# Patient Record
Sex: FEMALE | Race: WHITE | NOT HISPANIC OR LATINO | Employment: OTHER | URBAN - METROPOLITAN AREA
[De-identification: names, ages, dates, MRNs, and addresses within clinical notes are randomized per-mention and may not be internally consistent; named-entity substitution may affect disease eponyms.]

---

## 2017-11-09 ENCOUNTER — LAB REQUISITION (OUTPATIENT)
Dept: LAB | Facility: HOSPITAL | Age: 77
End: 2017-11-09
Payer: COMMERCIAL

## 2017-11-09 DIAGNOSIS — D12.5 BENIGN NEOPLASM OF SIGMOID COLON: ICD-10-CM

## 2017-11-09 DIAGNOSIS — K57.30 DIVERTICULOSIS OF LARGE INTESTINE WITHOUT PERFORATION OR ABSCESS WITHOUT BLEEDING: ICD-10-CM

## 2017-11-09 DIAGNOSIS — Z86.010 HISTORY OF COLONIC POLYPS: ICD-10-CM

## 2017-11-09 PROCEDURE — 88305 TISSUE EXAM BY PATHOLOGIST: CPT | Performed by: INTERNAL MEDICINE

## 2018-02-26 ENCOUNTER — TRANSCRIBE ORDERS (OUTPATIENT)
Dept: ADMINISTRATIVE | Facility: HOSPITAL | Age: 78
End: 2018-02-26

## 2018-02-26 DIAGNOSIS — R06.02 SHORTNESS OF BREATH: Primary | ICD-10-CM

## 2018-02-27 ENCOUNTER — TRANSCRIBE ORDERS (OUTPATIENT)
Dept: RADIOLOGY | Facility: HOSPITAL | Age: 78
End: 2018-02-27

## 2018-02-27 ENCOUNTER — HOSPITAL ENCOUNTER (OUTPATIENT)
Dept: RADIOLOGY | Facility: HOSPITAL | Age: 78
Discharge: HOME | End: 2018-02-27
Attending: INTERNAL MEDICINE
Payer: COMMERCIAL

## 2018-02-27 DIAGNOSIS — R06.02 SHORTNESS OF BREATH: ICD-10-CM

## 2018-02-27 PROCEDURE — 71275 CT ANGIOGRAPHY CHEST: CPT

## 2018-02-27 RX ADMIN — IOHEXOL 67 ML: 350 INJECTION, SOLUTION INTRAVENOUS at 11:23

## 2018-04-19 ENCOUNTER — TRANSCRIBE ORDERS (OUTPATIENT)
Dept: ADMINISTRATIVE | Facility: HOSPITAL | Age: 78
End: 2018-04-19

## 2018-04-20 ENCOUNTER — TRANSCRIBE ORDERS (OUTPATIENT)
Dept: ADMINISTRATIVE | Facility: HOSPITAL | Age: 78
End: 2018-04-20

## 2018-04-20 DIAGNOSIS — I15.2 ADRENAL HYPERTENSION (HCC): Primary | ICD-10-CM

## 2018-04-20 DIAGNOSIS — E27.9 ADRENAL HYPERTENSION (HCC): Primary | ICD-10-CM

## 2018-04-25 ENCOUNTER — HOSPITAL ENCOUNTER (OUTPATIENT)
Dept: RADIOLOGY | Age: 78
Discharge: HOME | End: 2018-04-25
Payer: COMMERCIAL

## 2018-04-25 DIAGNOSIS — E27.9 ADRENAL HYPERTENSION (HCC): ICD-10-CM

## 2018-04-25 DIAGNOSIS — I15.2 ADRENAL HYPERTENSION (HCC): ICD-10-CM

## 2018-04-25 PROCEDURE — 74150 CT ABDOMEN W/O CONTRAST: CPT

## 2019-02-13 ENCOUNTER — HOSPITAL ENCOUNTER (OUTPATIENT)
Dept: RADIOLOGY | Facility: HOSPITAL | Age: 79
Discharge: HOME | End: 2019-02-13
Attending: INTERNAL MEDICINE
Payer: COMMERCIAL

## 2019-02-13 ENCOUNTER — TRANSCRIBE ORDERS (OUTPATIENT)
Dept: ADMINISTRATIVE | Facility: HOSPITAL | Age: 79
End: 2019-02-13

## 2019-02-13 ENCOUNTER — APPOINTMENT (OUTPATIENT)
Dept: LAB | Facility: HOSPITAL | Age: 79
End: 2019-02-13
Attending: INTERNAL MEDICINE
Payer: COMMERCIAL

## 2019-02-13 ENCOUNTER — TRANSCRIBE ORDERS (OUTPATIENT)
Dept: LAB | Facility: HOSPITAL | Age: 79
End: 2019-02-13

## 2019-02-13 DIAGNOSIS — R06.02 SHORTNESS OF BREATH: Primary | ICD-10-CM

## 2019-02-13 DIAGNOSIS — R06.00 DYSPNEA, UNSPECIFIED TYPE: ICD-10-CM

## 2019-02-13 DIAGNOSIS — R06.00 DYSPNEA, UNSPECIFIED TYPE: Primary | ICD-10-CM

## 2019-02-13 DIAGNOSIS — R06.02 SHORTNESS OF BREATH: ICD-10-CM

## 2019-02-13 LAB
ALBUMIN SERPL BCP-MCNC: 3.8 G/DL (ref 3.5–5)
ALP SERPL-CCNC: 79 U/L (ref 46–116)
ALT SERPL W P-5'-P-CCNC: 56 U/L (ref 12–78)
ANION GAP SERPL CALCULATED.3IONS-SCNC: 7 MMOL/L (ref 4–13)
AST SERPL W P-5'-P-CCNC: 34 U/L (ref 5–45)
BASOPHILS # BLD AUTO: 0.02 THOUSANDS/ΜL (ref 0–0.1)
BASOPHILS NFR BLD AUTO: 0 % (ref 0–1)
BILIRUB SERPL-MCNC: 0.62 MG/DL (ref 0.2–1)
BUN SERPL-MCNC: 17 MG/DL (ref 5–25)
CALCIUM SERPL-MCNC: 9.2 MG/DL (ref 8.3–10.1)
CHLORIDE SERPL-SCNC: 101 MMOL/L (ref 100–108)
CO2 SERPL-SCNC: 29 MMOL/L (ref 21–32)
CREAT SERPL-MCNC: 0.96 MG/DL (ref 0.6–1.3)
EOSINOPHIL # BLD AUTO: 0.03 THOUSAND/ΜL (ref 0–0.61)
EOSINOPHIL NFR BLD AUTO: 0 % (ref 0–6)
ERYTHROCYTE [DISTWIDTH] IN BLOOD BY AUTOMATED COUNT: 12.1 % (ref 11.6–15.1)
GFR SERPL CREATININE-BSD FRML MDRD: 58 ML/MIN/1.73SQ M
GLUCOSE SERPL-MCNC: 223 MG/DL (ref 65–140)
HCT VFR BLD AUTO: 42.5 % (ref 34.8–46.1)
HGB BLD-MCNC: 13.9 G/DL (ref 11.5–15.4)
IMM GRANULOCYTES # BLD AUTO: 0.02 THOUSAND/UL (ref 0–0.2)
IMM GRANULOCYTES NFR BLD AUTO: 0 % (ref 0–2)
LYMPHOCYTES # BLD AUTO: 2.12 THOUSANDS/ΜL (ref 0.6–4.47)
LYMPHOCYTES NFR BLD AUTO: 28 % (ref 14–44)
MCH RBC QN AUTO: 31.4 PG (ref 26.8–34.3)
MCHC RBC AUTO-ENTMCNC: 32.7 G/DL (ref 31.4–37.4)
MCV RBC AUTO: 96 FL (ref 82–98)
MONOCYTES # BLD AUTO: 0.52 THOUSAND/ΜL (ref 0.17–1.22)
MONOCYTES NFR BLD AUTO: 7 % (ref 4–12)
NEUTROPHILS # BLD AUTO: 4.85 THOUSANDS/ΜL (ref 1.85–7.62)
NEUTS SEG NFR BLD AUTO: 65 % (ref 43–75)
NRBC BLD AUTO-RTO: 0 /100 WBCS
PLATELET # BLD AUTO: 256 THOUSANDS/UL (ref 149–390)
PMV BLD AUTO: 9.4 FL (ref 8.9–12.7)
POTASSIUM SERPL-SCNC: 3.6 MMOL/L (ref 3.5–5.3)
PROT SERPL-MCNC: 7.5 G/DL (ref 6.4–8.2)
RBC # BLD AUTO: 4.43 MILLION/UL (ref 3.81–5.12)
SODIUM SERPL-SCNC: 137 MMOL/L (ref 136–145)
WBC # BLD AUTO: 7.56 THOUSAND/UL (ref 4.31–10.16)

## 2019-02-13 PROCEDURE — 85025 COMPLETE CBC W/AUTO DIFF WBC: CPT

## 2019-02-13 PROCEDURE — 71275 CT ANGIOGRAPHY CHEST: CPT

## 2019-02-13 PROCEDURE — 36415 COLL VENOUS BLD VENIPUNCTURE: CPT

## 2019-02-13 PROCEDURE — 80053 COMPREHEN METABOLIC PANEL: CPT

## 2019-02-13 RX ADMIN — IOHEXOL 100 ML: 350 INJECTION, SOLUTION INTRAVENOUS at 16:02

## 2019-06-03 ENCOUNTER — TRANSCRIBE ORDERS (OUTPATIENT)
Dept: ADMINISTRATIVE | Age: 79
End: 2019-06-03

## 2019-06-03 ENCOUNTER — APPOINTMENT (OUTPATIENT)
Dept: RADIOLOGY | Age: 79
End: 2019-06-03
Payer: COMMERCIAL

## 2019-06-03 DIAGNOSIS — M25.551 RIGHT HIP PAIN: ICD-10-CM

## 2019-06-03 DIAGNOSIS — M25.551 RIGHT HIP PAIN: Primary | ICD-10-CM

## 2019-06-03 PROCEDURE — 73502 X-RAY EXAM HIP UNI 2-3 VIEWS: CPT

## 2019-06-28 ENCOUNTER — OFFICE VISIT (OUTPATIENT)
Dept: OBGYN CLINIC | Facility: MEDICAL CENTER | Age: 79
End: 2019-06-28
Payer: COMMERCIAL

## 2019-06-28 ENCOUNTER — APPOINTMENT (OUTPATIENT)
Dept: RADIOLOGY | Facility: CLINIC | Age: 79
End: 2019-06-28
Payer: COMMERCIAL

## 2019-06-28 VITALS — SYSTOLIC BLOOD PRESSURE: 124 MMHG | DIASTOLIC BLOOD PRESSURE: 84 MMHG | HEART RATE: 80 BPM | HEIGHT: 65 IN

## 2019-06-28 DIAGNOSIS — M25.551 PAIN IN RIGHT HIP: ICD-10-CM

## 2019-06-28 DIAGNOSIS — M16.11 PRIMARY OSTEOARTHRITIS OF ONE HIP, RIGHT: Primary | ICD-10-CM

## 2019-06-28 PROCEDURE — 73502 X-RAY EXAM HIP UNI 2-3 VIEWS: CPT

## 2019-06-28 PROCEDURE — 99203 OFFICE O/P NEW LOW 30 MIN: CPT | Performed by: ORTHOPAEDIC SURGERY

## 2019-06-28 RX ORDER — LEVOTHYROXINE SODIUM 25 MCG
25 TABLET ORAL DAILY
COMMUNITY
Start: 2019-04-12 | End: 2020-09-08

## 2019-06-28 RX ORDER — APIXABAN 5 MG/1
5 TABLET, FILM COATED ORAL 2 TIMES DAILY
COMMUNITY
Start: 2019-05-03 | End: 2020-12-14 | Stop reason: SDUPTHER

## 2019-06-28 RX ORDER — ATORVASTATIN CALCIUM 10 MG/1
10 TABLET, FILM COATED ORAL DAILY
COMMUNITY
Start: 2019-06-22 | End: 2021-06-05

## 2019-06-28 RX ORDER — AMLODIPINE BESYLATE 5 MG/1
5 TABLET ORAL DAILY
COMMUNITY
Start: 2019-04-01 | End: 2020-10-01

## 2019-06-28 RX ORDER — VALSARTAN AND HYDROCHLOROTHIAZIDE 160; 12.5 MG/1; MG/1
1 TABLET, FILM COATED ORAL DAILY
COMMUNITY
Start: 2019-04-12 | End: 2020-09-08

## 2019-07-08 ENCOUNTER — HOSPITAL ENCOUNTER (OUTPATIENT)
Dept: RADIOLOGY | Facility: MEDICAL CENTER | Age: 79
Discharge: HOME | End: 2019-07-08
Attending: PHYSICAL MEDICINE & REHABILITATION | Admitting: PHYSICAL MEDICINE & REHABILITATION
Payer: COMMERCIAL

## 2019-07-08 VITALS
RESPIRATION RATE: 18 BRPM | HEART RATE: 84 BPM | SYSTOLIC BLOOD PRESSURE: 151 MMHG | TEMPERATURE: 98.3 F | OXYGEN SATURATION: 93 % | DIASTOLIC BLOOD PRESSURE: 85 MMHG

## 2019-07-08 DIAGNOSIS — M16.11 PRIMARY OSTEOARTHRITIS OF RIGHT HIP: ICD-10-CM

## 2019-07-08 PROCEDURE — 20610 DRAIN/INJ JOINT/BURSA W/O US: CPT | Performed by: PHYSICAL MEDICINE & REHABILITATION

## 2019-07-08 PROCEDURE — 77002 NEEDLE LOCALIZATION BY XRAY: CPT

## 2019-07-08 PROCEDURE — 77002 NEEDLE LOCALIZATION BY XRAY: CPT | Performed by: PHYSICAL MEDICINE & REHABILITATION

## 2019-07-08 RX ORDER — LIDOCAINE HYDROCHLORIDE 10 MG/ML
5 INJECTION, SOLUTION EPIDURAL; INFILTRATION; INTRACAUDAL; PERINEURAL ONCE
Status: COMPLETED | OUTPATIENT
Start: 2019-07-08 | End: 2019-07-08

## 2019-07-08 RX ORDER — METHYLPREDNISOLONE ACETATE 40 MG/ML
40 INJECTION, SUSPENSION INTRA-ARTICULAR; INTRALESIONAL; INTRAMUSCULAR; PARENTERAL; SOFT TISSUE ONCE
Status: COMPLETED | OUTPATIENT
Start: 2019-07-08 | End: 2019-07-08

## 2019-07-08 RX ORDER — BUPIVACAINE HCL/PF 2.5 MG/ML
10 VIAL (ML) INJECTION ONCE
Status: COMPLETED | OUTPATIENT
Start: 2019-07-08 | End: 2019-07-08

## 2019-07-08 RX ADMIN — METHYLPREDNISOLONE ACETATE 40 MG: 40 INJECTION, SUSPENSION INTRA-ARTICULAR; INTRALESIONAL; INTRAMUSCULAR; PARENTERAL; SOFT TISSUE at 15:45

## 2019-07-08 RX ADMIN — IOHEXOL 2 ML: 300 INJECTION, SOLUTION INTRAVENOUS at 15:45

## 2019-07-08 RX ADMIN — Medication 3 ML: at 15:45

## 2019-07-08 RX ADMIN — LIDOCAINE HYDROCHLORIDE 1.5 ML: 10 INJECTION, SOLUTION EPIDURAL; INFILTRATION; INTRACAUDAL; PERINEURAL at 15:43

## 2019-07-08 NOTE — DISCHARGE INSTRUCTIONS
Steroid Joint Injection   WHAT YOU NEED TO KNOW:   A steroid joint injection is a procedure to inject steroid medicine into a joint  Steroid medicine decreases pain and inflammation  The injection may also contain an anesthetic (numbing medicine) to decrease pain  It may be done to treat conditions such as arthritis, gout, or carpal tunnel syndrome  The injections may be given in your knee, ankle, shoulder, elbow, wrist, ankle or sacroiliac joint  1  Do not apply heat to any area that is numb  If you have discomfort or soreness at the injection site, you may apply ice today, 20 minutes on and 20 minutes off  Tomorrow you may use ice or warm, moist heat  Do not apply ice or heat directly to the skin  2  You may have an increase or change in the discomfort for 36-48 hours after your treatment  Apply ice and continue with any pain medicine you have been prescribed  3  Do not do anything strenuous today  You may shower, but no tub baths or hot tubs today  You may resume your normal activities tomorrow, but do not overdo it  Resume normal activities slowly when you are feeling better  4  If you experience redness, drainage or swelling at the injection site, or if you develop a fever above 100 degrees, please call [facility] at [phone] or go to the Emergency Room  5  Continue to take all routine medicines prescribed by your primary care physician unless otherwise instructed by our staff  Most blood thinners should be started again according to your regularly scheduled dosing  If you have any questions, please give our office a call  If you have a problem specifically related to your procedure, please call our office at [phone]  Problems not related to your procedure should be directed to your primary care physician

## 2019-07-08 NOTE — H&P
History of Present Illness: The patient is a 70 y o  female who presents with complaints of right hip pain    There is no problem list on file for this patient  Past Medical History:   Diagnosis Date    Arthritis     Diabetes mellitus ([facility] [address] )     Hypertension        Past Surgical History:   Procedure Laterality Date    CHOLECYSTECTOMY      HYSTERECTOMY           Current Outpatient Medications:     amLODIPine (NORVASC) 5 mg tablet, Take 5 mg by mouth daily , Disp: , Rfl:     atorvastatin (LIPITOR) 10 mg tablet, Take 10 mg by mouth daily , Disp: , Rfl:     ELIQUIS 5 MG, Take 5 mg by mouth 2 (two) times a day , Disp: , Rfl:     SYNTHROID 25 MCG tablet, Take 25 mcg by mouth daily , Disp: , Rfl:     valsartan-hydrochlorothiazide (DIOVAN-HCT) 160-12 5 MG per tablet, Take 1 tablet by mouth daily , Disp: , Rfl:     Current Facility-Administered Medications:     bupivacaine (PF) (MARCAINE) 0 25 % injection 10 mL, 10 mL, Intra-articular, Once, Rikki Robison DO    iohexol (OMNIPAQUE) 300 mg/mL injection 50 mL, 50 mL, Injection, Once, Rikki Robison DO    lidocaine (PF) (XYLOCAINE-MPF) 1 % injection 5 mL, 5 mL, Infiltration, Once, Rikki Robison DO    methylPREDNISolone acetate (DEPO-MEDROL) injection 40 mg, 40 mg, Intra-articular, Once, Rikki Robison DO    No Known Allergies    Physical Exam:   Vitals:    07/08/19 1529   BP: 158/91   Pulse: 83   Resp: 18   Temp: 98 3 °F (36 8 °C)   SpO2: 95%     General: Awake, Alert, Oriented x 3, Mood and affect appropriate  Respiratory: Respirations even and unlabored  Cardiovascular: Peripheral pulses intact; no edema  Musculoskeletal Exam:  Right hip pain    ASA Score:  3  Patient/Chart Verification  Patient ID Verified: Verbal  Consents Confirmed: Procedural, To be obtained in the Pre-Procedure area  H&P( within 30 days) Verified: To be obtained in the Pre-Procedure area  Allergies Reviewed:  Yes  Anticoag/NSAID held?: NA  Currently on antibiotics?: No  Pregnancy denied?: NA    Assessment:   1   Primary osteoarthritis of right hip        Plan: RT HIP INJ - [place]

## 2019-07-11 ENCOUNTER — EVALUATION (OUTPATIENT)
Dept: PHYSICAL THERAPY | Age: 79
End: 2019-07-11
Payer: COMMERCIAL

## 2019-07-11 VITALS — DIASTOLIC BLOOD PRESSURE: 91 MMHG | SYSTOLIC BLOOD PRESSURE: 154 MMHG | HEART RATE: 86 BPM

## 2019-07-11 DIAGNOSIS — M25.551 PAIN IN RIGHT HIP: ICD-10-CM

## 2019-07-11 DIAGNOSIS — M16.11 PRIMARY OSTEOARTHRITIS OF ONE HIP, RIGHT: ICD-10-CM

## 2019-07-11 PROCEDURE — 97162 PT EVAL MOD COMPLEX 30 MIN: CPT

## 2019-07-11 NOTE — PROGRESS NOTES
PT Evaluation     Today's date: 2019  Patient name: Shashank Mensah  : 1940  MRN: [id number]  Referring provider: Rajni Castano DO  Dx:   Encounter Diagnosis     ICD-10-CM    1  Pain in right hip M25 551 Ambulatory referral to Physical Therapy   2  Primary osteoarthritis of one hip, right M16 11 Ambulatory referral to Physical Therapy                  Assessment  Assessment details: Patient is a 70 y o  Female reporting to PT with complaints of R hip pain  She presents with decreased R hip AROM in all planes, limited LE strength, and impaired ability to perform ADL's  Provocative hip testing was positive on the R side  No red flags or abnormal neurological findings observed  Overall, patient's presentation is consistent with R hip OA  At this time, patient would benefit from skilled PT intervention to address the impairments listed  Impairments: abnormal gait, abnormal or restricted ROM, activity intolerance, impaired physical strength, lacks appropriate home exercise program, pain with function, weight-bearing intolerance and poor body mechanics    Symptom irritability: moderateBarriers to therapy: None  Understanding of Dx/Px/POC: good   Prognosis: good    Goals  STG's: 4 Weeks  1 ) Patient will be independent with HEP   2 ) Patient will be able to walk 2-3 city blocks with <3/10 pain  3 ) Patient will exhibit 25% improvement in R hip AROM in all planes  LTG's: 8 Weeks  1 ) Patient will exhibit 50% improvement in R hip AROM in all planes  2 ) Patient will tolerate standing while doing dishes without discomfort  3 ) Patient will exhibit 4+/5 RLE strength in all planes  4 ) Patient will be able to ascend/descend stairs using reciprocal pattern with <3/10 pain  Plan  Plan details: Patient was provided with an HEP  They were educated regarding repetitions, resistance, and proper technique  Patient demonstrated understanding verbally       Patient would benefit from: skilled physical therapy  Planned therapy interventions: manual therapy, joint mobilization, balance, functional ROM exercises, flexibility, patient education, strengthening, stretching, therapeutic activities, therapeutic exercise and home exercise program  Frequency: 1x week  Duration in weeks: 8  Treatment plan discussed with: patient        Subjective Evaluation    History of Present Illness  Mechanism of injury: Patient is a 70 y o  Female reporting to PT with complaints of R hip pain  She has had a long history of hip pain  Sx's have worsened significantly over the past 3 months  She was seen by ortho who recommended either a LANCE or a CSI  Patient elected to have a CSI on   She has experienced significant relief ever since  Patient was then referred for PT  X-ray performed on  shows Severe degenerative osteoarthritis of the hip with joint space narrowing and osteophyte formation of the acetabulum and femoral head  No significant change  Patient's sx's include dull pain around the groin region, which extends into the thigh  Occasional clicking and catching with pivoting on the R side  Patient denies N/T  Functional Limitations  Walking: able to walk ~75% of the way across [facility] before significant hip pain  Standing: discomfort with prolonged standing >1 hour          Sleeping: significant difficulty sleeping on R side; improved following CSI  Stairs: able to perform since CSI, but one step at a time    Patient consented to manual therapy and physical examination  Demonstrated understanding verbally                    Recurrent probem    Quality of life: good    Pain  Current pain ratin  At best pain ratin  At worst pain ratin  Location: R Hip, Groin  Quality: cramping, dull ache, throbbing, tight, sharp, pressure and discomfort  Relieving factors: change in position, relaxation, rest, support and medications  Aggravating factors: standing, walking and stair climbing    Social Support  Stairs in house: no   Lives in: [place]  Lives with: spouse      Diagnostic Tests  X-ray: abnormal  Treatments  Previous treatment: injection treatment  Patient Goals  Patient goals for therapy: decreased pain, increased strength, increased motion, improved balance and decreased edema          Objective     Concurrent Complaints  Negative for night pain, disturbed sleep, bladder dysfunction, bowel dysfunction, saddle (S4) numbness, cardiac problem, kidney problem, gallbladder problem, stomach problem, ulcer, appendix problem, spleen problem, pancreas problem, history of cancer, history of trauma and infection    Neurological Testing     Sensation     Lumbar   Left   Intact: light touch    Right   Intact: light touch    Reflexes   Left   Patellar (L4): normal (2+)  Achilles (S1): normal (2+)    Right   Patellar (L4): normal (2+)  Achilles (S1): normal (2+)    Active Range of Motion   Left Hip   Flexion: 95 degrees   Abduction: 25 degrees   External rotation (90/90): 38 degrees   Internal rotation (90/90): 30 degrees     Right Hip   Flexion: 75 degrees with pain  Abduction: 20 degrees with pain  External rotation (90/90): 20 degrees with pain  Internal rotation (90/90): 30 degrees with pain    Additional Active Range of Motion Details  SLR: 50 / 55 degrees     Strength/Myotome Testing     Left Hip   Planes of Motion   Flexion: 4+  Extension: 4-  Abduction: 4    Right Hip   Planes of Motion   Flexion: 4+  Extension: 4-  Abduction: 4    Left Knee   Flexion: 5  Extension: 5    Right Knee   Flexion: 5  Extension: 5    Left Ankle/Foot   Dorsiflexion: 5    Right Ankle/Foot   Dorsiflexion: 5    Tests     Right Hip   Positive BINCATE and saira       General Comments:    Lower quarter screen   Hips: unremarkable  Knees: unremarkable  Foot/ankle: unremarkable             Precautions: HT      Manual  7/11  IE            R Hip AROM                                                                     Exercise Diary  7/11  IE Nustep             H/S Stretch             Hip Flexor Stretch              Heel Slides (Flex/Abd)             Bridges             SLR x 3             TB Clamshells             Standing H' Abd/Ext             [facility]                                                                                                                                      Modalities

## 2019-07-15 ENCOUNTER — TELEPHONE (OUTPATIENT)
Dept: PAIN MEDICINE | Facility: CLINIC | Age: 79
End: 2019-07-15

## 2019-07-22 NOTE — TELEPHONE ENCOUNTER
Patient is calling in with an update  Patient said that she is doing good  Her pain level is a 1-2/10, the injection has helped 80-90%

## 2019-07-23 ENCOUNTER — OFFICE VISIT (OUTPATIENT)
Dept: PHYSICAL THERAPY | Age: 79
End: 2019-07-23
Payer: COMMERCIAL

## 2019-07-23 DIAGNOSIS — M25.551 PAIN IN RIGHT HIP: Primary | ICD-10-CM

## 2019-07-23 DIAGNOSIS — M16.11 PRIMARY OSTEOARTHRITIS OF ONE HIP, RIGHT: ICD-10-CM

## 2019-07-23 PROCEDURE — 97140 MANUAL THERAPY 1/> REGIONS: CPT

## 2019-07-23 PROCEDURE — 97110 THERAPEUTIC EXERCISES: CPT

## 2019-07-23 NOTE — PROGRESS NOTES
Daily Note     Today's date: 2019  Patient name: Mei Venegas  : 1940  MRN: [id number]  Referring provider: Teena Wilhelm DO  Dx:   Encounter Diagnosis     ICD-10-CM    1  Pain in right hip M25 551    2  Primary osteoarthritis of one hip, right M16 11                   Subjective: Patient presents feeling well overall  0/10 pain noted  She was fairly adherent with HEP while on vacation  Offers no new complaints  Patient has elected to be done with PT and to continue with exercises at home due to high co-pay  She demonstrates understanding of how to perform the exercises verbally  Objective: See treatment diary below      Assessment: Patient tolerated tx session fairly well  She was unable to perform exercises with resisted hip abduction against gravity due to pain around the greater trochanter  Otherwise able to perform all other PRE's without difficulty  Patient demonstrated fatigue post treatment, exhibited good technique with therapeutic exercises and would benefit from continued PT      Plan: Patient has discharged herself from PT  Precautions: HT      Manual    IE            R Hip AROM  10'                                                                   Exercise Diary    IE            Nustep  10'           H/S Stretch  5x30"           Hip Flexor Stretch   5x30"           Bridges  20x           SLR x 3  SLR/PLR  2#  20x           Supine  TB Clamshells  Green  20x           Standing H' Abd/Ext  20x EA             Marches  30x           HR  30x           Monster Walks             Leg Press  55#  30x                                                                                                                       Modalities

## 2019-08-09 ENCOUNTER — APPOINTMENT (OUTPATIENT)
Dept: RADIOLOGY | Facility: MEDICAL CENTER | Age: 79
End: 2019-08-09
Payer: COMMERCIAL

## 2019-08-09 ENCOUNTER — OFFICE VISIT (OUTPATIENT)
Dept: OBGYN CLINIC | Facility: MEDICAL CENTER | Age: 79
End: 2019-08-09
Payer: COMMERCIAL

## 2019-08-09 ENCOUNTER — TELEPHONE (OUTPATIENT)
Dept: SURGICAL ONCOLOGY | Facility: CLINIC | Age: 79
End: 2019-08-09

## 2019-08-09 VITALS
DIASTOLIC BLOOD PRESSURE: 78 MMHG | WEIGHT: 206.6 LBS | HEIGHT: 65 IN | SYSTOLIC BLOOD PRESSURE: 142 MMHG | HEART RATE: 84 BPM | BODY MASS INDEX: 34.42 KG/M2

## 2019-08-09 DIAGNOSIS — Z01.818 PREOPERATIVE TESTING: ICD-10-CM

## 2019-08-09 DIAGNOSIS — M16.11 PRIMARY OSTEOARTHRITIS OF ONE HIP, RIGHT: ICD-10-CM

## 2019-08-09 DIAGNOSIS — M16.11 PRIMARY OSTEOARTHRITIS OF ONE HIP, RIGHT: Primary | ICD-10-CM

## 2019-08-09 PROCEDURE — 73502 X-RAY EXAM HIP UNI 2-3 VIEWS: CPT

## 2019-08-09 PROCEDURE — 99213 OFFICE O/P EST LOW 20 MIN: CPT | Performed by: ORTHOPAEDIC SURGERY

## 2019-08-09 RX ORDER — CHLORHEXIDINE GLUCONATE 4 G/100ML
SOLUTION TOPICAL DAILY PRN
Status: CANCELLED | OUTPATIENT
Start: 2019-08-09

## 2019-08-09 RX ORDER — FERROUS SULFATE TAB EC 324 MG (65 MG FE EQUIVALENT) 324 (65 FE) MG
324 TABLET DELAYED RESPONSE ORAL
Qty: 30 TABLET | Refills: 1 | Status: SHIPPED | OUTPATIENT
Start: 2019-08-09 | End: 2020-12-09

## 2019-08-09 RX ORDER — SODIUM CHLORIDE 9 MG/ML
75 INJECTION, SOLUTION INTRAVENOUS CONTINUOUS
Status: CANCELLED | OUTPATIENT
Start: 2019-10-22

## 2019-08-09 RX ORDER — CHLORHEXIDINE GLUCONATE 0.12 MG/ML
15 RINSE ORAL ONCE
Status: CANCELLED | OUTPATIENT
Start: 2019-10-22 | End: 2019-08-09

## 2019-08-09 RX ORDER — CEFAZOLIN SODIUM 2 G/50ML
2000 SOLUTION INTRAVENOUS ONCE
Status: CANCELLED | OUTPATIENT
Start: 2019-10-22 | End: 2019-08-09

## 2019-08-09 RX ORDER — FOLIC ACID 1 MG/1
1 TABLET ORAL DAILY
Qty: 30 TABLET | Refills: 1 | Status: SHIPPED | OUTPATIENT
Start: 2019-08-09 | End: 2020-12-09

## 2019-08-09 RX ORDER — ACETAMINOPHEN 325 MG/1
975 TABLET ORAL ONCE
Status: CANCELLED | OUTPATIENT
Start: 2019-10-22 | End: 2019-08-09

## 2019-08-09 RX ORDER — GABAPENTIN 300 MG/1
300 CAPSULE ORAL ONCE
Status: CANCELLED | OUTPATIENT
Start: 2019-10-22 | End: 2019-08-09

## 2019-08-09 RX ORDER — ASCORBIC ACID 500 MG
500 TABLET ORAL DAILY
Qty: 30 TABLET | Refills: 1 | Status: SHIPPED | OUTPATIENT
Start: 2019-08-09 | End: 2020-12-09

## 2019-08-09 NOTE — PROGRESS NOTES
Assessment/Plan     1  Primary osteoarthritis of one hip, right    2  Preoperative testing      Orders Placed This Encounter   Procedures    Urine culture    Comprehensive metabolic panel    Hemoglobin A1C W/EAG Estimation    CBC and differential    C-reactive protein    Protime-INR    APTT    UA (URINE) with reflex to Microscopic    Ambulatory referral to [address] Ambulatory referral to Physical Therapy    Ambulatory referral to Hematology / Oncology    Ambulatory referral to Dentistry    Type and screen     · Ms Bronson has severe R hip arthritis  She has tried and failed conservative treatment including tylenol, PT, and steroid injection  She is unable to take NSAIDs  We discussed her treatment options as well as risks and benefits of her treatment options  She would like to move ahead with a right total hip arthroplasty  The risks of surgery include, but are not limited to infection, blood clot, wound healing problems, blood loss, damage to blood vessels and nerves, persistent pain and stiffness, dislocation, fracture, leg-length discrepancy, need for additional surgery, need for revision surgery, failure of hardware, heart attack, stroke, death  The patient understood and agreed to by oral and written consent  I answered all questions regarding surgery  · Discussed with patient and her  surgery for Right posterior  total hip arthroplasty  She is aware there will be precautions after surgery  · Discussed with patient pros and cons about posterior vs anterior total hip arthroplasty   · Patient will need clearance from PCP,dental and hematology prior to surgery   · Will prescribed patient Vit C, Folic Acid and Iron to take 30 days before surgery   · She will likely resume Eliquis after surgery unless otherwise suggested by Hematology    Return for 10-14 days after surgery      I answered all of the patient's questions during the visit and provided education of the patient's condition during the visit  The patient verbalized understanding of the information given and agrees with the plan  This note was dictated using [employer] software  It may contain errors including improperly dictated words  Please contact physician directly for any questions  Subjective   Chief Complaint:   Chief Complaint   Patient presents with    Right Hip - Follow-up       Jean Mercado is a 70 y o  female who presents for follow up for right hip OA  Patient had right IA hip steroid injection on 7/8/19  Patient states the first day she had 80% relief , then had 60-70% relief for a week and then started to have increase pain  She notes the right anterior thigh pain has subsided since the injection  She is having constant achy pain right groin radiating to lateral and posterior hip  She notes achy pain when sitting and sharp pain with transitional positions, getting in and out of car and getting up and down steps  She is taking Tylenol prn for pain  She has tried PT, but it made her symptoms worse  She can not take NSAIDS due to taking Eliquis for 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  Patient has no history of DVT or MRSA, she does not see a cardiologist or dentist  She is a Diabetic  Review of Systems  See [facility] for musculoskeletal review     All other systems reviewed are negative     History:  Past Medical History:   Diagnosis Date    Arthritis     Diabetes mellitus ([address] )     Hypertension      Past Surgical History:   Procedure Laterality Date    CHOLECYSTECTOMY      HYSTERECTOMY       Social History   Social History     Substance and Sexual Activity   Alcohol Use Not on file     Social History     Substance and Sexual Activity   Drug Use Not on file     Social History     Tobacco Use   Smoking Status Never Smoker   Smokeless Tobacco Never Used     Family History:   Family History   Problem Relation Age of Onset    Hypertension Family        Current Outpatient Medications on File Prior to Visit   Medication Sig Dispense Refill    amLODIPine (NORVASC) 5 mg tablet Take 5 mg by mouth daily       atorvastatin (LIPITOR) 10 mg tablet Take 10 mg by mouth daily       ELIQUIS 5 MG Take 5 mg by mouth 2 (two) times a day       SYNTHROID 25 MCG tablet Take 25 mcg by mouth daily       valsartan-hydrochlorothiazide (DIOVAN-HCT) 160-12 5 MG per tablet Take 1 tablet by mouth daily        No current facility-administered medications on file prior to visit        No Known Allergies     Objective     /78   Pulse 84   Ht 5' 4 5" (1 638 m)   Wt 93 7 kg (206 lb 9 6 oz)   BMI 34 92 kg/m²      PE:  AAOx 3  WDWN  Hearing intact, no drainage from eyes  no audible wheezing  no abdominal distension  LE compartments soft, skin intact    right hip:   No dislocation/deformity  ROM: FF: 0-95, IR: 0-10, ER: 0-20  +TTP over greater trochanter  Leg lengths appear equal    RLE: AT/GS intact      Scribe Attestation    I,:   Jacqueline Hernandez am acting as a scribe while in the presence of the attending physician :        I,:   Holden Murguia DO personally performed the services described in this documentation    as scribed in my presence :

## 2019-08-09 NOTE — TELEPHONE ENCOUNTER
New Patient Encounter      New Patient Intake Form   Patient Details:  Oksana Matta  1940  [id number]    Background Information:  [address] starts by opening a telephone encounter and gathering the following information   Who is calling to schedule? If not self, relationship to patient? provider   Referring Provider POLLO Driscoll   What is the diagnosis? Surg  clearance  Hx of PE   When was the diagnosis? 8/2019   Is patient aware of diagnosis? Yes   Reason for visit? History Of   Have you had any testing done? If so: when, where? No   Are records in EPIC? Yes   Was the patient told to bring a disk? No   Scheduling Information:   Preferred [place]:  [place]   Requesting Specific Provider? no   Are there any dates/time the patient cannot be seen? no   Counseling Pre-Screen:  If the patient answers YES to any of the below questions, please route to the appropriate location specific counselor    Have you felt anxious or worried about cancer and the treatment you are receiving? No   Has your diagnosis caused physical, emotional, or financial hardship for you? No   Do you anticipate any transportation issues to get to your appointment? No   Miscellaneous: scheduled pt with Dr Terri Garcia   After completing the above information, please route to Financial Counselor and the appropriate Nurse Navigator for review

## 2019-09-02 ENCOUNTER — HOSPITAL ENCOUNTER (EMERGENCY)
Facility: HOSPITAL | Age: 79
Discharge: HOME | End: 2019-09-02
Attending: EMERGENCY MEDICINE
Payer: COMMERCIAL

## 2019-09-02 ENCOUNTER — APPOINTMENT (EMERGENCY)
Dept: RADIOLOGY | Facility: HOSPITAL | Age: 79
End: 2019-09-02
Payer: COMMERCIAL

## 2019-09-02 VITALS
DIASTOLIC BLOOD PRESSURE: 104 MMHG | HEIGHT: 63 IN | SYSTOLIC BLOOD PRESSURE: 201 MMHG | WEIGHT: 202 LBS | BODY MASS INDEX: 35.79 KG/M2 | TEMPERATURE: 98.4 F | RESPIRATION RATE: 22 BRPM | OXYGEN SATURATION: 99 % | HEART RATE: 93 BPM

## 2019-09-02 DIAGNOSIS — M25.551 RIGHT HIP PAIN: ICD-10-CM
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
## 2019-09-02 PROCEDURE — 73564 X-RAY EXAM KNEE 4 OR MORE: CPT

## 2019-09-02 PROCEDURE — 99284 EMERGENCY DEPT VISIT MOD MDM: CPT

## 2019-09-02 PROCEDURE — 99284 EMERGENCY DEPT VISIT MOD MDM: CPT | Performed by: EMERGENCY MEDICINE

## 2019-09-02 PROCEDURE — 73700 CT LOWER EXTREMITY W/O DYE: CPT

## 2019-09-02 RX ORDER — OXYCODONE HYDROCHLORIDE 5 MG/1
5 TABLET ORAL ONCE
Status: COMPLETED | OUTPATIENT
Start: 2019-09-02 | End: 2019-09-02

## 2019-09-02 RX ORDER — LIDOCAINE 50 MG/G
1 PATCH TOPICAL ONCE
Status: DISCONTINUED | OUTPATIENT
Start: 2019-09-02 | End: 2019-09-02 | Stop reason: HOSPADM

## 2019-09-02 RX ADMIN — LIDOCAINE 1 PATCH: 50 PATCH CUTANEOUS at 01:28

## 2019-09-02 RX ADMIN — OXYCODONE HYDROCHLORIDE 5 MG: 5 TABLET ORAL at 01:28

## 2019-09-02 NOTE — ED PROVIDER NOTES
History  Chief Complaint   Patient presents with    Fall     Pt states she was in the bathroom when she had a mechanical fall, landing on the left knee and having her right leg go out to the side  Pt now having right hip pain, denies hitting head  70year old female with history of osteoarthritis presents to ED s/p mechanical fall with worsening right hip pain  Patient says that prior to arrival she was going to the bathroom and missed her footing causing her to fall on her left knee while her right leg went to the side  She is on Eliquis but denies head injury or LOC  She was able to get up on her own and has been ambulatory but with severe right hip pain radiating into her groin  She is supposed to be getting a right hip replacement within the next couple of months  She denies any new knee pain or any other injuries or complaints  Prior to Admission Medications   Prescriptions Last Dose Informant Patient Reported? Taking?    ELIQUIS 5 MG 9/1/2019 at Unknown time Self Yes Yes   Sig: Take 5 mg by mouth 2 (two) times a day    SYNTHROID 25 MCG tablet 9/1/2019 at Unknown time Self Yes Yes   Sig: Take 25 mcg by mouth daily    amLODIPine (NORVASC) 5 mg tablet 9/1/2019 at Unknown time Self Yes Yes   Sig: Take 5 mg by mouth daily    ascorbic acid (VITAMIN C) 500 mg tablet 9/1/2019 at Unknown time  No Yes   Sig: Take 1 tablet (500 mg total) by mouth daily Start to take 30 days before surgery   atorvastatin (LIPITOR) 10 mg tablet 9/1/2019 at Unknown time Self Yes Yes   Sig: Take 10 mg by mouth daily    ferrous sulfate 324 (65 Fe) mg 9/1/2019 at Unknown time  No Yes   Sig: Take 1 tablet (324 mg total) by mouth daily before breakfast Start to take 30 days before surgery   folic acid (FOLVITE) 1 mg tablet 9/1/2019 at Unknown time  No Yes   Sig: Take 1 tablet (1 mg total) by mouth daily Start to take 30 days before surgery   valsartan-hydrochlorothiazide (DIOVAN-HCT) 160-12 5 MG per tablet 9/1/2019 at Unknown time Self Yes Yes   Sig: Take 1 tablet by mouth daily       Facility-Administered Medications: None       Past Medical History:   Diagnosis Date    Arthritis     Diabetes mellitus ([facility] [address] )     Hypertension        Past Surgical History:   Procedure Laterality Date    CHOLECYSTECTOMY      HYSTERECTOMY         Family History   Problem Relation Age of Onset    Hypertension Family      I have reviewed and agree with the history as documented  Social History     Tobacco Use    Smoking status: Never Smoker    Smokeless tobacco: Never Used   Substance Use Topics    Alcohol use: Not Currently    Drug use: Not Currently        Review of Systems   Constitutional: Negative  Negative for chills and fever  HENT: Negative  Negative for rhinorrhea  Eyes: Negative  Respiratory: Negative  Negative for cough and shortness of breath  Cardiovascular: Negative  Negative for chest pain and leg swelling  Gastrointestinal: Negative  Negative for abdominal pain, diarrhea, nausea and vomiting  Genitourinary: Negative  Negative for dysuria, flank pain and frequency  Musculoskeletal: Negative for back pain and neck pain  Right hip pain   Skin: Negative  Negative for rash  Neurological: Negative  Negative for light-headedness and headaches  All other systems reviewed and are negative  Physical Exam  ED Triage Vitals [09/02/19 0113]   Temperature Pulse Respirations Blood Pressure SpO2   98 4 °F (36 9 °C) 93 22 (!) 201/104 99 %      Temp Source Heart Rate Source Patient Position - Orthostatic VS BP Location FiO2 (%)   Oral Monitor Lying Left arm --      Pain Score       Worst Possible Pain             Orthostatic Vital Signs  Vitals:    09/02/19 0113   BP: (!) 201/104   Pulse: 93   Patient Position - Orthostatic VS: Lying       Physical Exam   Constitutional: She is oriented to person, place, and time  She appears well-developed and well-nourished  No distress     HENT:   Head: Normocephalic and atraumatic  Mouth/Throat: Oropharynx is clear and moist    Eyes: EOM are normal    Neck: Normal range of motion  Neck supple  Cardiovascular: Normal rate, regular rhythm, normal heart sounds and intact distal pulses  Exam reveals no gallop and no friction rub  No murmur heard  Pulmonary/Chest: Effort normal and breath sounds normal  No respiratory distress  She has no wheezes  She has no rales  Abdominal: Soft  There is no tenderness  There is no rebound and no guarding  Musculoskeletal: She exhibits tenderness (tender over right hip without any overlying ecchymosis or effusion)  She exhibits no edema  Neurological: She is alert and oriented to person, place, and time  No cranial nerve deficit  Clear fluent speech   Skin: Skin is warm and dry  Capillary refill takes less than 2 seconds  Psychiatric: She has a normal mood and affect  Nursing note and vitals reviewed  ED Medications  Medications   lidocaine (LIDODERM) 5 % patch 1 patch (1 patch Topical Medication Applied 9/2/19 0128)   oxyCODONE (ROXICODONE) IR tablet 5 mg (5 mg Oral Given 9/2/19 0128)       Diagnostic Studies  Results Reviewed     None                 CT hip right without contrast   Final Result by Jaylene Lindsey MD (09/02 0253)      No acute fracture or dislocation  Workstation performed: [id number]         XR knee 4+ vw left injury    (Results Pending)         Procedures  Procedures        ED Course           Identification of Seniors at Risk      Most Recent Value   (ISAR) Identification of Seniors at Risk   Before the illness or injury that brought you to the Emergency, did you need someone to help you on a regular basis? 0 Filed at: 09/02/2019 0115   In the last 24 hours, have you needed more help than usual?  0 Filed at: 09/02/2019 5959   Have you been hospitalized for one or more nights during the past 6 months?   0 Filed at: 09/02/2019 0115   In general, do you see well?  0 Filed at: 09/02/2019 0115   In general, do you have serious problems with your memory? 0 Filed at: 09/02/2019 0115   Do you take more than three different medications every day? 1 Filed at: 09/02/2019 0115   ISAR Score  1 Filed at: 09/02/2019 0115                          [facility]  Number of Diagnoses or Management Options  Fall, initial encounter:   Right hip pain:   Diagnosis management comments: 70year old female with history of osteoarthritis presents to ED s/p mechanical fall with worsening right hip pain  Xray of left knee and CT of right hip are negative for acute injury  Advised tylenol and lidoderm patches and follow up with her orthopedic surgeon this week  She has a walker at home she can use  Patient agrees with plan and remains good for discharge  Disposition  Final diagnoses:   Fall, initial encounter   Right hip pain     Time reflects when diagnosis was documented in both MDM as applicable and the Disposition within this note     Time User Action Codes Description Comment    9/2/2019  3:14 AM Heidi Reyes Add [W19  Darene Doyne Fall, initial encounter     9/2/2019  3:14 AM Heidi Reyes Add [M25 551] Right hip pain       ED Disposition     ED Disposition Condition Date/Time Comment    Discharge Good Mon Sep 2, 2019  3:14 AM Batool Bronson discharge to home/self care              Follow-up Information     Follow up With Specialties Details Why Contact Info Additional Information    Tk Seals MD Internal Medicine In 1 day To make an appointment [address]       [address] Emergency Department Emergency Medicine Go to  If symptoms worsen [address]  [phone]  ED, [address] [place] [address], [phone]          Discharge Medication List as of 9/2/2019  3:14 AM      CONTINUE these medications which have NOT CHANGED    Details   amLODIPine (NORVASC) 5 mg tablet Take 5 mg by mouth daily , Starting Mon 4/1/2019, Historical Med ascorbic acid (VITAMIN C) 500 mg tablet Take 1 tablet (500 mg total) by mouth daily Start to take 30 days before surgery, Starting Fri 8/9/2019, Normal      atorvastatin (LIPITOR) 10 mg tablet Take 10 mg by mouth daily , Starting Sat 6/22/2019, Historical Med      ELIQUIS 5 MG Take 5 mg by mouth 2 (two) times a day , Starting Fri 5/3/2019, Historical Med      ferrous sulfate 324 (65 Fe) mg Take 1 tablet (324 mg total) by mouth daily before breakfast Start to take 30 days before surgery, Starting Fri 2/8/8753, Normal      folic acid (FOLVITE) 1 mg tablet Take 1 tablet (1 mg total) by mouth daily Start to take 30 days before surgery, Starting Fri 8/9/2019, Normal      SYNTHROID 25 MCG tablet Take 25 mcg by mouth daily , Starting Fri 4/12/2019, Historical Med      valsartan-hydrochlorothiazide (DIOVAN-HCT) 160-12 5 MG per tablet Take 1 tablet by mouth daily , Starting Fri 4/12/2019, Historical Med           No discharge procedures on file  ED Provider  Attending physically available and evaluated Yazan Holt I managed the patient along with the ED Attending      Electronically Signed by         Angel Cano MD  09/02/19 5318

## 2019-09-02 NOTE — ED ATTENDING ATTESTATION
BRIJESH, [address] [place] [address], DO, saw and evaluated the patient  I have discussed the patient with the resident/non-physician practitioner and agree with the resident's/non-physician practitioner's findings, Plan of Care, and MDM as documented in the resident's/non-physician practitioner's note, except where noted  All available labs and Radiology studies were reviewed  I was present for key portions of any procedure(s) performed by the resident/non-physician practitioner and I was immediately available to provide assistance  At this point I agree with the current assessment done in the Emergency Department  I have conducted an independent evaluation of this patient a history and physical is as follows:    51-year-old female presents status post mechanical fall  Patient states she was standing in the bathroom, spread something and then slipped landing on her left knee and her right leg went out  Patient states she is having right hip pain  Is scheduled to have surgery on her right hip in November  Denies hitting her head, no loss of consciousness  Does take Xarelto for PEs  Denies other complaints  On exam-no acute distress, heart regular, no respiratory distress, minimal to no tenderness to the left knee without obvious ecchymosis or swelling, tender right hip, pain with external rotation and flexion    Plan-image right hip and left knee    Critical Care Time  Procedures

## 2019-09-03 ENCOUNTER — TELEPHONE (OUTPATIENT)
Dept: OBGYN CLINIC | Facility: MEDICAL CENTER | Age: 79
End: 2019-09-03

## 2019-09-03 NOTE — TELEPHONE ENCOUNTER
Patient called on Monday 9/2 and left vmail regarding a fall she had on Sunday 9/1  She stated she spent time in the ED yesterday  She has an upcoming surgery on her hip and she is in extreme pain  Wants a call back       number: [phone]

## 2019-09-03 NOTE — TELEPHONE ENCOUNTER
Patient is scheduled at this time for November to have her hip replaced  She was interested in having it sooner due to her increased pain  Upon return from vacation, Dr Zaira Velasquez can discuss this with scheduling  Patient is aware that she is out at this time

## 2019-09-09 NOTE — TELEPHONE ENCOUNTER
Esequiel Villanueva,     This patient can have her surgery any time after 10/8/19 from what I can see  Please call her to reschedule  Thank you!

## 2019-09-29 ENCOUNTER — ANESTHESIA EVENT (OUTPATIENT)
Dept: PERIOP | Facility: HOSPITAL | Age: 79
DRG: 470 | End: 2019-09-29
Payer: COMMERCIAL

## 2019-09-29 RX ORDER — SODIUM CHLORIDE 9 MG/ML
125 INJECTION, SOLUTION INTRAVENOUS CONTINUOUS
Status: CANCELLED | OUTPATIENT
Start: 2019-10-22

## 2019-09-30 ENCOUNTER — APPOINTMENT (OUTPATIENT)
Dept: PREADMISSION TESTING | Facility: HOSPITAL | Age: 79
End: 2019-09-30
Payer: COMMERCIAL

## 2019-09-30 ENCOUNTER — APPOINTMENT (OUTPATIENT)
Dept: LAB | Facility: HOSPITAL | Age: 79
End: 2019-09-30
Attending: ORTHOPAEDIC SURGERY
Payer: COMMERCIAL

## 2019-09-30 ENCOUNTER — HOSPITAL ENCOUNTER (OUTPATIENT)
Dept: NON INVASIVE DIAGNOSTICS | Facility: HOSPITAL | Age: 79
Discharge: HOME | End: 2019-09-30
Attending: ORTHOPAEDIC SURGERY
Payer: COMMERCIAL

## 2019-09-30 DIAGNOSIS — Z01.818 PREOPERATIVE TESTING: ICD-10-CM

## 2019-09-30 DIAGNOSIS — Z01.818 PREOP EXAMINATION: ICD-10-CM

## 2019-09-30 DIAGNOSIS — M16.11 PRIMARY OSTEOARTHRITIS OF ONE HIP, RIGHT: ICD-10-CM

## 2019-09-30 LAB
ABO GROUP BLD: NORMAL
ALBUMIN SERPL BCP-MCNC: 3.9 G/DL (ref 3.5–5)
ALP SERPL-CCNC: 70 U/L (ref 46–116)
ALT SERPL W P-5'-P-CCNC: 26 U/L (ref 12–78)
ANION GAP SERPL CALCULATED.3IONS-SCNC: 10 MMOL/L (ref 4–13)
APTT PPP: 32 SECONDS (ref 23–37)
AST SERPL W P-5'-P-CCNC: 19 U/L (ref 5–45)
ATRIAL RATE: 75 BPM
BACTERIA UR QL AUTO: ABNORMAL /HPF
BASOPHILS # BLD AUTO: 0.02 THOUSANDS/ΜL (ref 0–0.1)
BASOPHILS NFR BLD AUTO: 0 % (ref 0–1)
BILIRUB SERPL-MCNC: 0.49 MG/DL (ref 0.2–1)
BILIRUB UR QL STRIP: NEGATIVE
BLD GP AB SCN SERPL QL: NEGATIVE
BUN SERPL-MCNC: 15 MG/DL (ref 5–25)
CALCIUM SERPL-MCNC: 9.3 MG/DL (ref 8.3–10.1)
CHLORIDE SERPL-SCNC: 100 MMOL/L (ref 100–108)
CLARITY UR: CLEAR
CO2 SERPL-SCNC: 29 MMOL/L (ref 21–32)
COLOR UR: YELLOW
CREAT SERPL-MCNC: 0.84 MG/DL (ref 0.6–1.3)
CRP SERPL QL: <3 MG/L
EOSINOPHIL # BLD AUTO: 0.02 THOUSAND/ΜL (ref 0–0.61)
EOSINOPHIL NFR BLD AUTO: 0 % (ref 0–6)
ERYTHROCYTE [DISTWIDTH] IN BLOOD BY AUTOMATED COUNT: 12.1 % (ref 11.6–15.1)
EST. AVERAGE GLUCOSE BLD GHB EST-MCNC: 146 MG/DL
FERRITIN SERPL-MCNC: 66 NG/ML (ref 8–388)
GFR SERPL CREATININE-BSD FRML MDRD: 68 ML/MIN/1.73SQ M
GLUCOSE SERPL-MCNC: 122 MG/DL (ref 65–140)
GLUCOSE UR STRIP-MCNC: NEGATIVE MG/DL
HBA1C MFR BLD: 6.7 % (ref 4.2–6.3)
HCT VFR BLD AUTO: 43.1 % (ref 34.8–46.1)
HGB BLD-MCNC: 14.1 G/DL (ref 11.5–15.4)
HGB UR QL STRIP.AUTO: ABNORMAL
IMM GRANULOCYTES # BLD AUTO: 0.03 THOUSAND/UL (ref 0–0.2)
IMM GRANULOCYTES NFR BLD AUTO: 1 % (ref 0–2)
INR PPP: 0.99 (ref 0.84–1.19)
IRON SATN MFR SERPL: 24 %
IRON SERPL-MCNC: 89 UG/DL (ref 50–170)
KETONES UR STRIP-MCNC: NEGATIVE MG/DL
LEUKOCYTE ESTERASE UR QL STRIP: ABNORMAL
LYMPHOCYTES # BLD AUTO: 1.51 THOUSANDS/ΜL (ref 0.6–4.47)
LYMPHOCYTES NFR BLD AUTO: 28 % (ref 14–44)
MCH RBC QN AUTO: 31.7 PG (ref 26.8–34.3)
MCHC RBC AUTO-ENTMCNC: 32.7 G/DL (ref 31.4–37.4)
MCV RBC AUTO: 97 FL (ref 82–98)
MONOCYTES # BLD AUTO: 0.49 THOUSAND/ΜL (ref 0.17–1.22)
MONOCYTES NFR BLD AUTO: 9 % (ref 4–12)
NEUTROPHILS # BLD AUTO: 3.35 THOUSANDS/ΜL (ref 1.85–7.62)
NEUTS SEG NFR BLD AUTO: 62 % (ref 43–75)
NITRITE UR QL STRIP: NEGATIVE
NON-SQ EPI CELLS URNS QL MICRO: ABNORMAL /HPF
NRBC BLD AUTO-RTO: 0 /100 WBCS
P AXIS: 46 DEGREES
PH UR STRIP.AUTO: 7 [PH]
PLATELET # BLD AUTO: 297 THOUSANDS/UL (ref 149–390)
PMV BLD AUTO: 9.2 FL (ref 8.9–12.7)
POTASSIUM SERPL-SCNC: 3.8 MMOL/L (ref 3.5–5.3)
PR INTERVAL: 166 MS
PROT SERPL-MCNC: 7.4 G/DL (ref 6.4–8.2)
PROT UR STRIP-MCNC: NEGATIVE MG/DL
PROTHROMBIN TIME: 13.2 SECONDS (ref 11.6–14.5)
QRS AXIS: 24 DEGREES
QRSD INTERVAL: 74 MS
QT INTERVAL: 384 MS
QTC INTERVAL: 428 MS
RBC # BLD AUTO: 4.45 MILLION/UL (ref 3.81–5.12)
RBC #/AREA URNS AUTO: ABNORMAL /HPF
RH BLD: POSITIVE
SODIUM SERPL-SCNC: 139 MMOL/L (ref 136–145)
SP GR UR STRIP.AUTO: 1.01 (ref 1–1.03)
SPECIMEN EXPIRATION DATE: NORMAL
T WAVE AXIS: 30 DEGREES
TIBC SERPL-MCNC: 369 UG/DL (ref 250–450)
UROBILINOGEN UR QL STRIP.AUTO: 0.2 E.U./DL
VENTRICULAR RATE: 75 BPM
WBC # BLD AUTO: 5.42 THOUSAND/UL (ref 4.31–10.16)
WBC #/AREA URNS AUTO: ABNORMAL /HPF

## 2019-09-30 PROCEDURE — 83550 IRON BINDING TEST: CPT

## 2019-09-30 PROCEDURE — 86140 C-REACTIVE PROTEIN: CPT

## 2019-09-30 PROCEDURE — 86900 BLOOD TYPING SEROLOGIC ABO: CPT

## 2019-09-30 PROCEDURE — 93005 ELECTROCARDIOGRAM TRACING: CPT

## 2019-09-30 PROCEDURE — 87086 URINE CULTURE/COLONY COUNT: CPT

## 2019-09-30 PROCEDURE — 81001 URINALYSIS AUTO W/SCOPE: CPT | Performed by: ORTHOPAEDIC SURGERY

## 2019-09-30 PROCEDURE — 80053 COMPREHEN METABOLIC PANEL: CPT

## 2019-09-30 PROCEDURE — 85025 COMPLETE CBC W/AUTO DIFF WBC: CPT

## 2019-09-30 PROCEDURE — 85610 PROTHROMBIN TIME: CPT

## 2019-09-30 PROCEDURE — 83540 ASSAY OF IRON: CPT

## 2019-09-30 PROCEDURE — 85730 THROMBOPLASTIN TIME PARTIAL: CPT

## 2019-09-30 PROCEDURE — 36415 COLL VENOUS BLD VENIPUNCTURE: CPT

## 2019-09-30 PROCEDURE — 93010 ELECTROCARDIOGRAM REPORT: CPT | Performed by: INTERNAL MEDICINE

## 2019-09-30 PROCEDURE — 86850 RBC ANTIBODY SCREEN: CPT

## 2019-09-30 PROCEDURE — 82728 ASSAY OF FERRITIN: CPT

## 2019-09-30 PROCEDURE — 83036 HEMOGLOBIN GLYCOSYLATED A1C: CPT

## 2019-09-30 PROCEDURE — 86901 BLOOD TYPING SEROLOGIC RH(D): CPT

## 2019-09-30 RX ORDER — MULTIVITAMIN
1 TABLET ORAL DAILY
COMMUNITY

## 2019-09-30 RX ORDER — ACETAMINOPHEN 325 MG/1
650 TABLET ORAL EVERY 6 HOURS PRN
COMMUNITY

## 2019-09-30 NOTE — PRE-PROCEDURE INSTRUCTIONS
Pre-Surgery Instructions:   Medication Instructions    acetaminophen (TYLENOL) 325 mg tablet Patient was instructed by Physician and understands   amLODIPine (NORVASC) 5 mg tablet Patient was instructed by Physician and understands   ascorbic acid (VITAMIN C) 500 mg tablet Patient was instructed by Physician and understands   atorvastatin (LIPITOR) 10 mg tablet Patient was instructed by Physician and understands   ELIQUIS 5 MG Patient was instructed to contact Physician for medication instruction   ferrous sulfate 324 (65 Fe) mg Patient was instructed by Physician and understands   folic acid (FOLVITE) 1 mg tablet Patient was instructed by Physician and understands   Multiple Vitamin (MULTIVITAMIN) tablet Patient was instructed by Physician and understands   Omega-3 Fatty Acids (FISH OIL PO) Patient was instructed by Physician and understands   SYNTHROID 25 MCG tablet Patient was instructed by Physician and understands   valsartan-hydrochlorothiazide (DIOVAN-HCT) 160-12 5 MG per tablet Patient was instructed by Physician and understands  Pt instructed to take synthroid the morning of surgery with a small sip of water  [facility] preop instructions given to pt and reviewed  Pt given Chlorhexidine  Chlorhexidine wipes reviewed with pt and given  Incentive spirometer given to pt and reviewed  Joint information reviewed with pt

## 2019-09-30 NOTE — ANESTHESIA PREPROCEDURE EVALUATION
Review of Systems/Medical History  Patient summary reviewed  Chart reviewed  No history of anesthetic complications     Cardiovascular  Hyperlipidemia, Hypertension on > 1 medication, DVT  PE,  Pulmonary    Comment: Hx PE     GI/Hepatic  Negative GI/hepatic ROS          Negative  ROS        Endo/Other  History of thyroid disease , hypothyroidism,   Obesity    GYN  Negative gynecology ROS          Hematology  Negative hematology ROS      Musculoskeletal    Arthritis     Neurology  Negative neurology ROS      Psychology   Negative psychology ROS              Physical Exam    Airway    Mallampati score: II  TM Distance: >3 FB  Neck ROM: full     Dental   No notable dental hx     Cardiovascular  Rhythm: regular, Rate: normal, Cardiovascular exam normal    Pulmonary  Pulmonary exam normal Breath sounds clear to auscultation,     Other Findings        Anesthesia Plan  ASA Score- 3     Anesthesia Type- spinal with ASA Monitors  Additional Monitors:   Airway Plan:         Plan Factors-Patient not instructed to abstain from smoking on day of procedure  Patient did not smoke on day of surgery  Induction-     Postoperative Plan-     Informed Consent- Anesthetic plan and risks discussed with patient and spouse  I personally reviewed this patient with the CRNA  Discussed and agreed on the Anesthesia Plan with the CRNA  Al Rome

## 2019-10-02 LAB — BACTERIA UR CULT: NORMAL

## 2019-10-04 ENCOUNTER — TELEPHONE (OUTPATIENT)
Dept: OBGYN CLINIC | Facility: HOSPITAL | Age: 79
End: 2019-10-04

## 2019-10-04 NOTE — TELEPHONE ENCOUNTER
Pt contacted today to complete a preoperative elective admission assessment  VM left for patient to return my call at earliest convenience

## 2019-10-08 ENCOUNTER — TELEPHONE (OUTPATIENT)
Dept: OBGYN CLINIC | Facility: HOSPITAL | Age: 79
End: 2019-10-08

## 2019-10-08 ENCOUNTER — EVALUATION (OUTPATIENT)
Dept: PHYSICAL THERAPY | Age: 79
End: 2019-10-08
Payer: COMMERCIAL

## 2019-10-08 VITALS — SYSTOLIC BLOOD PRESSURE: 148 MMHG | DIASTOLIC BLOOD PRESSURE: 91 MMHG

## 2019-10-08 DIAGNOSIS — M16.11 PRIMARY OSTEOARTHRITIS OF ONE HIP, RIGHT: Primary | ICD-10-CM

## 2019-10-08 DIAGNOSIS — Z01.818 PREOPERATIVE TESTING: ICD-10-CM

## 2019-10-08 PROCEDURE — 97162 PT EVAL MOD COMPLEX 30 MIN: CPT

## 2019-10-08 NOTE — PROGRESS NOTES
PT Evaluation     Today's date: 10/8/2019  Patient name: Jillian Rincon  : 1940  MRN: [id number]  Referring provider: Heydi Lazo DO  Dx:   Encounter Diagnosis     ICD-10-CM    1  Primary osteoarthritis of one hip, right M16 11 Ambulatory referral to Physical Therapy   2  Preoperative testing Z01 [id number] Ambulatory referral to Physical Therapy                  Assessment  Assessment details: Patient is a 70 y o  Female reporting to PT with complaints of R hip pain  She presents with decreased R hip AROM in all planes, LE weakness, pain with performance of weight-bearing ADL's, and abnormal gait mechanics  No red flags observed  At this time, patient will benefit from skilled PT to address hip AROM deficits prior to surgery  Impairments: abnormal gait, abnormal or restricted ROM, abnormal movement, activity intolerance, impaired balance, impaired physical strength, pain with function and poor body mechanics    Symptom irritability: highBarriers to therapy: None  Understanding of Dx/Px/POC: excellent  Goals  STG's: 4 Weeks  1 ) Patient will be independent with HEP   2 ) Patient will demonstrate proper body mechanics with squatting and lifting heavy objects  3 ) Patient will be able to roll in bed without discomfort  4 ) Patient will be able to stand for >30 mins with <3/10 pain  LTG's: 8 Weeks  1 ) Patient will be able to stand for >1 hour with <3/10 pain  2 ) Patient will demonstrate 5/5 LE strength in all planes, which will allow for normal performance of ADL's without difficulty  3 ) Patient will demonstrate full R hip AROM in all planes when compared B/L    4 ) Patient will exhibit normal gait mechanics without deviation  5 ) Patient will achieve greater than or equal to predicted FOTO score  Plan  Plan details: Patient was educated regarding the etiology and pathomechanics of their condition  They demonstrated understanding verbally  Patient was provided with an HEP   They were educated regarding repetitions, resistance, and proper technique  Patient demonstrated understanding verbally  Patient would benefit from: skilled physical therapy  Planned therapy interventions: manual therapy, neuromuscular re-education, patient education, strengthening, stretching, therapeutic activities, therapeutic exercise, home exercise program, functional ROM exercises, flexibility, body mechanics training and gait training  Frequency: 2x week  Duration in weeks: 8  Treatment plan discussed with: patient        Subjective Evaluation    History of Present Illness  Mechanism of injury: Patient is a 70 y o  Female reporting to PT for R hip pain  She is currently scheduled for a Posterior R LANCE on 10/22 and will be utilizing PT for prehabilitation prior to surgery  Sx's have significantly worsened since initial PT tx over the summer  Patient fell on  without significant injury, but further exacerbated hip pain  Sx's have improved slightly, but overall, patient has great difficulty with walking, ascending/descending stairs, and transferring from sit-to-stand due to discomfort  Patient has been adherent with previously prescribed HEP, but she is unable to perform several exercises due to significant discomfort  Patient consented to manual therapy and physical examination  Demonstrated understanding verbally                Recurrent probem    Quality of life: good    Pain  Current pain ratin  At best pain ratin  At worst pain ratin  Location: R Hip  Quality: tight, throbbing, pressure, dull ache, cramping, discomfort and sharp  Aggravating factors: stair climbing, sitting, standing, walking and lifting    Treatments  Previous treatment: physical therapy and injection treatment  Patient Goals  Patient goals for therapy: decreased edema, decreased pain, increased motion, improved balance, increased strength, independence with ADLs/IADLs and return to sport/leisure activities          Objective     Neurological Testing     Sensation     Hip   Left Hip   Intact: light touch    Right Hip   Intact: light touch    Active Range of Motion   Left Hip   Flexion: 95 degrees   Abduction: 32 degrees   External rotation (90/90): 52 degrees   Internal rotation (90/90): 22 degrees     Right Hip   Flexion: 65 degrees with pain  Abduction: 20 degrees with pain  External rotation (90/90): 28 degrees with pain  Internal rotation (90/90): 18 degrees with pain    Strength/Myotome Testing     Left Hip   Planes of Motion   Flexion: 5  Extension: 4-  Abduction: 4+    Right Hip   Planes of Motion   Flexion: 4  Extension: 4-  Abduction: 4-    Ambulation     Comments   Observational Gait  -Decreased gait speed  -Decreased stance time on RLE with ambulation  -Decreased hip flexion during swing phase of RLE             Precautions: HTN, Elevated PE Risk      Manual  10/8  IE            R Hip PROM                                                                     Exercise Diary  10/8  IE            Bike             H/S Stretch             Heel Slides (Flex/Ext)             Bridges             SLR x 3             Supine TB Clamshells             Standing H' Abd/Ext             HR             Mini Squats                                                                                                                                                                Modalities

## 2019-10-09 ENCOUNTER — CONSULT (OUTPATIENT)
Dept: HEMATOLOGY ONCOLOGY | Facility: CLINIC | Age: 79
End: 2019-10-09
Payer: COMMERCIAL

## 2019-10-09 VITALS
RESPIRATION RATE: 16 BRPM | OXYGEN SATURATION: 94 % | BODY MASS INDEX: 35.03 KG/M2 | WEIGHT: 205.2 LBS | DIASTOLIC BLOOD PRESSURE: 90 MMHG | HEIGHT: 64 IN | HEART RATE: 104 BPM | TEMPERATURE: 97.3 F | SYSTOLIC BLOOD PRESSURE: 160 MMHG

## 2019-10-09 DIAGNOSIS — M16.11 PRIMARY OSTEOARTHRITIS OF ONE HIP, RIGHT: ICD-10-CM

## 2019-10-09 DIAGNOSIS — Z01.818 PREOPERATIVE TESTING: ICD-10-CM

## 2019-10-09 DIAGNOSIS — Z86.2 HISTORY OF HYPERCOAGULABLE STATE: Primary | ICD-10-CM

## 2019-10-09 PROCEDURE — 99204 OFFICE O/P NEW MOD 45 MIN: CPT | Performed by: INTERNAL MEDICINE

## 2019-10-09 NOTE — PROGRESS NOTES
Hematology/Oncology Outpatient Consult  Nieves Curling 70 y o  female 1940 [id number]    Date:  10/9/2019    Assessment and Plan:  1  Primary osteoarthritis of one hip, right  The patient was told that the Eliquis can safely be put on hold about 24-48 hours before her planned elective right hip surgery  She does not need any bridging since Eliquis has the same half-life like low-molecular weight heparin  The patient should then be restarted on full-dose Eliquis as soon as she completes the surgery and deemed safe to be back on anticoagulation by the surgical team   - Ambulatory referral to Hematology / Oncology    2  Preoperative testing  The patient was told that we will pursue some limited hypercoagulable workup before her surgery which is not going to change our recommendation unless she gets the diagnosis of antiphospholipid antibody syndrome, in which case anticoagulation with Coumadin postoperatively along with the low-molecular weight heparin would be the best choice for her  - Ambulatory referral to Hematology / Oncology    3  History of hypercoagulable state  As stated above  We will order the workup today and meet with the patient after her hip surgery to discuss the results and finalize our recommendation  If her workup indicates antiphospholipid antibody syndrome, the patient will be contacted over the phone regarding the next step  - CBC and differential; Future  - Comprehensive metabolic panel; Future  - Beta-2 glycoprotein antibodies; Future  - Cardiolipin antibody; Future  - Prothrombin gene mutation; Future  - Factor 5 leiden; Future      HPI:  This is a 46-year-old female with history of arthritis, hypertension, her tubal bowel syndrome, hyperlipidemia, etc   The patient stated that she was initially diagnosed with her 1st pulmonary embolism when she was in her 2nd trimester of her 5th pregnancy in 0  At that time she was treated with heparin    During the postpartum face she was not anticoagulated and rate had another clot a month after delivery and was on Coumadin for about 2 years  The patient then had no other clotting event until February of 2018 when she was again diagnosed with pulmonary embolism after a [place]  car trip  She at that time had significant respiratory symptoms and was found to have bilateral pulmonary emboli  She was then started on Eliquis for 8 months  She again had another episode of respiratory symptoms, however, this time was unprovoked around February 2019 while she was off of any anticoagulation  Another CT scan of the chest on the 13th February 2019 showed multiple acute pulmonary emboli  The patient was again restarted on Eliquis for anticoagulation 5 mg twice a day which she is tolerating very well  The patient is scheduled to get right hip surgery done in about 2 weeks from now and came in today for a pre surgical hematological evaluation  Interval history:  The patient denies bleeding from any sites  ROS: Review of Systems   Constitutional: Positive for fatigue  Negative for activity change, appetite change, chills, diaphoresis, fever and unexpected weight change  HENT: Negative for congestion, dental problem, ear discharge, ear pain, facial swelling, hearing loss, mouth sores, nosebleeds, postnasal drip, sore throat, tinnitus and trouble swallowing  Eyes: Negative for discharge, redness, itching and visual disturbance  Respiratory: Negative for cough, chest tightness, shortness of breath and wheezing  Cardiovascular: Negative for chest pain, palpitations and leg swelling  Gastrointestinal: Negative for abdominal distention, abdominal pain, anal bleeding, blood in stool, constipation, diarrhea, nausea and vomiting  Genitourinary: Negative for difficulty urinating, dysuria, flank pain, frequency, hematuria and urgency  Musculoskeletal: Positive for arthralgias (Right hip)   Negative for back pain, gait problem, joint swelling, myalgias and neck pain  Skin: Negative for color change, pallor, rash and wound  Neurological: Negative for dizziness, syncope, speech difficulty, weakness, light-headedness, numbness and headaches  Hematological: Negative for adenopathy  Does not bruise/bleed easily  Psychiatric/Behavioral: Positive for sleep disturbance  Negative for agitation, behavioral problems and confusion         Past Medical History:   Diagnosis Date    Arthritis     At risk for falls     Disease of thyroid gland     hypo    Edema of both legs     History of pulmonary embolus (PE)     Hyperlipidemia     Hypertension     Irritable bowel syndrome     Prediabetes     Uses roller walker     Wears glasses     Wears partial dentures     upper partial       Past Surgical History:   Procedure Laterality Date    CHOLECYSTECTOMY      COLONOSCOPY      HYSTERECTOMY         Social History     Socioeconomic History    Marital status: Unknown     Spouse name: None    Number of children: None    Years of education: None    Highest education level: None   Occupational History    None   Social Needs    Financial resource strain: None    Food insecurity:     Worry: None     Inability: None    Transportation needs:     Medical: None     Non-medical: None   Tobacco Use    Smoking status: Never Smoker    Smokeless tobacco: Never Used   Substance and Sexual Activity    Alcohol use: Yes     Frequency: Monthly or less    Drug use: Not Currently    Sexual activity: None   Lifestyle    Physical activity:     Days per week: None     Minutes per session: None    Stress: None   Relationships    Social connections:     Talks on phone: None     Gets together: None     Attends [employer] service: None     Active member of club or organization: None     Attends meetings of clubs or organizations: None     Relationship status: None    Intimate partner violence:     Fear of current or ex partner: None     Emotionally abused: None Physically abused: None     Forced sexual activity: None   Other Topics Concern    None   Social History Narrative    None       Family History   Problem Relation Age of Onset    Hypertension Family        No Known Allergies      Current Outpatient Medications:     acetaminophen (TYLENOL) 325 mg tablet, Take 650 mg by mouth every 6 (six) hours as needed for mild pain, Disp: , Rfl:     amLODIPine (NORVASC) 5 mg tablet, Take 5 mg by mouth daily , Disp: , Rfl:     ascorbic acid (VITAMIN C) 500 mg tablet, Take 1 tablet (500 mg total) by mouth daily Start to take 30 days before surgery, Disp: 30 tablet, Rfl: 1    atorvastatin (LIPITOR) 10 mg tablet, Take 10 mg by mouth daily , Disp: , Rfl:     ELIQUIS 5 MG, Take 5 mg by mouth 2 (two) times a day , Disp: , Rfl:     ferrous sulfate 324 (65 Fe) mg, Take 1 tablet (324 mg total) by mouth daily before breakfast Start to take 30 days before surgery, Disp: 30 tablet, Rfl: 1    folic acid (FOLVITE) 1 mg tablet, Take 1 tablet (1 mg total) by mouth daily Start to take 30 days before surgery, Disp: 30 tablet, Rfl: 1    Multiple Vitamin (MULTIVITAMIN) tablet, Take 1 tablet by mouth daily, Disp: , Rfl:     Omega-3 Fatty Acids (FISH OIL PO), Take 1 capsule by mouth daily, Disp: , Rfl:     SYNTHROID 25 MCG tablet, Take 25 mcg by mouth daily , Disp: , Rfl:     valsartan-hydrochlorothiazide (DIOVAN-HCT) 160-12 5 MG per tablet, Take 1 tablet by mouth daily , Disp: , Rfl:       Physical Exam:  /90 (BP Location: Left arm, Cuff Size: Adult)   Pulse 104   Temp (!) 97 3 °F (36 3 °C) (Tympanic)   Resp 16   Ht 5' 3 5" (1 613 m)   Wt 93 1 kg (205 lb 3 2 oz)   SpO2 94%   BMI 35 78 kg/m²     Physical Exam   Constitutional: She is oriented to person, place, and time  She appears well-developed and well-nourished  No distress  HENT:   Head: Normocephalic and atraumatic     Nose: Nose normal    Mouth/Throat: Oropharynx is clear and moist    Eyes: Pupils are equal, round, and reactive to light  Conjunctivae and EOM are normal  Right eye exhibits no discharge  Left eye exhibits no discharge  No scleral icterus  Neck: Normal range of motion  Neck supple  No JVD present  No tracheal deviation present  No thyromegaly present  Cardiovascular: Normal rate, regular rhythm and normal heart sounds  Exam reveals no friction rub  No murmur heard  Pulmonary/Chest: Effort normal and breath sounds normal  No stridor  No respiratory distress  She has no wheezes  She has no rales  She exhibits no tenderness  Abdominal: Soft  Bowel sounds are normal  She exhibits no distension and no mass  There is no hepatosplenomegaly, splenomegaly or hepatomegaly  There is no tenderness  There is no rebound and no guarding  Musculoskeletal: Normal range of motion  She exhibits no edema, tenderness or deformity  Lymphadenopathy:     She has no cervical adenopathy  Neurological: She is alert and oriented to person, place, and time  She has normal reflexes  No cranial nerve deficit  Coordination normal    Skin: Skin is warm and dry  No rash noted  She is not diaphoretic  No erythema  No pallor  Psychiatric: She has a normal mood and affect  Her behavior is normal  Judgment and thought content normal          Labs:  Lab Results   Component Value Date    WBC 5 42 09/30/2019    HGB 14 1 09/30/2019    HCT 43 1 09/30/2019    MCV 97 09/30/2019     09/30/2019     Lab Results   Component Value Date    K 3 8 09/30/2019     09/30/2019    CO2 29 09/30/2019    BUN 15 09/30/2019    CREATININE 0 84 09/30/2019    CALCIUM 9 3 09/30/2019    AST 19 09/30/2019    ALT 26 09/30/2019    ALKPHOS 70 09/30/2019    EGFR 68 09/30/2019       Patient voiced understanding and agreement in the above discussion  Aware to contact our office with questions/symptoms in the interim

## 2019-10-11 NOTE — TELEPHONE ENCOUNTER
Preoperative Elective Admission Assessment       Living Situation: Pt reports living at home with her Nallely  Home Layout: Ranch style home with step in bath tub with grab bars                      Steps: 1 to enter the home  First Floor Setup: Yes    Post-op Caregiver: , Juanpablo    Post-op Transport:  Juanpablo    Outpatient Physical Therapy Site: [facility]    DME: Pt reports having a cane, RW (rollator) and denies BSC     Patient's Current Level of Function: Pt reports cane for ambulation outside the home, independent with ADLS     Medication Management: Pt reports self managing medications removing them daily from the weekly pillbox                    Preferred Pharmacy: [facility]                     Blood Management Vitamins: Pt reports taking folic acid, vitamin C and the Iron daily                  Post-op anticoagulant: Eliquis patient  DC Plan: Home with outpatient PT                      Barriers to DC identified preoperatively:     BMI: 34 92    Cares[employer]: Enrolled on 10/8                    RAPT: 9                    ACE/ARB Form: GFR>60                    HOOS/KOOS: 46 [phone]     Patient Education:   Pt educated on post op pain, early mobilization (POD0), indication/use of incentive spirometer (10x/hr while awake), and indication for/use of foot/leg pumps  pt encouraged to call me with questions, concerns or issues

## 2019-10-15 ENCOUNTER — OFFICE VISIT (OUTPATIENT)
Dept: PHYSICAL THERAPY | Age: 79
End: 2019-10-15
Payer: COMMERCIAL

## 2019-10-15 DIAGNOSIS — M16.11 PRIMARY OSTEOARTHRITIS OF ONE HIP, RIGHT: Primary | ICD-10-CM

## 2019-10-15 PROCEDURE — 97140 MANUAL THERAPY 1/> REGIONS: CPT | Performed by: SPECIALIST/TECHNOLOGIST

## 2019-10-15 PROCEDURE — 97110 THERAPEUTIC EXERCISES: CPT | Performed by: SPECIALIST/TECHNOLOGIST

## 2019-10-15 NOTE — PROGRESS NOTES
Daily Note     Today's date: 10/15/2019  Patient name: Jolene Duffy  : 1940  MRN: [id number]  Referring provider: Gustavo Lindsey DO  Dx:   Encounter Diagnosis     ICD-10-CM    1  Primary osteoarthritis of one hip, right M16 11                   Subjective: Pt reports she had difficulty sleeping last night due to R hip pain  Objective: See treatment diary below    Assessment: PROM is stiff in all planes  Pt demonstrates good muscle activation with therex when cued  Tolerated treatment well  Patient would benefit from continued PT to improve pre-surgical ROM, strength and HEP application in preparation for LANCE next week  Plan: Continue per plan of care        Precautions: HTN, Elevated PE Risk      Manual  10/8  IE 10/15           R Hip PROM  10'                                                                   Exercise Diary  10/8  IE 10/15           Bike 10' L1 10' L1           H/S Stretch 4x30" 4x30"           Heel Slides (Flex/Ext) 15x10" 15x10"           Bridges  20x           SLR x 3  20x           Supine TB Clamshells  Red 20x           Standing H' Abd/Ext  20x           HR  20x           Mini Squats  20x                                                                                                                                                              Modalities

## 2019-10-17 ENCOUNTER — OFFICE VISIT (OUTPATIENT)
Dept: PHYSICAL THERAPY | Age: 79
End: 2019-10-17
Payer: COMMERCIAL

## 2019-10-17 DIAGNOSIS — Z01.818 PREOPERATIVE TESTING: ICD-10-CM

## 2019-10-17 DIAGNOSIS — M16.11 PRIMARY OSTEOARTHRITIS OF ONE HIP, RIGHT: Primary | ICD-10-CM

## 2019-10-17 PROCEDURE — 97140 MANUAL THERAPY 1/> REGIONS: CPT | Performed by: PHYSICAL THERAPIST

## 2019-10-17 PROCEDURE — 97110 THERAPEUTIC EXERCISES: CPT | Performed by: PHYSICAL THERAPIST

## 2019-10-22 ENCOUNTER — APPOINTMENT (INPATIENT)
Dept: RADIOLOGY | Facility: HOSPITAL | Age: 79
DRG: 470 | End: 2019-10-22
Payer: COMMERCIAL

## 2019-10-22 ENCOUNTER — HOSPITAL ENCOUNTER (INPATIENT)
Facility: HOSPITAL | Age: 79
LOS: 4 days | Discharge: HOME HEALTH | DRG: 470 | End: 2019-10-26
Attending: ORTHOPAEDIC SURGERY | Admitting: ORTHOPAEDIC SURGERY
Payer: COMMERCIAL

## 2019-10-22 ENCOUNTER — ANESTHESIA (OUTPATIENT)
Dept: PERIOP | Facility: HOSPITAL | Age: 79
DRG: 470 | End: 2019-10-22
Payer: COMMERCIAL

## 2019-10-22 DIAGNOSIS — M16.11 PRIMARY OSTEOARTHRITIS OF ONE HIP, RIGHT: Primary | ICD-10-CM

## 2019-10-22 DIAGNOSIS — Z86.2 HISTORY OF HYPERCOAGULABLE STATE: ICD-10-CM

## 2019-10-22 PROCEDURE — 0SR904Z REPLACEMENT OF RIGHT HIP JOINT WITH CERAMIC ON POLYETHYLENE SYNTHETIC SUBSTITUTE, OPEN APPROACH: ICD-10-PCS | Performed by: ORTHOPAEDIC SURGERY

## 2019-10-22 PROCEDURE — C1713 ANCHOR/SCREW BN/BN,TIS/BN: HCPCS | Performed by: ORTHOPAEDIC SURGERY

## 2019-10-22 PROCEDURE — 86920 COMPATIBILITY TEST SPIN: CPT

## 2019-10-22 PROCEDURE — 27130 TOTAL HIP ARTHROPLASTY: CPT | Performed by: PHYSICIAN ASSISTANT

## 2019-10-22 PROCEDURE — C1776 JOINT DEVICE (IMPLANTABLE): HCPCS | Performed by: ORTHOPAEDIC SURGERY

## 2019-10-22 PROCEDURE — 73501 X-RAY EXAM HIP UNI 1 VIEW: CPT

## 2019-10-22 PROCEDURE — 27130 TOTAL HIP ARTHROPLASTY: CPT | Performed by: ORTHOPAEDIC SURGERY

## 2019-10-22 DEVICE — TRI-LOCK BPS FEMORAL STEM 12/14 TAPER TRI-LOCK BPS W/GRIPTION SIZE 2 HI 99MM
Type: IMPLANTABLE DEVICE | Site: HIP | Status: FUNCTIONAL
Brand: TRI-LOCK GRIPTION

## 2019-10-22 DEVICE — PINNACLE POROCOAT ACETABULAR SHELL SECTOR II 48MM OD
Type: IMPLANTABLE DEVICE | Site: HIP | Status: FUNCTIONAL
Brand: PINNACLE POROCOAT

## 2019-10-22 DEVICE — BIOLOX DELTA CERAMIC FEMORAL HEAD 32MM DIA +1 12/14 TAPER
Type: IMPLANTABLE DEVICE | Site: HIP | Status: FUNCTIONAL
Brand: BIOLOX DELTA

## 2019-10-22 DEVICE — PINNACLE CANCELLOUS BONE SCREW 6.5MM X 15MM
Type: IMPLANTABLE DEVICE | Site: HIP | Status: FUNCTIONAL
Brand: PINNACLE

## 2019-10-22 DEVICE — PINNACLE HIP SOLUTIONS ALTRX POLYETHYLENE ACETABULAR LINER NEUTRAL 32MM ID 48MM OD
Type: IMPLANTABLE DEVICE | Site: HIP | Status: FUNCTIONAL
Brand: PINNACLE ALTRX

## 2019-10-22 RX ORDER — CEFAZOLIN SODIUM 2 G/50ML
2000 SOLUTION INTRAVENOUS ONCE
Status: COMPLETED | OUTPATIENT
Start: 2019-10-22 | End: 2019-10-22

## 2019-10-22 RX ORDER — FENTANYL CITRATE/PF 50 MCG/ML
25 SYRINGE (ML) INJECTION
Status: DISCONTINUED | OUTPATIENT
Start: 2019-10-22 | End: 2019-10-22 | Stop reason: HOSPADM

## 2019-10-22 RX ORDER — TRANEXAMIC ACID 100 MG/ML
INJECTION, SOLUTION INTRAVENOUS AS NEEDED
Status: DISCONTINUED | OUTPATIENT
Start: 2019-10-22 | End: 2019-10-22 | Stop reason: SURG

## 2019-10-22 RX ORDER — ONDANSETRON 2 MG/ML
4 INJECTION INTRAMUSCULAR; INTRAVENOUS EVERY 6 HOURS PRN
Status: DISCONTINUED | OUTPATIENT
Start: 2019-10-22 | End: 2019-10-24

## 2019-10-22 RX ORDER — OXYCODONE HYDROCHLORIDE 10 MG/1
10 TABLET ORAL EVERY 4 HOURS PRN
Status: DISCONTINUED | OUTPATIENT
Start: 2019-10-22 | End: 2019-10-24 | Stop reason: ALTCHOICE

## 2019-10-22 RX ORDER — ACETAMINOPHEN 325 MG/1
975 TABLET ORAL ONCE
Status: COMPLETED | OUTPATIENT
Start: 2019-10-22 | End: 2019-10-22

## 2019-10-22 RX ORDER — HYDROCHLOROTHIAZIDE 12.5 MG/1
12.5 TABLET ORAL DAILY
Status: DISCONTINUED | OUTPATIENT
Start: 2019-10-22 | End: 2019-10-26 | Stop reason: HOSPADM

## 2019-10-22 RX ORDER — CALCIUM CARBONATE 200(500)MG
1000 TABLET,CHEWABLE ORAL DAILY PRN
Status: DISCONTINUED | OUTPATIENT
Start: 2019-10-22 | End: 2019-10-26 | Stop reason: HOSPADM

## 2019-10-22 RX ORDER — PROPOFOL 10 MG/ML
INJECTION, EMULSION INTRAVENOUS CONTINUOUS PRN
Status: DISCONTINUED | OUTPATIENT
Start: 2019-10-22 | End: 2019-10-22 | Stop reason: SURG

## 2019-10-22 RX ORDER — LOSARTAN POTASSIUM 50 MG/1
100 TABLET ORAL DAILY
Status: DISCONTINUED | OUTPATIENT
Start: 2019-10-22 | End: 2019-10-26 | Stop reason: HOSPADM

## 2019-10-22 RX ORDER — ALBUMIN, HUMAN INJ 5% 5 %
12.5 SOLUTION INTRAVENOUS ONCE
Status: COMPLETED | OUTPATIENT
Start: 2019-10-22 | End: 2019-10-22

## 2019-10-22 RX ORDER — GABAPENTIN 300 MG/1
300 CAPSULE ORAL ONCE
Status: COMPLETED | OUTPATIENT
Start: 2019-10-22 | End: 2019-10-22

## 2019-10-22 RX ORDER — CHLORHEXIDINE GLUCONATE 0.12 MG/ML
15 RINSE ORAL ONCE
Status: COMPLETED | OUTPATIENT
Start: 2019-10-22 | End: 2019-10-22

## 2019-10-22 RX ORDER — PROMETHAZINE HYDROCHLORIDE 6.25 MG/5ML
12.5 SYRUP ORAL EVERY 6 HOURS PRN
Status: DISCONTINUED | OUTPATIENT
Start: 2019-10-22 | End: 2019-10-22

## 2019-10-22 RX ORDER — DEXAMETHASONE SODIUM PHOSPHATE 4 MG/ML
INJECTION, SOLUTION INTRA-ARTICULAR; INTRALESIONAL; INTRAMUSCULAR; INTRAVENOUS; SOFT TISSUE AS NEEDED
Status: DISCONTINUED | OUTPATIENT
Start: 2019-10-22 | End: 2019-10-22 | Stop reason: SURG

## 2019-10-22 RX ORDER — FENTANYL CITRATE 50 UG/ML
INJECTION, SOLUTION INTRAMUSCULAR; INTRAVENOUS AS NEEDED
Status: DISCONTINUED | OUTPATIENT
Start: 2019-10-22 | End: 2019-10-22 | Stop reason: SURG

## 2019-10-22 RX ORDER — MIDAZOLAM HYDROCHLORIDE 1 MG/ML
INJECTION INTRAMUSCULAR; INTRAVENOUS AS NEEDED
Status: DISCONTINUED | OUTPATIENT
Start: 2019-10-22 | End: 2019-10-22 | Stop reason: SURG

## 2019-10-22 RX ORDER — OXYCODONE HYDROCHLORIDE 5 MG/1
TABLET ORAL
Qty: 30 TABLET | Refills: 0 | Status: SHIPPED | OUTPATIENT
Start: 2019-10-22 | End: 2019-10-26 | Stop reason: HOSPADM

## 2019-10-22 RX ORDER — ATORVASTATIN CALCIUM 10 MG/1
10 TABLET, FILM COATED ORAL
Status: DISCONTINUED | OUTPATIENT
Start: 2019-10-22 | End: 2019-10-26 | Stop reason: HOSPADM

## 2019-10-22 RX ORDER — CEFAZOLIN SODIUM 1 G/50ML
1000 SOLUTION INTRAVENOUS EVERY 8 HOURS
Status: COMPLETED | OUTPATIENT
Start: 2019-10-22 | End: 2019-10-22

## 2019-10-22 RX ORDER — TETRACAINE HCL 10 MG/ML
INJECTION SUBARACHNOID AS NEEDED
Status: DISCONTINUED | OUTPATIENT
Start: 2019-10-22 | End: 2019-10-22 | Stop reason: SURG

## 2019-10-22 RX ORDER — ONDANSETRON 2 MG/ML
4 INJECTION INTRAMUSCULAR; INTRAVENOUS ONCE AS NEEDED
Status: DISCONTINUED | OUTPATIENT
Start: 2019-10-22 | End: 2019-10-22 | Stop reason: HOSPADM

## 2019-10-22 RX ORDER — OXYCODONE HYDROCHLORIDE 5 MG/1
5 TABLET ORAL EVERY 4 HOURS PRN
Status: DISCONTINUED | OUTPATIENT
Start: 2019-10-22 | End: 2019-10-24 | Stop reason: ALTCHOICE

## 2019-10-22 RX ORDER — ACETAMINOPHEN 325 MG/1
650 TABLET ORAL EVERY 6 HOURS PRN
Status: DISCONTINUED | OUTPATIENT
Start: 2019-10-22 | End: 2019-10-26 | Stop reason: HOSPADM

## 2019-10-22 RX ORDER — SENNOSIDES 8.6 MG
1 TABLET ORAL DAILY
Status: DISCONTINUED | OUTPATIENT
Start: 2019-10-22 | End: 2019-10-26 | Stop reason: HOSPADM

## 2019-10-22 RX ORDER — SODIUM CHLORIDE, SODIUM LACTATE, POTASSIUM CHLORIDE, CALCIUM CHLORIDE 600; 310; 30; 20 MG/100ML; MG/100ML; MG/100ML; MG/100ML
100 INJECTION, SOLUTION INTRAVENOUS CONTINUOUS
Status: DISCONTINUED | OUTPATIENT
Start: 2019-10-22 | End: 2019-10-25

## 2019-10-22 RX ORDER — DOCUSATE SODIUM 100 MG/1
100 CAPSULE, LIQUID FILLED ORAL 2 TIMES DAILY
Qty: 20 CAPSULE | Refills: 0 | Status: SHIPPED | OUTPATIENT
Start: 2019-10-22 | End: 2020-12-09

## 2019-10-22 RX ORDER — MORPHINE SULFATE 4 MG/ML
3 INJECTION, SOLUTION INTRAMUSCULAR; INTRAVENOUS EVERY 2 HOUR PRN
Status: ACTIVE | OUTPATIENT
Start: 2019-10-22 | End: 2019-10-24

## 2019-10-22 RX ORDER — LEVOTHYROXINE SODIUM 0.03 MG/1
25 TABLET ORAL
Status: DISCONTINUED | OUTPATIENT
Start: 2019-10-23 | End: 2019-10-26 | Stop reason: HOSPADM

## 2019-10-22 RX ORDER — ONDANSETRON 2 MG/ML
INJECTION INTRAMUSCULAR; INTRAVENOUS AS NEEDED
Status: DISCONTINUED | OUTPATIENT
Start: 2019-10-22 | End: 2019-10-22 | Stop reason: SURG

## 2019-10-22 RX ORDER — FERROUS SULFATE 325(65) MG
325 TABLET ORAL 2 TIMES DAILY WITH MEALS
Status: DISCONTINUED | OUTPATIENT
Start: 2019-10-22 | End: 2019-10-26 | Stop reason: HOSPADM

## 2019-10-22 RX ORDER — AMLODIPINE BESYLATE 5 MG/1
5 TABLET ORAL DAILY
Status: DISCONTINUED | OUTPATIENT
Start: 2019-10-22 | End: 2019-10-24

## 2019-10-22 RX ORDER — FOLIC ACID 1 MG/1
1 TABLET ORAL DAILY
Status: DISCONTINUED | OUTPATIENT
Start: 2019-10-22 | End: 2019-10-26 | Stop reason: HOSPADM

## 2019-10-22 RX ORDER — SODIUM CHLORIDE 9 MG/ML
75 INJECTION, SOLUTION INTRAVENOUS CONTINUOUS
Status: DISCONTINUED | OUTPATIENT
Start: 2019-10-22 | End: 2019-10-22

## 2019-10-22 RX ORDER — MAGNESIUM HYDROXIDE 1200 MG/15ML
LIQUID ORAL AS NEEDED
Status: DISCONTINUED | OUTPATIENT
Start: 2019-10-22 | End: 2019-10-22 | Stop reason: HOSPADM

## 2019-10-22 RX ORDER — CHLORHEXIDINE GLUCONATE 4 G/100ML
SOLUTION TOPICAL DAILY PRN
Status: DISCONTINUED | OUTPATIENT
Start: 2019-10-22 | End: 2019-10-22 | Stop reason: HOSPADM

## 2019-10-22 RX ORDER — SODIUM CHLORIDE 9 MG/ML
125 INJECTION, SOLUTION INTRAVENOUS CONTINUOUS
Status: DISCONTINUED | OUTPATIENT
Start: 2019-10-22 | End: 2019-10-22

## 2019-10-22 RX ORDER — DOCUSATE SODIUM 100 MG/1
100 CAPSULE, LIQUID FILLED ORAL 2 TIMES DAILY
Status: DISCONTINUED | OUTPATIENT
Start: 2019-10-22 | End: 2019-10-26 | Stop reason: HOSPADM

## 2019-10-22 RX ORDER — ASCORBIC ACID 500 MG
500 TABLET ORAL 2 TIMES DAILY
Status: DISCONTINUED | OUTPATIENT
Start: 2019-10-22 | End: 2019-10-26 | Stop reason: HOSPADM

## 2019-10-22 RX ORDER — PROMETHAZINE HYDROCHLORIDE 25 MG/ML
12.5 INJECTION, SOLUTION INTRAMUSCULAR; INTRAVENOUS EVERY 6 HOURS PRN
Status: DISCONTINUED | OUTPATIENT
Start: 2019-10-22 | End: 2019-10-26 | Stop reason: HOSPADM

## 2019-10-22 RX ORDER — PANTOPRAZOLE SODIUM 40 MG/1
40 TABLET, DELAYED RELEASE ORAL
Status: DISCONTINUED | OUTPATIENT
Start: 2019-10-23 | End: 2019-10-26 | Stop reason: HOSPADM

## 2019-10-22 RX ADMIN — FENTANYL CITRATE 50 MCG: 50 INJECTION, SOLUTION INTRAMUSCULAR; INTRAVENOUS at 07:50

## 2019-10-22 RX ADMIN — ENOXAPARIN SODIUM 40 MG: 40 INJECTION SUBCUTANEOUS at 23:29

## 2019-10-22 RX ADMIN — FOLIC ACID 1 MG: 1 TABLET ORAL at 15:45

## 2019-10-22 RX ADMIN — CEFAZOLIN SODIUM 1000 MG: 1 SOLUTION INTRAVENOUS at 14:49

## 2019-10-22 RX ADMIN — PHENYLEPHRINE HYDROCHLORIDE 100 MCG: 10 INJECTION INTRAVENOUS at 09:36

## 2019-10-22 RX ADMIN — ATORVASTATIN CALCIUM 10 MG: 10 TABLET, FILM COATED ORAL at 16:23

## 2019-10-22 RX ADMIN — CEFAZOLIN SODIUM 2000 MG: 2 SOLUTION INTRAVENOUS at 07:35

## 2019-10-22 RX ADMIN — CHLORHEXIDINE GLUCONATE 0.12% ORAL RINSE 15 ML: 1.2 LIQUID ORAL at 06:38

## 2019-10-22 RX ADMIN — Medication 1 TABLET: at 15:44

## 2019-10-22 RX ADMIN — PROPOFOL 50 MCG/KG/MIN: 10 INJECTION, EMULSION INTRAVENOUS at 07:56

## 2019-10-22 RX ADMIN — ONDANSETRON 4 MG: 2 INJECTION INTRAMUSCULAR; INTRAVENOUS at 07:45

## 2019-10-22 RX ADMIN — SODIUM CHLORIDE 125 ML/HR: 0.9 INJECTION, SOLUTION INTRAVENOUS at 06:27

## 2019-10-22 RX ADMIN — DOCUSATE SODIUM 100 MG: 100 CAPSULE, LIQUID FILLED ORAL at 18:31

## 2019-10-22 RX ADMIN — DEXAMETHASONE SODIUM PHOSPHATE 4 MG: 4 INJECTION, SOLUTION INTRAMUSCULAR; INTRAVENOUS at 07:45

## 2019-10-22 RX ADMIN — CEFAZOLIN SODIUM 1000 MG: 1 SOLUTION INTRAVENOUS at 23:29

## 2019-10-22 RX ADMIN — ACETAMINOPHEN 975 MG: 325 TABLET ORAL at 06:37

## 2019-10-22 RX ADMIN — OXYCODONE HYDROCHLORIDE AND ACETAMINOPHEN 500 MG: 500 TABLET ORAL at 18:31

## 2019-10-22 RX ADMIN — SODIUM CHLORIDE, SODIUM LACTATE, POTASSIUM CHLORIDE, AND CALCIUM CHLORIDE 100 ML/HR: .6; .31; .03; .02 INJECTION, SOLUTION INTRAVENOUS at 20:38

## 2019-10-22 RX ADMIN — SODIUM CHLORIDE: 0.9 INJECTION, SOLUTION INTRAVENOUS at 09:19

## 2019-10-22 RX ADMIN — ALBUMIN (HUMAN) 12.5 G: 12.5 SOLUTION INTRAVENOUS at 11:15

## 2019-10-22 RX ADMIN — IRON SUCROSE 300 MG: 20 INJECTION, SOLUTION INTRAVENOUS at 16:23

## 2019-10-22 RX ADMIN — FERROUS SULFATE TAB 325 MG (65 MG ELEMENTAL FE) 325 MG: 325 (65 FE) TAB at 16:23

## 2019-10-22 RX ADMIN — SODIUM CHLORIDE, SODIUM LACTATE, POTASSIUM CHLORIDE, AND CALCIUM CHLORIDE 1000 ML: .6; .31; .03; .02 INJECTION, SOLUTION INTRAVENOUS at 10:56

## 2019-10-22 RX ADMIN — PHENYLEPHRINE HYDROCHLORIDE 100 MCG: 10 INJECTION INTRAVENOUS at 08:16

## 2019-10-22 RX ADMIN — PHENYLEPHRINE HYDROCHLORIDE 100 MCG: 10 INJECTION INTRAVENOUS at 09:19

## 2019-10-22 RX ADMIN — ONDANSETRON 4 MG: 2 INJECTION INTRAMUSCULAR; INTRAVENOUS at 14:46

## 2019-10-22 RX ADMIN — GABAPENTIN 300 MG: 300 CAPSULE ORAL at 06:37

## 2019-10-22 RX ADMIN — OXYCODONE HYDROCHLORIDE 5 MG: 5 TABLET ORAL at 20:37

## 2019-10-22 RX ADMIN — PROMETHAZINE HYDROCHLORIDE 12.5 MG: 25 INJECTION INTRAMUSCULAR; INTRAVENOUS at 18:30

## 2019-10-22 RX ADMIN — SENNOSIDES 8.6 MG: 8.6 TABLET, FILM COATED ORAL at 15:44

## 2019-10-22 RX ADMIN — TETRACAINE HCL 2 ML: 10 INJECTION SUBARACHNOID at 07:50

## 2019-10-22 RX ADMIN — TRANEXAMIC ACID 1 G: 1 INJECTION, SOLUTION INTRAVENOUS at 08:00

## 2019-10-22 RX ADMIN — PHENYLEPHRINE HYDROCHLORIDE 100 MCG: 10 INJECTION INTRAVENOUS at 08:32

## 2019-10-22 RX ADMIN — MIDAZOLAM 2 MG: 1 INJECTION INTRAMUSCULAR; INTRAVENOUS at 07:35

## 2019-10-22 RX ADMIN — PHENYLEPHRINE HYDROCHLORIDE 100 MCG: 10 INJECTION INTRAVENOUS at 09:30

## 2019-10-22 RX ADMIN — SODIUM CHLORIDE, SODIUM LACTATE, POTASSIUM CHLORIDE, AND CALCIUM CHLORIDE 100 ML/HR: .6; .31; .03; .02 INJECTION, SOLUTION INTRAVENOUS at 11:10

## 2019-10-22 RX ADMIN — SODIUM CHLORIDE: 0.9 INJECTION, SOLUTION INTRAVENOUS at 08:15

## 2019-10-22 RX ADMIN — PHENYLEPHRINE HYDROCHLORIDE 100 MCG: 10 INJECTION INTRAVENOUS at 08:06

## 2019-10-22 RX ADMIN — PHENYLEPHRINE HYDROCHLORIDE 100 MCG: 10 INJECTION INTRAVENOUS at 09:25

## 2019-10-22 NOTE — INTERVAL H&P NOTE
H&P reviewed  After examining the patient I find no changes in the patients condition since the H&P had been written      Vitals:    10/22/19 0608   BP: 162/82   Pulse: 89   Resp: 18   Temp: 97 5 °F (36 4 °C)   SpO2: 94%

## 2019-10-22 NOTE — PLAN OF CARE
Problem: Potential for Falls  Goal: Patient will remain free of falls  Description  INTERVENTIONS:  - Assess patient frequently for physical needs  -  Identify cognitive and physical deficits and behaviors that affect risk of falls    -  [place] fall precautions as indicated by assessment   - Educate patient/family on patient safety including physical limitations  - Instruct patient to call for assistance with activity based on assessment  - Modify environment to reduce risk of injury  - Consider OT/PT consult to assist with strengthening/mobility  Outcome: Progressing     Problem: Prexisting or High Potential for Compromised Skin Integrity  Goal: Skin integrity is maintained or improved  Description  INTERVENTIONS:  - Identify patients at risk for skin breakdown  - Assess and monitor skin integrity  - Assess and monitor nutrition and hydration status  - Monitor labs   - Assess for incontinence   - Turn and reposition patient  - Assist with mobility/ambulation  - Relieve pressure over bony prominences  - Avoid friction and shearing  - Provide appropriate hygiene as needed including keeping skin clean and dry  - Evaluate need for skin moisturizer/barrier cream  - Collaborate with interdisciplinary team   - Patient/family teaching  - Consider wound care consult   Outcome: Progressing     Problem: PAIN - ADULT  Goal: Verbalizes/displays adequate comfort level or baseline comfort level  Description  Interventions:  - Encourage patient to monitor pain and request assistance  - Assess pain using appropriate pain scale  - Administer analgesics based on type and severity of pain and evaluate response  - Implement non-pharmacological measures as appropriate and evaluate response  - Consider cultural and social influences on pain and pain management  - Notify physician/advanced practitioner if interventions unsuccessful or patient reports new pain  Outcome: Progressing     Problem: SAFETY ADULT  Goal: Maintain or return to baseline ADL function  Description  INTERVENTIONS:  -  Assess patient's ability to carry out ADLs; assess patient's baseline for ADL function and identify physical deficits which impact ability to perform ADLs (bathing, care of mouth/teeth, toileting, grooming, dressing, etc )  - Assess/evaluate cause of self-care deficits   - Assess range of motion  - Assess patient's mobility; develop plan if impaired  - Assess patient's need for assistive devices and provide as appropriate  - Encourage maximum independence but intervene and supervise when necessary  - Involve family in performance of ADLs  - Assess for home care needs following discharge   - Consider OT consult to assist with ADL evaluation and planning for discharge  - Provide patient education as appropriate  Outcome: Progressing  Goal: Maintain or return mobility status to optimal level  Description  INTERVENTIONS:  - Assess patient's baseline mobility status (ambulation, transfers, stairs, etc )    - Identify cognitive and physical deficits and behaviors that affect mobility  - Identify mobility aids required to assist with transfers and/or ambulation (gait belt, sit-to-stand, lift, walker, cane, etc )  - [place] fall precautions as indicated by assessment  - Record patient progress and toleration of activity level on Mobility SBAR; progress patient to next Phase/Stage  - Instruct patient to call for assistance with activity based on assessment  - Consider rehabilitation consult to assist with strengthening/weightbearing, etc   Outcome: Progressing     Problem: DISCHARGE PLANNING  Goal: Discharge to home or other facility with appropriate resources  Description  INTERVENTIONS:  - Identify barriers to discharge w/patient and caregiver  - Arrange for needed discharge resources and transportation as appropriate  - Identify discharge learning needs (meds, wound care, etc )  - Arrange for interpretive services to assist at discharge as needed  - Refer to Case Management Department for coordinating discharge planning if the patient needs post-hospital services based on physician/advanced practitioner order or complex needs related to functional status, cognitive ability, or social support system  Outcome: Progressing     Problem: COPING  Goal: Pt/Family able to verbalize concerns and demonstrate effective coping strategies  Description  INTERVENTIONS:  - Assist patient/family to identify coping skills, available support systems and cultural and spiritual values  - Provide emotional support, including active listening and acknowledgement of concerns of patient and caregivers  - Reduce environmental stimuli, as able  - Provide patient education  - Assess for spiritual pain/suffering and initiate spiritual care, including notification of Pastoral Care or jey based community as needed  - Assess effectiveness of coping strategies  Outcome: Progressing  Goal: Will report anxiety at manageable levels  Description  INTERVENTIONS:  - Administer medication as ordered  - Teach and encourage coping skills  - Provide emotional support  - Assess patient/family for anxiety and ability to cope  Outcome: Progressing

## 2019-10-22 NOTE — DISCHARGE INSTRUCTIONS
TOTAL HIP REPLACEMENT DISCHARGE INSTRUCTIONS    Surgical Dressing: You may remove your dressing 7 days from the date of your surgery then change your dressing daily until drainage stops  Maintain steri strips  Let them fall off on their own  Do not put any lotions or creams on your incision  If there are any signs of infection such as drainage persisting beyond a few days, unusual looking drainage (yellow, green), increased redness around the incision, or fever/chills let your doctor know  Do not get your incision wet until approved by your physician  Medications:  Upon discharge you will be given a prescription for an anticoagulant (i e  Ecotrin (Aspirin), Coumadin (Warfarin), Lovenox (Enoxaparin)) and a narcotic pain reliever  Do not take any over the counter NSAIDs (i e  Ibuprofen, Motrin, Advil, Naprosyn, Aleve) while on your anticoagulation medication  Narcotic pain relievers cannot be refilled over the phone  Please be mindful of the number of pills you have left so you can  a prescription for a refill if needed during office hours  If you are on Coumadin (Warfarin) you will need your blood drawn every Monday and Thursday once you are home  Initially your visiting nurse will draw your blood  Later you will go to an outpatient lab  You will receive a phone call the next day and your Coumadin (warfarin) will be dosed based on these results  Take this dosage daily until you hear from us next  Walking:  Use two crutches or a walker for EVERY step  Gradually increase your walking daily  You can progress to a cane as tolerated once advised by your physical therapist       Sleeping Positions: You may sleep on your back, stomach, or either side with a pillow between your knees  Also use the pillow between your legs as you turn to your side or stomach  Bathing:  No tub baths  Do not submerge your incision    You may shower, but your incision must stay clean and dry while showering  It may be helpful to use gauze and a Tegaderm dressing to seal off your incision  You may sit on a shower chair or stand and shower briefly  Use your crutches/walker to get in and out of the shower  Once your incision is healed, and it is approved by your physician, you may get your incision wet  Sexual Relations:  Resume according to your comfort  Swimming:  No swimming or hot tubs until approved by your physician  Swimming will be allowed once your incision is well healed  Driving: You may ride as a passenger now  No driving until your follow-up appointment  To get into the car use the front passenger seat with the seat pushed back as far as possible  Scoot yourself back in the seat  Use your hands to assist your legs into the car  Physical therapy:  When you have finished with home visiting PT, you may begin outpatient PT  Call your surgeons office if you have not already received a prescription  A prescription can be faxed to the outpatient center of your choice  Posterior hip precautions:  Avoid bending greater than 90 degrees at the hips, twisting/pivoting, and crossing the knees (You may cross at the ankles only) for the first 6 weeks after surgery  Your physical therapist will review this with you  Special considerations: To minimize swelling, stiffness, and decrease pain use cold as needed, but not heat  Ice 20 minutes at a time with a cloth between your skin and the ice  Ice after walking, when you have pain, or after you have completed your exercises  Limit your sitting to 60 minutes at one time  Continue to wear your CAMILLE stockings at all times for 2 weeks after surgery, except when washing  You can wear them as needed after that  Follow up:  Call [phone] to make an appointment to see your surgeon within 1 week of your surgery if you havent done so already    If you have any questions during business hours please call the direct office phone number [phone]  Otherwise please call [phone] for any concerns  For the future:  Prior to any dental work, including routine cleanings, call the office for a prescription for antibiotics to be taken prior to your dental appointment  For any invasive procedures (i e  endoscopy, colonoscopy, etc ) inform the doctor performing the procedure that you have a total knee replacement and will antibiotics just prior to the procedure to protect it

## 2019-10-22 NOTE — PHYSICAL THERAPY NOTE
PHYSICAL THERAPY NOTE          Patient Name: Eliana Smith  VCBWI'G Date: 10/22/2019     PT order received  Chart reviewed  Attempted PT eval but pt requested to defer at this time 2* to nausea & BLE numbness  Motor intact but pt still has dec sensation to BLE  RN also reported that pt was on hypotension protocol in PACU  Per RN, BP better but still below normal  Will follow in a m  Nsg staff may attempt to dangle at bedside or OOB in chair at a later time as appropriate  Nsg notified    Santos Bergeron, PT

## 2019-10-22 NOTE — OP NOTE
OPERATIVE REPORT  PATIENT NAME: Yarelis Guevara    :  1940  MRN: [id number]  Pt Location: [facility] OR ROOM 01    SURGERY DATE: 10/22/2019    Surgeon(s) and Role:     * Berny Saenz DO - Primary     * Darvin Dahl PA-C - Assisting    Preop Diagnosis:  Primary osteoarthritis of one hip, right [M16 11]    Post-Op Diagnosis Codes:     * Primary osteoarthritis of one hip, right [M16 11]    Procedure(s) (LRB):  ARTHROPLASTY HIP TOTAL (Right)    Specimen(s):  * No specimens in log *    Estimated Blood Loss:   200 mL    Drains:  Urethral Catheter Double-lumen; Latex 16 Fr  (Active)   Number of days: 0       Anesthesia Type:   spinal    Operative Indications:  Primary osteoarthritis of one hip, right [M16 11]      Operative Findings:  See below    Complications:   None    Procedure and Technique:  Implants: Depuy  Pocahontas acetabular sector II cup size 48mm  Trilock high offset stem size 2  Neutral acetabular liner  Two 6 5 x 15mm screw  36mm  Biolox delta ceramic femoral head    INDICATIONS FOR PROCEDURE:  The patients is a 70years old female who presented to the office with right hip arthritis  Conservative treatments were tried and failed  The patient had debilitating pain and decreased quality of life from their end-stage arthritis  Surgery was then recommended  Extensive counseling in regards to the reasons for surgical intervention as well as the risks and benefits of surgery were reviewed  The risks include, but are not limited to infection, extensive blood clots, blood clots, wound healing problems, damage to blood vessels and nerves, dislocation, leg length discrepancy, fracture, the need for further surgery  The patient understood and agreed to by oral and written consent      OPERATIVE PROCEDURE:  The patient was identified as Trygve  Millets by Her ID bracelet by the surgical staff in the preoperative area at [address]   The patient was wheeled back to the surgical room and placed on the operative table  Preoperative antibiotics were given  Spinal anesthesia was administered and the patient was intubated without problems  The patient was placed in the lateral decubitus  position and all bony prominences were carefully protected  The right leg was then prepped and draped in the usual sterile fashion  A timeout was performed where the patients name and surgical site were once again identified  An incision was made over  the posterolateral aspect of the patients right hip  Dissection was made through the skin and subcutaneous tissue down to the fascia over the gluteus yohan  The fascia was incised and the gluteus yohan muscle fibers were spread  A retractor was carefully placed under the gluteus medius to protect it  The external rotators were identified and released  The piriformis was tagged with an ethibond suture  The gluteus minimus was identified and a retractor was carefully placed under it to protect it  The capsule was incised and tagged with an ethibond suture  At this point all retractors were removed and the suture tagging the piriformis was used to jess the patients leg length  Retractors were once again placed and the hip was dislocated without problems  A trial and the neck length measurement from the template were used to identify the best place to make the neck cut  The cut was made with a saw and the femoral head was removed  Severe arthritic changes were noted on the femoral head and acetabulum  Retractors were placed to provide acetabular exposure  The labrum was removed  Sequential reaming took place and a size 48mm acetabular shell was found to be the best fit  The capsule was injected with 10mL of a combination of toradol, marcaine, and morphine  The shell was impacted into place  Two 6 5 x 15 mm screws were placed through the cup and the final liner was impacted into placed  The liner was checked and found to be secure  Attention was then paid to the femur  Instruments were used to provide for exposure  A box osteotome and canal finder were used to open the femoral canal   Sequential broaching took place and it was found that a size 2  femoral broach to be the best fit  The broach was left in place and a size 32mm +1 5 femoral head with a high offset neck were then trialed  The leg lengths were checked and the leg was taken through a ROM to evaluate for stability  Both the stability and leg lengths were found to be acceptable  Attention returned to the femur and the final femoral stem was impacted into place  Another trial was performed and the stability and accuracy of the leg length were confirmed and found to be acceptable  The final 32mm +1 5 femoral head was impacted securely into place  The acetabulum was irrigated and the hip was carefully reduced  Copious amounts of irrigation was used to irrigate the hip  An irrisept wash followed by more irrigation was performed  The capsule and external rotators were repaired with a #5 Ethibond suture  Irrigation was used throughout the closure  The gluteus yohan fascia was repaired with #1-0 vicryl suture  The subcutaneous fat was closed with a running #1-0 vicryl suture  The skin was closed with #2-0 vicryl and #3-0 monocryl subcutaneously  Steri strips were placed and a sterile dressing were placed over top  The patient was transferred onto a hospital bed and awakened without difficulty  I attest that I was present and performed this procedure  Brooke Barney PA-C  was present for the entire procedure and provided essential assistance with limb position, patient prepping, and retraction    A qualified resident physician was not available    Patient Disposition:  extubated and stable    SIGNATURE: Clifton Barahona DO  DATE: October 22, 2019  TIME: 11:05 AM

## 2019-10-22 NOTE — ANESTHESIA POSTPROCEDURE EVALUATION
Post-Op Assessment Note    CV Status:  Stable    Pain management: adequate     Mental Status:  Alert and awake   Hydration Status:  Euvolemic   PONV Controlled:  Controlled   Airway Patency:  Patent   Post Op Vitals Reviewed: Yes      Staff: Anesthesiologist           BP 96/52 (10/22/19 1140)    Temp     Pulse 80 (10/22/19 1140)   Resp 14 (10/22/19 1140)    SpO2 97 % (10/22/19 1140)

## 2019-10-23 PROBLEM — E03.9 HYPOTHYROID: Status: ACTIVE | Noted: 2019-10-23

## 2019-10-23 PROBLEM — I10 ESSENTIAL HYPERTENSION: Status: ACTIVE | Noted: 2019-10-23

## 2019-10-23 PROBLEM — E78.5 HYPERLIPIDEMIA: Status: ACTIVE | Noted: 2019-10-23

## 2019-10-23 LAB
ANION GAP SERPL CALCULATED.3IONS-SCNC: 9 MMOL/L (ref 4–13)
BUN SERPL-MCNC: 8 MG/DL (ref 5–25)
CALCIUM SERPL-MCNC: 8.6 MG/DL (ref 8.3–10.1)
CHLORIDE SERPL-SCNC: 103 MMOL/L (ref 100–108)
CO2 SERPL-SCNC: 24 MMOL/L (ref 21–32)
CREAT SERPL-MCNC: 0.62 MG/DL (ref 0.6–1.3)
ERYTHROCYTE [DISTWIDTH] IN BLOOD BY AUTOMATED COUNT: 12.6 % (ref 11.6–15.1)
GFR SERPL CREATININE-BSD FRML MDRD: 88 ML/MIN/1.73SQ M
GLUCOSE SERPL-MCNC: 118 MG/DL (ref 65–140)
HCT VFR BLD AUTO: 35.9 % (ref 34.8–46.1)
HGB BLD-MCNC: 11.5 G/DL (ref 11.5–15.4)
MCH RBC QN AUTO: 32 PG (ref 26.8–34.3)
MCHC RBC AUTO-ENTMCNC: 32 G/DL (ref 31.4–37.4)
MCV RBC AUTO: 100 FL (ref 82–98)
PLATELET # BLD AUTO: 223 THOUSANDS/UL (ref 149–390)
PMV BLD AUTO: 9.4 FL (ref 8.9–12.7)
POTASSIUM SERPL-SCNC: 4.4 MMOL/L (ref 3.5–5.3)
RBC # BLD AUTO: 3.59 MILLION/UL (ref 3.81–5.12)
SODIUM SERPL-SCNC: 136 MMOL/L (ref 136–145)
WBC # BLD AUTO: 8.95 THOUSAND/UL (ref 4.31–10.16)

## 2019-10-23 PROCEDURE — 99024 POSTOP FOLLOW-UP VISIT: CPT | Performed by: ORTHOPAEDIC SURGERY

## 2019-10-23 PROCEDURE — G8988 SELF CARE GOAL STATUS: HCPCS

## 2019-10-23 PROCEDURE — G8978 MOBILITY CURRENT STATUS: HCPCS

## 2019-10-23 PROCEDURE — 97110 THERAPEUTIC EXERCISES: CPT

## 2019-10-23 PROCEDURE — G8987 SELF CARE CURRENT STATUS: HCPCS

## 2019-10-23 PROCEDURE — 97163 PT EVAL HIGH COMPLEX 45 MIN: CPT

## 2019-10-23 PROCEDURE — 97116 GAIT TRAINING THERAPY: CPT

## 2019-10-23 PROCEDURE — G8979 MOBILITY GOAL STATUS: HCPCS

## 2019-10-23 PROCEDURE — 85027 COMPLETE CBC AUTOMATED: CPT | Performed by: PHYSICIAN ASSISTANT

## 2019-10-23 PROCEDURE — 80048 BASIC METABOLIC PNL TOTAL CA: CPT | Performed by: PHYSICIAN ASSISTANT

## 2019-10-23 PROCEDURE — 99254 IP/OBS CNSLTJ NEW/EST MOD 60: CPT | Performed by: STUDENT IN AN ORGANIZED HEALTH CARE EDUCATION/TRAINING PROGRAM

## 2019-10-23 PROCEDURE — 97530 THERAPEUTIC ACTIVITIES: CPT

## 2019-10-23 PROCEDURE — 97167 OT EVAL HIGH COMPLEX 60 MIN: CPT

## 2019-10-23 RX ADMIN — OXYCODONE HYDROCHLORIDE AND ACETAMINOPHEN 500 MG: 500 TABLET ORAL at 17:56

## 2019-10-23 RX ADMIN — FOLIC ACID 1 MG: 1 TABLET ORAL at 08:58

## 2019-10-23 RX ADMIN — OXYCODONE HYDROCHLORIDE AND ACETAMINOPHEN 500 MG: 500 TABLET ORAL at 08:57

## 2019-10-23 RX ADMIN — LEVOTHYROXINE SODIUM 25 MCG: 25 TABLET ORAL at 05:22

## 2019-10-23 RX ADMIN — SENNOSIDES 8.6 MG: 8.6 TABLET, FILM COATED ORAL at 08:57

## 2019-10-23 RX ADMIN — FERROUS SULFATE TAB 325 MG (65 MG ELEMENTAL FE) 325 MG: 325 (65 FE) TAB at 08:58

## 2019-10-23 RX ADMIN — LOSARTAN POTASSIUM 100 MG: 50 TABLET, FILM COATED ORAL at 08:57

## 2019-10-23 RX ADMIN — AMLODIPINE BESYLATE 5 MG: 5 TABLET ORAL at 17:56

## 2019-10-23 RX ADMIN — OXYCODONE HYDROCHLORIDE 10 MG: 10 TABLET ORAL at 13:12

## 2019-10-23 RX ADMIN — ENOXAPARIN SODIUM 40 MG: 40 INJECTION SUBCUTANEOUS at 22:34

## 2019-10-23 RX ADMIN — OXYCODONE HYDROCHLORIDE 10 MG: 10 TABLET ORAL at 08:58

## 2019-10-23 RX ADMIN — HYDROCHLOROTHIAZIDE 12.5 MG: 12.5 TABLET ORAL at 08:57

## 2019-10-23 RX ADMIN — DOCUSATE SODIUM 100 MG: 100 CAPSULE, LIQUID FILLED ORAL at 08:57

## 2019-10-23 RX ADMIN — FERROUS SULFATE TAB 325 MG (65 MG ELEMENTAL FE) 325 MG: 325 (65 FE) TAB at 17:56

## 2019-10-23 RX ADMIN — DOCUSATE SODIUM 100 MG: 100 CAPSULE, LIQUID FILLED ORAL at 17:56

## 2019-10-23 RX ADMIN — OXYCODONE HYDROCHLORIDE 5 MG: 5 TABLET ORAL at 04:39

## 2019-10-23 RX ADMIN — PANTOPRAZOLE SODIUM 40 MG: 40 TABLET, DELAYED RELEASE ORAL at 05:22

## 2019-10-23 RX ADMIN — Medication 1 TABLET: at 08:57

## 2019-10-23 RX ADMIN — ATORVASTATIN CALCIUM 10 MG: 10 TABLET, FILM COATED ORAL at 17:56

## 2019-10-23 RX ADMIN — IRON SUCROSE 300 MG: 20 INJECTION, SOLUTION INTRAVENOUS at 08:57

## 2019-10-23 RX ADMIN — OXYCODONE HYDROCHLORIDE 10 MG: 10 TABLET ORAL at 20:36

## 2019-10-23 NOTE — UTILIZATION REVIEW
Initial Clinical Review    Elective IP   surgical procedure    Age/Sex: 70 y o  female     Surgery Date: 10/22/2019    Procedure: ARTHROPLASTY HIP TOTAL (Right)       Anesthesia:    spinal      Admission Orders: Date/Time/Statement: Inpatient Admission Orders (From admission, onward)     Ordered        10/22/19 1048  Inpatient Admission  Once                   Orders Placed This Encounter   Procedures    Inpatient Admission     Standing Status:   Standing     Number of Occurrences:   1     Order Specific Question:   Admitting Physician     Answer:   Alberto Anderson [[id number]]     Order Specific Question:   Level of Care     Answer:   Med Surg [[id number]]     Order Specific Question:   Estimated length of stay     Answer:   More than 2 Midnights     Order Specific Question:   Certification     Answer:   I certify that inpatient services are medically necessary for this patient for a duration of greater than two midnights  See H&P and MD Progress Notes for additional information about the patient's course of treatment       Vital Signs: /74 (BP Location: Right arm)   Pulse 99   Temp 98 8 °F (37 1 °C) (Tympanic)   Resp 18   Ht 5' 3 5" (1 613 m)   Wt 91 kg (200 lb 9 9 oz)   SpO2 94%   BMI 34 98 kg/m²      Diet:  reg    Mobility: PT/OT    DVT Prophylaxis:  SCD's    Medications/Pain Control:     Medications:  amLODIPine 5 mg Oral Daily   ascorbic acid 500 mg Oral BID   atorvastatin 10 mg Oral Daily With Dinner   docusate sodium 100 mg Oral BID   enoxaparin 40 mg Subcutaneous Daily   ferrous sulfate 325 mg Oral BID With Meals   folic acid 1 mg Oral Daily   losartan 100 mg Oral Daily   And      hydrochlorothiazide 12 5 mg Oral Daily   levothyroxine 25 mcg Oral Early Morning   multivitamin-minerals 1 tablet Oral Daily   pantoprazole 40 mg Oral Early Morning   senna 1 tablet Oral Daily       lactated ringers 100 mL/hr Intravenous Continuous       acetaminophen 650 mg Oral Q6H PRN   calcium carbonate 1,000 mg Oral Daily PRN   morphine injection 3 mg Intravenous Q2H PRN   ondansetron 4 mg Intravenous Q6H PRN   X 1   1022   oxyCODONE 10 mg Oral Q4H PRN   oxyCODONE 5 mg Oral Q4H PRN   promethazine 12 5 mg Intravenous Q6H PRN   X 1  10/22   sodium chloride 1,000 mL Intravenous Once PRN   And      sodium chloride 1,000 mL Intravenous Once PRN       Network Utilization Review Department  [email or web address]  ATTENTION: Please call with any questions or concerns to [phone] and carefully listen to the prompts so that you are directed to the right person  All voicemails are confidential   Lizeth Brigida all requests for admission clinical reviews, approved or denied determinations and any other requests to dedicated fax number below belonging to the campus where the patient is receiving treatment    FACILITY NAME UR FAX NUMBER   ADMISSION DENIALS (Administrative/Medical Necessity) [address] [facility] (Maternity/NICU/Pediatrics) [phone]   [facility] [address] [facility] [address] [place] [phone]   [facility] [address] [facility] [phone]   [facility] [address] [phone]

## 2019-10-23 NOTE — PHYSICAL THERAPY NOTE
Physical Therapy Evaluation    Patient Name: Hetal Arriaza    WBRZI'R Date: 10/23/2019     Problem List  Principal Problem:    Primary osteoarthritis of one hip, right       Past Medical History  Past Medical History:   Diagnosis Date    Arthritis     At risk for falls     Disease of thyroid gland     hypo    Edema of both legs     History of pulmonary embolus (PE)     Hyperlipidemia     Hypertension     Irritable bowel syndrome     Prediabetes     Uses roller walker     Wears glasses     Wears partial dentures     upper partial        Past Surgical History  Past Surgical History:   Procedure Laterality Date    CHOLECYSTECTOMY      COLONOSCOPY      HYSTERECTOMY      UT TOTAL HIP ARTHROPLASTY Right 10/22/2019    Procedure: ARTHROPLASTY HIP TOTAL;  Surgeon: Ana Arias DO;  Location: [facility];  Service: Orthopedics           10/23/19 0914   Note Type   Note type Eval only   Pain Assessment   Pain Assessment 0-10   Pain Score 6   Pain Type Acute pain;Surgical pain   Pain Location Hip   Pain Orientation Right   Hospital Pain Intervention(s) Repositioned; Ambulation/increased activity   Response to Interventions tolerated   Home Living   Type of [address] [facility] One level   Bathroom Shower/Tub Walk-in shower   Bathroom Toilet Raised   Home Equipment Walker  (RW)   Prior Function   Level of [place] Independent with ADLs and functional mobility   Lives With Spouse   Receives Help From Family   ADL Assistance Independent   IADLs Independent   Falls in the last 6 months 1 to 4  (1)   Vocational Retired   Restrictions/Precautions   [place] Bearing Precautions Per Order Yes   RLE [place] Bearing Per Order WBAT   Other Precautions WBS;THR;Multiple lines; Fall Risk;Pain   General   Family/Caregiver Present No   Cognition   Overall Cognitive Status WFL   Arousal/Participation Cooperative   Attention Within functional limits   Orientation Level Oriented X4   Following Commands Follows one step commands without difficulty   RUE Assessment   RUE Assessment WFL   LUE Assessment   LUE Assessment WFL   RLE Assessment   RLE Assessment X   Strength RLE   RLE Overall Strength 2/5  (grossly)   LLE Assessment   LLE Assessment WFL   Coordination   Movements are Fluid and Coordinated 0   Coordination and Movement Description slow movements, guarded posture secondary to pain   Bed Mobility   Rolling L 4  Minimal assistance   Additional items Increased time required;Verbal cues;LE management   Supine to Sit 4  Minimal assistance   Additional items Increased time required;Verbal cues;LE management   Transfers   Sit to Stand 4  Minimal assistance   Additional items Assist x 2; Increased time required;Verbal cues   Stand to Sit 4  Minimal assistance   Additional items Assist x 2; Increased time required;Verbal cues   Ambulation/Elevation   Gait pattern Improper Weight shift; Antalgic;Narrow DEBORA; Decreased foot clearance;Decreased R stance; Short stride; Step to;Excessively slow   Gait Assistance 4  Minimal assist   Additional items Verbal cues; Tactile cues   Assistive Device Rolling walker   Distance 7'   Balance   Static Sitting Fair +   Dynamic Sitting Fair   Static Standing Fair  (RW)   Dynamic Standing Poor +  (RW)   Ambulatory Poor +  (RW)   Endurance Deficit   Endurance Deficit Yes   Endurance Deficit Description weakness, fatigue   Activity Tolerance   Activity Tolerance Patient limited by fatigue;Patient limited by pain   Medical Staff Made Aware OT Angel   Nurse Made Aware RN cleared patient for PT evaluation   Assessment   Prognosis Good   Problem List Decreased strength;Decreased range of motion;Decreased endurance; Impaired balance;Decreased mobility; Decreased skin integrity;Orthopedic restrictions;Pain   Assessment Pt is a 70 y o  female seen for PT evaluation s/p admit to [facility] on 10/22/19  Two pt identifiers were used to confirm   Pt presented s/p R LANCE which was performed on 10/22/19  Pt was admitted with a primary dx of: primary osteoarthritis of right hip  PT now consulted for assessment of mobility and d/c needs  Pts current co morbidities effecting treatment include: history of hypercoaguable state, DM, HTN and personal factors including 2 JOHANNA home environment  Pt currently lives in a 1 story home with her   Pts current clinical presentation is Unstable/ Unpredictable (high complexity) due to Ongoing medical management for primary dx, Increased reliance on more restrictive AD compared to baseline, decreased activity tolerance compared to baseline, fall risk, increased assistance needed from caregiver at current time, multiple lines, decline in overall functional mobility status  Prior to admission, pt was mod I with ambulation with the use of a RW since her fall a month ago as per pt  Upon evaluation, pt currently is requiring min A for bed mobility; min A x 2 for transfers and min A for ambulation w/ RW  Pt displays decreased step and stride length, decreased gait speed, improper weight shift, step to gait pattern  Pt presents at PT eval functioning below baseline and currently w/ overall mobility deficits secondary to: decreased strength, decreased endurance, impaired balance, impaired coordination, pain  Pt currently at a fall risk secondary to impairments listed above  Based on PT evaluation, pt will continue to benefit from skilled acute PT interventions to address stated impairments; to maximize functional mobility; for ongoing pt/ family training; and DME needs  At conclusion of PT session pt was left seated in bedside chair, all needs within reach  Provided pt education regarding PT plan to improve functional mobility status  PT is currently recommending home with family support and outpatient PT  Pt agreeable to plan and goals as stated on evaluation  PT will continue to follow during hospital stay     Goals   Patient Goals to go on a cruise next year   STG Expiration Date 11/06/19   Short Term Goal #1 1  Pt will increased strength by 1 grade in order to increase functional independence  2  Pt will increase balance grade by 1 in order to improve safety and increase functional independence  3  Pt will improve transfer ability to mod I to increase functional independence  4  Pt will be able to amb 150 ft with RW and mod I to increase functional independence  5  Pt will be able to ascend and descend 2 stairs mod I to increase functional independence  6  Pt will be able to verbalize and adhere to hip precautions 3/3  PT Treatment Day 0   Plan   Treatment/Interventions Functional transfer training;LE strengthening/ROM; Elevations; Therapeutic exercise; Endurance training;Patient/family training;Equipment eval/education; Bed mobility;Gait training; Compensatory technique education;Spoke to nursing;OT   PT Frequency 7x/wk; Twice a day   Recommendation   Recommendation Home with family support; Outpatient PT   Equipment Recommended Walker  (RW)   PT - OK to Discharge No   Additional Comments increase ambulation distance and clear stairs   Modified [place] Scale   Modified [place] Scale 4   Barthel Index   Feeding 10   Bathing 0   Grooming Score 5   Dressing Score 5   Bladder Score 10   Bowels Score 10   Toilet Use Score 5   Transfers (Bed/Chair) Score 10   Mobility (Level Surface) Score 0   Stairs Score 0   Barthel Index Score 55       Nessa Menendez PT, DPT

## 2019-10-23 NOTE — PLAN OF CARE
Problem: OCCUPATIONAL THERAPY ADULT  Goal: Performs self-care activities at highest level of function for planned discharge setting  See evaluation for individualized goals  Description  Treatment Interventions: ADL retraining, UE strengthening/ROM, Functional transfer training, Patient/family training, Equipment evaluation/education, Compensatory technique education  Equipment Recommended: Bedside commode(RW)       See flowsheet documentation for full assessment, interventions and recommendations  Note:   Limitation: Decreased ADL status, Decreased UE strength, Decreased Safe judgement during ADL, Decreased endurance, Decreased high-level ADLs  Prognosis: Good  Assessment: Pt is a 77y/o female admitted to the hospital for an elected R THR(10/22) 2* hx OA  PTA pt states independence with ADLs, IADLs, transfers and ambulation; with RW; +1 fall; +drive; -home alone   During initial eval, pt demonstrated deficits with her functional balance, functional mobility, ADL status, activity tolerance(currently fair=15-20mins), transfer safety, and b/l UE strength  Pt would benefit from 1-5 OT tx sessions for the above deficits  OT Discharge Recommendation: (Continued PT)  OT - OK to Discharge:  Yes

## 2019-10-23 NOTE — PLAN OF CARE
Problem: PHYSICAL THERAPY ADULT  Goal: Performs mobility at highest level of function for planned discharge setting  See evaluation for individualized goals  Description  Treatment/Interventions: Functional transfer training, LE strengthening/ROM, Elevations, Therapeutic exercise, Endurance training, Patient/family training, Equipment eval/education, Bed mobility, Gait training, Compensatory technique education, Spoke to nursing, OT  Equipment Recommended: Walker(RW)       See flowsheet documentation for full assessment, interventions and recommendations  Outcome: Progressing  Note:   Prognosis: Good  Problem List: Decreased strength, Decreased range of motion, Decreased endurance, Impaired balance, Decreased mobility, Obesity, Decreased skin integrity, Orthopedic restrictions, Pain  Assessment: Pt  seated at EOB upon my arrival  Pt  reporting increased fatigue/pain, however agreeable to therapeutic intervention  Able to maintain THR posterior precautions throughout treatment session  Progressed with transfers requiring A of therapist with cues provided for proper technique/hand placement  Progressed with a limited amb  trial with use of RW and Tip of therapist with cues provided for LE sequencing  Pt  limited by fatigue requiring a quick return to seated at EOB  Pt  repositioned supine in bed at end of treatment session with ice applied  Pt  will continue progression of PT goals with intent of d/c home with HHPT and family support as needed when medically stable  Barriers to Discharge: Inaccessible home environment  Barriers to Discharge Comments: JOHANNA  Recommendation: Home PT, Home with family support     PT - OK to Discharge: No(Continued progression of PT goals prior to d/c )    See flowsheet documentation for full assessment

## 2019-10-23 NOTE — OCCUPATIONAL THERAPY NOTE
OccupationalTherapy Evaluation (8:50-9:25)     Patient Name: Alie Eastman  VELSG'H Date: 10/23/2019  Problem List  Principal Problem:    Primary osteoarthritis of one hip, right    Past Medical History  Past Medical History:   Diagnosis Date    Arthritis     At risk for falls     Disease of thyroid gland     hypo    Edema of both legs     History of pulmonary embolus (PE)     Hyperlipidemia     Hypertension     Irritable bowel syndrome     Prediabetes     Uses roller walker     Wears glasses     Wears partial dentures     upper partial     Past Surgical History  Past Surgical History:   Procedure Laterality Date    CHOLECYSTECTOMY      COLONOSCOPY      HYSTERECTOMY      ME TOTAL HIP ARTHROPLASTY Right 10/22/2019    Procedure: ARTHROPLASTY HIP TOTAL;  Surgeon: Pretty Nicole DO;  Location: [facility];  Service: Orthopedics        10/23/19 0925   Note Type   Note type Eval only   Restrictions/Precautions   Weight Bearing Precautions Per Order Yes   RLE Weight Bearing Per Order WBAT   Other Precautions Pain; Fall Risk;Multiple lines;WBS;THR  (THP)   Pain Assessment   Pain Assessment 0-10   Pain Score 6   Pain Type Acute pain;Surgical pain   Pain Location Hip   Pain Orientation Right   Home Living   Type of [address] One level   Bathroom Shower/Tub Walk-in shower  (small theshold)   Bathroom Toilet Raised   Home Equipment Walker   Prior Function   Lives With Spouse   ADL Assistance Independent   IADLs Independent   Falls in the last 6 months 1 to 4  (1)   Vocational Retired   Lifestyle   Autonomy PTA pt states independence with ADLs, IADLs, transfers and ambulation; with RW; +1 fall; +drive; -home alone    Reciprocal Relationships 5 children, supportive    Service to Others Retired RN    Intrinsic Gratification traveling, crossword puzzles   Psychosocial   Psychosocial (WDL) WDL   Subjective   Subjective "The hip feels a little stiff"    ADL   Where Assessed Edge of bed Eating Assistance 6  Modified independent   Grooming Assistance 6  Modified Independent   UB Bathing Assistance 5  Supervision/Setup   LB Bathing Assistance 3  Moderate Assistance   UB Dressing Assistance 5  Supervision/Setup   LB Dressing Assistance 3  Moderate Assistance   Bed Mobility   Rolling L 4  Minimal assistance   Additional items Assist x 1; Increased time required; Bedrails;LE management   Supine to Sit 4  Minimal assistance   Additional items Assist x 1; Increased time required;Verbal cues;LE management   Transfers   Sit to Stand 4  Minimal assistance   Additional items Assist x 2   Stand to Sit 4  Minimal assistance   Additional items Assist x 2   Additional Comments BP sitting EOB: 153/90; BP sitting after ambulation 152/82; nsg notified   Functional Mobility   Functional Mobility 4  Minimal assistance   Additional Comments x2   Additional items Rolling walker   Balance   Static Sitting Fair +   Dynamic Sitting Fair   Static Standing Fair   Dynamic Standing Fair -   Activity Tolerance   Activity Tolerance Patient limited by fatigue;Patient limited by pain   Medical Staff Made Aware nsg; PT    Nurse Made Aware Allie FONTANEZ Assessment   RUE Assessment WFL   RUE Strength   RUE Overall Strength Within Functional Limits - able to perform ADL tasks with strength  (4/5 throughout)   LUE Assessment   LUE Assessment WFL   LUE Strength   LUE Overall Strength Within Functional Limits - able to perform ADL tasks with strength  (4/5 throughout)   Hand Function   Gross Motor Coordination Functional   Fine Motor Coordination Functional   Sensation   Light Touch No apparent deficits   Proprioception   Proprioception No apparent deficits   Vision-Basic Assessment   Current Vision No visual deficits   Vision - Complex Assessment   Acuity Able to read clock/calendar on wall without difficulty   Perception   Inattention/Neglect Appears intact   Cognition   Overall Cognitive Status [facility]   Arousal/Participation Alert; Cooperative   Attention Within functional limits   Orientation Level Oriented X4   Memory Decreased recall of precautions   Following Commands Follows all commands and directions without difficulty   Assessment   Limitation Decreased ADL status; Decreased UE strength;Decreased Safe judgement during ADL;Decreased endurance;Decreased high-level ADLs   Prognosis Good   Assessment Pt is a 79y/o female admitted to the hospital for an elected R THR(10/22) 2* hx OA  PTA pt states independence with ADLs, IADLs, transfers and ambulation; with RW; +1 fall; +drive; -home alone   During initial eval, pt demonstrated deficits with her functional balance, functional mobility, ADL status, activity tolerance(currently fair=15-20mins), transfer safety, and b/l UE strength  Pt would benefit from 1-5 OT tx sessions for the above deficits  Goals   Patient Goals "to be able to get back to traveling "   STG Time Frame   (1-7days)   Short Term Goal #1 Pt will demonstrate improved activity tolerance to good(20-30mins) and standing tolerance to 3-5mins to assist with ADLs  Short Term Goal #2 Pt will independently demonstrate knowledge and application of proper THP's 100% of the time  Short Term Goal  Pt will demonstrate mod I with their bed mobility to facilitate EOB ADLs  Long Term Goal #1 Pt will demonstrate mod I with their UE and LE bathing/dresssing  Long Term Goal #2 Pt will demonstrate g/g- balance with all functional activities  Long Term Goal Pt will demonstrate proper walker/transfer safety 100% of the time  Plan   Treatment Interventions ADL retraining;UE strengthening/ROM; Functional transfer training;Patient/family training;Equipment evaluation/education; Compensatory technique education   Goal Expiration Date 10/30/19   OT Treatment Day 0   OT Frequency 3-5x/wk   Recommendation   OT Discharge Recommendation   (Continued PT)   Equipment Recommended Bedside commode  (RW)   OT - OK to Discharge Yes Barthel Index   Feeding 10   Bathing 0   Grooming Score 5   Dressing Score 5   Bladder Score 10   Bowels Score 10   Toilet Use Score 5   Transfers (Bed/Chair) Score 10   Mobility (Level Surface) Score 0   Stairs Score 0   Barthel Index Score 55     Beth Lopez, OT

## 2019-10-23 NOTE — NURSING NOTE
HERIBERTO Webber contacted about patient's IV access being lost  Stated that she was okay with not having IV access

## 2019-10-23 NOTE — PHYSICAL THERAPY NOTE
Physical Therapy Progress Note     10/23/19 1520   Pain Assessment   Pain Assessment 0-10   Pain Score 8   Pain Type Acute pain   Pain Location Hip   Pain Orientation Right   Hospital Pain Intervention(s) Ambulation/increased activity;Repositioned;Cold applied   Response to Interventions Tolerated  Precautions   Total Hip Replacement ADduction; Internal rotation; Flexion   Restrictions/Precautions   Weight Bearing Precautions Per Order Yes   RLE Weight Bearing Per Order WBAT   Other Precautions Fall Risk;Pain;THR   General   Chart Reviewed Yes   Response to Previous Treatment Patient reporting fatigue but able to participate  Family/Caregiver Present Yes  (Pt's spouse present during treatment session )   Subjective   Subjective Willing to participate in therapy this PM    Bed Mobility   Sit to Supine 4  Minimal assistance   Additional items Assist x 1;HOB elevated; Bedrails;Leg ; Increased time required;Verbal cues;LE management   Transfers   Sit to Stand 4  Minimal assistance   Additional items Assist x 1;Bedrails; Increased time required;Verbal cues   Stand to Sit 4  Minimal assistance   Additional items Assist x 1;Bedrails; Increased time required;Verbal cues   Ambulation/Elevation   Gait pattern Decreased foot clearance; Forward Flexion; Improper Weight shift; Antalgic; Short stride; Excessively slow; Step to; Inconsistent cherry;Decreased R stance   Gait Assistance 4  Minimal assist   Additional items Assist x 1;Verbal cues; Tactile cues   Assistive Device Rolling walker   Distance 10'   Balance   Static Sitting Fair +   Dynamic Sitting Fair   Static Standing Fair   Dynamic Standing Fair -   Ambulatory Poor +   Endurance Deficit   Endurance Deficit Yes   Endurance Deficit Description pain/fatigue   Activity Tolerance   Activity Tolerance Patient limited by fatigue;Patient limited by pain   Nurse Made Aware Yes   Exercises   THR Supine;10 reps;AAROM; Bilateral   Assessment   Prognosis Good   Problem List Decreased strength;Decreased range of motion;Decreased endurance; Impaired balance;Decreased mobility;Obesity; Decreased skin integrity;Orthopedic restrictions;Pain   Assessment Pt  seated at EOB upon my arrival  Pt  reporting increased fatigue/pain, however agreeable to therapeutic intervention  Able to maintain THR posterior precautions throughout treatment session  Progressed with transfers requiring A of therapist with cues provided for proper technique/hand placement  Progressed with a limited amb  trial with use of RW and Tip of therapist with cues provided for LE sequencing  Pt  limited by fatigue requiring a quick return to seated at EOB  Pt  repositioned supine in bed at end of treatment session with ice applied  Pt  will continue progression of PT goals with intent of d/c home with HHPT and family support as needed when medically stable  Barriers to Discharge Inaccessible home environment   Barriers to Discharge Comments JOHANNA   Goals   Patient Goals To get better  STG Expiration Date 11/06/19   PT Treatment Day 1   Plan   Treatment/Interventions Functional transfer training;LE strengthening/ROM; Therapeutic exercise; Endurance training;Bed mobility;Gait training;Spoke to case management;Spoke to nursing;Family   Progress Slow progress, decreased activity tolerance   PT Frequency 7x/wk; Twice a day;Weekend   Recommendation   Recommendation Home PT; Home with family support   Equipment Recommended Tyshawn Patinont; Other (Comment)  (RW, BSC)   PT - OK to Discharge No  (Continued progression of PT goals prior to d/c )     Fan Buchanan, PTA

## 2019-10-23 NOTE — SOCIAL WORK
CM met with pt and spouse at bedside to discuss discharge needs  Pt lives in a house with her   ADL's are completed independently  Pt owns a rolator; CM will order a RW and BSC  PCP identified as Dr Crystal Claire  Pharmacy identified as [facility]  Pt does drive and spouse will transport home at D/C  [facility] Pt does have a POA; CM advised them to give ppwrk to PCP  Pt would like to start with Home therapy and transition to OPT  CM will send a referral to [facility]  No other needs expressed or identified  CM to follow as needed

## 2019-10-23 NOTE — CONSULTS
[address] Internal Medicine  Consult- Ruy Bronson 1940, 70 y o  female MRN: [id number]  Unit/Bed#: E2 -01 Encounter: [id number]  Primary Care Provider: Carlos Alberto Hdz MD   Date and time admitted to hospital: 10/22/2019  5:44 AM      Inpatient consult to Internal Medicine  Consult performed by: Jessica Olivares PA-C  Consult ordered by: Teri Steele PA-C        Essential hypertension  Assessment & Plan  · Currently acceptable  · Continue Diovan and amlodipine  · Continue to monitor    Hyperlipidemia  Assessment & Plan  · Continue Lipitor 10mg     Hypothyroid  Assessment & Plan  · Continue synthroid    History of hypercoagulable state  Assessment & Plan  · History of PE   · Now on Eliquis  · Can resume Eliquis postoperatively/upon discharge    * Primary osteoarthritis of one hip, right  Assessment & Plan  · Ortho is primary, appreciate management  · POD 1  · Monitor Hb postoperatively          VTE Prophylaxis: Enoxaparin (Lovenox)      Recommendations for Discharge:  · Continue home medications as prescribed, transition Lovenox to Eliquis, outpatient follow-up with Ortho    Counseling / Coordination of Care Time: 30 minutes  Greater than 50% of total time spent on patient counseling and coordination of care  Collaboration of Care: Were Recommendations Directly Discussed with Primary Treatment Team? - No     History of Present Illness:    Wang Alexander is a 70 y o  female with past medical history of hypothyroidism, hyperlipidemia, hypertension, history of DVTs who is originally admitted to the Orthopedic service due to right total hip arthroplasty  We are consulted for management of chronic medical conditions  Review patient's home medications, postoperative labs and vitals are acceptable  Patient can resume Eliquis when okay with primary team postoperatively  Recommend rehab upon discharge    Patient medically stable for discharge at this time, with assistance upon ambulation    Review of Systems:    Review of Systems   Musculoskeletal: Positive for arthralgias (Right hip pain)  All other systems reviewed and are negative  Past Medical and Surgical History:     Past Medical History:   Diagnosis Date    Arthritis     At risk for falls     Disease of thyroid gland     hypo    Edema of both legs     History of pulmonary embolus (PE)     Hyperlipidemia     Hypertension     Irritable bowel syndrome     Prediabetes     Uses roller walker     Wears glasses     Wears partial dentures     upper partial       Past Surgical History:   Procedure Laterality Date    CHOLECYSTECTOMY      COLONOSCOPY      HYSTERECTOMY      [place] TOTAL HIP ARTHROPLASTY Right 10/22/2019    Procedure: ARTHROPLASTY HIP TOTAL;  Surgeon: Cameron Dailey DO;  Location: [facility];  Service: Orthopedics       Meds/Allergies:    PTA meds:   Prior to Admission Medications   Prescriptions Last Dose Informant Patient Reported? Taking?    ELIQUIS 5 MG 10/20/2019 at 0900 Self Yes Yes   Sig: Take 5 mg by mouth 2 (two) times a day    Multiple Vitamin (MULTIVITAMIN) tablet 10/21/2019 at 0900 Self Yes Yes   Sig: Take 1 tablet by mouth daily   Omega-3 Fatty Acids (FISH OIL PO) 10/21/2019 at 2130 Self Yes Yes   Sig: Take 1 capsule by mouth daily   SYNTHROID 25 MCG tablet 10/22/2019 at 0500 Self Yes Yes   Sig: Take 25 mcg by mouth daily    acetaminophen (TYLENOL) 325 mg tablet Past Month at Unknown time Self Yes Yes   Sig: Take 650 mg by mouth every 6 (six) hours as needed for mild pain   amLODIPine (NORVASC) 5 mg tablet 10/21/2019 at 2130 Self Yes Yes   Sig: Take 5 mg by mouth daily    ascorbic acid (VITAMIN C) 500 mg tablet 10/21/2019 at 0900  No Yes   Sig: Take 1 tablet (500 mg total) by mouth daily Start to take 30 days before surgery   atorvastatin (LIPITOR) 10 mg tablet 10/21/2019 at 2130 Self Yes Yes   Sig: Take 10 mg by mouth daily    ferrous sulfate 324 (65 Fe) mg 10/21/2019 at 0900  No Yes   Sig: Take 1 tablet (324 mg total) by mouth daily before breakfast Start to take 30 days before surgery   folic acid (FOLVITE) 1 mg tablet 10/21/2019 at 0900  No Yes   Sig: Take 1 tablet (1 mg total) by mouth daily Start to take 30 days before surgery   valsartan-hydrochlorothiazide (DIOVAN-HCT) 160-12 5 MG per tablet 10/21/2019 at 0930 Self Yes Yes   Sig: Take 1 tablet by mouth daily       Facility-Administered Medications: None       Allergies: No Known Allergies    Social History:     Marital Status: /Civil Union    Substance Use History:   Social History     Substance and Sexual Activity   Alcohol Use Yes    Frequency: Monthly or less     Social History     Tobacco Use   Smoking Status Never Smoker   Smokeless Tobacco Never Used     Social History     Substance and Sexual Activity   Drug Use Not Currently       Family History:    Family History   Problem Relation Age of Onset    Hypertension Family        Physical Exam:     Vitals:   Blood Pressure: 127/72 (10/23/19 1507)  Pulse: 89 (10/23/19 1507)  Temperature: 98 7 °F (37 1 °C) (10/23/19 1507)  Temp Source: Tympanic (10/23/19 1507)  Respirations: 19 (10/23/19 1507)  Height: 5' 3 5" (161 3 cm) (09/30/19 1059)  Weight - Scale: 91 kg (200 lb 9 9 oz) (10/23/19 0600)  SpO2: 92 % (10/23/19 1507)    Physical Exam   Constitutional: She appears well-developed and well-nourished  No distress  HENT:   Head: Normocephalic and atraumatic  Eyes: Conjunctivae are normal  No scleral icterus  Cardiovascular: Normal rate and regular rhythm  No murmur heard  Pulmonary/Chest: Effort normal and breath sounds normal  No respiratory distress  She has no wheezes  She has no rales  Abdominal: Soft  She exhibits no distension  There is no tenderness  Musculoskeletal: She exhibits no edema  R hip clean, dry, intact   Neurological: She is alert  Skin: Skin is warm and dry  No erythema  Psychiatric: She has a normal mood and affect  Nursing note and vitals reviewed        Additional Data:     Lab Results: I have personally reviewed pertinent reports  Results from last 7 days   Lab Units 10/23/19  1244   WBC Thousand/uL 8 95   HEMOGLOBIN g/dL 11 5   HEMATOCRIT % 35 9   PLATELETS Thousands/uL 223     Results from last 7 days   Lab Units 10/23/19  0447   SODIUM mmol/L 136   POTASSIUM mmol/L 4 4   CHLORIDE mmol/L 103   CO2 mmol/L 24   BUN mg/dL 8   CREATININE mg/dL 0 62   ANION GAP mmol/L 9   CALCIUM mg/dL 8 6   GLUCOSE RANDOM mg/dL 118             Lab Results   Component Value Date/Time    HGBA1C 6 7 (H) 2019 11:55 AM               Imaging: I have personally reviewed pertinent reports  XR hip/pelv 1 vw right if performed   Final Result by [facility],  (10/22 2889)      Unremarkable appearance of right total hip arthroplasty  Workstation performed: [id number]             EKG, Pathology, and Other Studies Reviewed on Admission:   · EK/30:  NSR, 75 bpm    ** Please Note: This note has been constructed using a voice recognition system   **

## 2019-10-23 NOTE — PLAN OF CARE
Problem: PHYSICAL THERAPY ADULT  Goal: Performs mobility at highest level of function for planned discharge setting  See evaluation for individualized goals  Description  Treatment/Interventions: Functional transfer training, LE strengthening/ROM, Elevations, Therapeutic exercise, Endurance training, Patient/family training, Equipment eval/education, Bed mobility, Gait training, Compensatory technique education, Spoke to nursing, OT  Equipment Recommended: Walker(RW)       See flowsheet documentation for full assessment, interventions and recommendations  Note:   Prognosis: Good  Problem List: Decreased strength, Decreased range of motion, Decreased endurance, Impaired balance, Decreased mobility, Decreased skin integrity, Orthopedic restrictions, Pain  Assessment: Pt is a 70 y o  female seen for PT evaluation s/p admit to [facility] on 10/22/19  Two pt identifiers were used to confirm  Pt presented s/p R LANCE which was performed on 10/22/19  Pt was admitted with a primary dx of: primary osteoarthritis of right hip  PT now consulted for assessment of mobility and d/c needs  Pts current co morbidities effecting treatment include: history of hypercoaguable state, DM, HTN and personal factors including 2 JOHANNA home environment  Pt currently lives in a 1 story home with her   Pts current clinical presentation is Unstable/ Unpredictable (high complexity) due to Ongoing medical management for primary dx, Increased reliance on more restrictive AD compared to baseline, decreased activity tolerance compared to baseline, fall risk, increased assistance needed from caregiver at current time, multiple lines, decline in overall functional mobility status  Prior to admission, pt was mod I with ambulation with the use of a RW since her fall a month ago as per pt  Upon evaluation, pt currently is requiring min A for bed mobility; min A x 2 for transfers and min A for ambulation w/ RW   Pt displays decreased step and stride length, decreased gait speed, improper weight shift, step to gait pattern  Pt presents at PT eval functioning below baseline and currently w/ overall mobility deficits secondary to: decreased strength, decreased endurance, impaired balance, impaired coordination, pain  Pt currently at a fall risk secondary to impairments listed above  Based on PT evaluation, pt will continue to benefit from skilled acute PT interventions to address stated impairments; to maximize functional mobility; for ongoing pt/ family training; and DME needs  At conclusion of PT session pt was left seated in bedside chair, all needs within reach  Provided pt education regarding PT plan to improve functional mobility status  PT is currently recommending home with family support and outpatient PT  Pt agreeable to plan and goals as stated on evaluation  PT will continue to follow during hospital stay  Recommendation: Home with family support, Outpatient PT     PT - OK to Discharge: No    See flowsheet documentation for full assessment

## 2019-10-23 NOTE — PLAN OF CARE
Problem: Potential for Falls  Goal: Patient will remain free of falls  Description  INTERVENTIONS:  - Assess patient frequently for physical needs  -  Identify cognitive and physical deficits and behaviors that affect risk of falls    -  [place] fall precautions as indicated by assessment   - Educate patient/family on patient safety including physical limitations  - Instruct patient to call for assistance with activity based on assessment  - Modify environment to reduce risk of injury  - Consider OT/PT consult to assist with strengthening/mobility  Outcome: Progressing     Problem: Prexisting or High Potential for Compromised Skin Integrity  Goal: Skin integrity is maintained or improved  Description  INTERVENTIONS:  - Identify patients at risk for skin breakdown  - Assess and monitor skin integrity  - Assess and monitor nutrition and hydration status  - Monitor labs   - Assess for incontinence   - Turn and reposition patient  - Assist with mobility/ambulation  - Relieve pressure over bony prominences  - Avoid friction and shearing  - Provide appropriate hygiene as needed including keeping skin clean and dry  - Evaluate need for skin moisturizer/barrier cream  - Collaborate with interdisciplinary team   - Patient/family teaching  - Consider wound care consult   Outcome: Progressing     Problem: PAIN - ADULT  Goal: Verbalizes/displays adequate comfort level or baseline comfort level  Description  Interventions:  - Encourage patient to monitor pain and request assistance  - Assess pain using appropriate pain scale  - Administer analgesics based on type and severity of pain and evaluate response  - Implement non-pharmacological measures as appropriate and evaluate response  - Consider cultural and social influences on pain and pain management  - Notify physician/advanced practitioner if interventions unsuccessful or patient reports new pain  Outcome: Progressing     Problem: SAFETY ADULT  Goal: Maintain or return to baseline ADL function  Description  INTERVENTIONS:  -  Assess patient's ability to carry out ADLs; assess patient's baseline for ADL function and identify physical deficits which impact ability to perform ADLs (bathing, care of mouth/teeth, toileting, grooming, dressing, etc )  - Assess/evaluate cause of self-care deficits   - Assess range of motion  - Assess patient's mobility; develop plan if impaired  - Assess patient's need for assistive devices and provide as appropriate  - Encourage maximum independence but intervene and supervise when necessary  - Involve family in performance of ADLs  - Assess for home care needs following discharge   - Consider OT consult to assist with ADL evaluation and planning for discharge  - Provide patient education as appropriate  Outcome: Progressing  Goal: Maintain or return mobility status to optimal level  Description  INTERVENTIONS:  - Assess patient's baseline mobility status (ambulation, transfers, stairs, etc )    - Identify cognitive and physical deficits and behaviors that affect mobility  - Identify mobility aids required to assist with transfers and/or ambulation (gait belt, sit-to-stand, lift, walker, cane, etc )  - [place] fall precautions as indicated by assessment  - Record patient progress and toleration of activity level on Mobility SBAR; progress patient to next Phase/Stage  - Instruct patient to call for assistance with activity based on assessment  - Consider rehabilitation consult to assist with strengthening/weightbearing, etc   Outcome: Progressing     Problem: DISCHARGE PLANNING  Goal: Discharge to home or other facility with appropriate resources  Description  INTERVENTIONS:  - Identify barriers to discharge w/patient and caregiver  - Arrange for needed discharge resources and transportation as appropriate  - Identify discharge learning needs (meds, wound care, etc )  - Arrange for interpretive services to assist at discharge as needed  - Refer to Case Management Department for coordinating discharge planning if the patient needs post-hospital services based on physician/advanced practitioner order or complex needs related to functional status, cognitive ability, or social support system  Outcome: Progressing     Problem: COPING  Goal: Pt/Family able to verbalize concerns and demonstrate effective coping strategies  Description  INTERVENTIONS:  - Assist patient/family to identify coping skills, available support systems and cultural and spiritual values  - Provide emotional support, including active listening and acknowledgement of concerns of patient and caregivers  - Reduce environmental stimuli, as able  - Provide patient education  - Assess for spiritual pain/suffering and initiate spiritual care, including notification of Pastoral Care or jey based community as needed  - Assess effectiveness of coping strategies  Outcome: Progressing  Goal: Will report anxiety at manageable levels  Description  INTERVENTIONS:  - Administer medication as ordered  - Teach and encourage coping skills  - Provide emotional support  - Assess patient/family for anxiety and ability to cope  Outcome: Progressing

## 2019-10-23 NOTE — PROGRESS NOTES
Progress Note - Orthopedics   [facility] Faraz Bronson 70 y o  female MRN: [id number]  Unit/Bed#: E2 -01 Encounter: [id number]    Assessment:  70 y o  female s/p right total hip arthroplasty POD 1     Plan:  Pain control prn  PT/OT-WBAT right LE   Posterior hip precautions  Abduction pillow while in bed  Lovenox/TEDs/SCDs for DVT prophylaxis  Abx x 24h  F/u CBC    Subjective: Pt S&E  Pain better controlled today  Denies nausea/vomiting today  Vitals: Blood pressure 146/74, pulse 99, temperature 98 8 °F (37 1 °C), temperature source Tympanic, resp  rate 18, height 5' 3 5" (1 613 m), weight 91 kg (200 lb 9 9 oz), SpO2 94 %  ,Body mass index is 34 98 kg/m²        Intake/Output Summary (Last 24 hours) at 10/23/2019 1201  Last data filed at 10/23/2019 0857  Gross per 24 hour   Intake 2615 ml   Output 4050 ml   Net -1435 ml       Invasive Devices     Peripheral Intravenous Line            Peripheral IV 10/23/19 Right;Ventral (anterior) Hand less than 1 day                Ortho Exam: rightLE:  Drsg:  C/D/I, sensation grossly intact L4, L5, S1, palpable pedal pulse, EHL/AT/GS intact    Lab, Imaging and other studies:   CBC: No results found for: WBC, HGB, HCT, MCV, PLT, ADJUSTEDWBC, MCH, MCHC, RDW, MPV, NRBC  CMP:   Lab Results   Component Value Date    SODIUM 136 10/23/2019     10/23/2019    CO2 24 10/23/2019    BUN 8 10/23/2019    CREATININE 0 62 10/23/2019    CALCIUM 8 6 10/23/2019    EGFR 88 10/23/2019

## 2019-10-24 LAB
ANION GAP SERPL CALCULATED.3IONS-SCNC: 7 MMOL/L (ref 4–13)
BASOPHILS # BLD AUTO: 0.02 THOUSANDS/ΜL (ref 0–0.1)
BASOPHILS NFR BLD AUTO: 0 % (ref 0–1)
BUN SERPL-MCNC: 9 MG/DL (ref 5–25)
CALCIUM SERPL-MCNC: 8.7 MG/DL (ref 8.3–10.1)
CHLORIDE SERPL-SCNC: 98 MMOL/L (ref 100–108)
CO2 SERPL-SCNC: 27 MMOL/L (ref 21–32)
CREAT SERPL-MCNC: 0.7 MG/DL (ref 0.6–1.3)
EOSINOPHIL # BLD AUTO: 0.01 THOUSAND/ΜL (ref 0–0.61)
EOSINOPHIL NFR BLD AUTO: 0 % (ref 0–6)
ERYTHROCYTE [DISTWIDTH] IN BLOOD BY AUTOMATED COUNT: 12.6 % (ref 11.6–15.1)
GFR SERPL CREATININE-BSD FRML MDRD: 84 ML/MIN/1.73SQ M
GLUCOSE SERPL-MCNC: 142 MG/DL (ref 65–140)
HCT VFR BLD AUTO: 32.1 % (ref 34.8–46.1)
HGB BLD-MCNC: 10 G/DL (ref 11.5–15.4)
IMM GRANULOCYTES # BLD AUTO: 0.05 THOUSAND/UL (ref 0–0.2)
IMM GRANULOCYTES NFR BLD AUTO: 1 % (ref 0–2)
LYMPHOCYTES # BLD AUTO: 1.8 THOUSANDS/ΜL (ref 0.6–4.47)
LYMPHOCYTES NFR BLD AUTO: 20 % (ref 14–44)
MCH RBC QN AUTO: 31.6 PG (ref 26.8–34.3)
MCHC RBC AUTO-ENTMCNC: 31.2 G/DL (ref 31.4–37.4)
MCV RBC AUTO: 102 FL (ref 82–98)
MONOCYTES # BLD AUTO: 0.89 THOUSAND/ΜL (ref 0.17–1.22)
MONOCYTES NFR BLD AUTO: 10 % (ref 4–12)
NEUTROPHILS # BLD AUTO: 6.28 THOUSANDS/ΜL (ref 1.85–7.62)
NEUTS SEG NFR BLD AUTO: 69 % (ref 43–75)
NRBC BLD AUTO-RTO: 0 /100 WBCS
PLATELET # BLD AUTO: 207 THOUSANDS/UL (ref 149–390)
PMV BLD AUTO: 9.3 FL (ref 8.9–12.7)
POTASSIUM SERPL-SCNC: 3.9 MMOL/L (ref 3.5–5.3)
RBC # BLD AUTO: 3.16 MILLION/UL (ref 3.81–5.12)
SODIUM SERPL-SCNC: 132 MMOL/L (ref 136–145)
WBC # BLD AUTO: 9.05 THOUSAND/UL (ref 4.31–10.16)

## 2019-10-24 PROCEDURE — 97530 THERAPEUTIC ACTIVITIES: CPT

## 2019-10-24 PROCEDURE — 97535 SELF CARE MNGMENT TRAINING: CPT

## 2019-10-24 PROCEDURE — 85025 COMPLETE CBC W/AUTO DIFF WBC: CPT | Performed by: PHYSICIAN ASSISTANT

## 2019-10-24 PROCEDURE — 97116 GAIT TRAINING THERAPY: CPT

## 2019-10-24 PROCEDURE — 80048 BASIC METABOLIC PNL TOTAL CA: CPT | Performed by: PHYSICIAN ASSISTANT

## 2019-10-24 PROCEDURE — 97110 THERAPEUTIC EXERCISES: CPT

## 2019-10-24 PROCEDURE — 99232 SBSQ HOSP IP/OBS MODERATE 35: CPT | Performed by: PHYSICIAN ASSISTANT

## 2019-10-24 PROCEDURE — 99024 POSTOP FOLLOW-UP VISIT: CPT | Performed by: PHYSICIAN ASSISTANT

## 2019-10-24 RX ORDER — ONDANSETRON 2 MG/ML
4 INJECTION INTRAMUSCULAR; INTRAVENOUS EVERY 4 HOURS PRN
Status: DISCONTINUED | OUTPATIENT
Start: 2019-10-24 | End: 2019-10-26 | Stop reason: HOSPADM

## 2019-10-24 RX ORDER — ONDANSETRON 4 MG/1
4 TABLET, ORALLY DISINTEGRATING ORAL EVERY 6 HOURS PRN
Status: DISCONTINUED | OUTPATIENT
Start: 2019-10-24 | End: 2019-10-24

## 2019-10-24 RX ORDER — AMLODIPINE BESYLATE 5 MG/1
5 TABLET ORAL DAILY
Status: DISCONTINUED | OUTPATIENT
Start: 2019-10-24 | End: 2019-10-26 | Stop reason: HOSPADM

## 2019-10-24 RX ORDER — HYDROCODONE BITARTRATE AND ACETAMINOPHEN 5; 325 MG/1; MG/1
1 TABLET ORAL EVERY 6 HOURS PRN
Status: DISCONTINUED | OUTPATIENT
Start: 2019-10-24 | End: 2019-10-26 | Stop reason: HOSPADM

## 2019-10-24 RX ORDER — HYDROCODONE BITARTRATE AND ACETAMINOPHEN 5; 325 MG/1; MG/1
2 TABLET ORAL EVERY 8 HOURS PRN
Status: DISCONTINUED | OUTPATIENT
Start: 2019-10-24 | End: 2019-10-26 | Stop reason: HOSPADM

## 2019-10-24 RX ADMIN — PANTOPRAZOLE SODIUM 40 MG: 40 TABLET, DELAYED RELEASE ORAL at 05:30

## 2019-10-24 RX ADMIN — FERROUS SULFATE TAB 325 MG (65 MG ELEMENTAL FE) 325 MG: 325 (65 FE) TAB at 17:41

## 2019-10-24 RX ADMIN — LEVOTHYROXINE SODIUM 25 MCG: 25 TABLET ORAL at 05:30

## 2019-10-24 RX ADMIN — DOCUSATE SODIUM 100 MG: 100 CAPSULE, LIQUID FILLED ORAL at 17:41

## 2019-10-24 RX ADMIN — ONDANSETRON 4 MG: 2 INJECTION INTRAMUSCULAR; INTRAVENOUS at 17:41

## 2019-10-24 RX ADMIN — OXYCODONE HYDROCHLORIDE AND ACETAMINOPHEN 500 MG: 500 TABLET ORAL at 08:41

## 2019-10-24 RX ADMIN — ONDANSETRON 4 MG: 2 INJECTION INTRAMUSCULAR; INTRAVENOUS at 21:42

## 2019-10-24 RX ADMIN — OXYCODONE HYDROCHLORIDE 10 MG: 10 TABLET ORAL at 05:30

## 2019-10-24 RX ADMIN — HYDROCHLOROTHIAZIDE 12.5 MG: 12.5 TABLET ORAL at 08:41

## 2019-10-24 RX ADMIN — ONDANSETRON 4 MG: 2 INJECTION INTRAMUSCULAR; INTRAVENOUS at 06:26

## 2019-10-24 RX ADMIN — Medication 1 TABLET: at 08:41

## 2019-10-24 RX ADMIN — ENOXAPARIN SODIUM 40 MG: 40 INJECTION SUBCUTANEOUS at 08:41

## 2019-10-24 RX ADMIN — FOLIC ACID 1 MG: 1 TABLET ORAL at 08:41

## 2019-10-24 RX ADMIN — LOSARTAN POTASSIUM 100 MG: 50 TABLET, FILM COATED ORAL at 08:41

## 2019-10-24 RX ADMIN — OXYCODONE HYDROCHLORIDE 10 MG: 10 TABLET ORAL at 00:50

## 2019-10-24 RX ADMIN — DOCUSATE SODIUM 100 MG: 100 CAPSULE, LIQUID FILLED ORAL at 08:41

## 2019-10-24 RX ADMIN — ONDANSETRON 4 MG: 2 INJECTION INTRAMUSCULAR; INTRAVENOUS at 12:17

## 2019-10-24 RX ADMIN — FERROUS SULFATE TAB 325 MG (65 MG ELEMENTAL FE) 325 MG: 325 (65 FE) TAB at 08:41

## 2019-10-24 RX ADMIN — SENNOSIDES 8.6 MG: 8.6 TABLET, FILM COATED ORAL at 08:41

## 2019-10-24 RX ADMIN — ATORVASTATIN CALCIUM 10 MG: 10 TABLET, FILM COATED ORAL at 17:41

## 2019-10-24 RX ADMIN — HYDROCODONE BITARTRATE AND ACETAMINOPHEN 1 TABLET: 5; 325 TABLET ORAL at 23:41

## 2019-10-24 RX ADMIN — OXYCODONE HYDROCHLORIDE 10 MG: 10 TABLET ORAL at 12:56

## 2019-10-24 RX ADMIN — OXYCODONE HYDROCHLORIDE AND ACETAMINOPHEN 500 MG: 500 TABLET ORAL at 17:41

## 2019-10-24 NOTE — PROGRESS NOTES
[facility] Internal Medicine  Progress Note - Howard Crystal 1940, 70 y o  female MRN: [id number]  Unit/Bed#: E2 -01 Encounter: [id number]  Primary Care Provider: Adelina Livingston MD   Date and time admitted to hospital: 10/22/2019  5:44 AM    Essential hypertension  Assessment & Plan  · Currently acceptable  · Continue Diovan and amlodipine  · Continue to monitor    Hyperlipidemia  Assessment & Plan  · Continue Lipitor 10mg     Hypothyroid  Assessment & Plan  · Continue synthroid    History of hypercoagulable state  Assessment & Plan  · History of PE   · Now on Eliquis  · Can resume Eliquis postoperatively/upon discharge    * Primary osteoarthritis of one hip, right  Assessment & Plan  · Ortho is primary, appreciate management  · POD 2  · Monitor Hb postoperatively  · Patient has been nauseous with p o  Pain medication, consider switching to IV in the short-term/ensuring that patient does not take pills on an empty stomach  · Patient experienced nausea since last night when she took 10mg oxycodone on an empty stomach and has been nauseous/vomiting since then  Episodes of vomiting or brought on with exacerbation of pain as was experienced during physical therapy today  VTE Pharmacologic Prophylaxis:   Pharmacologic: Enoxaparin (Lovenox)  Mechanical VTE Prophylaxis in Place: No    Patient Centered Rounds: I have performed bedside rounds with nursing staff today  Discussions with Specialists or Other Care Team Provider:  Discussed with nursing staff, nausea is associated with taking pain medication on an empty stomach  Recommend ensuring that patient eats with oxycodone    Education and Discussions with Family / Patient:  Discussed care plan with patient at bedside  Answered all questions to the best of my ability  Time Spent for Care: 20 minutes  More than 50% of total time spent on counseling and coordination of care as described above      Current Length of Stay: 2 day(s)    Current Patient Status: Inpatient   Certification Statement: The patient will continue to require additional inpatient hospital stay due to Postoperative pain control/nausea control    Discharge Plan:  Per primary team, transition patient to p o  Zofran if needed and resume Eliquis upon discharge  Encourage patient to eat when taking oxycodone  Code Status: No Order      Subjective:   Patient reports continued nausea throughout the day, she did vomit while participating with physical therapy  She reports nausea is brought on approximately 15-20 minutes after she takes her oxycodone  It is worse on an empty stomach  Encouraged her to eat while taking this medication  Objective:     Vitals:   Temp (24hrs), Av 3 °F (36 8 °C), Min:97 7 °F (36 5 °C), Max:99 2 °F (37 3 °C)    Temp:  [97 7 °F (36 5 °C)-99 2 °F (37 3 °C)] 98 7 °F (37 1 °C)  HR:  [] 85  Resp:  [18-20] 18  BP: (111-141)/(57-71) 111/57  SpO2:  [94 %-97 %] 95 %  Body mass index is 34 9 kg/m²  Input and Output Summary (last 24 hours): Intake/Output Summary (Last 24 hours) at 10/24/2019 1519  Last data filed at 10/24/2019 1254  Gross per 24 hour   Intake    Output 400 ml   Net -400 ml       Physical Exam:     Physical Exam   Constitutional: She appears well-developed and well-nourished  No distress  HENT:   Head: Normocephalic and atraumatic  Eyes: Conjunctivae are normal  No scleral icterus  Cardiovascular: Normal rate and regular rhythm  No murmur heard  Pulmonary/Chest: Effort normal and breath sounds normal  No respiratory distress  She has no wheezes  She has no rales  Abdominal: Soft  She exhibits distension  There is no tenderness  Musculoskeletal: She exhibits no edema  Neurological: She is alert  Skin: Skin is warm and dry  No erythema  Psychiatric: She has a normal mood and affect  Nursing note and vitals reviewed        Additional Data:     Labs:    Results from last 7 days   Lab Units 10/24/19  0515   WBC Thousand/uL 9 05   HEMOGLOBIN g/dL 10 0*   HEMATOCRIT % 32 1*   PLATELETS Thousands/uL 207   NEUTROS PCT % 69   LYMPHS PCT % 20   MONOS PCT % 10   EOS PCT % 0     Results from last 7 days   Lab Units 10/24/19  0515   SODIUM mmol/L 132*   POTASSIUM mmol/L 3 9   CHLORIDE mmol/L 98*   CO2 mmol/L 27   BUN mg/dL 9   CREATININE mg/dL 0 70   ANION GAP mmol/L 7   CALCIUM mg/dL 8 7   GLUCOSE RANDOM mg/dL 142*                           * I Have Reviewed All Lab Data Listed Above  * Additional Pertinent Lab Tests Reviewed:  All Labs Within Last 24 Hours Reviewed    Imaging:    Imaging Reports Reviewed Today Include: None  Imaging Personally Reviewed by Myself Includes:  None    Recent Cultures (last 7 days):           Last 24 Hours Medication List:     Current Facility-Administered Medications:  acetaminophen 650 mg Oral Q6H PRN Rodolfo Julian PA-C    amLODIPine 5 mg Oral Daily Kelle RALPH PA-C    ascorbic acid 500 mg Oral BID Rodolfo Julian PA-C    atorvastatin 10 mg Oral Daily With [employer]    calcium carbonate 1,000 mg Oral Daily PRN Rodolfo Julian PA-C    docusate sodium 100 mg Oral BID Rodolfo Julian PA-C    enoxaparin 40 mg Subcutaneous Daily Rodolfo Julian PA-C    ferrous sulfate 325 mg Oral BID With Meals Rodolfo Julian PA-C    folic acid 1 mg Oral Daily Rodolfo Julian PA-C    losartan 100 mg Oral Daily Rodolfo Julian PA-C    And        hydrochlorothiazide 12 5 mg Oral Daily Rodolfo Julian PA-C    lactated ringers 100 mL/hr Intravenous Continuous Rodolfo Julian PA-C Last Rate: Stopped (10/23/19 1340)   levothyroxine 25 mcg Oral Early Morning Rodolfo Julian PA-C    multivitamin-minerals 1 tablet Oral Daily Rodolfo Julian PA-C    ondansetron 4 mg Intravenous Q4H PRN Alejo Lasabhinav RALPH PA-C    oxyCODONE 10 mg Oral Q4H PRN Rodolfo Julian PA-C    oxyCODONE 5 mg Oral Q4H PRN Rodolfo Julian PA-C    pantoprazole 40 mg Oral Early Morning Rodolfo Julian PA-C    promethazine 12 5 mg Intravenous Q6H PRN DO kye Gil 1 tablet Oral Daily Nia Cruz PA-C         Today, Patient Was Seen By: Yohana Giron PA-C    ** Please Note: Dictation voice to text software may have been used in the creation of this document   **

## 2019-10-24 NOTE — PLAN OF CARE
Problem: PHYSICAL THERAPY ADULT  Goal: Performs mobility at highest level of function for planned discharge setting  See evaluation for individualized goals  Description  Treatment/Interventions: Functional transfer training, LE strengthening/ROM, Elevations, Therapeutic exercise, Endurance training, Patient/family training, Equipment eval/education, Bed mobility, Gait training, Compensatory technique education, Spoke to nursing, OT  Equipment Recommended: Walker(RW)       See flowsheet documentation for full assessment, interventions and recommendations  10/24/2019 1519 by Marianna Castillo PTA  Outcome: Progressing  Note:   Prognosis: Good  Problem List: Decreased strength, Decreased range of motion, Decreased endurance, Impaired balance, Decreased mobility, Decreased skin integrity, Orthopedic restrictions, Pain, Obesity  Assessment: Pt  supine in bed upon my arrival  Pt  able to adhere to THR posterior precautions throughout treatment session  Performance of HEP supine in bed upon my arrival  Pt  reporting fatigue/pain, however agreeable to therapeutic intervention  Progressed with transfers requiring A of therapist with cues for hand placement/technique  Positioned seated at EOB  Pt  Able to progress with OOB mobility with RW and Tip of therapist with cues provided for LE sequencing  Able to progress to step through gait pattern this treatment session  Pt  Returned to supine in bed  Pt  remained supine in bed with ice applied at end of treatment session  Pt  will continue progression of PT goals with intent of d/c home with family support and HHPT provided when medically stable  Barriers to Discharge: Inaccessible home environment, Decreased caregiver support  Barriers to Discharge Comments: JOHANNA  Recommendation: Home PT, Home with family support     PT - OK to Discharge: No(Continued progression of PT goals prior to d/c )    See flowsheet documentation for full assessment       10/24/2019 1333 by Cira Young TRISHA Farley  Outcome: Progressing  Note:   Prognosis: Good  Problem List: Decreased strength, Decreased endurance, Decreased range of motion, Decreased mobility, Impaired balance, Decreased skin integrity, Orthopedic restrictions, Pain, Obesity  Assessment: Pt  supine in bed upon my arrival  Pt  able to adhere to THR posterior precautions throughout treatment session  Performance of HEP supine in bed upon my arrival  Pt  reporting fatigue/pain, however agreeable to therapeutic intervention  Progressed with transfers requiring A of therapist with cues for hand placement/technique  Positioned seated at EOB  Pt  began reporting increased nausea  Pt  began vomitting at EOB  Requiring A to return to bed  Pt  remained supine in bed with ice applied at end of treatment session  Pt  will continue progression of PT goals with intent of d/c home with family support and HHPT provided when medically stable  Barriers to Discharge: Inaccessible home environment  Barriers to Discharge Comments: JOHANNA  Recommendation: Home PT, Home with family support     PT - OK to Discharge: No(continued progression of PT goals prior to d/c )    See flowsheet documentation for full assessment

## 2019-10-24 NOTE — PLAN OF CARE
Problem: OCCUPATIONAL THERAPY ADULT  Goal: Performs self-care activities at highest level of function for planned discharge setting  See evaluation for individualized goals  Description  Treatment Interventions: ADL retraining, UE strengthening/ROM, Functional transfer training, Patient/family training, Equipment evaluation/education, Compensatory technique education  Equipment Recommended: Bedside commode(RW)       See flowsheet documentation for full assessment, interventions and recommendations  Outcome: Progressing  Note:   Limitation: Decreased ADL status, Decreased UE strength, Decreased Safe judgement during ADL, Decreased endurance, Decreased high-level ADLs  Prognosis: Good  Assessment: Pt was seen for skilled OT with focus on bed mobility, review of hip safety packet, review of LHAE and review of current plan of care  Pt with increased nausea noted this tx session  Pt returned to supine positioning due to noted nausea and slight vomiting  Pt able to tolerate educational tx session with focus on review of RW safety and use of LHAE for LE dressing while maintaining THP's  Pt reports having interest in purchasing LHAE to encourage optimal performance levels with all functional tasks  Pt may benefit from further rehab with focus on achieving optimal performance levels with all functional tasks  Continue to recommend Inpatient rehab     OT Discharge Recommendation: (continue PT)  OT - OK to Discharge:  Yes

## 2019-10-24 NOTE — PLAN OF CARE
Problem: PHYSICAL THERAPY ADULT  Goal: Performs mobility at highest level of function for planned discharge setting  See evaluation for individualized goals  Description  Treatment/Interventions: Functional transfer training, LE strengthening/ROM, Elevations, Therapeutic exercise, Endurance training, Patient/family training, Equipment eval/education, Bed mobility, Gait training, Compensatory technique education, Spoke to nursing, OT  Equipment Recommended: Walker(OMI)       See flowsheet documentation for full assessment, interventions and recommendations  Outcome: Progressing  Note:   Prognosis: Good  Problem List: Decreased strength, Decreased endurance, Decreased range of motion, Decreased mobility, Impaired balance, Decreased skin integrity, Orthopedic restrictions, Pain, Obesity  Assessment: Pt  supine in bed upon my arrival  Pt  able to adhere to THR posterior precautions throughout treatment session  Performance of HEP supine in bed upon my arrival  Pt  reporting fatigue/pain, however agreeable to therapeutic intervention  Progressed with transfers requiring A of therapist with cues for hand placement/technique  Positioned seated at EOB  Pt  began reporting increased nausea  Pt  began vomitting at EOB  Requiring A to return to bed  Pt  remained supine in bed with ice applied at end of treatment session  Pt  will continue progression of PT goals with intent of d/c home with family support and HHPT provided when medically stable  Barriers to Discharge: Inaccessible home environment  Barriers to Discharge Comments: JOHANNA  Recommendation: Home PT, Home with family support     PT - OK to Discharge: No(continued progression of PT goals prior to d/c )    See flowsheet documentation for full assessment

## 2019-10-24 NOTE — PLAN OF CARE
Problem: Potential for Falls  Goal: Patient will remain free of falls  Description  INTERVENTIONS:  - Assess patient frequently for physical needs  -  Identify cognitive and physical deficits and behaviors that affect risk of falls    -  [place] fall precautions as indicated by assessment   - Educate patient/family on patient safety including physical limitations  - Instruct patient to call for assistance with activity based on assessment  - Modify environment to reduce risk of injury  - Consider OT/PT consult to assist with strengthening/mobility  Outcome: Progressing     Problem: Prexisting or High Potential for Compromised Skin Integrity  Goal: Skin integrity is maintained or improved  Description  INTERVENTIONS:  - Identify patients at risk for skin breakdown  - Assess and monitor skin integrity  - Assess and monitor nutrition and hydration status  - Monitor labs   - Assess for incontinence   - Turn and reposition patient  - Assist with mobility/ambulation  - Relieve pressure over bony prominences  - Avoid friction and shearing  - Provide appropriate hygiene as needed including keeping skin clean and dry  - Evaluate need for skin moisturizer/barrier cream  - Collaborate with interdisciplinary team   - Patient/family teaching  - Consider wound care consult   Outcome: Progressing     Problem: PAIN - ADULT  Goal: Verbalizes/displays adequate comfort level or baseline comfort level  Description  Interventions:  - Encourage patient to monitor pain and request assistance  - Assess pain using appropriate pain scale  - Administer analgesics based on type and severity of pain and evaluate response  - Implement non-pharmacological measures as appropriate and evaluate response  - Consider cultural and social influences on pain and pain management  - Notify physician/advanced practitioner if interventions unsuccessful or patient reports new pain  Outcome: Progressing     Problem: SAFETY ADULT  Goal: Maintain or return to baseline ADL function  Description  INTERVENTIONS:  -  Assess patient's ability to carry out ADLs; assess patient's baseline for ADL function and identify physical deficits which impact ability to perform ADLs (bathing, care of mouth/teeth, toileting, grooming, dressing, etc )  - Assess/evaluate cause of self-care deficits   - Assess range of motion  - Assess patient's mobility; develop plan if impaired  - Assess patient's need for assistive devices and provide as appropriate  - Encourage maximum independence but intervene and supervise when necessary  - Involve family in performance of ADLs  - Assess for home care needs following discharge   - Consider OT consult to assist with ADL evaluation and planning for discharge  - Provide patient education as appropriate  Outcome: Progressing  Goal: Maintain or return mobility status to optimal level  Description  INTERVENTIONS:  - Assess patient's baseline mobility status (ambulation, transfers, stairs, etc )    - Identify cognitive and physical deficits and behaviors that affect mobility  - Identify mobility aids required to assist with transfers and/or ambulation (gait belt, sit-to-stand, lift, walker, cane, etc )  - [place] fall precautions as indicated by assessment  - Record patient progress and toleration of activity level on Mobility SBAR; progress patient to next Phase/Stage  - Instruct patient to call for assistance with activity based on assessment  - Consider rehabilitation consult to assist with strengthening/weightbearing, etc   Outcome: Progressing     Problem: DISCHARGE PLANNING  Goal: Discharge to home or other facility with appropriate resources  Description  INTERVENTIONS:  - Identify barriers to discharge w/patient and caregiver  - Arrange for needed discharge resources and transportation as appropriate  - Identify discharge learning needs (meds, wound care, etc )  - Arrange for interpretive services to assist at discharge as needed  - Refer to Case Management Department for coordinating discharge planning if the patient needs post-hospital services based on physician/advanced practitioner order or complex needs related to functional status, cognitive ability, or social support system  Outcome: Progressing     Problem: COPING  Goal: Pt/Family able to verbalize concerns and demonstrate effective coping strategies  Description  INTERVENTIONS:  - Assist patient/family to identify coping skills, available support systems and cultural and spiritual values  - Provide emotional support, including active listening and acknowledgement of concerns of patient and caregivers  - Reduce environmental stimuli, as able  - Provide patient education  - Assess for spiritual pain/suffering and initiate spiritual care, including notification of Pastoral Care or jey based community as needed  - Assess effectiveness of coping strategies  Outcome: Progressing  Goal: Will report anxiety at manageable levels  Description  INTERVENTIONS:  - Administer medication as ordered  - Teach and encourage coping skills  - Provide emotional support  - Assess patient/family for anxiety and ability to cope  Outcome: Progressing

## 2019-10-24 NOTE — PHYSICAL THERAPY NOTE
Physical Therapy Progress Note     10/24/19 1048   Pain Assessment   Pain Assessment 0-10   Pain Score 3   Pain Type Acute pain;Surgical pain   Pain Location Hip   Pain Orientation Right   Hospital Pain Intervention(s) Ambulation/increased activity;Repositioned;Cold applied   Response to Interventions Tolerated  Precautions   Total Hip Replacement ADduction; Internal rotation; Flexion   Restrictions/Precautions   Weight Bearing Precautions Per Order Yes   RLE Weight Bearing Per Order WBAT   Other Precautions Fall Risk;Pain;THR   General   Chart Reviewed Yes   Response to Previous Treatment Patient reporting fatigue but able to participate  Family/Caregiver Present No   Subjective   Subjective Willing to participate in therapy this AM    Bed Mobility   Supine to Sit 4  Minimal assistance   Additional items Assist x 1;Bedrails;HOB elevated;Leg ; Increased time required;Verbal cues;LE management   Sit to Supine 4  Minimal assistance   Additional items Assist x 1;Bedrails; Increased time required;Leg ;LE management;Verbal cues   Balance   Static Sitting Fair +   Dynamic Sitting Fair   Endurance Deficit   Endurance Deficit Yes   Endurance Deficit Description pain/fatigue   Activity Tolerance   Activity Tolerance Patient limited by fatigue;Patient limited by pain   Medical Staff Made Aware Shazia Judaism, [address]   Nurse Made Aware Yes   Exercises   THR Supine;10 reps;AAROM; Bilateral   Assessment   Prognosis Good   Problem List Decreased strength;Decreased endurance;Decreased range of motion;Decreased mobility; Impaired balance;Decreased skin integrity;Orthopedic restrictions;Pain;Obesity   Assessment Pt  supine in bed upon my arrival  Pt  able to adhere to THR posterior precautions throughout treatment session  Performance of HEP supine in bed upon my arrival  Pt  reporting fatigue/pain, however agreeable to therapeutic intervention   Progressed with transfers requiring A of therapist with cues for hand placement/technique  Positioned seated at EOB  Pt  began reporting increased nausea  Pt  began vomitting at EOB  Requiring A to return to bed  Pt  remained supine in bed with ice applied at end of treatment session  Pt  will continue progression of PT goals with intent of d/c home with family support and HHPT provided when medically stable  Barriers to Discharge Inaccessible home environment   Barriers to Discharge Comments JOHANNA   Goals   Patient Goals To get better  STG Expiration Date 11/06/19   PT Treatment Day 2   Plan   Treatment/Interventions Functional transfer training;LE strengthening/ROM; Therapeutic exercise; Endurance training;Bed mobility;Spoke to nursing;Spoke to case management;OT   Progress Slow progress, decreased activity tolerance   PT Frequency 7x/wk; Twice a day;Weekend   Recommendation   Recommendation Home PT; Home with family support   PT - OK to Discharge No  (continued progression of PT goals prior to d/c )     Jocelyn Closs, PTA

## 2019-10-24 NOTE — ASSESSMENT & PLAN NOTE
· Ortho is primary, appreciate management  · POD 2  · Monitor Hb postoperatively  · Patient has been nauseous with p o  Pain medication, consider switching to IV in the short-term/ensuring that patient does not take pills on an empty stomach  · Patient experienced nausea since last night when she took 10mg oxycodone on an empty stomach and has been nauseous/vomiting since then  Episodes of vomiting or brought on with exacerbation of pain as was experienced during physical therapy today

## 2019-10-24 NOTE — PROGRESS NOTES
Progress Note - Orthopedics   Adam Bronson 70 y o  female MRN: [id number]  Unit/Bed#: E2 -01 Encounter: [id number]    Assessment:  70year old Female s/p Right total hip arthroplasty, POD 2    Plan:  -WBAT RLE  -PT/OT  -Bowel regiment  -Nausea medication  -Pain control PRN  -TEDs/SCDs/ Lovenox DVT ppx  -D/C planning: Home    Subjective: 70year old Female s/p Right total hip arthroplasty POD2  She was seen and examined at bedside  She stated that she is experiencing nausea and has vomited today  She reports that PT came in, started moving her hip around, felt nauseous and vomited  She has been urinating without difficulty, denies having a bowel movement  Denies abdomen distention or discomfort  She was given IV Zofran  Her pain is well controlled  Denies numbness and tingling  Denies chest pain and sob  Denies fevers, chills, and sweats  Vitals: Blood pressure 131/63, pulse 94, temperature 99 2 °F (37 3 °C), temperature source Tympanic, resp  rate 18, height 5' 3 5" (1 613 m), weight 90 8 kg (200 lb 2 8 oz), SpO2 96 %  ,Body mass index is 34 9 kg/m²  Intake/Output Summary (Last 24 hours) at 10/24/2019 1327  Last data filed at 10/24/2019 1254  Gross per 24 hour   Intake 766 67 ml   Output 400 ml   Net 366 67 ml       Invasive Devices     Peripheral Intravenous Line            Peripheral IV 10/24/19 Dorsal (posterior); Right Hand less than 1 day                Physical Exam: General appearance: alert and oriented, in no acute distress  Head: Normocephalic, without obvious abnormality, atraumatic  Eyes: conjunctivae/corneas clear  PERRL, EOM's intact  Fundi benign  Lungs: no stridor or wheezing  Heart: no apparent distress  Ortho Exam: Right Lower extremity  -Mepilex dressing C/D/I, compartments soft and compressible, EHL and FHL 5/5, distal sensation and pulses intact     Lab, Imaging and other studies:   I have personally reviewed pertinent lab results    CBC:   Lab Results   Component Value Date WBC 9 05 10/24/2019    HGB 10 0 (L) 10/24/2019    HCT 32 1 (L) 10/24/2019     (H) 10/24/2019     10/24/2019    MCH 31 6 10/24/2019    MCHC 31 2 (L) 10/24/2019    RDW 12 6 10/24/2019    MPV 9 3 10/24/2019    NRBC 0 10/24/2019     CMP:   Lab Results   Component Value Date    SODIUM 132 (L) 10/24/2019    CL 98 (L) 10/24/2019    CO2 27 10/24/2019    BUN 9 10/24/2019    CREATININE 0 70 10/24/2019    CALCIUM 8 7 10/24/2019    EGFR 84 10/24/2019

## 2019-10-24 NOTE — PHYSICAL THERAPY NOTE
Physical Therapy Progress Note     10/24/19 1458   Pain Assessment   Pain Assessment 0-10   Pain Score 5   Pain Type Surgical pain   Pain Location Hip   Pain Orientation Right   Hospital Pain Intervention(s) Ambulation/increased activity;Repositioned;Cold applied   Response to Interventions Tolerated  Precautions   Total Hip Replacement ADduction; Internal rotation; Flexion   Restrictions/Precautions   Weight Bearing Precautions Per Order Yes   RLE Weight Bearing Per Order WBAT   Other Precautions Fall Risk;Pain;THR   General   Chart Reviewed Yes   Response to Previous Treatment Patient reporting fatigue but able to participate  Family/Caregiver Present No   Subjective   Subjective Willing to participate in therapy this PM    Bed Mobility   Supine to Sit 5  Supervision   Additional items Assist x 1;Bedrails;Leg ; Increased time required;LE management;Verbal cues   Sit to Supine 4  Minimal assistance   Additional items Assist x 1;HOB elevated; Bedrails;Leg ; Increased time required;Verbal cues;LE management   Transfers   Sit to Stand 5  Supervision   Additional items Assist x 1;Bedrails; Increased time required;Verbal cues   Stand to Sit 5  Supervision   Additional items Assist x 1;Bedrails; Increased time required;Verbal cues   Ambulation/Elevation   Gait pattern Decreased foot clearance; Improper Weight shift; Forward Flexion; Antalgic;Decreased R stance; Short stride; Excessively slow; Inconsistent cherry  (step through gait pattern)   Gait Assistance 4  Minimal assist   Additional items Assist x 1;Verbal cues; Tactile cues   Assistive Device Rolling walker   Distance 20' x 2 with a standing resting period in between   Balance   Static Sitting Fair +   Dynamic Sitting Fair   Static Standing Fair   Dynamic Standing Fair -   Ambulatory Poor +   Endurance Deficit   Endurance Deficit Yes   Endurance Deficit Description pain/fatigue   Activity Tolerance   Activity Tolerance Patient limited by fatigue;Patient limited by pain   Nurse Made Aware Yes   Exercises   THR Supine;10 reps;AAROM; Bilateral   Assessment   Prognosis Good   Problem List Decreased strength;Decreased range of motion;Decreased endurance; Impaired balance;Decreased mobility; Decreased skin integrity;Orthopedic restrictions;Pain;Obesity   Assessment Pt  supine in bed upon my arrival  Pt  able to adhere to THR posterior precautions throughout treatment session  Performance of HEP supine in bed upon my arrival  Pt  reporting fatigue/pain, however agreeable to therapeutic intervention  Progressed with transfers requiring A of therapist with cues for hand placement/technique  Positioned seated at EOB  Pt  Able to progress with OOB mobility with RW and Tip of therapist with cues provided for LE sequencing  Able to progress to step through gait pattern this treatment session  Pt  Returned to supine in bed  Pt  remained supine in bed with ice applied at end of treatment session  Pt  will continue progression of PT goals with intent of d/c home with family support and HHPT provided when medically stable  Barriers to Discharge Inaccessible home environment;Decreased caregiver support   Barriers to Discharge Comments JOHANNA   Goals   Patient Goals To get better  STG Expiration Date 11/06/19   PT Treatment Day 3   Plan   Treatment/Interventions Functional transfer training;LE strengthening/ROM; Endurance training; Therapeutic exercise; Bed mobility;Gait training;Spoke to nursing;Spoke to case management   Progress Progressing toward goals   PT Frequency 7x/wk; Twice a day;Weekend   Recommendation   Recommendation Home PT; Home with family support   PT - OK to Discharge No  (Continued progression of PT goals prior to d/c )     Eliza De La Fuente PTA

## 2019-10-24 NOTE — OCCUPATIONAL THERAPY NOTE
Occupational Therapy Treatment Note:         10/24/19 1104   Restrictions/Precautions   Weight Bearing Precautions Per Order Yes   RLE Weight Bearing Per Order WBAT   Other Precautions Fall Risk;Pain;THR   Pain Assessment   Pain Assessment 0-10   Pain Score 3   Pain Type Acute pain;Surgical pain   Pain Location Hip; Head   Pain Orientation Right   ADL   Where Assessed Edge of bed   Grooming Assistance 4  Minimal Assistance   Grooming Deficit Setup   LB Dressing Comments reviewed use of LHAE with Pt  Limited by nausea this tx session  Will benefit from further review  Toileting Assistance  Unable to assess   Toileting Comments further review of bedside commode will be required  Bed Mobility   Rolling L 4  Minimal assistance   Additional items Assist x 1   Supine to Sit 4  Minimal assistance   Additional items Assist x 1   Sit to Supine 4  Minimal assistance   Additional items Assist x 1   Transfers   Sit to Stand Unable to assess   Functional Mobility   Functional Mobility   (uanble to assess due to noted nausea)   Cognition   Overall Cognitive Status [facility]   Arousal/Participation Alert; Responsive; Cooperative   Attention Within functional limits   Orientation Level Oriented X4   Memory Within functional limits   Following Commands Follows multistep commands with increased time or repetition   Comments Pt able to follow review of RW safety and use of LHAE with good carry over  Additional Activities   Additional Activities Other (Comment)  (reviewed hip safety packet  )   Additional Activities Comments Pt reports having F understanding  Activity Tolerance   Activity Tolerance Other (Comment)  (reviewed current plan of care/hip safety packet)   Medical Staff Made Aware Pt reports having F understanding   Assessment   Assessment Pt was seen for skilled OT with focus on bed mobility, review of hip safety packet, review of LHAE and review of current plan of care  Pt with increased nausea noted this tx session  Pt returned to supine positioning due to noted nausea and slight vomiting  Pt able to tolerate educational tx session with focus on review of RW safety and use of LHAE for LE dressing while maintaining THP's  Pt reports having interest in purchasing LHAE to encourage optimal performance levels with all functional tasks  Pt may benefit from further rehab with focus on achieving optimal performance levels with all functional tasks  Continue to recommend Inpatient rehab   Plan   Treatment Interventions ADL retraining;Functional transfer training; Endurance training   Goal Expiration Date 10/30/19   OT Treatment Day 1   OT Frequency 3-5x/wk   Recommendation   OT Discharge Recommendation   (continue PT)   Equipment Recommended Bedside commode  (RW)   OT - OK to Discharge Yes   Barthel Index   Feeding 10   Bathing 0   Grooming Score 5   Dressing Score 5   Bladder Score 10   Bowels Score 10   Toilet Use Score 5   Transfers (Bed/Chair) Score 10   Mobility (Level Surface) Score 0   Stairs Score 0   Barthel Index Score 55   Modified Juan F Scale   Modified Juan F Scale 4   Cara Julian, [address]

## 2019-10-25 PROBLEM — R11.0 NAUSEA: Status: ACTIVE | Noted: 2019-10-25

## 2019-10-25 PROBLEM — D62 ACUTE BLOOD LOSS ANEMIA: Status: ACTIVE | Noted: 2019-10-25

## 2019-10-25 LAB
ANION GAP SERPL CALCULATED.3IONS-SCNC: 6 MMOL/L (ref 4–13)
BUN SERPL-MCNC: 8 MG/DL (ref 5–25)
CALCIUM SERPL-MCNC: 8.8 MG/DL (ref 8.3–10.1)
CHLORIDE SERPL-SCNC: 98 MMOL/L (ref 100–108)
CO2 SERPL-SCNC: 30 MMOL/L (ref 21–32)
CREAT SERPL-MCNC: 0.85 MG/DL (ref 0.6–1.3)
ERYTHROCYTE [DISTWIDTH] IN BLOOD BY AUTOMATED COUNT: 12.7 % (ref 11.6–15.1)
GFR SERPL CREATININE-BSD FRML MDRD: 67 ML/MIN/1.73SQ M
GLUCOSE SERPL-MCNC: 124 MG/DL (ref 65–140)
HCT VFR BLD AUTO: 30 % (ref 34.8–46.1)
HGB BLD-MCNC: 9.8 G/DL (ref 11.5–15.4)
MCH RBC QN AUTO: 32.2 PG (ref 26.8–34.3)
MCHC RBC AUTO-ENTMCNC: 32.7 G/DL (ref 31.4–37.4)
MCV RBC AUTO: 99 FL (ref 82–98)
PLATELET # BLD AUTO: 236 THOUSANDS/UL (ref 149–390)
PMV BLD AUTO: 9.5 FL (ref 8.9–12.7)
POTASSIUM SERPL-SCNC: 3.6 MMOL/L (ref 3.5–5.3)
RBC # BLD AUTO: 3.04 MILLION/UL (ref 3.81–5.12)
SODIUM SERPL-SCNC: 134 MMOL/L (ref 136–145)
WBC # BLD AUTO: 10.06 THOUSAND/UL (ref 4.31–10.16)

## 2019-10-25 PROCEDURE — 80048 BASIC METABOLIC PNL TOTAL CA: CPT | Performed by: PHYSICIAN ASSISTANT

## 2019-10-25 PROCEDURE — 97116 GAIT TRAINING THERAPY: CPT

## 2019-10-25 PROCEDURE — 97530 THERAPEUTIC ACTIVITIES: CPT

## 2019-10-25 PROCEDURE — 99232 SBSQ HOSP IP/OBS MODERATE 35: CPT | Performed by: STUDENT IN AN ORGANIZED HEALTH CARE EDUCATION/TRAINING PROGRAM

## 2019-10-25 PROCEDURE — 85027 COMPLETE CBC AUTOMATED: CPT | Performed by: PHYSICIAN ASSISTANT

## 2019-10-25 PROCEDURE — 97535 SELF CARE MNGMENT TRAINING: CPT

## 2019-10-25 PROCEDURE — 97110 THERAPEUTIC EXERCISES: CPT

## 2019-10-25 PROCEDURE — 99024 POSTOP FOLLOW-UP VISIT: CPT | Performed by: PHYSICIAN ASSISTANT

## 2019-10-25 RX ORDER — METOCLOPRAMIDE HYDROCHLORIDE 5 MG/ML
10 INJECTION INTRAMUSCULAR; INTRAVENOUS ONCE
Status: COMPLETED | OUTPATIENT
Start: 2019-10-25 | End: 2019-10-25

## 2019-10-25 RX ADMIN — ACETAMINOPHEN 650 MG: 325 TABLET ORAL at 15:57

## 2019-10-25 RX ADMIN — PANTOPRAZOLE SODIUM 40 MG: 40 TABLET, DELAYED RELEASE ORAL at 06:17

## 2019-10-25 RX ADMIN — ATORVASTATIN CALCIUM 10 MG: 10 TABLET, FILM COATED ORAL at 15:56

## 2019-10-25 RX ADMIN — AMLODIPINE BESYLATE 5 MG: 5 TABLET ORAL at 18:10

## 2019-10-25 RX ADMIN — OXYCODONE HYDROCHLORIDE AND ACETAMINOPHEN 500 MG: 500 TABLET ORAL at 18:10

## 2019-10-25 RX ADMIN — DOCUSATE SODIUM 100 MG: 100 CAPSULE, LIQUID FILLED ORAL at 08:20

## 2019-10-25 RX ADMIN — FERROUS SULFATE TAB 325 MG (65 MG ELEMENTAL FE) 325 MG: 325 (65 FE) TAB at 07:36

## 2019-10-25 RX ADMIN — HYDROCODONE BITARTRATE AND ACETAMINOPHEN 1 TABLET: 5; 325 TABLET ORAL at 07:27

## 2019-10-25 RX ADMIN — DOCUSATE SODIUM 100 MG: 100 CAPSULE, LIQUID FILLED ORAL at 18:10

## 2019-10-25 RX ADMIN — ENOXAPARIN SODIUM 40 MG: 40 INJECTION SUBCUTANEOUS at 08:19

## 2019-10-25 RX ADMIN — OXYCODONE HYDROCHLORIDE AND ACETAMINOPHEN 500 MG: 500 TABLET ORAL at 08:18

## 2019-10-25 RX ADMIN — Medication 1 TABLET: at 08:19

## 2019-10-25 RX ADMIN — FOLIC ACID 1 MG: 1 TABLET ORAL at 08:19

## 2019-10-25 RX ADMIN — ONDANSETRON 4 MG: 2 INJECTION INTRAMUSCULAR; INTRAVENOUS at 09:15

## 2019-10-25 RX ADMIN — FERROUS SULFATE TAB 325 MG (65 MG ELEMENTAL FE) 325 MG: 325 (65 FE) TAB at 15:56

## 2019-10-25 RX ADMIN — LEVOTHYROXINE SODIUM 25 MCG: 25 TABLET ORAL at 06:17

## 2019-10-25 RX ADMIN — METOCLOPRAMIDE 10 MG: 5 INJECTION, SOLUTION INTRAMUSCULAR; INTRAVENOUS at 10:24

## 2019-10-25 RX ADMIN — HYDROCHLOROTHIAZIDE 12.5 MG: 12.5 TABLET ORAL at 08:20

## 2019-10-25 RX ADMIN — SENNOSIDES 8.6 MG: 8.6 TABLET, FILM COATED ORAL at 08:19

## 2019-10-25 RX ADMIN — LOSARTAN POTASSIUM 100 MG: 50 TABLET, FILM COATED ORAL at 08:19

## 2019-10-25 RX ADMIN — HYDROCODONE BITARTRATE AND ACETAMINOPHEN 1 TABLET: 5; 325 TABLET ORAL at 23:59

## 2019-10-25 NOTE — PLAN OF CARE
Problem: OCCUPATIONAL THERAPY ADULT  Goal: Performs self-care activities at highest level of function for planned discharge setting  See evaluation for individualized goals  Description  Treatment Interventions: ADL retraining, UE strengthening/ROM, Functional transfer training, Patient/family training, Equipment evaluation/education, Compensatory technique education  Equipment Recommended: Bedside commode(RW)       See flowsheet documentation for full assessment, interventions and recommendations  Outcome: Progressing  Note:   Limitation: Decreased ADL status, Decreased UE strength, Decreased Safe judgement during ADL, Decreased endurance, Decreased high-level ADLs  Prognosis: Good  Assessment: Pt was seen for skilled OT with focus on completion of self care tasks, functional mobility, review of hip safety packet and use of LHAE and review of current plan of care  ADL routine completed while seated in bedside chair, assistance levels as follows  Grooming: SBA UB Bathing: SBA UB Dressing:SBA LB Bathing: Min A  LB Dressing: Min A with cues for carry over with use of LHAE  Pt able to maintain THP's with activities  Hip kit issued and signed for by Pt  Toileting: Min A with cues to lift head with activities  Bed Mobility: unable to assess  Transfers: Min A with cues for safe use of RW  See above levels of A required for all functional tasks  Pt with improved activity tolerance noted this tx session  Pt may benefit from continued home therapies and family support with return to home environment        OT Discharge Recommendation: (continue PT )  OT - OK to Discharge: Yes(When medicall cleared)

## 2019-10-25 NOTE — ASSESSMENT & PLAN NOTE
Continued nausea today, no vomiting yet today   No abdominal pain   Likely associated with poor intake with pills on empty stomach, pain medications, anesthesia   Will continue with IV zofran and phenergan   Educated to not take pills on empty stomach   Please give antiemetic prior to pain medication administration   Continue PPI

## 2019-10-25 NOTE — PROGRESS NOTES
Progress Note - Kendra Braxton 1940, 70 y o  female MRN: [id number]  Unit/Bed#: E2 -01 Encounter: [id number]  Primary Care Provider: Naya Bell MD   Date and time admitted to hospital: 10/22/2019  5:44 AM    * History of hypercoagulable state  Assessment & Plan  · History of recurrent PE   · Following with Hematology outpatient with work up for hypercoagulable state   · Eliquis was on hold per Heme recommendation preoperatively   · Patient can resume Eliquis upon discharge and d/c lovenox - spoke with orthopedic PA regarding this plan       Primary osteoarthritis of one hip, right  Assessment & Plan  · Management per Orthopedics, primary team   · S/p right total hip arthroplasty by Dr Gilford Force 10/22, POD #3   · PT/OT   · Pain control   · DVT prophylaxis as discussed above   · Monitor H&H, will check in AM   · Still with continued nausea see plan below     Essential hypertension  Assessment & Plan  · Currently acceptable  · Continue home medication regimen   · On losartan while here substituted for valsartan   · Continue amlodipine and HCTZ   · Can resume all home medications upon discharge     Hyperlipidemia  Assessment & Plan  · Continue Lipitor 10mg     Hypothyroid  Assessment & Plan  · Continue synthroid    Acute blood loss anemia  Assessment & Plan  · Continue to monitor H&H   · Continue iron and folic acid supplementation   · No active signs of bleeding     Lab Results   Component Value Date    HGB 9 8 (L) 10/25/2019    HGB 10 0 (L) 10/24/2019    HGB 11 5 10/23/2019         Nausea  Assessment & Plan  Continued nausea today, no vomiting yet today   No abdominal pain   Likely associated with poor intake with pills on empty stomach, pain medications, anesthesia   Will continue with IV zofran and phenergan   Educated to not take pills on empty stomach   Please give antiemetic prior to pain medication administration   Continue PPI       VTE Pharmacologic Prophylaxis:   Pharmacologic: Enoxaparin (Lovenox)  Mechanical VTE Prophylaxis in Place: Yes    Patient Centered Rounds: I have performed bedside rounds with nursing staff today  Discussions with Specialists or Other Care Team Provider: Ortho     Education and Discussions with Family / Patient: patient     Time Spent for Care: 20 minutes  More than 50% of total time spent on counseling and coordination of care as described above  Current Length of Stay: 3 day(s)    Current Patient Status: Inpatient   Certification Statement: The patient will continue to require additional inpatient hospital stay due to primary OA of right hip     Discharge Plan: per Ortho     Code Status: No Order      Subjective:   Patient continues to have nausea today  States she feels like she is going to vomit if she moves  Had one piece toast this morning and unable to eat crackers currently  States worsens after pain medications and taking all morning meds  No abdominal pain  No chest pain or belching  She is passing gas  No bowel movement yet  No urinary complaints  No SOB or difficulty breathing  Denies dizziness  States pain in left hip controlled with pain medication  No active bleeding from anywhere  Objective:     Vitals:   Temp (24hrs), Av 9 °F (37 2 °C), Min:98 3 °F (36 8 °C), Max:99 6 °F (37 6 °C)    Temp:  [98 3 °F (36 8 °C)-99 6 °F (37 6 °C)] 99 6 °F (37 6 °C)  HR:  [79-95] 95  Resp:  [18-20] 18  BP: (111-155)/(57-85) 142/69  SpO2:  [95 %-96 %] 96 %  Body mass index is 35 02 kg/m²  Input and Output Summary (last 24 hours): Intake/Output Summary (Last 24 hours) at 10/25/2019 0957  Last data filed at 10/24/2019 1254  Gross per 24 hour   Intake    Output 400 ml   Net -400 ml       Physical Exam:     Physical Exam   Constitutional: No distress  HENT:   Head: Normocephalic and atraumatic  Eyes: Conjunctivae are normal    Neck: Neck supple  Cardiovascular: Normal rate and regular rhythm     Pulmonary/Chest: Effort normal and breath sounds normal  Abdominal: Soft  Bowel sounds are normal    Musculoskeletal: She exhibits tenderness (right hip) and deformity (dressing over right hip, no significant ecchympsos )  Neurological: She is alert  Skin: Skin is warm  She is not diaphoretic  Psychiatric: She has a normal mood and affect  Nursing note and vitals reviewed  Additional Data:     Labs:    Results from last 7 days   Lab Units 10/25/19  0428 10/24/19  0515   WBC Thousand/uL 10 06 9 05   HEMOGLOBIN g/dL 9 8* 10 0*   HEMATOCRIT % 30 0* 32 1*   PLATELETS Thousands/uL 236 207   NEUTROS PCT %  --  69   LYMPHS PCT %  --  20   MONOS PCT %  --  10   EOS PCT %  --  0     Results from last 7 days   Lab Units 10/25/19  0428   SODIUM mmol/L 134*   POTASSIUM mmol/L 3 6   CHLORIDE mmol/L 98*   CO2 mmol/L 30   BUN mg/dL 8   CREATININE mg/dL 0 85   ANION GAP mmol/L 6   CALCIUM mg/dL 8 8   GLUCOSE RANDOM mg/dL 124                           * I Have Reviewed All Lab Data Listed Above  * Additional Pertinent Lab Tests Reviewed:  All Labs Within Last 24 Hours Reviewed    Imaging:    Imaging Reports Reviewed Today Include: reviewed  Imaging Personally Reviewed by Myself Includes:  none    Recent Cultures (last 7 days):           Last 24 Hours Medication List:     Current Facility-Administered Medications:  acetaminophen 650 mg Oral Q6H PRN Patricia Man PA-C   amLODIPine 5 mg Oral Daily Kelle RALPH PA-C   ascorbic acid 500 mg Oral BID Patricia Man PA-C   atorvastatin 10 mg Oral Daily With [employer] Life Insurance[employer]ITI   calcium carbonate 1,000 mg Oral Daily PRN Patricia Man PA-C   docusate sodium 100 mg Oral BID Patricia Man PA-C   enoxaparin 40 mg Subcutaneous Daily Patricia Man PA-C   ferrous sulfate 325 mg Oral BID With Meals Patricia Man PA-C   folic acid 1 mg Oral Daily Patricia Man PA-C   losartan 100 mg Oral Daily Patricia Man PA-C   And       hydrochlorothiazide 12 5 mg Oral Daily Patricia Man PA-C   HYDROcodone-acetaminophen 1 tablet Oral Q6H PRN James Prescott TITI Roy   HYDROcodone-acetaminophen 2 tablet Oral Q8H PRN Chayo Adorno PA-C   levothyroxine 25 mcg Oral Early Morning Arnoldo Ratliff PA-C   multivitamin-minerals 1 tablet Oral Daily Arnoldo Ratliff PA-C   ondansetron 4 mg Intravenous Q4H PRN Kelle RALPH PA-C   pantoprazole 40 mg Oral Early Morning Arnoldo TITI Ratliff   promethazine 12 5 mg Intravenous Q6H PRN DO kye Jackson 1 tablet Oral Daily Arnoldo Ratliff PA-C        Today, Patient Was Seen By: Jacey Melendez PA-C    ** Please Note: Dictation voice to text software may have been used in the creation of this document   **

## 2019-10-25 NOTE — OCCUPATIONAL THERAPY NOTE
Occupational Therapy Treatment Note:         10/25/19 1861   Restrictions/Precautions   Weight Bearing Precautions Per Order Yes   RLE Weight Bearing Per Order WBAT   Other Precautions Fall Risk;Pain;THR  (nausea)   Pain Assessment   Pain Assessment 0-10   Pain Score 5   Pain Type Acute pain   Pain Location Hip   Pain Orientation Right   Pain Descriptors Aching   ADL   Where Assessed Chair   Grooming Assistance 4  Minimal Assistance   Grooming Deficit Setup   UB Bathing Assistance 4  Minimal Assistance   UB Bathing Deficit Setup   LB Bathing Assistance 4  Minimal Assistance   LB Bathing Deficit Steadying;Setup;Verbal cueing;Supervision/safety; Increased time to complete;Perineal area; Buttocks   LB Bathing Comments cues for safe balance with functional reach required  UB Dressing Assistance 4  Minimal Assistance   UB Dressing Deficit Verbal cueing;Setup   LB Dressing Assistance 4  Minimal Assistance   LB Dressing Deficit Steadying;Setup; Requires assistive device for steadying;Supervision/safety;Verbal cueing; Increased time to complete; Don/doff L sock; Don/doff R sock;Pull up over hips; Thread LLE into underwear; Thread RLE into underwear   Toileting Assistance  4  Minimal Assistance   Toileting Deficit Setup;Steadying;Supervison/safety;Verbal cueing; Increased time to complete;Clothing management down;Clothing management up;Perineal hygiene; Bedside commode   Toileting Comments Cues for safe hand placement and footing required  Functional Standing Tolerance   Time 5 mins   Activity dynamic stand balance activity   Comments increased fatigue noted with activity  Transfers   Sit to Stand 5  Supervision   Additional items Assist x 1; Armrests; Increased time required;Verbal cues   Stand to Sit 5  Supervision   Additional items Assist x 1; Armrests; Increased time required;Verbal cues   Stand pivot 5  Supervision   Additional items Assist x 1; Increased time required;Armrests; Verbal cues   Toilet transfer 4  Minimal assistance   Additional items Assist x 1; Increased time required;Commode   Additional Comments Cues for safe hand placement and to lift head with rise and descent required  Functional Mobility   Functional Mobility 4  Minimal assistance   Additional Comments x1   Additional items Rolling walker   Toilet Transfers   Toilet Transfer From Rolling walker   Toilet Transfer Type To and from   Toilet Transfer to Standard bedside commode   Toilet Transfer Technique Stand pivot   Toilet Transfers Verbal cues; Minimal assistance   Cognition   Overall Cognitive Status WFL   Arousal/Participation Alert; Responsive; Cooperative   Attention Within functional limits   Orientation Level Oriented X4   Memory Within functional limits   Following Commands Follows multistep commands with increased time or repetition   Additional Activities   Additional Activities Other (Comment)  (reviewed current plan of care  )   Additional Activities Comments Pt reports having good understanding  Activity Tolerance   Activity Tolerance Patient limited by pain; Patient limited by fatigue   Medical Staff Made Aware Reported all findings to nursing staff  Pt able to tolerate activities without further increase in pain levels  Assessment   Assessment Pt was seen for skilled OT with focus on completion of self care tasks, functional mobility, review of hip safety packet and use of LHAE and review of current plan of care  ADL routine completed while seated in bedside chair, assistance levels as follows  Grooming: SBA UB Bathing: SBA UB Dressing:SBA LB Bathing: Min A  LB Dressing: Min A with cues for carry over with use of LHAE  Pt able to maintain THP's with activities  Hip kit issued and signed for by Pt  Toileting: Min A with cues to lift head with activities  Bed Mobility: unable to assess  Transfers: Min A with cues for safe use of RW  See above levels of A required for all functional tasks   Pt with improved activity tolerance noted this tx session  Pt may benefit from continued home therapies and family support with return to home environment  Plan   Treatment Interventions ADL retraining;Functional transfer training; Endurance training;Cognitive reorientation;UE strengthening/ROM   Goal Expiration Date 10/30/19   OT Treatment Day 2   OT Frequency 3-5x/wk   Recommendation   OT Discharge Recommendation   (continue PT )   Equipment Recommended Bedside commode  (RW)   OT - OK to Discharge Yes  (When medicall cleared)   Barthel Index   Feeding 10   Bathing 0   Grooming Score 5   Dressing Score 5   Bladder Score 10   Bowels Score 10   Toilet Use Score 5   Transfers (Bed/Chair) Score 10   Mobility (Level Surface) Score 0   Stairs Score 0   Barthel Index Score 55   Modified Rhea Scale   Modified Juan F Scale 4   Kalpana Prude, [address]

## 2019-10-25 NOTE — ASSESSMENT & PLAN NOTE
· History of recurrent PE   · Following with Hematology outpatient with work up for hypercoagulable state   · Eliquis was on hold per Heme recommendation preoperatively   · Patient can resume Eliquis upon discharge and d/c lovenox - spoke with orthopedic PA regarding this plan

## 2019-10-25 NOTE — ASSESSMENT & PLAN NOTE
· Management per Orthopedics, primary team   · S/p right total hip arthroplasty by Dr Lilia Crook 10/22, POD #3   · PT/OT   · Pain control   · DVT prophylaxis as discussed above   · Monitor H&H, will check in AM   · Still with continued nausea see plan below

## 2019-10-25 NOTE — PLAN OF CARE
Problem: PHYSICAL THERAPY ADULT  Goal: Performs mobility at highest level of function for planned discharge setting  See evaluation for individualized goals  Description  Treatment/Interventions: Functional transfer training, LE strengthening/ROM, Elevations, Therapeutic exercise, Endurance training, Patient/family training, Equipment eval/education, Bed mobility, Gait training, Compensatory technique education, Spoke to nursing, OT  Equipment Recommended: Walker(RW)       See flowsheet documentation for full assessment, interventions and recommendations  Outcome: Progressing  Note:   Prognosis: Good  Problem List: Decreased strength, Decreased range of motion, Decreased endurance, Impaired balance, Decreased mobility  Assessment: Pt seen for PT per POC  Improved mobility & activity tolerance noted this tx session  Pt require S for transfers & amb w/ RW + cues for techniques  Improved amb tolerance but still below baseline 2* to pain & nausea  See above levels of assistance required for all functional tasks  Gait deviations as above, slow & antalgic w/ dec foot clearance but no gross LOB noted  Pt require initial cues for gait sequencing w/ good return demo  Reviewed THPs, pt verbalized 3/3  However, continue to require cues for proper RLE positioning during sit<>stand transitions & during turns when ambulating to maintain THP  Pt tolerated above mentioned thera  ex well, AROM maintain THPs t/o  (+) nausea t/o session  Nsg staff most recent vital signs as follows: /69 (BP Location: Right arm)   Pulse 95   Temp 99 6 °F (37 6 °C) (Tympanic)   Resp 18   Ht 5' 3 5" (1 613 m)   Wt 91 1 kg (200 lb 13 4 oz)   SpO2 96%   BMI 35 02 kg/m²   Will continue PT per POC  At end of session, pt remain OOB in chair w/o issues, call bell & phone in reach  Fall precautions reinforced w/ good understanding   At this point, pt's continued nausea limiting pt's tolerance to PT tx hence slow progress in PT hence not ready to D/C home at this time  Pt will need to achieve PT goals prior to D/C for safe D/C to home  Will continue to plan to D/C home & will continue to recommend HHPT, RW, BSC and inc family support at D/C  CM to follow  Nsg staff to continue to mobilized pt (OOB in chair for all meals & ambulate in room/unit) as tolerated to prevent decline in function  Nsg notified  Barriers to Discharge: Inaccessible home environment  Barriers to Discharge Comments: 1STE;  available 24/7   Recommendation: Home PT, Home with family support     PT - OK to Discharge: No(pt to achieve PT goals prior to D/C home)    See flowsheet documentation for full assessment

## 2019-10-25 NOTE — PHYSICAL THERAPY NOTE
PHYSICAL THERAPY NOTE          Patient Name: Eliana CHANDRA Date: 10/25/2019     Attempted PT pm tx however pt requested to defer at a later time as she just started eating dinner & has visitors at this time  Will continue to follow as appropriate  RN notified   Santos Bergeron, PT

## 2019-10-25 NOTE — PROGRESS NOTES
Progress Note - Orthopedics   Chanda Bronson 70 y o  female MRN: [id number]  Unit/Bed#: E2 -01      Subjective:    70 y  o female seen and examined this morning  POD 3 s/p R LANCE  No acute events overnight  Pain controlled  Denies fevers chills, CP, SOB  Patient reports her nausea and vomiting has resolved following medication change from oxycodone to hydrocodone       Labs:  0   Lab Value Date/Time    HCT 30 0 (L) 10/25/2019 0428    HCT 32 1 (L) 10/24/2019 0515    HCT 35 9 10/23/2019 1244    HGB 9 8 (L) 10/25/2019 0428    HGB 10 0 (L) 10/24/2019 0515    HGB 11 5 10/23/2019 1244    INR 0 99 09/30/2019 1155    WBC 10 06 10/25/2019 0428    WBC 9 05 10/24/2019 0515    WBC 8 95 10/23/2019 1244    CRP <3 0 09/30/2019 1155       Meds:    Current Facility-Administered Medications:     acetaminophen (TYLENOL) tablet 650 mg, 650 mg, Oral, Q6H PRN, Nia Gabrielleen, PA-C    amLODIPine (NORVASC) tablet 5 mg, 5 mg, Oral, Daily, Kelle Gomez V PAADEN    ascorbic acid (VITAMIN C) tablet 500 mg, 500 mg, Oral, BID, Nia Hymen, PA-C, 500 mg at 10/24/19 1741    atorvastatin (LIPITOR) tablet 10 mg, 10 mg, Oral, Daily With Eugene Garcia PA-C, 10 mg at 10/24/19 1741    calcium carbonate (TUMS) chewable tablet 1,000 mg, 1,000 mg, Oral, Daily PRN, Nia Hymen, PA-C    docusate sodium (COLACE) capsule 100 mg, 100 mg, Oral, BID, Nia Hymen, PA-C, 100 mg at 10/24/19 1741    enoxaparin (LOVENOX) subcutaneous injection 40 mg, 40 mg, Subcutaneous, Daily, Nia Hymen, PA-C, 40 mg at 10/24/19 1946    ferrous sulfate tablet 325 mg, 325 mg, Oral, BID With Meals, Nia Hymen, PA-C, 325 mg at 64/25/56 2196    folic acid (FOLVITE) tablet 1 mg, 1 mg, Oral, Daily, Nia Anthony, PA-C, 1 mg at 10/24/19 0841    losartan (COZAAR) tablet 100 mg, 100 mg, Oral, Daily, 100 mg at 10/24/19 0841 **AND** hydrochlorothiazide (HYDRODIURIL) tablet 12 5 mg, 12 5 mg, Oral, Daily, Nia Anthony, PA-C, 12 5 mg at 10/24/19 0841    HYDROcodone-acetaminophen (NORCO) 5-325 mg per tablet 1 tablet, 1 tablet, Oral, Q6H PRN, JAN Ramesh-C, 1 tablet at 10/25/19 0727    HYDROcodone-acetaminophen (NORCO) 5-325 mg per tablet 2 tablet, 2 tablet, Oral, Q8H PRN, Ashkan To PA-C    lactated ringers infusion, 100 mL/hr, Intravenous, Continuous, Teri Steele PA-C, Stopped at 10/23/19 1340    levothyroxine tablet 25 mcg, 25 mcg, Oral, Early Morning, Teri Steele PA-C, 25 mcg at 10/25/19 0617    multivitamin-minerals (CENTRUM) tablet 1 tablet, 1 tablet, Oral, Daily, Teri Steele PA-C, 1 tablet at 10/24/19 0841    ondansetron (ZOFRAN) injection 4 mg, 4 mg, Intravenous, Q4H PRN, JAN Miranda-JESSICA, 4 mg at 10/24/19 2142    pantoprazole (PROTONIX) EC tablet 40 mg, 40 mg, Oral, Early Morning, Teri Steele PA-C, 40 mg at 10/25/19 0617    promethazine (PHENERGAN) injection 12 5 mg, 12 5 mg, Intravenous, Q6H PRN, Ozzy Jay DO, 12 5 mg at 10/22/19 1830    senna (SENOKOT) tablet 8 6 mg, 1 tablet, Oral, Daily, Teri Steele PA-C, 8 6 mg at 10/24/19 0841    Blood Culture:   No results found for: BLOODCX    Wound Culture:   No results found for: WOUNDCULT    Ins and Outs:  I/O last 24 hours: In: -   Out: 400 [Urine:400]          Physical:  Vitals:    10/24/19 2300   BP: 155/85   Pulse: 79   Resp: 20   Temp: 98 3 °F (36 8 °C)   SpO2: 96%     Musculoskeletal: right Lower Extremity  · Skin: diffuse limb swelling, as to be expected post-operatively  · Dressing: c/d/i   · TTP: landen-incisionally, as to be expected  · Thigh and calf compartments are soft, compressible, and non-tender  · SILT s/s/sp/dp/t  +fhl/ehl, +ankle dorsi/plantar flexion  2+ DP pulse    Assessment:    70 y  o female POD 3 s/p R LANCE, ABLA  Plan:  · WBAT RLE  · Maintain posterior hip precautions at all times  · Abduction pillow when resting in bed  · Hgb 9 8 today, down from 10 0 yesterday  Patient's hgb was 14 1 pre-procedure   Greater than 2 gram drop which qualifies for diagnosis of acute blood loss anemia, will monitor and administer IVF/prbc as indicated   · PT/OT  · Pain control and ice prn   · Nausea medication prn  · Bowel regimen prn  · DVT ppx: Lovenox, SCDs, TEDs, ambulation  · D/C planning: home when cleared       Brooke Lorenz PA-C

## 2019-10-25 NOTE — PHYSICAL THERAPY NOTE
PT PROGRESS NOTE    Name: Jeannine Bronson  AGE: 70 y o  MRN: [id number]  LENGTH OF STAY: 3             10/25/19 1215   Pain Assessment   Pain Score 5   Pain Type Acute pain;Surgical pain   Pain Location Hip   Pain Orientation Right   Hospital Pain Intervention(s) Medication (See MAR); Cold applied;Repositioned; Ambulation/increased activity; Emotional support; Rest   Precautions   Total Hip Replacement ADduction; Internal rotation; Flexion   Restrictions/Precautions   Weight Bearing Precautions Per Order Yes   RLE Weight Bearing Per Order WBAT   Other Precautions Fall Risk;Pain;THR  (nausea)   General   Chart Reviewed Yes   Response to Previous Treatment Patient reporting fatigue but able to participate  Family/Caregiver Present Yes  ()   Cognition   Overall Cognitive Status WFL   Arousal/Participation Alert; Cooperative   Attention Within functional limits   Orientation Level Oriented to person;Oriented to place;Oriented to time   Following Commands Follows one step commands without difficulty   Subjective   Subjective Pt agreeable to therapy  Bed Mobility   Supine to Sit Unable to assess   Additional Comments pt OOB in chair pre & post session   Transfers   Sit to Stand 5  Supervision   Additional items Armrests; Increased time required;Verbal cues   Stand to Sit 5  Supervision   Additional items Armrests; Increased time required;Verbal cues   Additional Comments cues for hand placement & RLE positioning   Ambulation/Elevation   Gait pattern Wide DEBORA; Decreased foot clearance;Decreased R stance; Short stride; Step to;Excessively slow; Antalgic   Gait Assistance 5  Supervision   Additional items Verbal cues   Assistive Device Rolling walker   Distance 50'x1 + 30'x1  (seated rest in between amb trial)   Balance   Static Sitting Good   Static Standing Fair   Ambulatory Fair -   Endurance Deficit   Endurance Deficit Yes   Endurance Deficit Description nausea; fatigue; weakness; pain   Activity Tolerance   Activity Tolerance Patient limited by fatigue;Patient limited by pain   Medical Staff [address]   Nurse Made Aware RN [facility]   Exercises   THR Sitting;10 reps;AROM; Right;Left  (R hip flexion not performed)   Assessment   Prognosis Good   Problem List Decreased strength;Decreased range of motion;Decreased endurance; Impaired balance;Decreased mobility   Assessment Pt seen for PT per POC  Improved mobility & activity tolerance noted this tx session  Pt require S for transfers & amb w/ RW + cues for techniques  Improved amb tolerance but still below baseline 2* to pain & nausea  See above levels of assistance required for all functional tasks  Gait deviations as above, slow & antalgic w/ dec foot clearance but no gross LOB noted  Pt require initial cues for gait sequencing w/ good return demo  Reviewed THPs, pt verbalized 3/3  However, continue to require cues for proper RLE positioning during sit<>stand transitions & during turns when ambulating to maintain THP  Pt tolerated above mentioned thera  ex well, AROM maintain THPs t/o  (+) nausea t/o session  Nsg staff most recent vital signs as follows: /69 (BP Location: Right arm)   Pulse 95   Temp 99 6 °F (37 6 °C) (Tympanic)   Resp 18   Ht 5' 3 5" (1 613 m)   Wt 91 1 kg (200 lb 13 4 oz)   SpO2 96%   BMI 35 02 kg/m²   Will continue PT per POC  At end of session, pt remain OOB in chair w/o issues, call bell & phone in reach  Fall precautions reinforced w/ good understanding  At this point, pt's continued nausea limiting pt's tolerance to PT tx hence slow progress in PT hence not ready to D/C home at this time  Pt will need to achieve PT goals prior to D/C for safe D/C to home  Will continue to plan to D/C home & will continue to recommend HHPT, RW, BSC and inc family support at D/C  CM to follow  Nsg staff to continue to mobilized pt (OOB in chair for all meals & ambulate in room/unit) as tolerated to prevent decline in function  Nsg notified  Barriers to Discharge Inaccessible home environment   Barriers to Discharge Comments 1STE;  available 24/7    Goals   Patient Goals to feel better   STG Expiration Date 11/06/19   PT Treatment Day 4   Plan   Treatment/Interventions Functional transfer training;LE strengthening/ROM; Elevations; Therapeutic exercise; Endurance training;Patient/family training;Bed mobility;Gait training;Spoke to nursing;OT;Family   Progress Slow progress, medical status limitations   PT Frequency Twice a day;7x/wk; Weekend   Recommendation   Recommendation Home PT; Home with family support   Equipment Recommended Fidel Jesus; Other (Comment)  (RW & BSC)   PT - OK to Discharge No  (pt to achieve PT goals prior to D/C home)   Additional Comments Pt needs to do stairs & able to ambulate at least 150' for safe D/C to home   Martinez Schaefer, PT

## 2019-10-25 NOTE — ASSESSMENT & PLAN NOTE
· Continue to monitor H&H   · Continue iron and folic acid supplementation   · No active signs of bleeding     Lab Results   Component Value Date    HGB 9 8 (L) 10/25/2019    HGB 10 0 (L) 10/24/2019    HGB 11 5 10/23/2019

## 2019-10-25 NOTE — ASSESSMENT & PLAN NOTE
· Currently acceptable  · Continue home medication regimen   · On losartan while here substituted for valsartan   · Continue amlodipine and HCTZ   · Can resume all home medications upon discharge

## 2019-10-26 VITALS
TEMPERATURE: 97.1 F | HEIGHT: 64 IN | HEART RATE: 83 BPM | OXYGEN SATURATION: 97 % | SYSTOLIC BLOOD PRESSURE: 133 MMHG | BODY MASS INDEX: 34.48 KG/M2 | RESPIRATION RATE: 20 BRPM | DIASTOLIC BLOOD PRESSURE: 78 MMHG | WEIGHT: 201.94 LBS

## 2019-10-26 LAB
HCT VFR BLD AUTO: 31.8 % (ref 34.8–46.1)
HGB BLD-MCNC: 10.2 G/DL (ref 11.5–15.4)

## 2019-10-26 PROCEDURE — 97530 THERAPEUTIC ACTIVITIES: CPT

## 2019-10-26 PROCEDURE — 97116 GAIT TRAINING THERAPY: CPT

## 2019-10-26 PROCEDURE — 85014 HEMATOCRIT: CPT | Performed by: PHYSICIAN ASSISTANT

## 2019-10-26 PROCEDURE — 97110 THERAPEUTIC EXERCISES: CPT

## 2019-10-26 PROCEDURE — NC001 PR NO CHARGE: Performed by: ORTHOPAEDIC SURGERY

## 2019-10-26 PROCEDURE — 85018 HEMOGLOBIN: CPT | Performed by: PHYSICIAN ASSISTANT

## 2019-10-26 PROCEDURE — 99024 POSTOP FOLLOW-UP VISIT: CPT | Performed by: ORTHOPAEDIC SURGERY

## 2019-10-26 RX ORDER — HYDROCODONE BITARTRATE AND ACETAMINOPHEN 5; 325 MG/1; MG/1
1 TABLET ORAL EVERY 4 HOURS PRN
Qty: 40 TABLET | Refills: 0 | Status: SHIPPED | OUTPATIENT
Start: 2019-10-26 | End: 2019-11-05

## 2019-10-26 RX ADMIN — FERROUS SULFATE TAB 325 MG (65 MG ELEMENTAL FE) 325 MG: 325 (65 FE) TAB at 09:09

## 2019-10-26 RX ADMIN — HYDROCHLOROTHIAZIDE 12.5 MG: 12.5 TABLET ORAL at 09:09

## 2019-10-26 RX ADMIN — DOCUSATE SODIUM 100 MG: 100 CAPSULE, LIQUID FILLED ORAL at 09:11

## 2019-10-26 RX ADMIN — OXYCODONE HYDROCHLORIDE AND ACETAMINOPHEN 500 MG: 500 TABLET ORAL at 09:11

## 2019-10-26 RX ADMIN — HYDROCODONE BITARTRATE AND ACETAMINOPHEN 2 TABLET: 5; 325 TABLET ORAL at 06:11

## 2019-10-26 RX ADMIN — ONDANSETRON 4 MG: 2 INJECTION INTRAMUSCULAR; INTRAVENOUS at 09:17

## 2019-10-26 RX ADMIN — LEVOTHYROXINE SODIUM 25 MCG: 25 TABLET ORAL at 06:09

## 2019-10-26 RX ADMIN — FOLIC ACID 1 MG: 1 TABLET ORAL at 09:11

## 2019-10-26 RX ADMIN — SENNOSIDES 8.6 MG: 8.6 TABLET, FILM COATED ORAL at 09:11

## 2019-10-26 RX ADMIN — PANTOPRAZOLE SODIUM 40 MG: 40 TABLET, DELAYED RELEASE ORAL at 06:09

## 2019-10-26 RX ADMIN — ENOXAPARIN SODIUM 40 MG: 40 INJECTION SUBCUTANEOUS at 09:11

## 2019-10-26 RX ADMIN — LOSARTAN POTASSIUM 100 MG: 50 TABLET, FILM COATED ORAL at 09:11

## 2019-10-26 RX ADMIN — Medication 1 TABLET: at 09:11

## 2019-10-26 NOTE — PHYSICAL THERAPY NOTE
Physical Therapy Progress Note     10/26/19 1207   Pain Assessment   Pain Assessment 0-10   Pain Score 1   Pain Type Surgical pain   Pain Location Hip   Pain Orientation Right   Hospital Pain Intervention(s) Ambulation/increased activity;Repositioned;Cold applied   Response to Interventions Tolerated  Precautions   Total Hip Replacement ADduction; Internal rotation; Flexion   Restrictions/Precautions   Weight Bearing Precautions Per Order Yes   RLE Weight Bearing Per Order WBAT   Other Precautions Fall Risk;Pain;THR   General   Chart Reviewed Yes   Response to Previous Treatment Patient reporting fatigue but able to participate  Family/Caregiver Present Yes   Subjective   Subjective Willing to participate in therapy this PM    Transfers   Sit to Stand 5  Supervision   Additional items Assist x 1; Armrests; Increased time required;Verbal cues   Stand to Sit 5  Supervision   Additional items Assist x 1; Armrests; Increased time required;Verbal cues   Car transfer 5  Supervision   Additional items Assist x 1; Armrests; Increased time required;Verbal cues   Ambulation/Elevation   Gait pattern Decreased foot clearance; Forward Flexion; Short stride; Excessively slow; Inconsistent cherry;Decreased R stance; Antalgic  (step through gait pattern)   Gait Assistance 5  Supervision   Additional items Assist x 1;Verbal cues; Tactile cues   Assistive Device Rolling walker   Distance 100'   Stair Management Assistance 5  Supervision   Additional items Assist x 1;Verbal cues; Tactile cues   Stair Management Technique Two rails; Step to pattern; Foreward;Nonreciprocal   Number of Stairs 4   Balance   Static Sitting Good   Dynamic Sitting Fair   Static Standing Fair   Dynamic Standing Fair -   Ambulatory Fair -   Endurance Deficit   Endurance Deficit No   Activity Tolerance   Activity Tolerance Patient tolerated treatment well   Nurse Made Aware Yes   Exercises   THR Sitting;10 reps;AAROM; Bilateral   Assessment   Prognosis Good   Problem List Decreased strength;Decreased range of motion;Decreased endurance; Impaired balance;Decreased mobility;Obesity;Orthopedic restrictions;Decreased skin integrity;Pain   Assessment Pt  seated in bedside chair upon my arrival  Pt  reporting fatigue/pain, however agreeable to therapeutic intervention  Performance of HEP seated in bedside chair with cues provided for proper completion  Progressed with transfers being able to complete practicing proper technique with no noted LOB  Progressed with an increased amb  trial with use of RW and standbyA of therapist with cues provided for LE sequencing  Progressed to stair training being able to complete practicing proper technique with no noted LOB  Both pt and pt's family report good understanding at this time  Returned to room and repositioned seated in bedside chair with ice applied at end of treatment session  Pt  has completed all her PT goals and is safe for d/c home with HHPT and family support as needed when medically stable  Barriers to Discharge None   Goals   Patient Goals To go home today  STG Expiration Date 11/06/19   PT Treatment Day 5   Plan   Treatment/Interventions Functional transfer training; Therapeutic exercise; Endurance training;Elevations;LE strengthening/ROM; Gait training;Spoke to nursing;Spoke to case management; Family   Progress Progressing toward goals   PT Frequency 7x/wk; Twice a day;Weekend   Recommendation   Recommendation Home PT; Home with family support   Equipment Recommended Blanca Lynn; Other (Comment)  (RW, BSC)   PT - OK to Discharge Yes  (if d/c when medically stable )     Marcell Duffy PTA

## 2019-10-26 NOTE — PROGRESS NOTES
Orthopaedic Surgery - Progress Note  Hetal Arriaza (79 y o  female)   : 1940   MRN: [id number]  Date: 10/26/2019   Encounter: [id number]   Unit/Bed#: E2 -01    Assessment / Plan  S/p R LANCE on 10/22/19    · PT/OT - WBAT RLE  · Analagesics and ice prn  · DVT prophylaxis  · Plan d/c home today  · F/U with Dr Guille Tate as scheduled    Subjective  PT reports minimal right hip discomfort  She is tolerating PT well  Denies numbness in the RLE  Vitals  Temp:  [97 1 °F (36 2 °C)-99 1 °F (37 3 °C)] 97 1 °F (36 2 °C)  HR:  [79-95] 83  Resp:  [15-20] 20  BP: (133-145)/(78-85) 133/78  Body mass index is 35 21 kg/m²  I/O last 24 hours:   In: -   Out: 1575 [Urine:1575]    Ortho Exam - Right Lower Extremity  · Incision c/d  · Mild tenderness at hip  · Sensation intact throughout RLE  · +DF/PF ankle and toes  · 2+ DP pulse    Lab Results  (I have personally reviewed pertinent lab results )  Results from last 7 days   Lab Units 10/26/19  0431 10/25/19  0428 10/24/19  0515 10/23/19  1244   WBC Thousand/uL  --  10 06 9 05 8 95   HEMOGLOBIN g/dL 10 2* 9 8* 10 0* 11 5   HEMATOCRIT % 31 8* 30 0* 32 1* 35 9   PLATELETS Thousands/uL  --  236 207 223         Results from last 7 days   Lab Units 10/25/19  0428 10/24/19  0515 10/23/19  0447   POTASSIUM mmol/L 3 6 3 9 4 4   CHLORIDE mmol/L 98* 98* 103   CO2 mmol/L 30 27 24   BUN mg/dL 8 9 8   CREATININE mg/dL 0 85 0 70 0 62   EGFR ml/min/1 73sq m 67 84 88   CALCIUM mg/dL 8 8 8 7 8 6               John Maharaj MD

## 2019-10-26 NOTE — DISCHARGE SUMMARY
Orthopaedic Surgery - Discharge Summary  Julián Wu (62 y o  female)   : 1940   MRN: [id number]  Encounter: [id number]   Unit/Bed#: E2 -01    Admission Date: 10/22/2019    Discharge Date: 10/26/19    Discharge Diagnosis: Primary osteoarthritis of one hip, right [M16 11]    Procedures Performed: Procedure(s) (LRB):  ARTHROPLASTY HIP TOTAL (Right)    Hospital Course: Julián uW is a 70 y o  female admitted on 10/22/2019 following routine right LANCE  Throughout the postoperative period, the patient remained afebrile with stable vital signs  At the time of discharge on , the patient was tolerating PO diet, voiding, and pain was well-controlled on oral medications  Significant Findings, Care, Treatment and Services Provided: None    Complications: None    Condition at Discharge: good     Discharge instructions:   See After Visit Summary for discharge instructions  Follow-Up Care:  See After Visit Summary for information related to follow-up care  Disposition: Home    Planned Readmission: No    Discharge Statement   I spent 20 minutes discharging the patient  This time was spent on the day of discharge  I had direct contact with the patient on the day of discharge  Discharge Medications:  See after visit summary for reconciled discharge medications provided to patient and family      Merrick Rivers MD

## 2019-10-26 NOTE — SOCIAL WORK
W/E THELMA was notified by PT that pt would require a [address] had already been sent over by assigned CM  Updated in [facility] that delivery took place by W/E THELMA, and pt was made aware that insurance would not be able to be run on weekends, and she may receive a bill for any co-pays  Delivery form signed and left in DME closet  Assigned CM had already sent a referral to -[facility]  Pt was accepted  AVS had already been updated       HEIDI Taylor  10/26/2019   1227

## 2019-10-26 NOTE — PLAN OF CARE
Problem: PHYSICAL THERAPY ADULT  Goal: Performs mobility at highest level of function for planned discharge setting  See evaluation for individualized goals  Description  Treatment/Interventions: Functional transfer training, LE strengthening/ROM, Elevations, Therapeutic exercise, Endurance training, Patient/family training, Equipment eval/education, Bed mobility, Gait training, Compensatory technique education, Spoke to nursing, OT  Equipment Recommended: Walker(RW)       See flowsheet documentation for full assessment, interventions and recommendations  Outcome: Progressing  Note:   Prognosis: Good  Problem List: Decreased strength, Decreased range of motion, Decreased endurance, Impaired balance, Decreased mobility, Obesity, Orthopedic restrictions, Decreased skin integrity, Pain  Assessment: Pt  seated in bedside chair upon my arrival  Pt  reporting fatigue/pain, however agreeable to therapeutic intervention  Performance of HEP seated in bedside chair with cues provided for proper completion  Progressed with transfers being able to complete practicing proper technique with no noted LOB  Progressed with an increased amb  trial with use of RW and standbyA of therapist with cues provided for LE sequencing  Progressed to stair training being able to complete practicing proper technique with no noted LOB  Both pt and pt's family report good understanding at this time  Returned to room and repositioned seated in bedside chair with ice applied at end of treatment session  Pt  has completed all her PT goals and is safe for d/c home with HHPT and family support as needed when medically stable  Barriers to Discharge: None  Barriers to Discharge Comments: 1STE;  available 24/7   Recommendation: Home PT, Home with family support     PT - OK to Discharge: Yes(if d/c when medically stable )    See flowsheet documentation for full assessment

## 2019-10-28 ENCOUNTER — APPOINTMENT (OUTPATIENT)
Dept: PHYSICAL THERAPY | Age: 79
End: 2019-10-28
Payer: COMMERCIAL

## 2019-10-28 ENCOUNTER — TELEPHONE (OUTPATIENT)
Dept: OBGYN CLINIC | Facility: HOSPITAL | Age: 79
End: 2019-10-28

## 2019-10-28 NOTE — TELEPHONE ENCOUNTER
Patient contacted today to complete a postoperative follow-up call assessment  Patient reports her  at home and helping take care of her after surgery  Patient's after visit summary and after visit summary medication list reviewed at this time  Patient reports not taking the Marci Menghini as it is making her sick, patient reports while in hospital oxycodone made her sick also  I offered at this time to reach out to surgeon to get more pain medication recommendations, patient denied any doing so at this time she reports her Tylenol administration is enough  Patient reports taking a 1000 mg of Tylenol every 4 hours at this time  Patient was educated that 3000 mg every 24 hours is the max dose of tylenol, and she was advised to decrease her dosing at this time and was understanding and agreeable  Patient was instructed to keep me posted on her pain control and she  The patient has reports 3/10 pain and isnt the is problem     Patient reports taking Eliquis 5 mg twice a day  Patient reports also passing gas, a little bit, and a bowel movement this morning and taking Colace twice a day  Patient was recommended to take MiraLax today and  increase fluids, and increase fiber  Patient reports having decreased appetite was instructed to try  small frequent meals  Patient denies any nausea, vomiting, and or abdominal pain  Patient reports noticing a little more swelling today    Patient reports icing and states keeping ice on    Patient reports dressing is dry and states no drainage "at all "  Patient reports ambulating with a walker and denies any falls or complications  Patient reports physical therapy stated that she has good movement, and home therapist was out to evaluate her yesterday        Patient does report large blister on her inner thigh above the knee that was there at time of discharge and was evaluated by the nursing team  Patient reports blister is 4 inches wide and 2 inches long and started weeping at time of discharge and staff was aware  Patient reports the drainage is clear denies any redness, and has "patch over it " Pt reports home nurse did evaluate the blister and drainage  Patient denies any chest pain, shortness of breath, dizziness, fever, calf pain, and/or any additional symptoms at this time  Patient denies any questions at this time also  Patient encouraged to contact me with questions, concerns, or any issues

## 2019-10-28 NOTE — TELEPHONE ENCOUNTER
Pt contacted today to complete a postoperative follow up call assessmentt  VM left for patient to return my call at earliest convenience

## 2019-10-29 LAB
ABO GROUP BLD BPU: NORMAL
ABO GROUP BLD BPU: NORMAL
BPU ID: NORMAL
BPU ID: NORMAL
CROSSMATCH: NORMAL
CROSSMATCH: NORMAL
UNIT DISPENSE STATUS: NORMAL
UNIT DISPENSE STATUS: NORMAL
UNIT PRODUCT CODE: NORMAL
UNIT PRODUCT CODE: NORMAL
UNIT RH: NORMAL
UNIT RH: NORMAL

## 2019-10-31 ENCOUNTER — APPOINTMENT (OUTPATIENT)
Dept: RADIOLOGY | Facility: MEDICAL CENTER | Age: 79
End: 2019-10-31
Payer: COMMERCIAL

## 2019-10-31 ENCOUNTER — OFFICE VISIT (OUTPATIENT)
Dept: OBGYN CLINIC | Facility: MEDICAL CENTER | Age: 79
End: 2019-10-31

## 2019-10-31 VITALS
WEIGHT: 206 LBS | DIASTOLIC BLOOD PRESSURE: 84 MMHG | BODY MASS INDEX: 35.17 KG/M2 | SYSTOLIC BLOOD PRESSURE: 150 MMHG | HEIGHT: 64 IN | HEART RATE: 91 BPM

## 2019-10-31 DIAGNOSIS — M16.11 PRIMARY OSTEOARTHRITIS OF ONE HIP, RIGHT: ICD-10-CM

## 2019-10-31 DIAGNOSIS — M16.11 PRIMARY OSTEOARTHRITIS OF ONE HIP, RIGHT: Primary | ICD-10-CM

## 2019-10-31 PROCEDURE — 99024 POSTOP FOLLOW-UP VISIT: CPT | Performed by: ORTHOPAEDIC SURGERY

## 2019-10-31 PROCEDURE — 73502 X-RAY EXAM HIP UNI 2-3 VIEWS: CPT

## 2019-10-31 NOTE — PROGRESS NOTES
Assessment/Plan:  1  Primary osteoarthritis of one hip, right      Orders Placed This Encounter   Procedures    XR hip/pelv 2-3 vws right if performed    Ambulatory referral to Physical Therapy       Transition to outpatient PT  Pain control prn- tylenol, occasional norco use- patient aware to wean away from narcotics  Continue with Eliquis for DVT prophylaxis  CAMILLE stockings as needed for swelling  May shower and let water run over incision, do not submerge incision  Continue with posterior hip precautions  Patient should call ahead for abx prior to dental appts  Monitor blister  Continue with bowel regimen    Return in about 4 weeks (around 11/28/2019) for Re-evaluation  I answered all of the patient's questions during the visit and provided education of the patient's condition during the visit  The patient verbalized understanding of the information given and agrees with the plan  This note was dictated using [employer] software  It may contain errors including improperly dictated words  Please contact physician directly for any questions  Subjective   Chief Complaint:   Chief Complaint   Patient presents with    Right Hip - Post-op       Felisa Jordan is a 70 y o  female who presents for 1 week follow up s/p right LANCE  She reports that she had constipation for 1 week following iron supplementation, but was able to have a BM on 10/30/2019  She also complains of weeping blister in medial aspect of the thigh  She states that she has been consistent with participation in physical therapy, and has been pleased with her progress to this point  She is still participating in home-based therapy  She also states that she has had adverse reaction to oxycodone, and she is currently taking Norco an as-needed basis  Review of Systems  ROS:    See HPI for musculoskeletal review     All other systems reviewed are negative     History:  Past Medical History:   Diagnosis Date    Arthritis     At risk for falls     Disease of thyroid gland     hypo    Edema of both legs     History of pulmonary embolus (PE)     Hyperlipidemia     Hypertension     Irritable bowel syndrome     Prediabetes     Uses roller walker     Wears glasses     Wears partial dentures     upper partial     Past Surgical History:   Procedure Laterality Date    CHOLECYSTECTOMY      COLONOSCOPY      HYSTERECTOMY      [place] TOTAL HIP ARTHROPLASTY Right 10/22/2019    Procedure: ARTHROPLASTY HIP TOTAL;  Surgeon: Fang Schneider DO;  [facility];  Service: Orthopedics     Social History   Social History     Substance and Sexual Activity   Alcohol Use Yes    Frequency: Monthly or less     Social History     Substance and Sexual Activity   Drug Use Not Currently     Social History     Tobacco Use   Smoking Status Never Smoker   Smokeless Tobacco Never Used     Family History:   Family History   Problem Relation Age of Onset    Hypertension Family        Current Outpatient Medications on File Prior to Visit   Medication Sig Dispense Refill    acetaminophen (TYLENOL) 325 mg tablet Take 650 mg by mouth every 6 (six) hours as needed for mild pain      amLODIPine (NORVASC) 5 mg tablet Take 5 mg by mouth daily       ascorbic acid (VITAMIN C) 500 mg tablet Take 1 tablet (500 mg total) by mouth daily Start to take 30 days before surgery 30 tablet 1    atorvastatin (LIPITOR) 10 mg tablet Take 10 mg by mouth daily       docusate sodium (COLACE) 100 mg capsule Take 1 capsule (100 mg total) by mouth 2 (two) times a day 20 capsule 0    ELIQUIS 5 MG Take 5 mg by mouth 2 (two) times a day       ferrous sulfate 324 (65 Fe) mg Take 1 tablet (324 mg total) by mouth daily before breakfast Start to take 30 days before surgery 30 tablet 1    folic acid (FOLVITE) 1 mg tablet Take 1 tablet (1 mg total) by mouth daily Start to take 30 days before surgery 30 tablet 1    HYDROcodone-acetaminophen (NORCO) 5-325 mg per tablet Take 1 tablet by mouth every 4 (four) hours as needed for pain for up to 10 daysMax Daily Amount: 6 tablets 40 tablet 0    Multiple Vitamin (MULTIVITAMIN) tablet Take 1 tablet by mouth daily      Omega-3 Fatty Acids (FISH OIL PO) Take 1 capsule by mouth daily      SYNTHROID 25 MCG tablet Take 25 mcg by mouth daily       valsartan-hydrochlorothiazide (DIOVAN-HCT) 160-12 5 MG per tablet Take 1 tablet by mouth daily        No current facility-administered medications on file prior to visit  No Known Allergies     Objective     /84   Pulse 91   Ht 5' 3 5" (1 613 m)   Wt 93 4 kg (206 lb)   BMI 35 92 kg/m²      PE:  AAOx 3  WDWN  Hearing intact, no drainage from eyes  no audible wheezing  no abdominal distension  LE compartments soft, AT/GS intact    Ortho Exam:  right hip:   Leg lengths equal  INC: C/D/I, No erythema, mild swelling    Patient has blister in the medial thigh, weeping, currently keeping covered, no erythema  Mild pain with ROM    Imaging Studies: I have personally reviewed pertinent films in PACS  XR right hip:  S/p LANCE, adequate alignment      Scribe Attestation    I,:   Patricia Flaherty am acting as a scribe while in the presence of the attending physician :        I,:   Ck Aguilar, DO personally performed the services described in this documentation    as scribed in my presence :

## 2019-11-01 NOTE — UTILIZATION REVIEW
THIS WAS AN APPROVE Bharat Dunbar [address] [facility] ([id number]) FOR 10/22 THRU 10/24 - FAXED OVER KO W/ CLINICALS TO [employer] FOR REMAINDER OF DAY WHICH WAS 10/25 PATIENT DC'D ON 10/26  FAXED OVER KO UNDER EXT [id number]- WHICH PER [facility] [employer] PORTAL HAS BEEN CANCELLED - NEED APPROVAL FOR 10/25 WHICH [facility] STATES SHOULD COME FROM [employer]    I AM REQUESTING A CALL BACK -[phone] REBECCA CHAVEZ  [facility] DEPT UR  PLS REOPEN THIS CASE - RE SENDING CLINICALS AS WELL

## 2019-11-06 NOTE — UTILIZATION REVIEW
Elective Surgical Continued Stay Review    Date:     FOR  10/25/2019      POD#:    3    Current Patient Class:   Inpatient  Current Level of Care:   Med surg    Assessment/Plan: 70 y o  female, initial surgery date   10/22/2019    10/25   POD  #  3        R  LANCE  Acute  Blood loss anemia with  > 2 Gm hemoglobin drop  Post op  Need to cont to monitor  Hmgb  Cont  PT/OT and  Pain control  Maintain hip precautions at all times  Hmgb  9 8  10/25,  Down from   10 0   10/24  Pertinent Labs/Diagnostic Results:    Ref Range & Units 10/25/19 0428   WBC 4 31 - 10 16 Thousand/uL 10 06    RBC 3 81 - 5 12 Million/uL 3 04Low     Hemoglobin 11 5 - 15 4 g/dL 9 8Low     Hematocrit 34 8 - 46 1 % 30  0Low     MCV 82 - 98 fL 99High     MCH 26 8 - 34 3 pg 32 2    MCHC 31 4 - 37 4 g/dL 32 7    RDW 11 6 - 15 1 % 12 7    Platelets 372 - 973 Thousands/uL 236      Ref Range & Units 10/25/19 0428    Sodium 136 - 145 mmol/L 134Low     Potassium 3 5 - 5 3 mmol/L 3 6    Chloride 100 - 108 mmol/L 98Low     CO2 21 - 32 mmol/L 30    ANION GAP 4 - 13 mmol/L 6    BUN 5 - 25 mg/dL 8    Creatinine 0 60 - 1 30 mg/dL 0 85    Comment: Standardized to IDMS reference method   Glucose 65 - 140 mg/dL 124          Vital Signs:   98 5 °F (36 9 °C)  79  20  139/81  97 %  None (Room air)  Lying     10/25/19 1456  99 1 °F (37 3 °C)  95  15  145/85  96 %  None (Room air)  Lying         Medications:   Scheduled Medications:  norvasc  Daily  lipitor daily  Colace  BID  SQ lovenox d aily  Ferrous  Sulfate   BID  Folic   Acid  Daily  Cozaar daily  Hydrodiuril daily  Synthroid  Daily  IV  reglan  X 1  10/25  protonix  Daily    IV  zofran PRN  (  x1  10/25)        Discharge Plan:   Pt  D/c  Home with services  10/26/2019    Network Utilization Review Department  [email or web address]  ATTENTION: Please call with any questions or concerns to [phone] and carefully listen to the prompts so that you are directed to the right person  All voicemails are confidential   Meng Alford all requests for admission clinical reviews, approved or denied determinations and any other requests to dedicated fax number below belonging to the campus where the patient is receiving treatment    FACILITY NAME UR FAX NUMBER   ADMISSION DENIALS (Administrative/Medical Necessity) [address] [facility] (Maternity/NICU/Pediatrics) [phone]   [facility] [address] [facility] [address] [facility] [phone]   [address] [place] [address] [phone]   [facility] [address] [facility] [address] [place] [address] [phone]

## 2019-11-14 ENCOUNTER — EVALUATION (OUTPATIENT)
Dept: PHYSICAL THERAPY | Age: 79
End: 2019-11-14
Payer: COMMERCIAL

## 2019-11-14 DIAGNOSIS — M16.11 PRIMARY OSTEOARTHRITIS OF ONE HIP, RIGHT: Primary | ICD-10-CM

## 2019-11-14 PROCEDURE — 97110 THERAPEUTIC EXERCISES: CPT

## 2019-11-14 PROCEDURE — 97140 MANUAL THERAPY 1/> REGIONS: CPT

## 2019-11-14 PROCEDURE — 97164 PT RE-EVAL EST PLAN CARE: CPT

## 2019-11-14 NOTE — PROGRESS NOTES
Daily Note     Today's date: 2019  Patient name: Jolene Duffy  : 1940  MRN: [id number]  Referring provider: Gustavo Lindsey DO  Dx:   Encounter Diagnosis     ICD-10-CM    1  Primary osteoarthritis of one hip, right M16 11                   Assessment  Assessment details: Patient is a 70 y o  Female reporting to PT ~3 weeks s/p R LANCE  She presents with limited R hip AROM in all planes, poor endurance, and decreased LE strength  However, patient has been walking well with the use of a rollator walker and has been able to perform lighter ADL's without significant difficulty  No red flags or complications noted  At this time, patient will benefit from skilled PT to address impairments listed  Impairments: abnormal gait, abnormal or restricted ROM, abnormal movement, activity intolerance, impaired balance, impaired physical strength, pain with function and poor body mechanics    Symptom irritability: highBarriers to therapy: None  Understanding of Dx/Px/POC: excellent  Goals  STG's: 4 Weeks  1 ) Patient will be independent with HEP  MET  2 ) Patient will demonstrate proper body mechanics with squatting and lifting heavy objects  PROGRESSING  3 ) Patient will be able to roll in bed without discomfort  PROGRESSING  4 ) Patient will be able to stand for >30 mins with <3/10 pain  PROGRESSING    LTG's: 8 Weeks  1 ) Patient will be able to stand for >1 hour with <3/10 pain  PROGRESSING  2 ) Patient will demonstrate 5/5 LE strength in all planes, which will allow for normal performance of ADL's without difficulty  PROGRESSING  3 ) Patient will demonstrate full R hip AROM in all planes when compared B/L  PROGRESSING  4 ) Patient will exhibit normal gait mechanics without deviation  PROGRESSING  5 ) Patient will achieve greater than or equal to predicted FOTO score  PROGRESSING       Plan  Plan details: Patient was educated regarding the etiology and pathomechanics of their condition   They demonstrated understanding verbally  Patient was provided with an HEP  They were educated regarding repetitions, resistance, and proper technique  Patient demonstrated understanding verbally  Patient would benefit from: skilled physical therapy  Planned therapy interventions: manual therapy, neuromuscular re-education, patient education, strengthening, stretching, therapeutic activities, therapeutic exercise, home exercise program, functional ROM exercises, flexibility, body mechanics training and gait training  Frequency: 2x week  Duration in weeks: 8  Treatment plan discussed with: patient        Subjective Evaluation    History of Present Illness  Mechanism of injury: Patient is a 70 y o  Female reporting to PT 3 weeks s/p posterior R LANCE  She presents feeling very well overall  Reports significant improvement since surgery  Patient participated in home-PT where she was able to walk while using a SPC and perform her LE exercises  She reports improvements in strength and endurance since initiating PT  Patient has been adherent with post-operative hip precautions and demonstrates understanding verbally  However, she continues to utilize rollator walker with community ambulation due to instability and poor endurance  Patient continues to have difficulty with ascending/descending stairs and transferring from sit-to-stand  Overall, she continues to progress well without complication  She denies fever, chills, muscle aches, or other signs of infection  Patient has follow-up with surgeon on   Patient consented to manual therapy and physical examination  Demonstrated understanding verbally                Recurrent probem    Quality of life: good    Pain  Current pain ratin  At best pain ratin  At worst pain ratin  Location: R Hip  Quality: tight, throbbing, pressure, dull ache, cramping, discomfort and sharp  Aggravating factors: stair climbing, sitting, standing, walking and lifting    Treatments  Previous treatment: physical therapy and injection treatment  Patient Goals  Patient goals for therapy: decreased edema, decreased pain, increased motion, improved balance, increased strength, independence with ADLs/IADLs and return to sport/leisure activities          Objective     Palpation  Incision appears clean with routine healing  No signs of infection or abnormal discharge     Neurological Testing     Sensation     Hip   Left Hip   Intact: light touch    Right Hip   Intact: light touch    Active Range of Motion   Left Hip   Flexion: 95 degrees   Abduction: 32 degrees   External rotation (90/90): 52 degrees   Internal rotation (90/90): 22 degrees     Right Hip   Flexion: 65 degrees   Abduction: 26 degrees  External rotation (90/90): 35 degrees with pain  Internal rotation (90/90): 18 degrees with pain    Strength/Myotome Testing     Left Hip   Planes of Motion   Flexion: 5  Extension: 4-  Abduction: 4+    Right Hip   Planes of Motion   Flexion: 4  Extension: 4-  Abduction: 4-    Ambulation     Comments   Observational Gait  -Patient presents ambulating with rollator walker  -Decreased gait speed with decreased step length        Precautions: HTN, Elevated PE Risk      Manual  10/8  IE 10/15 10/17 11/14  PN         R Hip PROM  10' 10' 10'                                                                 Exercise Diary  10/8  IE 10/15 10/17 NEW INTERVENTIONS 11/14  PN      Bike 10' L1 10' L1 10' L1 Heel Slides (flex/abd) 15x10" ea      H/S Stretch 4x30" 4x30" 4x30" SLR (knee flexed) 20x      Heel Slides (Flex/Ext) 15x10" 15x10" 20x Bridges 20x      Bridges  20x 20x Supine TB Clamshells Green  20x      SLR x 3  20x 20x        Supine TB Clamshells  Red 20x Red 20x        Standing H' Abd/Ext  20x 20x        HR  20x 20x        Mini Squats  20x 20x        Hip flexor stretch   Leg off edge of table 30"x3 Modalities

## 2019-11-19 ENCOUNTER — OFFICE VISIT (OUTPATIENT)
Dept: PHYSICAL THERAPY | Age: 79
End: 2019-11-19
Payer: COMMERCIAL

## 2019-11-19 DIAGNOSIS — M16.11 PRIMARY OSTEOARTHRITIS OF ONE HIP, RIGHT: Primary | ICD-10-CM

## 2019-11-19 PROCEDURE — 97140 MANUAL THERAPY 1/> REGIONS: CPT

## 2019-11-19 PROCEDURE — 97110 THERAPEUTIC EXERCISES: CPT

## 2019-11-19 NOTE — PROGRESS NOTES
Daily Note     Today's date: 2019  Patient name: Jolene Duffy  : 1940  MRN: [id number]  Referring provider: Gustavo Lindsey DO  Dx:   Encounter Diagnosis     ICD-10-CM    1  Primary osteoarthritis of one hip, right M16 11                   Subjective: Patient presents doing very well overall  She has asked about walking without rollator  Patient was told that she no longer needs to use rollator as her balance and ability to walk has improved to the point where she no longer requires it for assistance  Objective: See treatment diary below      Assessment: Patient demonstrates ability to safely ambulate without need for rollator walker  Patient's dynamic and static balance appears to be progressing to the point where she does not exhibit risk for falling  Patient demonstrated fatigue post treatment, exhibited good technique with therapeutic exercises and would benefit from continued PT      Plan: Continue per plan of care        Precautions: HTN, Elevated PE Risk      Manual  10/8  IE 10/15 10/17 11/14  PN         R Hip PROM  10' 10' 10' 10'                                                                Exercise Diary  10/8  IE 10/15 10/17 NEW INTERVENTIONS   PN      Bike 10' L1 10' L1 10' L1 Heel Slides (flex/abd) 15x10" ea 15x10" ea     H/S Stretch 4x30" 4x30" 4x30" SLR (knee flexed) 20x 30x     Heel Slides (Flex/Ext) 15x10" 15x10" 20x Bridges 20x 30x     Bridges  20x 20x Supine TB Clamshells Green  20x Blue  30x     SLR x 3  20x 20x Standing H' Abd/Ext  20x ea      Supine TB Clamshells  Red 20x Red 20x HR  2x30     Standing H' Abd/Ext  20x 20x Sit-to-Stands  10x     HR  20x 20x Hip Abductor Machine       Mini Squats  20x 20x Leg Press        Hip flexor stretch   Leg off edge of table 30"x3 Step-Ups (fw/lat)  Staircase  20x         Walking  5 laps                                                                                                            Modalities

## 2019-11-21 ENCOUNTER — APPOINTMENT (OUTPATIENT)
Dept: PHYSICAL THERAPY | Age: 79
End: 2019-11-21
Payer: COMMERCIAL

## 2019-11-25 ENCOUNTER — OFFICE VISIT (OUTPATIENT)
Dept: OBGYN CLINIC | Facility: MEDICAL CENTER | Age: 79
End: 2019-11-25

## 2019-11-25 VITALS
BODY MASS INDEX: 34.83 KG/M2 | DIASTOLIC BLOOD PRESSURE: 84 MMHG | SYSTOLIC BLOOD PRESSURE: 146 MMHG | HEIGHT: 64 IN | WEIGHT: 204 LBS

## 2019-11-25 DIAGNOSIS — Z96.641 STATUS POST TOTAL REPLACEMENT OF RIGHT HIP: Primary | ICD-10-CM

## 2019-11-25 PROCEDURE — 99024 POSTOP FOLLOW-UP VISIT: CPT | Performed by: ORTHOPAEDIC SURGERY

## 2019-11-25 NOTE — PROGRESS NOTES
Assessment/Plan:  1  Status post total replacement of right hip      No orders of the defined types were placed in this encounter  Transition to home exercises   Pain control prn  Patient should call ahead for abx prior to dental appts  Return in about 4 weeks (around 12/23/2019) for PO Right LANCE   I answered all of the patient's questions during the visit and provided education of the patient's condition during the visit  The patient verbalized understanding of the information given and agrees with the plan  This note was dictated using Vyykn software  It may contain errors including improperly dictated words  Please contact physician directly for any questions  Subjective   Chief Complaint:   Chief Complaint   Patient presents with    Right Hip - Follow-up       Sevn Mayer is a 70 y o  female who presents for 6 week follow up s/p right LANCE on 10/22/19  Patient states she is doing better over all  She is on Eliquis  She is currently doing out patient physical therapy  Denies any pain pain or discomfort in the right hip  She notes leg lengths appear equal   She is currently not taking any pain medications  Review of Systems  ROS:    See HPI for musculoskeletal review     All other systems reviewed are negative     History:  Past Medical History:   Diagnosis Date    Arthritis     At risk for falls     Disease of thyroid gland     hypo    Edema of both legs     History of pulmonary embolus (PE)     Hyperlipidemia     Hypertension     Irritable bowel syndrome     Prediabetes     Uses roller walker     Wears glasses     Wears partial dentures     upper partial     Past Surgical History:   Procedure Laterality Date    CHOLECYSTECTOMY      COLONOSCOPY      HYSTERECTOMY      NC TOTAL HIP ARTHROPLASTY Right 10/22/2019    Procedure: ARTHROPLASTY HIP TOTAL;  Surgeon: Yvan Webb DO;  Location: [facility];  Service: Orthopedics     Social History   Social History Substance and Sexual Activity   Alcohol Use Yes    Frequency: Monthly or less     Social History     Substance and Sexual Activity   Drug Use Not Currently     Social History     Tobacco Use   Smoking Status Never Smoker   Smokeless Tobacco Never Used     Family History:   Family History   Problem Relation Age of Onset    Hypertension Family        Current Outpatient Medications on File Prior to Visit   Medication Sig Dispense Refill    acetaminophen (TYLENOL) 325 mg tablet Take 650 mg by mouth every 6 (six) hours as needed for mild pain      amLODIPine (NORVASC) 5 mg tablet Take 5 mg by mouth daily       ascorbic acid (VITAMIN C) 500 mg tablet Take 1 tablet (500 mg total) by mouth daily Start to take 30 days before surgery 30 tablet 1    atorvastatin (LIPITOR) 10 mg tablet Take 10 mg by mouth daily       docusate sodium (COLACE) 100 mg capsule Take 1 capsule (100 mg total) by mouth 2 (two) times a day 20 capsule 0    ELIQUIS 5 MG Take 5 mg by mouth 2 (two) times a day       ferrous sulfate 324 (65 Fe) mg Take 1 tablet (324 mg total) by mouth daily before breakfast Start to take 30 days before surgery 30 tablet 1    folic acid (FOLVITE) 1 mg tablet Take 1 tablet (1 mg total) by mouth daily Start to take 30 days before surgery 30 tablet 1    Multiple Vitamin (MULTIVITAMIN) tablet Take 1 tablet by mouth daily      Omega-3 Fatty Acids (FISH OIL PO) Take 1 capsule by mouth daily      SYNTHROID 25 MCG tablet Take 25 mcg by mouth daily       valsartan-hydrochlorothiazide (DIOVAN-HCT) 160-12 5 MG per tablet Take 1 tablet by mouth daily        No current facility-administered medications on file prior to visit        No Known Allergies     Objective     /84   Ht 5' 3 5" (1 613 m)   Wt 92 5 kg (204 lb)   BMI 35 57 kg/m²      PE:  AAOx 3  WDWN  Hearing intact, no drainage from eyes  no audible wheezing  no abdominal distension  LE compartments soft, AT/GS intact    Ortho Exam:  right hip:   INC: healed, No erythema, mild swelling  No pain with ROM  Leg lengths appear equal   Blister over medial thigh completely resolved, mild discoloration resolving    Scribe Attestation    I,:   Edil Hernandez am acting as a scribe while in the presence of the attending physician :        I,:   Aimee Huntley, DO personally performed the services described in this documentation    as scribed in my presence :

## 2019-11-26 ENCOUNTER — APPOINTMENT (OUTPATIENT)
Dept: LAB | Age: 79
End: 2019-11-26
Payer: COMMERCIAL

## 2019-11-26 DIAGNOSIS — Z86.2 HISTORY OF HYPERCOAGULABLE STATE: ICD-10-CM

## 2019-11-26 LAB
ALBUMIN SERPL BCP-MCNC: 4.1 G/DL (ref 3.5–5)
ALP SERPL-CCNC: 89 U/L (ref 46–116)
ALT SERPL W P-5'-P-CCNC: 31 U/L (ref 12–78)
ANION GAP SERPL CALCULATED.3IONS-SCNC: 6 MMOL/L (ref 4–13)
AST SERPL W P-5'-P-CCNC: 20 U/L (ref 5–45)
BASOPHILS # BLD AUTO: 0.03 THOUSANDS/ΜL (ref 0–0.1)
BASOPHILS NFR BLD AUTO: 0 % (ref 0–1)
BILIRUB SERPL-MCNC: 0.31 MG/DL (ref 0.2–1)
BUN SERPL-MCNC: 17 MG/DL (ref 5–25)
CALCIUM SERPL-MCNC: 9.9 MG/DL (ref 8.3–10.1)
CHLORIDE SERPL-SCNC: 102 MMOL/L (ref 100–108)
CO2 SERPL-SCNC: 30 MMOL/L (ref 21–32)
CREAT SERPL-MCNC: 0.84 MG/DL (ref 0.6–1.3)
EOSINOPHIL # BLD AUTO: 0.06 THOUSAND/ΜL (ref 0–0.61)
EOSINOPHIL NFR BLD AUTO: 1 % (ref 0–6)
ERYTHROCYTE [DISTWIDTH] IN BLOOD BY AUTOMATED COUNT: 13.1 % (ref 11.6–15.1)
GFR SERPL CREATININE-BSD FRML MDRD: 67 ML/MIN/1.73SQ M
GLUCOSE SERPL-MCNC: 110 MG/DL (ref 65–140)
HCT VFR BLD AUTO: 40.2 % (ref 34.8–46.1)
HGB BLD-MCNC: 12.8 G/DL (ref 11.5–15.4)
IMM GRANULOCYTES # BLD AUTO: 0.02 THOUSAND/UL (ref 0–0.2)
IMM GRANULOCYTES NFR BLD AUTO: 0 % (ref 0–2)
LYMPHOCYTES # BLD AUTO: 2.34 THOUSANDS/ΜL (ref 0.6–4.47)
LYMPHOCYTES NFR BLD AUTO: 27 % (ref 14–44)
MCH RBC QN AUTO: 31.5 PG (ref 26.8–34.3)
MCHC RBC AUTO-ENTMCNC: 31.8 G/DL (ref 31.4–37.4)
MCV RBC AUTO: 99 FL (ref 82–98)
MONOCYTES # BLD AUTO: 0.61 THOUSAND/ΜL (ref 0.17–1.22)
MONOCYTES NFR BLD AUTO: 7 % (ref 4–12)
NEUTROPHILS # BLD AUTO: 5.49 THOUSANDS/ΜL (ref 1.85–7.62)
NEUTS SEG NFR BLD AUTO: 65 % (ref 43–75)
NRBC BLD AUTO-RTO: 0 /100 WBCS
PLATELET # BLD AUTO: 320 THOUSANDS/UL (ref 149–390)
PMV BLD AUTO: 9.6 FL (ref 8.9–12.7)
POTASSIUM SERPL-SCNC: 3.9 MMOL/L (ref 3.5–5.3)
PROT SERPL-MCNC: 7.6 G/DL (ref 6.4–8.2)
RBC # BLD AUTO: 4.06 MILLION/UL (ref 3.81–5.12)
SODIUM SERPL-SCNC: 138 MMOL/L (ref 136–145)
WBC # BLD AUTO: 8.55 THOUSAND/UL (ref 4.31–10.16)

## 2019-11-26 PROCEDURE — 86147 CARDIOLIPIN ANTIBODY EA IG: CPT

## 2019-11-26 PROCEDURE — 86146 BETA-2 GLYCOPROTEIN ANTIBODY: CPT

## 2019-11-26 PROCEDURE — 36415 COLL VENOUS BLD VENIPUNCTURE: CPT

## 2019-11-26 PROCEDURE — 81241 F5 GENE: CPT

## 2019-11-26 PROCEDURE — 85025 COMPLETE CBC W/AUTO DIFF WBC: CPT

## 2019-11-26 PROCEDURE — 80053 COMPREHEN METABOLIC PANEL: CPT

## 2019-11-26 PROCEDURE — 81240 F2 GENE: CPT

## 2019-11-29 ENCOUNTER — OFFICE VISIT (OUTPATIENT)
Dept: URGENT CARE | Age: 79
End: 2019-11-29
Payer: COMMERCIAL

## 2019-11-29 VITALS
SYSTOLIC BLOOD PRESSURE: 167 MMHG | HEIGHT: 63 IN | TEMPERATURE: 97.5 F | RESPIRATION RATE: 20 BRPM | DIASTOLIC BLOOD PRESSURE: 93 MMHG | HEART RATE: 107 BPM | WEIGHT: 202 LBS | OXYGEN SATURATION: 96 % | BODY MASS INDEX: 35.79 KG/M2

## 2019-11-29 DIAGNOSIS — N30.01 ACUTE CYSTITIS WITH HEMATURIA: ICD-10-CM

## 2019-11-29 DIAGNOSIS — R35.0 URINARY FREQUENCY: Primary | ICD-10-CM

## 2019-11-29 LAB
CARDIOLIPIN IGA SER IA-ACNC: <9 APL U/ML (ref 0–11)
CARDIOLIPIN IGG SER IA-ACNC: <9 GPL U/ML (ref 0–14)
CARDIOLIPIN IGM SER IA-ACNC: <9 MPL U/ML (ref 0–12)
SL AMB  POCT GLUCOSE, UA: NORMAL
SL AMB LEUKOCYTE ESTERASE,UA: NORMAL
SL AMB POCT BILIRUBIN,UA: NORMAL
SL AMB POCT BLOOD,UA: NORMAL
SL AMB POCT CLARITY,UA: NORMAL
SL AMB POCT COLOR,UA: YELLOW
SL AMB POCT KETONES,UA: NORMAL
SL AMB POCT NITRITE,UA: NORMAL
SL AMB POCT PH,UA: 6.5
SL AMB POCT SPECIFIC GRAVITY,UA: 1.01
SL AMB POCT URINE PROTEIN: 100
SL AMB POCT UROBILINOGEN: 0.2

## 2019-11-29 PROCEDURE — 99203 OFFICE O/P NEW LOW 30 MIN: CPT | Performed by: PHYSICIAN ASSISTANT

## 2019-11-29 PROCEDURE — 87186 SC STD MICRODIL/AGAR DIL: CPT | Performed by: PHYSICIAN ASSISTANT

## 2019-11-29 PROCEDURE — 87086 URINE CULTURE/COLONY COUNT: CPT | Performed by: PHYSICIAN ASSISTANT

## 2019-11-29 PROCEDURE — 81002 URINALYSIS NONAUTO W/O SCOPE: CPT | Performed by: PHYSICIAN ASSISTANT

## 2019-11-29 PROCEDURE — 87077 CULTURE AEROBIC IDENTIFY: CPT | Performed by: PHYSICIAN ASSISTANT

## 2019-11-29 RX ORDER — SULFAMETHOXAZOLE AND TRIMETHOPRIM 800; 160 MG/1; MG/1
1 TABLET ORAL EVERY 12 HOURS SCHEDULED
Qty: 20 TABLET | Refills: 0 | Status: SHIPPED | COMMUNITY
Start: 2019-11-29 | End: 2019-12-09

## 2019-11-30 NOTE — PROGRESS NOTES
[facility]        NAME: Jose Gil is a 70 y o  female  : 1940    MRN: [id number]  DATE: 2019  TIME: 9:55 PM    Assessment and Plan   Urinary frequency [R35 0]  1  Urinary frequency  POCT urine dip    Urine culture   2  Acute cystitis with hematuria  sulfamethoxazole-trimethoprim (BACTRIM DS) 800-160 mg per tablet         Patient Instructions     --start antibiotics as directed  -drink plenty of  fluids  -probiotics while on antibiotics  -monitor right total hip arthroplasty incision  If any increased pain, erythema contact Dr Aldair Mcghee  Follow up with PCP in 3-5 days  Proceed to  ER if symptoms worsen  Chief Complaint     Chief Complaint   Patient presents with    Urinary Frequency     Frequency urinary and burning since yesterday  History of Present Illness       Patient presents with urinary frequency, urgency, burning with urination since yesterday  She is taking Tylenol without relief  She recently had a right total hip arthroplasty done 5 weeks ago by Dr Aldair Mcghee  She did have a catheter for the surgery  She denies any fevers, chills, nausea, vomiting, flank pain  She states her incision is healing well  She complains of minimal hip pain  She is on blood thinner  Review of Systems   Review of Systems   Constitutional: Negative  HENT: Negative  Respiratory: Negative  Cardiovascular: Negative  Endocrine: Positive for polyuria  Genitourinary: Positive for dysuria, flank pain, frequency, pelvic pain and urgency  Skin: Negative  Neurological: Negative  Psychiatric/Behavioral: Negative            Current Medications       Current Outpatient Medications:     acetaminophen (TYLENOL) 325 mg tablet, Take 650 mg by mouth every 6 (six) hours as needed for mild pain, Disp: , Rfl:     amLODIPine (NORVASC) 5 mg tablet, Take 5 mg by mouth daily , Disp: , Rfl:     ascorbic acid (VITAMIN C) 500 mg tablet, Take 1 tablet (500 mg total) by mouth daily Start to take 30 days before surgery, Disp: 30 tablet, Rfl: 1    atorvastatin (LIPITOR) 10 mg tablet, Take 10 mg by mouth daily , Disp: , Rfl:     docusate sodium (COLACE) 100 mg capsule, Take 1 capsule (100 mg total) by mouth 2 (two) times a day, Disp: 20 capsule, Rfl: 0    ELIQUIS 5 MG, Take 5 mg by mouth 2 (two) times a day , Disp: , Rfl:     ferrous sulfate 324 (65 Fe) mg, Take 1 tablet (324 mg total) by mouth daily before breakfast Start to take 30 days before surgery, Disp: 30 tablet, Rfl: 1    folic acid (FOLVITE) 1 mg tablet, Take 1 tablet (1 mg total) by mouth daily Start to take 30 days before surgery, Disp: 30 tablet, Rfl: 1    Multiple Vitamin (MULTIVITAMIN) tablet, Take 1 tablet by mouth daily, Disp: , Rfl:     Omega-3 Fatty Acids (FISH OIL PO), Take 1 capsule by mouth daily, Disp: , Rfl:     sulfamethoxazole-trimethoprim (BACTRIM DS) 800-160 mg per tablet, Take 1 tablet by mouth every 12 (twelve) hours for 10 days, Disp: 20 tablet, Rfl: 0    SYNTHROID 25 MCG tablet, Take 25 mcg by mouth daily , Disp: , Rfl:     valsartan-hydrochlorothiazide (DIOVAN-HCT) 160-12 5 MG per tablet, Take 1 tablet by mouth daily , Disp: , Rfl:     Current Allergies     Allergies as of 11/29/2019    (No Known Allergies)            The following portions of the patient's history were reviewed and updated as appropriate: allergies, current medications, past family history, past medical history, past social history, past surgical history and problem list      Past Medical History:   Diagnosis Date    Arthritis     At risk for falls     Disease of thyroid gland     hypo    Edema of both legs     History of pulmonary embolus (PE)     Hyperlipidemia     Hypertension     Irritable bowel syndrome     Prediabetes     Uses roller walker     Wears glasses     Wears partial dentures     upper partial       Past Surgical History:   Procedure Laterality Date    CHOLECYSTECTOMY      COLONOSCOPY      HYSTERECTOMY      DC TOTAL HIP ARTHROPLASTY Right 10/22/2019    Procedure: ARTHROPLASTY HIP TOTAL;  Surgeon: Sharon Pena DO;  Location: [facility];  Service: Orthopedics       Family History   Problem Relation Age of Onset    Hypertension Family          Medications have been verified  Objective   /93 (BP Location: Right arm, Patient Position: Sitting, Cuff Size: Standard)   Pulse (!) 107   Temp 97 5 °F (36 4 °C) (Temporal)   Resp 20   Ht 5' 3" (1 6 m)   Wt 91 6 kg (202 lb)   SpO2 96%   BMI 35 78 kg/m²        Physical Exam     Physical Exam   Constitutional: She is oriented to person, place, and time  She appears well-developed and well-nourished  No distress  HENT:   Head: Normocephalic and atraumatic  Cardiovascular: Normal rate, regular rhythm and normal heart sounds  Pulmonary/Chest: Effort normal and breath sounds normal    Abdominal: Soft  Bowel sounds are normal  There is no tenderness  No CVA tenderness   Musculoskeletal:   Right hip incision healed with no erythema  Neurological: She is alert and oriented to person, place, and time  Skin: Skin is warm and dry  She is not diaphoretic  Psychiatric: She has a normal mood and affect  Her behavior is normal    Nursing note and vitals reviewed

## 2019-11-30 NOTE — PATIENT INSTRUCTIONS
-start antibiotics as directed  -drink plenty of  fluids  -probiotics while on antibiotics  -follow-up with the primary care doctor in 3-5 days  -ER if symptoms worsen

## 2019-12-01 LAB
B2 GLYCOPROT1 IGA SER-ACNC: <9 GPI IGA UNITS (ref 0–25)
B2 GLYCOPROT1 IGG SER-ACNC: <9 GPI IGG UNITS (ref 0–20)
B2 GLYCOPROT1 IGM SER-ACNC: <9 GPI IGM UNITS (ref 0–32)
BACTERIA UR CULT: ABNORMAL
BACTERIA UR CULT: ABNORMAL

## 2019-12-02 LAB
F2 GENE MUT ANL BLD/T: NORMAL
F5 GENE MUT ANL BLD/T: NORMAL

## 2019-12-09 ENCOUNTER — APPOINTMENT (OUTPATIENT)
Dept: LAB | Age: 79
End: 2019-12-09
Payer: COMMERCIAL

## 2019-12-09 ENCOUNTER — TRANSCRIBE ORDERS (OUTPATIENT)
Dept: ADMINISTRATIVE | Age: 79
End: 2019-12-09

## 2019-12-09 DIAGNOSIS — E13.69 OTHER SPECIFIED DIABETES MELLITUS WITH OTHER SPECIFIED COMPLICATION, UNSPECIFIED WHETHER LONG TERM INSULIN USE (HCC): Primary | ICD-10-CM

## 2019-12-09 DIAGNOSIS — E13.69 OTHER SPECIFIED DIABETES MELLITUS WITH OTHER SPECIFIED COMPLICATION, UNSPECIFIED WHETHER LONG TERM INSULIN USE (HCC): ICD-10-CM

## 2019-12-09 LAB
ALBUMIN SERPL BCP-MCNC: 3.8 G/DL (ref 3.5–5)
ALP SERPL-CCNC: 79 U/L (ref 46–116)
ALT SERPL W P-5'-P-CCNC: 35 U/L (ref 12–78)
ANION GAP SERPL CALCULATED.3IONS-SCNC: 4 MMOL/L (ref 4–13)
AST SERPL W P-5'-P-CCNC: 16 U/L (ref 5–45)
BASOPHILS # BLD AUTO: 0.03 THOUSANDS/ΜL (ref 0–0.1)
BASOPHILS NFR BLD AUTO: 1 % (ref 0–1)
BILIRUB SERPL-MCNC: 0.43 MG/DL (ref 0.2–1)
BUN SERPL-MCNC: 17 MG/DL (ref 5–25)
CALCIUM SERPL-MCNC: 9.6 MG/DL (ref 8.3–10.1)
CHLORIDE SERPL-SCNC: 103 MMOL/L (ref 100–108)
CHOLEST SERPL-MCNC: 159 MG/DL (ref 50–200)
CO2 SERPL-SCNC: 28 MMOL/L (ref 21–32)
CREAT SERPL-MCNC: 1.05 MG/DL (ref 0.6–1.3)
CREAT UR-MCNC: 114 MG/DL
EOSINOPHIL # BLD AUTO: 0.04 THOUSAND/ΜL (ref 0–0.61)
EOSINOPHIL NFR BLD AUTO: 1 % (ref 0–6)
ERYTHROCYTE [DISTWIDTH] IN BLOOD BY AUTOMATED COUNT: 12.9 % (ref 11.6–15.1)
EST. AVERAGE GLUCOSE BLD GHB EST-MCNC: 128 MG/DL
GFR SERPL CREATININE-BSD FRML MDRD: 51 ML/MIN/1.73SQ M
GLUCOSE P FAST SERPL-MCNC: 116 MG/DL (ref 65–99)
HBA1C MFR BLD: 6.1 % (ref 4.2–6.3)
HCT VFR BLD AUTO: 39.9 % (ref 34.8–46.1)
HDLC SERPL-MCNC: 50 MG/DL
HGB BLD-MCNC: 12.7 G/DL (ref 11.5–15.4)
IMM GRANULOCYTES # BLD AUTO: 0.01 THOUSAND/UL (ref 0–0.2)
IMM GRANULOCYTES NFR BLD AUTO: 0 % (ref 0–2)
LDLC SERPL CALC-MCNC: 78 MG/DL (ref 0–100)
LYMPHOCYTES # BLD AUTO: 1.65 THOUSANDS/ΜL (ref 0.6–4.47)
LYMPHOCYTES NFR BLD AUTO: 34 % (ref 14–44)
MCH RBC QN AUTO: 31.4 PG (ref 26.8–34.3)
MCHC RBC AUTO-ENTMCNC: 31.8 G/DL (ref 31.4–37.4)
MCV RBC AUTO: 99 FL (ref 82–98)
MICROALBUMIN UR-MCNC: 21.4 MG/L (ref 0–20)
MICROALBUMIN/CREAT 24H UR: 19 MG/G CREATININE (ref 0–30)
MONOCYTES # BLD AUTO: 0.42 THOUSAND/ΜL (ref 0.17–1.22)
MONOCYTES NFR BLD AUTO: 9 % (ref 4–12)
NEUTROPHILS # BLD AUTO: 2.71 THOUSANDS/ΜL (ref 1.85–7.62)
NEUTS SEG NFR BLD AUTO: 55 % (ref 43–75)
NONHDLC SERPL-MCNC: 109 MG/DL
NRBC BLD AUTO-RTO: 0 /100 WBCS
PLATELET # BLD AUTO: 310 THOUSANDS/UL (ref 149–390)
PMV BLD AUTO: 9 FL (ref 8.9–12.7)
POTASSIUM SERPL-SCNC: 4.3 MMOL/L (ref 3.5–5.3)
PROT SERPL-MCNC: 7.1 G/DL (ref 6.4–8.2)
RBC # BLD AUTO: 4.04 MILLION/UL (ref 3.81–5.12)
SODIUM SERPL-SCNC: 135 MMOL/L (ref 136–145)
TRIGL SERPL-MCNC: 154 MG/DL
TSH SERPL DL<=0.05 MIU/L-ACNC: 1.9 UIU/ML (ref 0.36–3.74)
WBC # BLD AUTO: 4.86 THOUSAND/UL (ref 4.31–10.16)

## 2019-12-09 PROCEDURE — 84443 ASSAY THYROID STIM HORMONE: CPT

## 2019-12-09 PROCEDURE — 36415 COLL VENOUS BLD VENIPUNCTURE: CPT

## 2019-12-09 PROCEDURE — 83036 HEMOGLOBIN GLYCOSYLATED A1C: CPT

## 2019-12-09 PROCEDURE — 85025 COMPLETE CBC W/AUTO DIFF WBC: CPT

## 2019-12-09 PROCEDURE — 80053 COMPREHEN METABOLIC PANEL: CPT

## 2019-12-09 PROCEDURE — 82570 ASSAY OF URINE CREATININE: CPT | Performed by: INTERNAL MEDICINE

## 2019-12-09 PROCEDURE — 80061 LIPID PANEL: CPT

## 2019-12-09 PROCEDURE — 82043 UR ALBUMIN QUANTITATIVE: CPT | Performed by: INTERNAL MEDICINE

## 2019-12-11 ENCOUNTER — OFFICE VISIT (OUTPATIENT)
Dept: HEMATOLOGY ONCOLOGY | Facility: CLINIC | Age: 79
End: 2019-12-11
Payer: COMMERCIAL

## 2019-12-11 VITALS
HEART RATE: 77 BPM | RESPIRATION RATE: 18 BRPM | HEIGHT: 63 IN | OXYGEN SATURATION: 97 % | WEIGHT: 201 LBS | DIASTOLIC BLOOD PRESSURE: 88 MMHG | TEMPERATURE: 96.8 F | BODY MASS INDEX: 35.61 KG/M2 | SYSTOLIC BLOOD PRESSURE: 150 MMHG

## 2019-12-11 DIAGNOSIS — Z86.2 HISTORY OF HYPERCOAGULABLE STATE: Primary | ICD-10-CM

## 2019-12-11 PROCEDURE — 99214 OFFICE O/P EST MOD 30 MIN: CPT | Performed by: INTERNAL MEDICINE

## 2019-12-11 NOTE — PROGRESS NOTES
Hematology/Oncology Outpatient Follow-up  Timoteo Ferrell 70 y o  female 1940 [id number]    Date:  12/11/2019    Assessment and Plan:  1  History of hypercoagulable state  The patient was educated about the limited hypercoagulable workup  She was told that she should continue on the Eliquis indefinitely since she did have multiple episodes of clotting events  We did go through the potential risks of taking Eliquis which is mainly bleeding  The patient felt comfortable with the recommendation of indefinite anticoagulation  We will see her in the future on as-needed basis  HPI:  This is a 53-year-old female with history of arthritis, hypertension, irritable bowel syndrome, hyperlipidemia, etc     The patient stated that she was initially diagnosed with her 1st pulmonary embolism when she was in her 2nd trimester of her 5th pregnancy in 0  At that time she was treated with heparin  During the postpartum time she was not anticoagulated and had another clot a month after delivery and was on Coumadin for about 2 years  The patient then had no other clotting event until February of 2018 when she was again diagnosed with pulmonary embolism after a [place]  car trip  She at that time had significant respiratory symptoms and was found to have bilateral pulmonary emboli  She was then started on Eliquis for 8 months  She then again had another episode of PEs with respiratory symptoms, however, this time she was unprovoked around February 2019 while she was off of any anticoagulation  Another CT scan of the chest on the 13th February 2019 showed multiple acute pulmonary emboli  The patient was again restarted on Eliquis for anticoagulation 5 mg twice a day which she is tolerating very well  Interval history:  The patient came today for a follow-up visit  She had her right hip surgery in October without significant complications    The Eliquis was put on hold about 24-48 hours prior to the surgery and was restarted after the discharge  During the hospital stay she stated that she was on low-molecular weight heparin  During the hospital stay she stated that she had iron IV treatment which show kept her hemoglobin level within the normal range  She had limited hypercoagulable workup on 11/26/2019 which show was negative for factor 5 Leiden or prothrombin gene mutation  The cardiolipin antibody titers and beta 2 glycoprotein antibodies were all within normal range ruling out antiphospholipid antibody syndrome  The patient is tolerating Eliquis very well without any hint of bleeding  ROS: Review of Systems   Constitutional: Positive for fatigue  Negative for activity change, appetite change, chills, diaphoresis, fever and unexpected weight change  HENT: Negative for congestion, dental problem, ear discharge, ear pain, facial swelling, hearing loss, mouth sores, nosebleeds, postnasal drip, sore throat, tinnitus and trouble swallowing  Eyes: Negative for discharge, redness, itching and visual disturbance  Respiratory: Negative for cough, chest tightness, shortness of breath and wheezing  Cardiovascular: Negative for chest pain, palpitations and leg swelling  Gastrointestinal: Negative for abdominal distention, abdominal pain, anal bleeding, blood in stool, constipation, diarrhea, nausea and vomiting  Genitourinary: Negative for difficulty urinating, dysuria, flank pain, frequency, hematuria and urgency  Musculoskeletal: Positive for arthralgias  Negative for back pain, gait problem, joint swelling, myalgias and neck pain  Skin: Negative for color change, pallor, rash and wound  Neurological: Negative for dizziness, syncope, speech difficulty, weakness, light-headedness, numbness and headaches  Hematological: Negative for adenopathy  Does not bruise/bleed easily  Psychiatric/Behavioral: Positive for sleep disturbance   Negative for agitation, behavioral problems and confusion         Past Medical History:   Diagnosis Date    Arthritis     At risk for falls     Disease of thyroid gland     hypo    Edema of both legs     History of pulmonary embolus (PE)     Hyperlipidemia     Hypertension     Irritable bowel syndrome     Prediabetes     Uses roller walker     Wears glasses     Wears partial dentures     upper partial       Past Surgical History:   Procedure Laterality Date    CHOLECYSTECTOMY      COLONOSCOPY      HYSTERECTOMY      [place] TOTAL HIP ARTHROPLASTY Right 10/22/2019    Procedure: ARTHROPLASTY HIP TOTAL;  Surgeon: Mauri Rodriguez DO;  Location: [facility];  Service: Orthopedics       Social History     Socioeconomic History    Marital status: /Civil Union     Spouse name: None    Number of children: None    Years of education: None    Highest education level: None   Occupational History    None   Social Needs    Financial resource strain: None    Food insecurity:     Worry: None     Inability: None    Transportation needs:     Medical: None     Non-medical: None   Tobacco Use    Smoking status: Never Smoker    Smokeless tobacco: Never Used   Substance and Sexual Activity    Alcohol use: Yes     Frequency: Monthly or less    Drug use: Not Currently    Sexual activity: None   Lifestyle    Physical activity:     Days per week: None     Minutes per session: None    Stress: None   Relationships    Social connections:     Talks on phone: None     Gets together: None     Attends Orthodox service: None     Active member of club or organization: None     Attends meetings of clubs or organizations: None     Relationship status: None    Intimate partner violence:     Fear of current or ex partner: None     Emotionally abused: None     Physically abused: None     Forced sexual activity: None   Other Topics Concern    None   Social History Narrative    None       Family History   Problem Relation Age of Onset    Hypertension Family Allergies   Allergen Reactions    Oxycodone Vomiting         Current Outpatient Medications:     acetaminophen (TYLENOL) 325 mg tablet, Take 650 mg by mouth every 6 (six) hours as needed for mild pain, Disp: , Rfl:     amLODIPine (NORVASC) 5 mg tablet, Take 5 mg by mouth daily , Disp: , Rfl:     atorvastatin (LIPITOR) 10 mg tablet, Take 10 mg by mouth daily , Disp: , Rfl:     ELIQUIS 5 MG, Take 5 mg by mouth 2 (two) times a day , Disp: , Rfl:     folic acid (FOLVITE) 1 mg tablet, Take 1 tablet (1 mg total) by mouth daily Start to take 30 days before surgery, Disp: 30 tablet, Rfl: 1    Multiple Vitamin (MULTIVITAMIN) tablet, Take 1 tablet by mouth daily, Disp: , Rfl:     Omega-3 Fatty Acids (FISH OIL PO), Take 1 capsule by mouth daily, Disp: , Rfl:     SYNTHROID 25 MCG tablet, Take 25 mcg by mouth daily , Disp: , Rfl:     valsartan-hydrochlorothiazide (DIOVAN-HCT) 160-12 5 MG per tablet, Take 1 tablet by mouth daily , Disp: , Rfl:     ascorbic acid (VITAMIN C) 500 mg tablet, Take 1 tablet (500 mg total) by mouth daily Start to take 30 days before surgery (Patient not taking: Reported on 12/11/2019), Disp: 30 tablet, Rfl: 1    docusate sodium (COLACE) 100 mg capsule, Take 1 capsule (100 mg total) by mouth 2 (two) times a day (Patient not taking: Reported on 12/11/2019), Disp: 20 capsule, Rfl: 0    ferrous sulfate 324 (65 Fe) mg, Take 1 tablet (324 mg total) by mouth daily before breakfast Start to take 30 days before surgery (Patient not taking: Reported on 12/11/2019), Disp: 30 tablet, Rfl: 1      Physical Exam:  /88 (BP Location: Left arm, Patient Position: Sitting, Cuff Size: Adult)   Pulse 77   Temp (!) 96 8 °F (36 °C) (Tympanic)   Resp 18   Ht 5' 3" (1 6 m)   Wt 91 2 kg (201 lb)   SpO2 97%   BMI 35 61 kg/m²     Physical Exam   Constitutional: She is oriented to person, place, and time  She appears well-developed and well-nourished  No distress     HENT:   Head: Normocephalic and atraumatic  Nose: Nose normal    Mouth/Throat: Oropharynx is clear and moist    Eyes: Pupils are equal, round, and reactive to light  Conjunctivae and EOM are normal  Right eye exhibits no discharge  Left eye exhibits no discharge  No scleral icterus  Neck: Normal range of motion  Neck supple  No JVD present  No tracheal deviation present  No thyromegaly present  Cardiovascular: Normal rate, regular rhythm and normal heart sounds  Exam reveals no friction rub  No murmur heard  Pulmonary/Chest: Effort normal and breath sounds normal  No stridor  No respiratory distress  She has no wheezes  She has no rales  She exhibits no tenderness  Abdominal: Soft  Bowel sounds are normal  She exhibits no distension and no mass  There is no hepatosplenomegaly, splenomegaly or hepatomegaly  There is no tenderness  There is no rebound and no guarding  Musculoskeletal: Normal range of motion  She exhibits no edema, tenderness or deformity  Lymphadenopathy:     She has no cervical adenopathy  Neurological: She is alert and oriented to person, place, and time  She has normal reflexes  No cranial nerve deficit  Coordination normal    Skin: Skin is warm and dry  No rash noted  She is not diaphoretic  No erythema  No pallor  Psychiatric: She has a normal mood and affect  Her behavior is normal  Judgment and thought content normal          Labs:  Lab Results   Component Value Date    WBC 4 86 12/09/2019    HGB 12 7 12/09/2019    HCT 39 9 12/09/2019    MCV 99 (H) 12/09/2019     12/09/2019     Lab Results   Component Value Date    K 4 3 12/09/2019     12/09/2019    CO2 28 12/09/2019    BUN 17 12/09/2019    CREATININE 1 05 12/09/2019    GLUF 116 (H) 12/09/2019    CALCIUM 9 6 12/09/2019    AST 16 12/09/2019    ALT 35 12/09/2019    ALKPHOS 79 12/09/2019    EGFR 51 12/09/2019       Patient voiced understanding and agreement in the above discussion   Aware to contact our office with questions/symptoms in the interim

## 2019-12-27 ENCOUNTER — TELEPHONE (OUTPATIENT)
Dept: OBGYN CLINIC | Facility: HOSPITAL | Age: 79
End: 2019-12-27

## 2019-12-27 NOTE — TELEPHONE ENCOUNTER
Received a voice mail message that the patient would like to change her appointment time on Monday 12/30 with Dr Can Floyd  I tried to return the call to the patient but I also received a voice mail  I left a message for the patient to call us back to change appointment time/date

## 2019-12-30 ENCOUNTER — OFFICE VISIT (OUTPATIENT)
Dept: OBGYN CLINIC | Facility: MEDICAL CENTER | Age: 79
End: 2019-12-30

## 2019-12-30 VITALS
WEIGHT: 206 LBS | BODY MASS INDEX: 35.17 KG/M2 | DIASTOLIC BLOOD PRESSURE: 90 MMHG | SYSTOLIC BLOOD PRESSURE: 153 MMHG | HEIGHT: 64 IN | HEART RATE: 86 BPM

## 2019-12-30 DIAGNOSIS — Z96.641 STATUS POST TOTAL REPLACEMENT OF RIGHT HIP: Primary | ICD-10-CM

## 2019-12-30 PROCEDURE — 99024 POSTOP FOLLOW-UP VISIT: CPT | Performed by: ORTHOPAEDIC SURGERY

## 2019-12-30 NOTE — PROGRESS NOTES
Assessment/Plan:  1  Status post total replacement of right hip      No orders of the defined types were placed in this encounter  Resume home exercises   If pain persists will consider return to formal physical therapy, patient prefers to try home exercises 1st  Pain control prn- OTC pain meds  Patient should call ahead for abx prior to dental appts  Return in about 9 months (around 9/30/2020) for Recheck of right hip  I answered all of the patient's questions during the visit and provided education of the patient's condition during the visit  The patient verbalized understanding of the information given and agrees with the plan  This note was dictated using Cista System software  It may contain errors including improperly dictated words  Please contact physician directly for any questions  Subjective   Chief Complaint:   Chief Complaint   Patient presents with    Right Hip - Follow-up, Post-op       Edwardo Encinas is a 70 y o  female who presents for 4 week follow up 10 weeks s/p right LANCE performed on 10/22/19  Patient continues to note that she is doing well post operatively  Her pain is relatively well controlled  Patient reports that she has an intermittent "dull, aching" sensation diffusely about the hip, especially when first waking up in the morning  She states that she has not been compliant with performing her home exercise program as instructed and she feels this is why she is experiencing these symptoms  Denies numbness and tingling, fevers or chills  Review of Systems  ROS:    See HPI for musculoskeletal review     All other systems reviewed are negative     History:  Past Medical History:   Diagnosis Date    Arthritis     At risk for falls     Disease of thyroid gland     hypo    Edema of both legs     History of pulmonary embolus (PE)     Hyperlipidemia     Hypertension     Irritable bowel syndrome     Prediabetes     Uses roller walker     Wears glasses     Wears partial dentures     upper partial     Past Surgical History:   Procedure Laterality Date    CHOLECYSTECTOMY      COLONOSCOPY      HYSTERECTOMY      [place] TOTAL HIP ARTHROPLASTY Right 10/22/2019    Procedure: ARTHROPLASTY HIP TOTAL;  Surgeon: Dandy Le DO;  Location: [facility];  Service: Orthopedics     Social History   Social History     Substance and Sexual Activity   Alcohol Use Yes    Frequency: Monthly or less     Social History     Substance and Sexual Activity   Drug Use Not Currently     Social History     Tobacco Use   Smoking Status Never Smoker   Smokeless Tobacco Never Used     Family History:   Family History   Problem Relation Age of Onset    Hypertension Family        Meds/Allergies     (Not in a hospital admission)  Allergies   Allergen Reactions    Oxycodone Vomiting          Objective     /90   Pulse 86   Ht 5' 3 5" (1 613 m)   Wt 93 4 kg (206 lb)   BMI 35 92 kg/m²      PE:  AAOx 3  WDWN  Hearing intact, no drainage from eyes  no audible wheezing  no abdominal distension  LE compartments soft, AT/GS intact    Ortho Exam:  right hip:   INC: healed, No erythema  No pain with ROM  Leg lengths appear equal    Scribe Attestation    I,:   Lizeth Ross am acting as a scribe while in the presence of the attending physician :        I,:   Dandy Le DO personally performed the services described in this documentation    as scribed in my presence :

## 2020-01-06 ENCOUNTER — OFFICE VISIT (OUTPATIENT)
Dept: PODIATRY | Facility: CLINIC | Age: 80
End: 2020-01-06
Payer: COMMERCIAL

## 2020-01-06 VITALS
SYSTOLIC BLOOD PRESSURE: 134 MMHG | DIASTOLIC BLOOD PRESSURE: 88 MMHG | BODY MASS INDEX: 35.24 KG/M2 | HEART RATE: 84 BPM | HEIGHT: 64 IN | WEIGHT: 206.4 LBS

## 2020-01-06 DIAGNOSIS — E11.9 CONTROLLED TYPE 2 DIABETES MELLITUS WITHOUT COMPLICATION, WITHOUT LONG-TERM CURRENT USE OF INSULIN (HCC): Primary | ICD-10-CM

## 2020-01-06 PROCEDURE — 99213 OFFICE O/P EST LOW 20 MIN: CPT | Performed by: PODIATRIST

## 2020-01-06 NOTE — PROGRESS NOTES
Assessment/Plan:    Discussed principles of diabetic foot care  Patient has ankle edema which precludes feeling posterior tibial pulse  Dorsalis pulses within normal limits  Sensorium is intact  Treatment consisted of nail trimming  Patient desires periodic palliative nail care  She is rescheduled for 10 weeks  No problem-specific Assessment & Plan notes found for this encounter  Diagnoses and all orders for this visit:    Controlled type 2 diabetes mellitus without complication, without long-term current use of insulin (HCC)          Subjective:      Patient ID: Alfonso Cabot is a 70 y o  female  HPI     Patient, a 28-year-old type 2 diet controlled diabetic presents for pedal assessment  Last A1c was 6 1  Patient relates no numbness or tingling with her feet  Chief complaint involves long toenails that she has difficulty cutting  Patient had a right hip replacement in October  She has significant swelling in the right ankle  The following portions of the patient's history were reviewed and updated as appropriate: allergies, current medications, past family history, past medical history, past social history, past surgical history and problem list     Review of Systems   Cardiovascular: Negative  Gastrointestinal: Negative  Musculoskeletal: Positive for arthralgias and gait problem  Neurological: Negative for numbness  Objective:      /88   Pulse 84   Ht 5' 3 5" (1 613 m)   Wt 93 6 kg (206 lb 6 4 oz)   BMI 35 99 kg/m²          Physical Exam   Cardiovascular: Pulses are no weak pulses  Pulses:       Dorsalis pedis pulses are 2+ on the right side, and 2+ on the left side  Posterior tibial pulses are 0 on the right side, and 0 on the left side  Feet:   Right Foot:   Skin Integrity: Negative for ulcer, skin breakdown, erythema, warmth, callus or dry skin     Left Foot:   Skin Integrity: Negative for ulcer, skin breakdown, erythema, warmth, callus or dry skin  Diabetic Foot Exam    Patient's shoes and socks removed  Right Foot/Ankle   Right Foot Inspection  Skin Exam: skin normal and skin intact no dry skin, no warmth, no callus, no erythema, no maceration, no abnormal color, no pre-ulcer, no ulcer and no callus                          Toe Exam: ROM and strength within normal limits  Sensory   Vibration: intact  Proprioception: intact   Monofilament testing: intact  Vascular  Capillary refills: < 3 seconds  The right DP pulse is 2+  The right PT pulse is 0  Right Toe  - Comprehensive Exam  Ecchymosis: none  Arch: normal  Hammertoes: absent  Claw Toes: absent  Swelling: none   Tenderness: none         Left Foot/Ankle  Left Foot Inspection  Skin Exam: skin normal and skin intactno dry skin, no warmth, no erythema, no maceration, normal color, no pre-ulcer, no ulcer and no callus                         Toe Exam: ROM and strength within normal limits                   Sensory   Vibration: intact  Proprioception: intact  Monofilament: intact  Vascular  Capillary refills: < 3 seconds  The left DP pulse is 2+  The left PT pulse is 0  Left Toe  - Comprehensive Exam  Ecchymosis: none  Arch: normal  Hammertoes: absent  Claw toes: absent  Swelling: none   Tenderness: none       Assign Risk Category:  No deformity present; No loss of protective sensation;  No weak pulses       Risk: 0

## 2020-06-17 ENCOUNTER — TRANSCRIBE ORDERS (OUTPATIENT)
Dept: ADMINISTRATIVE | Age: 80
End: 2020-06-17

## 2020-06-17 ENCOUNTER — APPOINTMENT (OUTPATIENT)
Dept: LAB | Age: 80
End: 2020-06-17
Payer: COMMERCIAL

## 2020-06-17 DIAGNOSIS — E11.9 TYPE 2 DIABETES MELLITUS WITHOUT COMPLICATION, WITHOUT LONG-TERM CURRENT USE OF INSULIN (HCC): ICD-10-CM

## 2020-06-17 DIAGNOSIS — E11.9 TYPE 2 DIABETES MELLITUS WITHOUT COMPLICATION, WITHOUT LONG-TERM CURRENT USE OF INSULIN (HCC): Primary | ICD-10-CM

## 2020-06-17 LAB
ALBUMIN SERPL BCP-MCNC: 3.6 G/DL (ref 3.5–5)
ALP SERPL-CCNC: 71 U/L (ref 46–116)
ALT SERPL W P-5'-P-CCNC: 27 U/L (ref 12–78)
ANION GAP SERPL CALCULATED.3IONS-SCNC: 5 MMOL/L (ref 4–13)
AST SERPL W P-5'-P-CCNC: 16 U/L (ref 5–45)
BASOPHILS # BLD AUTO: 0.02 THOUSANDS/ΜL (ref 0–0.1)
BASOPHILS NFR BLD AUTO: 0 % (ref 0–1)
BILIRUB SERPL-MCNC: 0.54 MG/DL (ref 0.2–1)
BUN SERPL-MCNC: 15 MG/DL (ref 5–25)
CALCIUM SERPL-MCNC: 9.1 MG/DL (ref 8.3–10.1)
CHLORIDE SERPL-SCNC: 104 MMOL/L (ref 100–108)
CHOLEST SERPL-MCNC: 164 MG/DL (ref 50–200)
CO2 SERPL-SCNC: 29 MMOL/L (ref 21–32)
CREAT SERPL-MCNC: 0.9 MG/DL (ref 0.6–1.3)
CREAT UR-MCNC: 157 MG/DL
EOSINOPHIL # BLD AUTO: 0.04 THOUSAND/ΜL (ref 0–0.61)
EOSINOPHIL NFR BLD AUTO: 1 % (ref 0–6)
ERYTHROCYTE [DISTWIDTH] IN BLOOD BY AUTOMATED COUNT: 12.2 % (ref 11.6–15.1)
EST. AVERAGE GLUCOSE BLD GHB EST-MCNC: 151 MG/DL
GFR SERPL CREATININE-BSD FRML MDRD: 62 ML/MIN/1.73SQ M
GLUCOSE P FAST SERPL-MCNC: 126 MG/DL (ref 65–99)
HBA1C MFR BLD: 6.9 %
HCT VFR BLD AUTO: 43.1 % (ref 34.8–46.1)
HDLC SERPL-MCNC: 48 MG/DL
HGB BLD-MCNC: 14.1 G/DL (ref 11.5–15.4)
IMM GRANULOCYTES # BLD AUTO: 0.01 THOUSAND/UL (ref 0–0.2)
IMM GRANULOCYTES NFR BLD AUTO: 0 % (ref 0–2)
LDLC SERPL CALC-MCNC: 74 MG/DL (ref 0–100)
LYMPHOCYTES # BLD AUTO: 2.21 THOUSANDS/ΜL (ref 0.6–4.47)
LYMPHOCYTES NFR BLD AUTO: 45 % (ref 14–44)
MCH RBC QN AUTO: 31.6 PG (ref 26.8–34.3)
MCHC RBC AUTO-ENTMCNC: 32.7 G/DL (ref 31.4–37.4)
MCV RBC AUTO: 97 FL (ref 82–98)
MICROALBUMIN UR-MCNC: 47 MG/L (ref 0–20)
MICROALBUMIN/CREAT 24H UR: 30 MG/G CREATININE (ref 0–30)
MONOCYTES # BLD AUTO: 0.46 THOUSAND/ΜL (ref 0.17–1.22)
MONOCYTES NFR BLD AUTO: 10 % (ref 4–12)
NEUTROPHILS # BLD AUTO: 2.11 THOUSANDS/ΜL (ref 1.85–7.62)
NEUTS SEG NFR BLD AUTO: 44 % (ref 43–75)
NONHDLC SERPL-MCNC: 116 MG/DL
NRBC BLD AUTO-RTO: 0 /100 WBCS
PLATELET # BLD AUTO: 255 THOUSANDS/UL (ref 149–390)
PMV BLD AUTO: 9.9 FL (ref 8.9–12.7)
POTASSIUM SERPL-SCNC: 3.9 MMOL/L (ref 3.5–5.3)
PROT SERPL-MCNC: 7.2 G/DL (ref 6.4–8.2)
RBC # BLD AUTO: 4.46 MILLION/UL (ref 3.81–5.12)
SODIUM SERPL-SCNC: 138 MMOL/L (ref 136–145)
TRIGL SERPL-MCNC: 208 MG/DL
TSH SERPL DL<=0.05 MIU/L-ACNC: 3.31 UIU/ML (ref 0.36–3.74)
WBC # BLD AUTO: 4.85 THOUSAND/UL (ref 4.31–10.16)

## 2020-06-17 PROCEDURE — 83036 HEMOGLOBIN GLYCOSYLATED A1C: CPT

## 2020-06-17 PROCEDURE — 36415 COLL VENOUS BLD VENIPUNCTURE: CPT

## 2020-06-17 PROCEDURE — 84443 ASSAY THYROID STIM HORMONE: CPT

## 2020-06-17 PROCEDURE — 80061 LIPID PANEL: CPT

## 2020-06-17 PROCEDURE — 80053 COMPREHEN METABOLIC PANEL: CPT

## 2020-06-17 PROCEDURE — 82043 UR ALBUMIN QUANTITATIVE: CPT | Performed by: INTERNAL MEDICINE

## 2020-06-17 PROCEDURE — 85025 COMPLETE CBC W/AUTO DIFF WBC: CPT

## 2020-06-17 PROCEDURE — 82570 ASSAY OF URINE CREATININE: CPT | Performed by: INTERNAL MEDICINE

## 2020-08-19 ENCOUNTER — APPOINTMENT (OUTPATIENT)
Dept: RADIOLOGY | Facility: OTHER | Age: 80
End: 2020-08-19
Payer: COMMERCIAL

## 2020-08-19 ENCOUNTER — OFFICE VISIT (OUTPATIENT)
Dept: OBGYN CLINIC | Facility: OTHER | Age: 80
End: 2020-08-19
Payer: COMMERCIAL

## 2020-08-19 VITALS
HEART RATE: 88 BPM | WEIGHT: 206 LBS | BODY MASS INDEX: 35.17 KG/M2 | SYSTOLIC BLOOD PRESSURE: 148 MMHG | HEIGHT: 64 IN | DIASTOLIC BLOOD PRESSURE: 81 MMHG

## 2020-08-19 DIAGNOSIS — M17.12 PRIMARY OSTEOARTHRITIS OF LEFT KNEE: ICD-10-CM

## 2020-08-19 DIAGNOSIS — M25.562 LEFT KNEE PAIN, UNSPECIFIED CHRONICITY: ICD-10-CM

## 2020-08-19 DIAGNOSIS — Z01.89 ENCOUNTER FOR LOWER EXTREMITY COMPARISON IMAGING STUDY: Primary | ICD-10-CM

## 2020-08-19 DIAGNOSIS — Z01.89 ENCOUNTER FOR LOWER EXTREMITY COMPARISON IMAGING STUDY: ICD-10-CM

## 2020-08-19 PROCEDURE — 73564 X-RAY EXAM KNEE 4 OR MORE: CPT

## 2020-08-19 PROCEDURE — 3079F DIAST BP 80-89 MM HG: CPT | Performed by: ORTHOPAEDIC SURGERY

## 2020-08-19 PROCEDURE — 99213 OFFICE O/P EST LOW 20 MIN: CPT | Performed by: ORTHOPAEDIC SURGERY

## 2020-08-19 PROCEDURE — 73562 X-RAY EXAM OF KNEE 3: CPT

## 2020-08-19 PROCEDURE — 1036F TOBACCO NON-USER: CPT | Performed by: ORTHOPAEDIC SURGERY

## 2020-08-19 PROCEDURE — 20610 DRAIN/INJ JOINT/BURSA W/O US: CPT | Performed by: ORTHOPAEDIC SURGERY

## 2020-08-19 PROCEDURE — 3077F SYST BP >= 140 MM HG: CPT | Performed by: ORTHOPAEDIC SURGERY

## 2020-08-19 PROCEDURE — 1160F RVW MEDS BY RX/DR IN RCRD: CPT | Performed by: ORTHOPAEDIC SURGERY

## 2020-08-19 RX ORDER — BUPIVACAINE HYDROCHLORIDE 2.5 MG/ML
4 INJECTION, SOLUTION INFILTRATION; PERINEURAL
Status: COMPLETED | OUTPATIENT
Start: 2020-08-19 | End: 2020-08-19

## 2020-08-19 RX ORDER — METHYLPREDNISOLONE ACETATE 40 MG/ML
1 INJECTION, SUSPENSION INTRA-ARTICULAR; INTRALESIONAL; INTRAMUSCULAR; SOFT TISSUE
Status: COMPLETED | OUTPATIENT
Start: 2020-08-19 | End: 2020-08-19

## 2020-08-19 RX ADMIN — METHYLPREDNISOLONE ACETATE 1 ML: 40 INJECTION, SUSPENSION INTRA-ARTICULAR; INTRALESIONAL; INTRAMUSCULAR; SOFT TISSUE at 12:58

## 2020-08-19 RX ADMIN — BUPIVACAINE HYDROCHLORIDE 4 ML: 2.5 INJECTION, SOLUTION INFILTRATION; PERINEURAL at 12:58

## 2020-08-19 NOTE — PROGRESS NOTES
Orthopaedic Surgery - Office Note  Steff Parry (71 y o  female)   : 1940   MRN: [id number]  Encounter Date: 2020    Chief Complaint   Patient presents with    Left Knee - Pain       Assessment / Plan  Left knee osteoarthritis    · CSI of the left knee joint was provided today, this was tolerated well  · Patient was advised on beginning her hip thigh strengthening  Patient states she has handout from her previous total hip replacement  · Activities as tolerated  Return if symptoms worsen or fail to improve  History of Present Illness  Steff Parry is a 70 y o  female who presents for evaluation of her left knee  She states that she has been evaluated in the past and was told she has had osteoarthritis  She has had cortisone injection previously that improved her pain for months  She notes that she recently had a significant flare of her pain  She denies any obvious trauma  She even needs to use a walker in her home which is not typical for her  Any weightbearing causes significant pain  She localizes the pain medially and posteriorly  She denies numbness or tinging  She has history of right LANCE by Dr Jenn Baca  Review of Systems  Pertinent items are noted in HPI  All other systems were reviewed and are negative  Physical Exam  /81   Pulse 88   Ht 5' 3 5" (1 613 m)   Wt 93 4 kg (206 lb)   BMI 35 92 kg/m²   Cons: Appears well  No apparent distress  Psych: Alert  Oriented x3  Mood and affect normal   Eyes: PERRLA, EOMI  Resp: Normal effort  No audible wheezing or stridor  CV: Palpable pulse  No discernable arrhythmia  No LE edema  Lymph:  No palpable cervical, axillary, or inguinal lymphadenopathy  Skin: Warm  No palpable masses  No visible lesions  Neuro: Normal muscle tone  Normal and symmetric DTR's  Left Knee Exam  Alignment:  Normal knee alignment  Inspection:  No swelling  No ecchymosis  Palpation:  trace effusion   medial and lateral joint line tenderness  Popliteal tenderness  ROM:  Knee Extension 0  Knee Flexion 115 limited by pain  Strength:  5/5 quadriceps and hamstrings  Stability:  No objective knee instability  Stable Varus / Valgus stress, Lachman, and Posterior drawer  Tests:  (+) Matias  Patella:  Patella tracks centrally with crepitus  Neurovascular:  Sensation intact in DP/SP/Marcus/Sa/T nerve distributions  2+ DP & PT pulses  Brisk capillary refill in all toes  Toes warm and perfused  Gait:  Steady  Studies Reviewed  I have personally reviewed pertinent films in PACS and my interpretation is left knee xray 8/19/2020: Moderate tricompartmental osteoarthritis, more severe in patellofemoral compartment  Large joint arthrocentesis: L knee  Date/Time: 8/19/2020 12:58 PM  Consent given by: patient  Site marked: site marked  Timeout: Immediately prior to procedure a time out was called to verify the correct patient, procedure, equipment, support staff and site/side marked as required   Supporting Documentation  Indications: pain   Procedure Details  Location: knee - L knee  Preparation: Patient was prepped and draped in the usual sterile fashion  Ultrasound guidance: no  Approach: lateral  Medications administered: 4 mL bupivacaine 0 25 %; 1 mL methylPREDNISolone acetate 40 mg/mL    Patient tolerance: patient tolerated the procedure well with no immediate complications  Dressing:  Sterile dressing applied             Medical, Surgical, Family, and Social History  The patient's medical history, family history, and social history, were reviewed and updated as appropriate      Past Medical History:   Diagnosis Date    Arthritis     At risk for falls     Disease of thyroid gland     hypo    Edema of both legs     History of pulmonary embolus (PE)     Hyperlipidemia     Hypertension     Irritable bowel syndrome     Prediabetes     Uses roller walker     Wears glasses     Wears partial dentures     upper partial       Past Surgical History:   Procedure Laterality Date    CHOLECYSTECTOMY      COLONOSCOPY      HYSTERECTOMY      [place] TOTAL HIP ARTHROPLASTY Right 10/22/2019    Procedure: ARTHROPLASTY HIP TOTAL;  Surgeon: Rose Cavazos DO;  Location: [facility];  Service: Orthopedics       Family History   Problem Relation Age of Onset    Hypertension Family        Social History     Occupational History    Not on file   Tobacco Use    Smoking status: Never Smoker    Smokeless tobacco: Never Used   Substance and Sexual Activity    Alcohol use: Yes     Frequency: Monthly or less    Drug use: Not Currently    Sexual activity: Not on file       Allergies   Allergen Reactions    Oxycodone Vomiting         Current Outpatient Medications:     acetaminophen (TYLENOL) 325 mg tablet, Take 650 mg by mouth every 6 (six) hours as needed for mild pain, Disp: , Rfl:     amLODIPine (NORVASC) 5 mg tablet, Take 5 mg by mouth daily , Disp: , Rfl:     atorvastatin (LIPITOR) 10 mg tablet, Take 10 mg by mouth daily , Disp: , Rfl:     ELIQUIS 5 MG, Take 5 mg by mouth 2 (two) times a day , Disp: , Rfl:     Multiple Vitamin (MULTIVITAMIN) tablet, Take 1 tablet by mouth daily, Disp: , Rfl:     Omega-3 Fatty Acids (FISH OIL PO), Take 1 capsule by mouth daily, Disp: , Rfl:     SYNTHROID 25 MCG tablet, Take 25 mcg by mouth daily , Disp: , Rfl:     valsartan-hydrochlorothiazide (DIOVAN-HCT) 160-12 5 MG per tablet, Take 1 tablet by mouth daily , Disp: , Rfl:     ascorbic acid (VITAMIN C) 500 mg tablet, Take 1 tablet (500 mg total) by mouth daily Start to take 30 days before surgery, Disp: 30 tablet, Rfl: 1    docusate sodium (COLACE) 100 mg capsule, Take 1 capsule (100 mg total) by mouth 2 (two) times a day, Disp: 20 capsule, Rfl: 0    ferrous sulfate 324 (65 Fe) mg, Take 1 tablet (324 mg total) by mouth daily before breakfast Start to take 30 days before surgery, Disp: 30 tablet, Rfl: 1    folic acid (FOLVITE) 1 mg tablet, Take 1 tablet (1 mg total) by mouth daily Start to take 30 days before surgery, Disp: 30 tablet, Rfl: [id number] [facility],[address], [place]    Scribe Attestation    I,:   Melanie Petit MA am acting as a scribe while in the presence of the attending physician :        I,:   Dagoberto Davis MD personally performed the services described in this documentation    as scribed in my presence :

## 2020-09-06 DIAGNOSIS — I10 ESSENTIAL HYPERTENSION, BENIGN: Primary | ICD-10-CM

## 2020-09-06 DIAGNOSIS — E03.9 HYPOTHYROIDISM (ACQUIRED): ICD-10-CM

## 2020-09-08 RX ORDER — LEVOTHYROXINE SODIUM 0.03 MG/1
TABLET ORAL
Qty: 90 TABLET | Refills: 1 | Status: SHIPPED | OUTPATIENT
Start: 2020-09-08 | End: 2021-03-05

## 2020-09-08 RX ORDER — VALSARTAN AND HYDROCHLOROTHIAZIDE 160; 12.5 MG/1; MG/1
TABLET, FILM COATED ORAL
Qty: 90 TABLET | Refills: 1 | Status: SHIPPED | OUTPATIENT
Start: 2020-09-08 | End: 2021-03-05

## 2020-09-15 ENCOUNTER — TELEPHONE (OUTPATIENT)
Dept: INTERNAL MEDICINE CLINIC | Facility: CLINIC | Age: 80
End: 2020-09-15

## 2020-09-15 DIAGNOSIS — Z29.8 NEED FOR SBE (SUBACUTE BACTERIAL ENDOCARDITIS) PROPHYLAXIS: Primary | ICD-10-CM

## 2020-09-15 RX ORDER — AMOXICILLIN 500 MG/1
2000 TABLET, FILM COATED ORAL
Qty: 4 TABLET | Refills: 0 | Status: SHIPPED | OUTPATIENT
Start: 2020-09-15 | End: 2020-09-16

## 2020-09-15 NOTE — TELEPHONE ENCOUNTER
Uses [facility] in [place]   Has a dental appointment next week and will be needing an antibiotic due to having a hip replacement      Please advise

## 2020-09-21 ENCOUNTER — CLINICAL SUPPORT (OUTPATIENT)
Dept: INTERNAL MEDICINE CLINIC | Facility: CLINIC | Age: 80
End: 2020-09-21
Payer: COMMERCIAL

## 2020-09-21 DIAGNOSIS — Z23 NEED FOR INFLUENZA VACCINATION: Primary | ICD-10-CM

## 2020-09-21 PROCEDURE — 90653 IIV ADJUVANT VACCINE IM: CPT | Performed by: INTERNAL MEDICINE

## 2020-09-29 ENCOUNTER — APPOINTMENT (OUTPATIENT)
Dept: RADIOLOGY | Facility: MEDICAL CENTER | Age: 80
End: 2020-09-29
Payer: COMMERCIAL

## 2020-09-29 ENCOUNTER — OFFICE VISIT (OUTPATIENT)
Dept: OBGYN CLINIC | Facility: MEDICAL CENTER | Age: 80
End: 2020-09-29
Payer: COMMERCIAL

## 2020-09-29 VITALS
WEIGHT: 207 LBS | DIASTOLIC BLOOD PRESSURE: 88 MMHG | BODY MASS INDEX: 36.68 KG/M2 | HEART RATE: 81 BPM | SYSTOLIC BLOOD PRESSURE: 156 MMHG | HEIGHT: 63 IN

## 2020-09-29 DIAGNOSIS — M25.551 PAIN IN RIGHT HIP: ICD-10-CM

## 2020-09-29 DIAGNOSIS — Z47.1 AFTERCARE FOLLOWING RIGHT HIP JOINT REPLACEMENT SURGERY: ICD-10-CM

## 2020-09-29 DIAGNOSIS — Z96.641 AFTERCARE FOLLOWING RIGHT HIP JOINT REPLACEMENT SURGERY: ICD-10-CM

## 2020-09-29 DIAGNOSIS — Z96.641 STATUS POST TOTAL REPLACEMENT OF RIGHT HIP: Primary | ICD-10-CM

## 2020-09-29 PROCEDURE — 1036F TOBACCO NON-USER: CPT | Performed by: PHYSICIAN ASSISTANT

## 2020-09-29 PROCEDURE — 99213 OFFICE O/P EST LOW 20 MIN: CPT | Performed by: PHYSICIAN ASSISTANT

## 2020-09-29 PROCEDURE — 3079F DIAST BP 80-89 MM HG: CPT | Performed by: PHYSICIAN ASSISTANT

## 2020-09-29 PROCEDURE — 3077F SYST BP >= 140 MM HG: CPT | Performed by: PHYSICIAN ASSISTANT

## 2020-09-29 PROCEDURE — 73502 X-RAY EXAM HIP UNI 2-3 VIEWS: CPT

## 2020-09-29 PROCEDURE — 1160F RVW MEDS BY RX/DR IN RCRD: CPT | Performed by: PHYSICIAN ASSISTANT

## 2020-09-29 NOTE — PROGRESS NOTES
Assessment/Plan     1  Status post total replacement of right hip    2  Aftercare following right hip joint replacement surgery      Orders Placed This Encounter   Procedures    XR hip/pelv 2-3 vws right if performed     · X-rays right hip reviewed demonstrating s/p LANCE with hardware intact and well aligned  · Patient doing well at this time and pleased with LANCE  · Continue home exercises  · She will call for antibiotics prior to dental appts  · Patient should follow up in 1 year for repeat x-rays or sooner if any issues arise  Return in about 1 year (around 9/29/2021) for right LANCE  I answered all of the patient's questions during the visit and provided education of the patient's condition during the visit  The patient verbalized understanding of the information given and agrees with the plan  This note was dictated using Ide[employer] software  It may contain errors including improperly dictated words  Please contact physician directly for any questions  Subjective   Chief Complaint:   Chief Complaint   Patient presents with    Right Hip - Follow-up       HPI:  Milagros Higginbotham is a 70 y o  female who presents for follow up for right LANCE on 10/22/2019  Patient is doing well  She is pleased with her LANCE though she does note that it took her 9 months or so to feel good  She eventually did work with PT for her hip and noted significant improvement  She does note some soreness when she tries to sleep on her right side  She has swelling in bilateral lower extremities since surgery for which she is on a diuretic  Denies distal numbness or tingling  Review of Systems  See HPI for musculoskeletal review     All other systems reviewed are negative     History:  Past Medical History:   Diagnosis Date    Arthritis     At risk for falls     Disease of thyroid gland     hypo    Edema of both legs     History of pulmonary embolus (PE)     Hyperlipidemia     Hypertension     Irritable bowel syndrome     Prediabetes     Uses roller walker     Wears glasses     Wears partial dentures     upper partial     Past Surgical History:   Procedure Laterality Date    CHOLECYSTECTOMY      COLONOSCOPY      HYSTERECTOMY      [place] TOTAL HIP ARTHROPLASTY Right 10/22/2019    Procedure: ARTHROPLASTY HIP TOTAL;  Surgeon: Carl Monroe DO;  Location: [facility];  Service: Orthopedics     Social History   Social History     Substance and Sexual Activity   Alcohol Use Yes    Frequency: Monthly or less     Social History     Substance and Sexual Activity   Drug Use Not Currently     Social History     Tobacco Use   Smoking Status Never Smoker   Smokeless Tobacco Never Used     Family History:   Family History   Problem Relation Age of Onset    Hypertension Family        Current Outpatient Medications on File Prior to Visit   Medication Sig Dispense Refill    acetaminophen (TYLENOL) 325 mg tablet Take 650 mg by mouth every 6 (six) hours as needed for mild pain      amLODIPine (NORVASC) 5 mg tablet Take 5 mg by mouth daily       atorvastatin (LIPITOR) 10 mg tablet Take 10 mg by mouth daily       ELIQUIS 5 MG Take 5 mg by mouth 2 (two) times a day       levothyroxine 25 mcg tablet TAKE 1 TABLET ONCE DAILY 90 tablet 1    Multiple Vitamin (MULTIVITAMIN) tablet Take 1 tablet by mouth daily      Omega-3 Fatty Acids (FISH OIL PO) Take 1 capsule by mouth daily      valsartan-hydrochlorothiazide (DIOVAN-HCT) 160-12 5 MG per tablet TAKE 1 TABLET ONCE DAILY IN THE MORNING 90 tablet 1    ascorbic acid (VITAMIN C) 500 mg tablet Take 1 tablet (500 mg total) by mouth daily Start to take 30 days before surgery 30 tablet 1    docusate sodium (COLACE) 100 mg capsule Take 1 capsule (100 mg total) by mouth 2 (two) times a day 20 capsule 0    ferrous sulfate 324 (65 Fe) mg Take 1 tablet (324 mg total) by mouth daily before breakfast Start to take 30 days before surgery 30 tablet 1    folic acid (FOLVITE) 1 mg tablet Take 1 tablet (1 mg total) by mouth daily Start to take 30 days before surgery 30 tablet 1     No current facility-administered medications on file prior to visit        Allergies   Allergen Reactions    Oxycodone Vomiting        Objective     /88   Pulse 81   Ht 5' 3" (1 6 m)   Wt 93 9 kg (207 lb)   BMI 36 67 kg/m²      PE:  AAOx 3  WDWN  Hearing intact, no drainage from eyes  no audible wheezing  no abdominal distension  LE compartments soft, skin intact    right hip:   ROM: 0- 120  Ext rot- 25  Int rot- 50  No TTP over greater trochanter  No TTP over SIJ    AT/GS intact   Sensation intact L4-S1  1+ pitting edema in bilateral LE    Imaging Studies: I have personally reviewed pertinent films in PACS  X-ray right hip: s/p LANCE hardware intact and well aligned

## 2020-10-01 DIAGNOSIS — I10 ESSENTIAL HYPERTENSION, BENIGN: Primary | ICD-10-CM

## 2020-10-01 RX ORDER — AMLODIPINE BESYLATE 5 MG/1
TABLET ORAL
Qty: 90 TABLET | Refills: 1 | Status: SHIPPED | OUTPATIENT
Start: 2020-10-01 | End: 2021-03-30

## 2020-12-01 ENCOUNTER — LAB (OUTPATIENT)
Dept: LAB | Age: 80
End: 2020-12-01
Payer: COMMERCIAL

## 2020-12-01 ENCOUNTER — TRANSCRIBE ORDERS (OUTPATIENT)
Dept: ADMINISTRATIVE | Age: 80
End: 2020-12-01

## 2020-12-01 DIAGNOSIS — E13.69 OTHER SPECIFIED DIABETES MELLITUS WITH OTHER SPECIFIED COMPLICATION, UNSPECIFIED WHETHER LONG TERM INSULIN USE (HCC): Primary | ICD-10-CM

## 2020-12-01 DIAGNOSIS — E13.69 OTHER SPECIFIED DIABETES MELLITUS WITH OTHER SPECIFIED COMPLICATION, UNSPECIFIED WHETHER LONG TERM INSULIN USE (HCC): ICD-10-CM

## 2020-12-01 LAB
ALBUMIN SERPL BCP-MCNC: 3.8 G/DL (ref 3.5–5)
ALP SERPL-CCNC: 75 U/L (ref 46–116)
ALT SERPL W P-5'-P-CCNC: 27 U/L (ref 12–78)
ANION GAP SERPL CALCULATED.3IONS-SCNC: 6 MMOL/L (ref 4–13)
AST SERPL W P-5'-P-CCNC: 14 U/L (ref 5–45)
BASOPHILS # BLD AUTO: 0.02 THOUSANDS/ΜL (ref 0–0.1)
BASOPHILS NFR BLD AUTO: 0 % (ref 0–1)
BILIRUB SERPL-MCNC: 0.43 MG/DL (ref 0.2–1)
BUN SERPL-MCNC: 14 MG/DL (ref 5–25)
CALCIUM SERPL-MCNC: 9.5 MG/DL (ref 8.3–10.1)
CHLORIDE SERPL-SCNC: 103 MMOL/L (ref 100–108)
CHOLEST SERPL-MCNC: 143 MG/DL (ref 50–200)
CO2 SERPL-SCNC: 29 MMOL/L (ref 21–32)
CREAT SERPL-MCNC: 0.96 MG/DL (ref 0.6–1.3)
CREAT UR-MCNC: 173 MG/DL
EOSINOPHIL # BLD AUTO: 0.05 THOUSAND/ΜL (ref 0–0.61)
EOSINOPHIL NFR BLD AUTO: 1 % (ref 0–6)
ERYTHROCYTE [DISTWIDTH] IN BLOOD BY AUTOMATED COUNT: 12.1 % (ref 11.6–15.1)
EST. AVERAGE GLUCOSE BLD GHB EST-MCNC: 146 MG/DL
GFR SERPL CREATININE-BSD FRML MDRD: 57 ML/MIN/1.73SQ M
GLUCOSE P FAST SERPL-MCNC: 132 MG/DL (ref 65–99)
HBA1C MFR BLD: 6.7 %
HCT VFR BLD AUTO: 42.3 % (ref 34.8–46.1)
HDLC SERPL-MCNC: 51 MG/DL
HGB BLD-MCNC: 13.8 G/DL (ref 11.5–15.4)
IMM GRANULOCYTES # BLD AUTO: 0.02 THOUSAND/UL (ref 0–0.2)
IMM GRANULOCYTES NFR BLD AUTO: 0 % (ref 0–2)
LDLC SERPL CALC-MCNC: 69 MG/DL (ref 0–100)
LYMPHOCYTES # BLD AUTO: 2.38 THOUSANDS/ΜL (ref 0.6–4.47)
LYMPHOCYTES NFR BLD AUTO: 43 % (ref 14–44)
MCH RBC QN AUTO: 31.6 PG (ref 26.8–34.3)
MCHC RBC AUTO-ENTMCNC: 32.6 G/DL (ref 31.4–37.4)
MCV RBC AUTO: 97 FL (ref 82–98)
MICROALBUMIN UR-MCNC: 49.5 MG/L (ref 0–20)
MICROALBUMIN/CREAT 24H UR: 29 MG/G CREATININE (ref 0–30)
MONOCYTES # BLD AUTO: 0.5 THOUSAND/ΜL (ref 0.17–1.22)
MONOCYTES NFR BLD AUTO: 9 % (ref 4–12)
NEUTROPHILS # BLD AUTO: 2.51 THOUSANDS/ΜL (ref 1.85–7.62)
NEUTS SEG NFR BLD AUTO: 47 % (ref 43–75)
NONHDLC SERPL-MCNC: 92 MG/DL
NRBC BLD AUTO-RTO: 0 /100 WBCS
PLATELET # BLD AUTO: 315 THOUSANDS/UL (ref 149–390)
PMV BLD AUTO: 9.6 FL (ref 8.9–12.7)
POTASSIUM SERPL-SCNC: 4.1 MMOL/L (ref 3.5–5.3)
PROT SERPL-MCNC: 7.2 G/DL (ref 6.4–8.2)
RBC # BLD AUTO: 4.37 MILLION/UL (ref 3.81–5.12)
SODIUM SERPL-SCNC: 138 MMOL/L (ref 136–145)
TRIGL SERPL-MCNC: 116 MG/DL
TSH SERPL DL<=0.05 MIU/L-ACNC: 4.65 UIU/ML (ref 0.36–3.74)
WBC # BLD AUTO: 5.48 THOUSAND/UL (ref 4.31–10.16)

## 2020-12-01 PROCEDURE — 82570 ASSAY OF URINE CREATININE: CPT | Performed by: INTERNAL MEDICINE

## 2020-12-01 PROCEDURE — 36415 COLL VENOUS BLD VENIPUNCTURE: CPT

## 2020-12-01 PROCEDURE — 80061 LIPID PANEL: CPT

## 2020-12-01 PROCEDURE — 84443 ASSAY THYROID STIM HORMONE: CPT

## 2020-12-01 PROCEDURE — 85025 COMPLETE CBC W/AUTO DIFF WBC: CPT

## 2020-12-01 PROCEDURE — 82043 UR ALBUMIN QUANTITATIVE: CPT | Performed by: INTERNAL MEDICINE

## 2020-12-01 PROCEDURE — 80053 COMPREHEN METABOLIC PANEL: CPT

## 2020-12-01 PROCEDURE — 83036 HEMOGLOBIN GLYCOSYLATED A1C: CPT

## 2020-12-09 ENCOUNTER — OFFICE VISIT (OUTPATIENT)
Dept: INTERNAL MEDICINE CLINIC | Facility: CLINIC | Age: 80
End: 2020-12-09
Payer: COMMERCIAL

## 2020-12-09 VITALS
SYSTOLIC BLOOD PRESSURE: 136 MMHG | OXYGEN SATURATION: 97 % | HEIGHT: 65 IN | WEIGHT: 210.2 LBS | TEMPERATURE: 97.8 F | HEART RATE: 83 BPM | DIASTOLIC BLOOD PRESSURE: 84 MMHG | BODY MASS INDEX: 35.02 KG/M2

## 2020-12-09 DIAGNOSIS — E03.9 ACQUIRED HYPOTHYROIDISM: ICD-10-CM

## 2020-12-09 DIAGNOSIS — I27.82 CHRONIC SADDLE PULMONARY EMBOLISM WITHOUT ACUTE COR PULMONALE (HCC): ICD-10-CM

## 2020-12-09 DIAGNOSIS — I10 ESSENTIAL HYPERTENSION: Primary | ICD-10-CM

## 2020-12-09 DIAGNOSIS — M16.11 PRIMARY OSTEOARTHRITIS OF RIGHT HIP: ICD-10-CM

## 2020-12-09 DIAGNOSIS — I26.92 CHRONIC SADDLE PULMONARY EMBOLISM WITHOUT ACUTE COR PULMONALE (HCC): ICD-10-CM

## 2020-12-09 DIAGNOSIS — E66.01 OBESITY, MORBID (HCC): ICD-10-CM

## 2020-12-09 DIAGNOSIS — K58.0 IRRITABLE BOWEL SYNDROME WITH DIARRHEA: ICD-10-CM

## 2020-12-09 DIAGNOSIS — E11.9 DIABETES MELLITUS WITHOUT COMPLICATION (HCC): ICD-10-CM

## 2020-12-09 DIAGNOSIS — I87.2 VENOUS INSUFFICIENCY OF BOTH LOWER EXTREMITIES: ICD-10-CM

## 2020-12-09 DIAGNOSIS — E78.2 MIXED HYPERLIPIDEMIA: ICD-10-CM

## 2020-12-09 DIAGNOSIS — R60.0 EDEMA OF BOTH LEGS: ICD-10-CM

## 2020-12-09 PROCEDURE — 3079F DIAST BP 80-89 MM HG: CPT | Performed by: INTERNAL MEDICINE

## 2020-12-09 PROCEDURE — 1160F RVW MEDS BY RX/DR IN RCRD: CPT | Performed by: INTERNAL MEDICINE

## 2020-12-09 PROCEDURE — 3725F SCREEN DEPRESSION PERFORMED: CPT | Performed by: INTERNAL MEDICINE

## 2020-12-09 PROCEDURE — 99214 OFFICE O/P EST MOD 30 MIN: CPT | Performed by: INTERNAL MEDICINE

## 2020-12-09 PROCEDURE — 3075F SYST BP GE 130 - 139MM HG: CPT | Performed by: INTERNAL MEDICINE

## 2020-12-09 PROCEDURE — 1036F TOBACCO NON-USER: CPT | Performed by: INTERNAL MEDICINE

## 2020-12-14 DIAGNOSIS — I26.92 CHRONIC SADDLE PULMONARY EMBOLISM WITHOUT ACUTE COR PULMONALE (HCC): Primary | ICD-10-CM

## 2020-12-14 DIAGNOSIS — I27.82 CHRONIC SADDLE PULMONARY EMBOLISM WITHOUT ACUTE COR PULMONALE (HCC): Primary | ICD-10-CM

## 2020-12-15 RX ORDER — APIXABAN 5 MG/1
5 TABLET, FILM COATED ORAL 2 TIMES DAILY
Qty: 180 TABLET | Refills: 0 | Status: SHIPPED | OUTPATIENT
Start: 2020-12-15 | End: 2021-02-11 | Stop reason: SDUPTHER

## 2021-02-11 DIAGNOSIS — I26.92 CHRONIC SADDLE PULMONARY EMBOLISM WITHOUT ACUTE COR PULMONALE (HCC): ICD-10-CM

## 2021-02-11 DIAGNOSIS — I27.82 CHRONIC SADDLE PULMONARY EMBOLISM WITHOUT ACUTE COR PULMONALE (HCC): ICD-10-CM

## 2021-02-11 RX ORDER — APIXABAN 5 MG/1
5 TABLET, FILM COATED ORAL 2 TIMES DAILY
Qty: 180 TABLET | Refills: 0 | Status: SHIPPED | OUTPATIENT
Start: 2021-02-11 | End: 2021-05-05 | Stop reason: SDUPTHER

## 2021-02-12 DIAGNOSIS — Z23 ENCOUNTER FOR IMMUNIZATION: ICD-10-CM

## 2021-03-05 DIAGNOSIS — E03.9 HYPOTHYROIDISM (ACQUIRED): ICD-10-CM

## 2021-03-05 DIAGNOSIS — I10 ESSENTIAL HYPERTENSION, BENIGN: ICD-10-CM

## 2021-03-05 RX ORDER — LEVOTHYROXINE SODIUM 0.03 MG/1
TABLET ORAL
Qty: 90 TABLET | Refills: 3 | Status: SHIPPED | OUTPATIENT
Start: 2021-03-05 | End: 2022-02-28

## 2021-03-05 RX ORDER — VALSARTAN AND HYDROCHLOROTHIAZIDE 160; 12.5 MG/1; MG/1
TABLET, FILM COATED ORAL
Qty: 90 TABLET | Refills: 3 | Status: SHIPPED | OUTPATIENT
Start: 2021-03-05 | End: 2022-02-28

## 2021-03-17 ENCOUNTER — TELEPHONE (OUTPATIENT)
Dept: INTERNAL MEDICINE CLINIC | Facility: CLINIC | Age: 81
End: 2021-03-17

## 2021-03-26 ENCOUNTER — IMMUNIZATIONS (OUTPATIENT)
Dept: FAMILY MEDICINE CLINIC | Facility: HOSPITAL | Age: 81
End: 2021-03-26

## 2021-03-26 DIAGNOSIS — Z23 ENCOUNTER FOR IMMUNIZATION: Primary | ICD-10-CM

## 2021-03-26 PROCEDURE — 0001A SARS-COV-2 / COVID-19 MRNA VACCINE (PFIZER-BIONTECH) 30 MCG: CPT

## 2021-03-26 PROCEDURE — 91300 SARS-COV-2 / COVID-19 MRNA VACCINE (PFIZER-BIONTECH) 30 MCG: CPT

## 2021-03-30 DIAGNOSIS — I10 ESSENTIAL HYPERTENSION, BENIGN: ICD-10-CM

## 2021-03-30 RX ORDER — AMLODIPINE BESYLATE 5 MG/1
TABLET ORAL
Qty: 90 TABLET | Refills: 3 | Status: SHIPPED | OUTPATIENT
Start: 2021-03-30 | End: 2021-05-05

## 2021-04-16 ENCOUNTER — IMMUNIZATIONS (OUTPATIENT)
Dept: FAMILY MEDICINE CLINIC | Facility: HOSPITAL | Age: 81
End: 2021-04-16

## 2021-04-16 ENCOUNTER — RA CDI HCC (OUTPATIENT)
Dept: OTHER | Facility: HOSPITAL | Age: 81
End: 2021-04-16

## 2021-04-16 DIAGNOSIS — Z23 ENCOUNTER FOR IMMUNIZATION: Primary | ICD-10-CM

## 2021-04-16 PROCEDURE — 91300 SARS-COV-2 / COVID-19 MRNA VACCINE (PFIZER-BIONTECH) 30 MCG: CPT

## 2021-04-16 PROCEDURE — 0002A SARS-COV-2 / COVID-19 MRNA VACCINE (PFIZER-BIONTECH) 30 MCG: CPT

## 2021-04-16 NOTE — PROGRESS NOTES
Re: Oscargabby Keron Anthonyangelina    Based on clinical documentation indicated in your record, it appears that the patient may have the following conditions not coded in 2021:    I26 92 Chronic saddle pulmonary embolism without acute cor pulmonale    E66 01 Morbid obesity    E11 9 Type 2 diabetes without complication     If this is correct, please document and assess at your next visit April 23rd    [facility]  coding opportunities             Chart reviewed, (number of) suggestions sent to provider: 3           Patients insurance company: [employer] (Medicare Advantage and Commercial)             [facility]  coding opportunities             Chart reviewed, (number of) suggestions sent to provider: 3           Patients insurance company: [employer] (Medicare Advantage and Commercial)     Visit status: Patient canceled the appointment     Provider never responded to [place] [address]  coding request

## 2021-04-28 ENCOUNTER — RA CDI HCC (OUTPATIENT)
Dept: OTHER | Facility: HOSPITAL | Age: 81
End: 2021-04-28

## 2021-04-28 NOTE — PROGRESS NOTES
Based on clinical documentation indicated in your record, it appears that the patient may have the following conditions not coded in 2021:    I26 92 Chronic saddle pulmonary embolism without acute cor pulmonale    E66 01 Morbid obesity    E11 9 Type 2 diabetes without complication     If this is correct, please document and assess at your next visit May 5th    [facility]  coding opportunities             Chart reviewed, (number of) suggestions sent to provider: 3           Patients insurance company: [employer] (Medicare Advantage and [employer])             [facility] [address]  coding opportunities             Chart reviewed, (number of) suggestions sent to provider: 3           Patients insurance company: [employer] (Medicare Advantage and [employer])     Visit status: Patient arrived for their scheduled appointment     Provider never responded to [facility]  coding request

## 2021-05-05 ENCOUNTER — OFFICE VISIT (OUTPATIENT)
Dept: INTERNAL MEDICINE CLINIC | Facility: CLINIC | Age: 81
End: 2021-05-05
Payer: COMMERCIAL

## 2021-05-05 VITALS
OXYGEN SATURATION: 98 % | DIASTOLIC BLOOD PRESSURE: 82 MMHG | BODY MASS INDEX: 35.49 KG/M2 | WEIGHT: 213 LBS | HEART RATE: 82 BPM | SYSTOLIC BLOOD PRESSURE: 132 MMHG | HEIGHT: 65 IN | TEMPERATURE: 98.2 F

## 2021-05-05 DIAGNOSIS — I10 ESSENTIAL HYPERTENSION, BENIGN: ICD-10-CM

## 2021-05-05 DIAGNOSIS — I26.92 CHRONIC SADDLE PULMONARY EMBOLISM WITHOUT ACUTE COR PULMONALE (HCC): Primary | ICD-10-CM

## 2021-05-05 DIAGNOSIS — I87.2 VENOUS INSUFFICIENCY OF BOTH LOWER EXTREMITIES: ICD-10-CM

## 2021-05-05 DIAGNOSIS — E11.9 DIABETES MELLITUS WITHOUT COMPLICATION (HCC): ICD-10-CM

## 2021-05-05 DIAGNOSIS — E03.9 ACQUIRED HYPOTHYROIDISM: ICD-10-CM

## 2021-05-05 DIAGNOSIS — I27.82 CHRONIC SADDLE PULMONARY EMBOLISM WITHOUT ACUTE COR PULMONALE (HCC): Primary | ICD-10-CM

## 2021-05-05 DIAGNOSIS — E78.2 MIXED HYPERLIPIDEMIA: ICD-10-CM

## 2021-05-05 DIAGNOSIS — E66.01 OBESITY, MORBID (HCC): ICD-10-CM

## 2021-05-05 DIAGNOSIS — M16.11 PRIMARY OSTEOARTHRITIS OF RIGHT HIP: ICD-10-CM

## 2021-05-05 DIAGNOSIS — K58.0 IRRITABLE BOWEL SYNDROME WITH DIARRHEA: ICD-10-CM

## 2021-05-05 PROCEDURE — 1160F RVW MEDS BY RX/DR IN RCRD: CPT | Performed by: INTERNAL MEDICINE

## 2021-05-05 PROCEDURE — 99214 OFFICE O/P EST MOD 30 MIN: CPT | Performed by: INTERNAL MEDICINE

## 2021-05-05 PROCEDURE — 1036F TOBACCO NON-USER: CPT | Performed by: INTERNAL MEDICINE

## 2021-05-05 PROCEDURE — 3288F FALL RISK ASSESSMENT DOCD: CPT | Performed by: INTERNAL MEDICINE

## 2021-05-05 PROCEDURE — 3079F DIAST BP 80-89 MM HG: CPT | Performed by: INTERNAL MEDICINE

## 2021-05-05 PROCEDURE — 3725F SCREEN DEPRESSION PERFORMED: CPT | Performed by: INTERNAL MEDICINE

## 2021-05-05 PROCEDURE — 3075F SYST BP GE 130 - 139MM HG: CPT | Performed by: INTERNAL MEDICINE

## 2021-05-05 PROCEDURE — 1101F PT FALLS ASSESS-DOCD LE1/YR: CPT | Performed by: INTERNAL MEDICINE

## 2021-05-05 RX ORDER — APIXABAN 5 MG/1
5 TABLET, FILM COATED ORAL 2 TIMES DAILY
Qty: 180 TABLET | Refills: 0 | Status: SHIPPED | OUTPATIENT
Start: 2021-05-05 | End: 2021-08-24 | Stop reason: SDUPTHER

## 2021-05-05 NOTE — PROGRESS NOTES
Assessment/Plan:    BMI Counseling: Body mass index is 36 kg/m²  The BMI is above normal  Nutrition recommendations include decreasing portion sizes, encouraging healthy choices of fruits and vegetables and decreasing fast food intake  Exercise recommendations include moderate physical activity 150 minutes/week  1  Chronic 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)  -     Eliquis 5 MG; Take 1 tablet (5 mg total) by mouth 2 (two) times a day    2  Obesity, morbid ([address] )    3  Diabetes mellitus without complication (HCC)  -     CBC and differential; Future  -     Comprehensive metabolic panel; Future  -     Hemoglobin A1C; Future  -     Lipid Panel with Direct LDL reflex; Future  -     Microalbumin / creatinine urine ratio  -     TSH, 3rd generation; Future    4  Essential hypertension, benign    5  Acquired hypothyroidism    6  Venous insufficiency of both lower extremities    7  Mixed hyperlipidemia    8  Irritable bowel syndrome with diarrhea    9  Primary osteoarthritis of right hip           Subjective:      Patient ID: Lindsey Ng is a 68 y o  female  Follow-up on multiple medical problems to ensure they are stable on current medications      The following portions of the patient's history were reviewed and updated as appropriate: She  has a past medical history of Acquired hypothyroidism, 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), Adrenal gland disorder ([address] ), Anemia, Anxiety, Arthritis, At risk for falls, Diabetes mellitus ([address] ), Disease of thyroid gland, DJD (degenerative joint disease) of knee, Edema of both legs, History of pulmonary embolus (PE), Hyperlipidemia, Hypertension, Irritable bowel syndrome, Prediabetes, Presence of right artificial hip joint, Pulmonary embolus ([address] ) (2/27/2018 & 2/14/2019), Uses roller walker, Wears glasses, and Wears partial dentures    She   Patient Active Problem List    Diagnosis Date Noted    Obesity, morbid ([address] ) 05/05/2021    Essential hypertension, benign 05/05/2021    Venous insufficiency of both lower extremities 12/09/2020    Diabetes mellitus without complication ([facility] ) 17/12/0556    Hypertension     Irritable bowel syndrome     Edema of both legs     Acquired hypothyroidism     Diabetes mellitus ([facility] )     History of pulmonary embolus (PE)     Pulmonary embolus (HCC)     Presence of right artificial hip joint     Acute blood loss anemia 10/25/2019    Nausea 10/25/2019    Hypothyroid 10/23/2019    Hyperlipidemia 10/23/2019    Essential hypertension 10/23/2019    History of hypercoagulable state 10/09/2019    Primary osteoarthritis of one hip, right 08/09/2019    Primary osteoarthritis of right hip      She  has a past surgical history that includes Cholecystectomy (1991); Colonoscopy (11/09/2017); pr total hip arthroplasty (Right, 10/22/2019); Hysterectomy (1993); Hemorrhoid surgery (1999); and Trigger finger release (Right, 1998)  Her family history includes Hypertension in her family and mother  She  reports that she has never smoked  She has never used smokeless tobacco  She reports previous alcohol use  She reports previous drug use  Current Outpatient Medications   Medication Sig Dispense Refill    acetaminophen (TYLENOL) 325 mg tablet Take 650 mg by mouth every 6 (six) hours as needed for mild pain      atorvastatin (LIPITOR) 10 mg tablet Take 10 mg by mouth daily       Eliquis 5 MG Take 1 tablet (5 mg total) by mouth 2 (two) times a day 180 tablet 0    levothyroxine 25 mcg tablet TAKE 1 TABLET ONCE DAILY 90 tablet 3    Multiple Vitamin (MULTIVITAMIN) tablet Take 1 tablet by mouth daily      Omega-3 Fatty Acids (FISH OIL PO) Take 1 capsule by mouth daily      valsartan-hydrochlorothiazide (DIOVAN-HCT) 160-12 5 MG per tablet TAKE 1 TABLET ONCE DAILY IN THE MORNING 90 tablet 3     No current facility-administered medications for this visit        Current Outpatient Medications on File Prior to Visit Medication Sig    acetaminophen (TYLENOL) 325 mg tablet Take 650 mg by mouth every 6 (six) hours as needed for mild pain    atorvastatin (LIPITOR) 10 mg tablet Take 10 mg by mouth daily     levothyroxine 25 mcg tablet TAKE 1 TABLET ONCE DAILY    Multiple Vitamin (MULTIVITAMIN) tablet Take 1 tablet by mouth daily    Omega-3 Fatty Acids (FISH OIL PO) Take 1 capsule by mouth daily    valsartan-hydrochlorothiazide (DIOVAN-HCT) 160-12 5 MG per tablet TAKE 1 TABLET ONCE DAILY IN THE MORNING    [DISCONTINUED] amLODIPine (NORVASC) 5 mg tablet TAKE 1 TABLET ONCE DAILY IN THE EVENING    [DISCONTINUED] Eliquis 5 MG Take 1 tablet (5 mg total) by mouth 2 (two) times a day     No current facility-administered medications on file prior to visit  She is allergic to oxycodone       Review of Systems   Constitutional: Negative for chills and fever  HENT: Negative for congestion, ear pain and sore throat  Eyes: Negative for pain  Respiratory: Negative for cough and shortness of breath  Cardiovascular: Negative for chest pain and leg swelling  Gastrointestinal: Negative for abdominal pain, nausea and vomiting  Endocrine: Negative for polyuria  Genitourinary: Negative for difficulty urinating, frequency and urgency  Musculoskeletal: Positive for arthralgias  Negative for back pain  Skin: Negative for rash  Neurological: Negative for weakness and headaches  Psychiatric/Behavioral: Negative for sleep disturbance  The patient is not nervous/anxious  Objective:      /82 (BP Location: Left arm, Patient Position: Sitting, Cuff Size: Standard)   Pulse 82   Temp 98 2 °F (36 8 °C) (Temporal)   Ht 5' 4 5" (1 638 m)   Wt 96 6 kg (213 lb)   SpO2 98%   BMI 36 00 kg/m²     No results found for this or any previous visit (from the past 1344 hour(s))  Physical Exam  Constitutional:       Appearance: Normal appearance  HENT:      Head: Normocephalic        Right Ear: Tympanic membrane, ear canal and external ear normal       Left Ear: Tympanic membrane, ear canal and external ear normal       Nose: Nose normal  No congestion  Mouth/Throat:      Mouth: Mucous membranes are moist       Pharynx: Oropharynx is clear  No oropharyngeal exudate or posterior oropharyngeal erythema  Eyes:      Extraocular Movements: Extraocular movements intact  Conjunctiva/sclera: Conjunctivae normal       Pupils: Pupils are equal, round, and reactive to light  Neck:      Musculoskeletal: Normal range of motion and neck supple  Cardiovascular:      Rate and Rhythm: Normal rate and regular rhythm  Heart sounds: Normal heart sounds  No murmur  Pulmonary:      Effort: Pulmonary effort is normal       Breath sounds: Normal breath sounds  No wheezing or rales  Abdominal:      General: Bowel sounds are normal  There is no distension  Palpations: Abdomen is soft  Tenderness: There is no abdominal tenderness  Musculoskeletal: Normal range of motion  Right lower leg: Edema present  Left lower leg: Edema present  Lymphadenopathy:      Cervical: No cervical adenopathy  Skin:     General: Skin is warm  Neurological:      General: No focal deficit present  Mental Status: She is alert and oriented to person, place, and time

## 2021-06-05 DIAGNOSIS — E78.2 MIXED HYPERLIPIDEMIA: Primary | ICD-10-CM

## 2021-06-05 RX ORDER — ATORVASTATIN CALCIUM 10 MG/1
TABLET, FILM COATED ORAL
Qty: 90 TABLET | Refills: 1 | Status: SHIPPED | OUTPATIENT
Start: 2021-06-05 | End: 2021-12-06

## 2021-06-17 ENCOUNTER — APPOINTMENT (OUTPATIENT)
Dept: LAB | Age: 81
End: 2021-06-17
Payer: COMMERCIAL

## 2021-06-17 DIAGNOSIS — E11.9 DIABETES MELLITUS WITHOUT COMPLICATION (HCC): ICD-10-CM

## 2021-06-17 LAB
ALBUMIN SERPL BCP-MCNC: 3.8 G/DL (ref 3.5–5)
ALP SERPL-CCNC: 56 U/L (ref 46–116)
ALT SERPL W P-5'-P-CCNC: 29 U/L (ref 12–78)
ANION GAP SERPL CALCULATED.3IONS-SCNC: 6 MMOL/L (ref 4–13)
AST SERPL W P-5'-P-CCNC: 18 U/L (ref 5–45)
BASOPHILS # BLD AUTO: 0.02 THOUSANDS/ΜL (ref 0–0.1)
BASOPHILS NFR BLD AUTO: 0 % (ref 0–1)
BILIRUB SERPL-MCNC: 0.66 MG/DL (ref 0.2–1)
BUN SERPL-MCNC: 13 MG/DL (ref 5–25)
CALCIUM SERPL-MCNC: 9.6 MG/DL (ref 8.3–10.1)
CHLORIDE SERPL-SCNC: 101 MMOL/L (ref 100–108)
CHOLEST SERPL-MCNC: 141 MG/DL (ref 50–200)
CO2 SERPL-SCNC: 28 MMOL/L (ref 21–32)
CREAT SERPL-MCNC: 0.86 MG/DL (ref 0.6–1.3)
CREAT UR-MCNC: 83.7 MG/DL
EOSINOPHIL # BLD AUTO: 0.06 THOUSAND/ΜL (ref 0–0.61)
EOSINOPHIL NFR BLD AUTO: 1 % (ref 0–6)
ERYTHROCYTE [DISTWIDTH] IN BLOOD BY AUTOMATED COUNT: 12.2 % (ref 11.6–15.1)
EST. AVERAGE GLUCOSE BLD GHB EST-MCNC: 148 MG/DL
GFR SERPL CREATININE-BSD FRML MDRD: 65 ML/MIN/1.73SQ M
GLUCOSE P FAST SERPL-MCNC: 124 MG/DL (ref 65–99)
HBA1C MFR BLD: 6.8 %
HCT VFR BLD AUTO: 42.8 % (ref 34.8–46.1)
HDLC SERPL-MCNC: 48 MG/DL
HGB BLD-MCNC: 14 G/DL (ref 11.5–15.4)
IMM GRANULOCYTES # BLD AUTO: 0.03 THOUSAND/UL (ref 0–0.2)
IMM GRANULOCYTES NFR BLD AUTO: 0 % (ref 0–2)
LDLC SERPL CALC-MCNC: 66 MG/DL (ref 0–100)
LYMPHOCYTES # BLD AUTO: 2.21 THOUSANDS/ΜL (ref 0.6–4.47)
LYMPHOCYTES NFR BLD AUTO: 30 % (ref 14–44)
MCH RBC QN AUTO: 31 PG (ref 26.8–34.3)
MCHC RBC AUTO-ENTMCNC: 32.7 G/DL (ref 31.4–37.4)
MCV RBC AUTO: 95 FL (ref 82–98)
MICROALBUMIN UR-MCNC: 109 MG/L (ref 0–20)
MICROALBUMIN/CREAT 24H UR: 130 MG/G CREATININE (ref 0–30)
MONOCYTES # BLD AUTO: 0.51 THOUSAND/ΜL (ref 0.17–1.22)
MONOCYTES NFR BLD AUTO: 7 % (ref 4–12)
NEUTROPHILS # BLD AUTO: 4.59 THOUSANDS/ΜL (ref 1.85–7.62)
NEUTS SEG NFR BLD AUTO: 62 % (ref 43–75)
NRBC BLD AUTO-RTO: 0 /100 WBCS
PLATELET # BLD AUTO: 282 THOUSANDS/UL (ref 149–390)
PMV BLD AUTO: 9.9 FL (ref 8.9–12.7)
POTASSIUM SERPL-SCNC: 3.7 MMOL/L (ref 3.5–5.3)
PROT SERPL-MCNC: 7.1 G/DL (ref 6.4–8.2)
RBC # BLD AUTO: 4.52 MILLION/UL (ref 3.81–5.12)
SODIUM SERPL-SCNC: 135 MMOL/L (ref 136–145)
TRIGL SERPL-MCNC: 136 MG/DL
TSH SERPL DL<=0.05 MIU/L-ACNC: 3.76 UIU/ML (ref 0.36–3.74)
WBC # BLD AUTO: 7.42 THOUSAND/UL (ref 4.31–10.16)

## 2021-06-17 PROCEDURE — 82043 UR ALBUMIN QUANTITATIVE: CPT | Performed by: INTERNAL MEDICINE

## 2021-06-17 PROCEDURE — 83036 HEMOGLOBIN GLYCOSYLATED A1C: CPT

## 2021-06-17 PROCEDURE — 85025 COMPLETE CBC W/AUTO DIFF WBC: CPT

## 2021-06-17 PROCEDURE — 36415 COLL VENOUS BLD VENIPUNCTURE: CPT

## 2021-06-17 PROCEDURE — 82570 ASSAY OF URINE CREATININE: CPT | Performed by: INTERNAL MEDICINE

## 2021-06-17 PROCEDURE — 84443 ASSAY THYROID STIM HORMONE: CPT

## 2021-06-17 PROCEDURE — 80053 COMPREHEN METABOLIC PANEL: CPT

## 2021-06-17 PROCEDURE — 80061 LIPID PANEL: CPT

## 2021-06-29 ENCOUNTER — RA CDI HCC (OUTPATIENT)
Dept: OTHER | Facility: HOSPITAL | Age: 81
End: 2021-06-29

## 2021-06-29 NOTE — PROGRESS NOTES
previous suggestions used    [facility]  coding opportunities          Chart reviewed, no opportunity found: CHART REVIEWED, NO OPPORTUNITY FOUND                     Patients insurance company:  [facility] (Medicare Advantage and Commercial)

## 2021-07-07 ENCOUNTER — OFFICE VISIT (OUTPATIENT)
Dept: INTERNAL MEDICINE CLINIC | Facility: CLINIC | Age: 81
End: 2021-07-07
Payer: COMMERCIAL

## 2021-07-07 VITALS
TEMPERATURE: 97.8 F | WEIGHT: 206 LBS | HEIGHT: 65 IN | BODY MASS INDEX: 34.32 KG/M2 | HEART RATE: 83 BPM | OXYGEN SATURATION: 96 % | DIASTOLIC BLOOD PRESSURE: 82 MMHG | SYSTOLIC BLOOD PRESSURE: 136 MMHG

## 2021-07-07 DIAGNOSIS — E78.2 MIXED HYPERLIPIDEMIA: ICD-10-CM

## 2021-07-07 DIAGNOSIS — I10 ESSENTIAL HYPERTENSION, BENIGN: Primary | ICD-10-CM

## 2021-07-07 DIAGNOSIS — I87.2 VENOUS INSUFFICIENCY OF BOTH LOWER EXTREMITIES: ICD-10-CM

## 2021-07-07 DIAGNOSIS — I26.92 CHRONIC SADDLE PULMONARY EMBOLISM WITHOUT ACUTE COR PULMONALE (HCC): ICD-10-CM

## 2021-07-07 DIAGNOSIS — E03.9 ACQUIRED HYPOTHYROIDISM: ICD-10-CM

## 2021-07-07 DIAGNOSIS — K58.0 IRRITABLE BOWEL SYNDROME WITH DIARRHEA: ICD-10-CM

## 2021-07-07 DIAGNOSIS — Z12.11 SCREEN FOR COLON CANCER: ICD-10-CM

## 2021-07-07 DIAGNOSIS — E11.9 DIABETES MELLITUS WITHOUT COMPLICATION (HCC): ICD-10-CM

## 2021-07-07 DIAGNOSIS — M16.11 PRIMARY OSTEOARTHRITIS OF RIGHT HIP: ICD-10-CM

## 2021-07-07 DIAGNOSIS — R60.0 EDEMA OF BOTH LEGS: ICD-10-CM

## 2021-07-07 DIAGNOSIS — I27.82 CHRONIC SADDLE PULMONARY EMBOLISM WITHOUT ACUTE COR PULMONALE (HCC): ICD-10-CM

## 2021-07-07 PROCEDURE — 1036F TOBACCO NON-USER: CPT | Performed by: INTERNAL MEDICINE

## 2021-07-07 PROCEDURE — 1170F FXNL STATUS ASSESSED: CPT | Performed by: INTERNAL MEDICINE

## 2021-07-07 PROCEDURE — 3079F DIAST BP 80-89 MM HG: CPT | Performed by: INTERNAL MEDICINE

## 2021-07-07 PROCEDURE — 1160F RVW MEDS BY RX/DR IN RCRD: CPT | Performed by: INTERNAL MEDICINE

## 2021-07-07 PROCEDURE — 3075F SYST BP GE 130 - 139MM HG: CPT | Performed by: INTERNAL MEDICINE

## 2021-07-07 PROCEDURE — 99213 OFFICE O/P EST LOW 20 MIN: CPT | Performed by: INTERNAL MEDICINE

## 2021-07-07 PROCEDURE — 1125F AMNT PAIN NOTED PAIN PRSNT: CPT | Performed by: INTERNAL MEDICINE

## 2021-07-07 PROCEDURE — 3725F SCREEN DEPRESSION PERFORMED: CPT | Performed by: INTERNAL MEDICINE

## 2021-07-07 PROCEDURE — 3288F FALL RISK ASSESSMENT DOCD: CPT | Performed by: INTERNAL MEDICINE

## 2021-07-07 NOTE — PROGRESS NOTES
Diabetic Foot Exam    Patient's shoes and socks removed  Right Foot/Ankle   Right Foot Inspection  Skin Exam: skin normal, skin intact and dry skin no warmth, no callus, no erythema, no maceration, no abnormal color, no pre-ulcer, no ulcer and no callus                          Toe Exam: ROM and strength within normal limits  Sensory       Monofilament testing: intact  Vascular  Capillary refills: < 3 seconds  The right DP pulse is 2+  The right PT pulse is 2+  Left Foot/Ankle  Left Foot Inspection  Skin Exam: skin normal, skin intact and dry skinno warmth, no erythema, no maceration, normal color, no pre-ulcer, no ulcer and no callus                         Toe Exam: ROM and strength within normal limits                   Sensory       Monofilament: intact  Vascular  Capillary refills: < 3 seconds  The left DP pulse is 2+  The left PT pulse is 2+  Assign Risk Category:  No deformity present; No loss of protective sensation; No weak pulses       Risk: 0     Assessment and Plan:     Problem List Items Addressed This Visit        Digestive    Irritable bowel syndrome       Endocrine    Acquired hypothyroidism    Diabetes mellitus without complication ([facility] )       Cardiovascular and Mediastinum    Pulmonary embolus (HCC)    Venous insufficiency of both lower extremities    Essential hypertension, benign - Primary       Musculoskeletal and Integument    Primary osteoarthritis of right hip       Other    Hyperlipidemia    Edema of both legs    Screen for colon cancer           Preventive health issues were discussed with patient, and age appropriate screening tests were ordered as noted in patient's After Visit Summary  Personalized health advice and appropriate referrals for health education or preventive services given if needed, as noted in patient's After Visit Summary       History of Present Illness:     Patient presents for Medicare Annual Wellness visit    Patient Care Team:  Bridgette Guerrero MD as PCP - General (Internal Medicine)     Problem List:     Patient Active Problem List   Diagnosis    Primary osteoarthritis of right hip    Primary osteoarthritis of one hip, right    History of hypercoagulable state    Hypothyroid    Hyperlipidemia    Essential hypertension    Acute blood loss anemia    Nausea    Hypertension    Irritable bowel syndrome    Edema of both legs    Acquired hypothyroidism    Diabetes mellitus ([address] )    History of pulmonary embolus (PE)    Pulmonary embolus (HCC)    Presence of right artificial hip joint    Venous insufficiency of both lower extremities    Diabetes mellitus without complication (HCC)    Obesity, morbid ([address] )    Essential hypertension, benign    Screen for colon cancer      Past Medical and Surgical History:     Past Medical History:   Diagnosis Date    Acquired hypothyroidism     Adenoma of left adrenal gland 02/27/2018    Adrenal gland disorder ([address] )     Anemia     Anxiety     Arthritis     At risk for falls     Diabetes mellitus ([address] )     Disease of thyroid gland     hypo    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    Edema of both legs     History of pulmonary embolus (PE)     Hyperlipidemia     Hypertension     Irritable bowel syndrome     Prediabetes     Presence of right artificial hip joint     Pulmonary embolus ([address] ) 2/27/2018 & 2/14/2019    Uses roller walker     Wears glasses     Wears partial dentures     upper partial     Past Surgical History:   Procedure Laterality Date    CHOLECYSTECTOMY  1991    COLONOSCOPY  11/09/2017    HEMORRHOID SURGERY  1999    HYSTERECTOMY  1993    Vaginal    [place] TOTAL HIP ARTHROPLASTY Right 10/22/2019    Procedure: ARTHROPLASTY HIP TOTAL;  Surgeon: Annelise Parnell DO;  Location: [facility];  Service: Orthopedics    TRIGGER FINGER RELEASE Right 1998      Family History:     Family History   Problem Relation Age of Onset    Hypertension Family     Hypertension Mother       Social History:     Social History     Socioeconomic History    Marital status: /Civil Union     Spouse name: None    Number of children: None    Years of education: None    Highest education level: None   Occupational History    Occupation: RN, retired   Tobacco Use    Smoking status: Never Smoker    Smokeless tobacco: Never Used   Vaping Use    Vaping Use: Never used   Substance and Sexual Activity    Alcohol use: Not Currently    Drug use: Not Currently    Sexual activity: None   Other Topics Concern    None   Social History Narrative    None     Social Determinants of Health     Financial Resource Strain:     Difficulty of Paying Living Expenses:    Food Insecurity:     Worried About Running Out of Food in the Last Year:     [address] in the Last Year:    Transportation Needs:     Lack of Transportation (Medical):      Lack of Transportation (Non-Medical):    Physical Activity:     Days of Exercise per Week:     Minutes of Exercise per Session:    Stress:     Feeling of Stress :    Social Connections:     Frequency of Communication with Friends and Family:     Frequency of Social Gatherings with Friends and Family:     Attends Spiritism Services:     Active Member of Clubs or Organizations:     Attends Club or Organization Meetings:     Marital Status:    Intimate Partner Violence:     Fear of Current or Ex-Partner:     Emotionally Abused:     Physically Abused:     Sexually Abused:       Medications and Allergies:     Current Outpatient Medications   Medication Sig Dispense Refill    acetaminophen (TYLENOL) 325 mg tablet Take 650 mg by mouth every 6 (six) hours as needed for mild pain      atorvastatin (LIPITOR) 10 mg tablet TAKE 1 TABLET DAILY AT BEDTIME 90 tablet 1    Eliquis 5 MG Take 1 tablet (5 mg total) by mouth 2 (two) times a day 180 tablet 0    levothyroxine 25 mcg tablet TAKE 1 TABLET ONCE DAILY 90 tablet 3    Multiple Vitamin (MULTIVITAMIN) tablet Take 1 tablet by mouth daily      Omega-3 Fatty Acids (FISH OIL PO) Take 1 capsule by mouth daily      valsartan-hydrochlorothiazide (DIOVAN-HCT) 160-12 5 MG per tablet TAKE 1 TABLET ONCE DAILY IN THE MORNING 90 tablet 3     No current facility-administered medications for this visit  Allergies   Allergen Reactions    Oxycodone Vomiting      Immunizations:     Immunization History   Administered Date(s) Administered    INFLUENZA 09/21/2020    SARS-CoV-2 / COVID-19 mRNA IM (Pfizer-BioNTech) 03/26/2021, 04/16/2021    Td (adult), Unspecified 12/15/2009    Td (adult), adsorbed 12/15/2009    influenza, trivalent, adjuvanted 09/21/2020      Health Maintenance:         Topic Date Due    Hepatitis C Screening  Never done         Topic Date Due    Pneumococcal Vaccine: 65+ Years (1 of 2 - PPSV23) Never done    DTaP,Tdap,and Td Vaccines (1 - Tdap) 11/25/1963    Influenza Vaccine (1) 09/01/2021      Medicare Health Risk Assessment:     /82 (BP Location: Left arm, Patient Position: Sitting, Cuff Size: Standard)   Pulse 83   Temp 97 8 °F (36 6 °C) (Temporal)   Ht 5' 4 5" (1 638 m)   Wt 93 4 kg (206 lb)   SpO2 96%   BMI 34 81 kg/m²      Joby Mar is here for her Subsequent Wellness visit  Health Risk Assessment:   Patient rates overall health as good  Patient feels that their physical health rating is slightly better  Patient is very satisfied with their life  Eyesight was rated as same  Hearing was rated as same  Patient feels that their emotional and mental health rating is same  Patients states they are never, rarely angry  Patient states they are never, rarely unusually tired/fatigued  Pain experienced in the last 7 days has been none  Patient states that she has experienced no weight loss or gain in last 6 months  Fall Risk Screening:    In the past year, patient has experienced: no history of falling in past year      Urinary Incontinence Screening:   Patient has leaked urine accidently in the last six months  Home Safety:  Patient does not have trouble with stairs inside or outside of their home  Patient has working smoke alarms and has working carbon monoxide detector  Home safety hazards include: none  Nutrition:   Current diet is Regular  Medications:   Patient is currently taking over-the-counter supplements  OTC medications include: see medication list  Patient is able to manage medications  Activities of Daily Living (ADLs)/Instrumental Activities of Daily Living (IADLs):   Walk and transfer into and out of bed and chair?: Yes  Dress and groom yourself?: Yes    Bathe or shower yourself?: Yes    Feed yourself? Yes  Do your laundry/housekeeping?: Yes  Manage your money, pay your bills and track your expenses?: Yes  Make your own meals?: Yes    Do your own shopping?: Yes    Previous Hospitalizations:   Any hospitalizations or ED visits within the last 12 months?: No      Advance Care Planning:   Living will: Yes    Durable POA for healthcare: Yes    Advanced directive: Yes      PREVENTIVE SCREENINGS      Cardiovascular Screening:    General: Screening Not Indicated and History Lipid Disorder      Diabetes Screening:     General: Screening Not Indicated and History Diabetes      Cervical Cancer Screening:    General: Screening Not Indicated      Lung Cancer Screening:     General: Screening Not Indicated    Screening, Brief Intervention, and Referral to Treatment (SBIRT)    Screening  Typical number of drinks in a day: 0  Typical number of drinks in a week: 0  Interpretation: Low risk drinking behavior      Single Item Drug Screening:  How often have you used an illegal drug (including marijuana) or a prescription medication for non-medical reasons in the past year? never    Single Item Drug Screen Score: 0  Interpretation: Negative screen for possible drug use disorder      Jeffory Hodgkin, MD

## 2021-07-07 NOTE — PROGRESS NOTES
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)    5  Mixed hyperlipidemia    6  Venous insufficiency of both lower extremities    7  Irritable bowel syndrome with diarrhea    8  Primary osteoarthritis of right hip    9  Diabetes mellitus without complication ([address] )    10  Edema of both legs           Subjective:      Patient ID: Willie Guerrier is a 68 y o  female  Follow-up on blood test done 06/17/2021 test discussed with her      The following portions of the patient's history were reviewed and updated as appropriate: She  has a past medical history of Acquired hypothyroidism, 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), Adrenal gland disorder ([address] ), Anemia, Anxiety, Arthritis, At risk for falls, Diabetes mellitus ([address] ), Disease of thyroid gland, DJD (degenerative joint disease) of knee, Edema of both legs, History of pulmonary embolus (PE), Hyperlipidemia, Hypertension, Irritable bowel syndrome, Prediabetes, Presence of right artificial hip joint, Pulmonary embolus ([address] ) (2/27/2018 & 2/14/2019), Uses roller walker, Wears glasses, and Wears partial dentures    She   Patient Active Problem List    Diagnosis Date Noted    Screen for colon cancer 07/07/2021    Obesity, morbid ([address] ) 05/05/2021    Essential hypertension, benign 05/05/2021    Venous insufficiency of both lower extremities 12/09/2020    Diabetes mellitus without complication ([address] ) 89/76/4312    Hypertension     Irritable bowel syndrome     Edema of both legs     Acquired hypothyroidism     Diabetes mellitus ([address] )     History of pulmonary embolus (PE)     Pulmonary embolus (HCC)     Presence of right artificial hip joint     Acute blood loss anemia 10/25/2019    Nausea 10/25/2019    Hypothyroid 10/23/2019    Hyperlipidemia 10/23/2019    Essential hypertension 10/23/2019    History of hypercoagulable state 10/09/2019    Primary osteoarthritis of one hip, right 08/09/2019    Primary osteoarthritis of right hip      She  has a past surgical history that includes Cholecystectomy (1991); Colonoscopy (11/09/2017); pr total hip arthroplasty (Right, 10/22/2019); Hysterectomy (1993); Hemorrhoid surgery (1999); and Trigger finger release (Right, 1998)  Her family history includes Hypertension in her family and mother  She  reports that she has never smoked  She has never used smokeless tobacco  She reports previous alcohol use  She reports previous drug use  Current Outpatient Medications   Medication Sig Dispense Refill    acetaminophen (TYLENOL) 325 mg tablet Take 650 mg by mouth every 6 (six) hours as needed for mild pain      atorvastatin (LIPITOR) 10 mg tablet TAKE 1 TABLET DAILY AT BEDTIME 90 tablet 1    Eliquis 5 MG Take 1 tablet (5 mg total) by mouth 2 (two) times a day 180 tablet 0    levothyroxine 25 mcg tablet TAKE 1 TABLET ONCE DAILY 90 tablet 3    Multiple Vitamin (MULTIVITAMIN) tablet Take 1 tablet by mouth daily      Omega-3 Fatty Acids (FISH OIL PO) Take 1 capsule by mouth daily      valsartan-hydrochlorothiazide (DIOVAN-HCT) 160-12 5 MG per tablet TAKE 1 TABLET ONCE DAILY IN THE MORNING 90 tablet 3     No current facility-administered medications for this visit       Current Outpatient Medications on File Prior to Visit   Medication Sig    acetaminophen (TYLENOL) 325 mg tablet Take 650 mg by mouth every 6 (six) hours as needed for mild pain    atorvastatin (LIPITOR) 10 mg tablet TAKE 1 TABLET DAILY AT BEDTIME    Eliquis 5 MG Take 1 tablet (5 mg total) by mouth 2 (two) times a day    levothyroxine 25 mcg tablet TAKE 1 TABLET ONCE DAILY    Multiple Vitamin (MULTIVITAMIN) tablet Take 1 tablet by mouth daily    Omega-3 Fatty Acids (FISH OIL PO) Take 1 capsule by mouth daily    valsartan-hydrochlorothiazide (DIOVAN-HCT) 160-12 5 MG per tablet TAKE 1 TABLET ONCE DAILY IN THE MORNING     No current facility-administered medications on file prior to visit  She is allergic to oxycodone       Review of Systems   Constitutional: Negative for chills and fever  HENT: Negative for congestion, ear pain and sore throat  Eyes: Negative for pain  Respiratory: Negative for cough and shortness of breath  Cardiovascular: Negative for chest pain and leg swelling  Gastrointestinal: Negative for abdominal pain, nausea and vomiting  Endocrine: Negative for polyuria  Genitourinary: Negative for difficulty urinating, frequency and urgency  Musculoskeletal: Negative for arthralgias and back pain  Skin: Negative for rash  Neurological: Negative for weakness and headaches  Psychiatric/Behavioral: Negative for sleep disturbance  The patient is not nervous/anxious  Objective:      /82 (BP Location: Left arm, Patient Position: Sitting, Cuff Size: Standard)   Pulse 83   Temp 97 8 °F (36 6 °C) (Temporal)   Ht 5' 4 5" (1 638 m)   Wt 93 4 kg (206 lb)   SpO2 96%   BMI 34 81 kg/m²     Recent Results (from the past 1344 hour(s))   Microalbumin / creatinine urine ratio    Collection Time: 06/17/21 10:35 AM   Result Value Ref Range    Creatinine, Ur 83 7 mg/dL    Microalbum  ,U,Random 109 0 (H) 0 0 - 20 0 mg/L    Microalb Creat Ratio 130 (H) 0 - 30 mg/g creatinine   CBC and differential    Collection Time: 06/17/21 10:35 AM   Result Value Ref Range    WBC 7 42 4 31 - 10 16 Thousand/uL    RBC 4 52 3 81 - 5 12 Million/uL    Hemoglobin 14 0 11 5 - 15 4 g/dL    Hematocrit 42 8 34 8 - 46 1 %    MCV 95 82 - 98 fL    MCH 31 0 26 8 - 34 3 pg    MCHC 32 7 31 4 - 37 4 g/dL    RDW 12 2 11 6 - 15 1 %    MPV 9 9 8 9 - 12 7 fL    Platelets 002 002 - 078 Thousands/uL    nRBC 0 /100 WBCs    Neutrophils Relative 62 43 - 75 %    Immat GRANS % 0 0 - 2 %    Lymphocytes Relative 30 14 - 44 %    Monocytes Relative 7 4 - 12 %    Eosinophils Relative 1 0 - 6 %    Basophils Relative 0 0 - 1 % Neutrophils Absolute 4 59 1 85 - 7 62 Thousands/µL    Immature Grans Absolute 0 03 0 00 - 0 20 Thousand/uL    Lymphocytes Absolute 2 21 0 60 - 4 47 Thousands/µL    Monocytes Absolute 0 51 0 17 - 1 22 Thousand/µL    Eosinophils Absolute 0 06 0 00 - 0 61 Thousand/µL    Basophils Absolute 0 02 0 00 - 0 10 Thousands/µL   Comprehensive metabolic panel    Collection Time: 06/17/21 10:35 AM   Result Value Ref Range    Sodium 135 (L) 136 - 145 mmol/L    Potassium 3 7 3 5 - 5 3 mmol/L    Chloride 101 100 - 108 mmol/L    CO2 28 21 - 32 mmol/L    ANION GAP 6 4 - 13 mmol/L    BUN 13 5 - 25 mg/dL    Creatinine 0 86 0 60 - 1 30 mg/dL    Glucose, Fasting 124 (H) 65 - 99 mg/dL    Calcium 9 6 8 3 - 10 1 mg/dL    AST 18 5 - 45 U/L    ALT 29 12 - 78 U/L    Alkaline Phosphatase 56 46 - 116 U/L    Total Protein 7 1 6 4 - 8 2 g/dL    Albumin 3 8 3 5 - 5 0 g/dL    Total Bilirubin 0 66 0 20 - 1 00 mg/dL    eGFR 65 ml/min/1 73sq m   Hemoglobin A1C    Collection Time: 06/17/21 10:35 AM   Result Value Ref Range    Hemoglobin A1C 6 8 (H) Normal 3 8-5 6%; PreDiabetic 5 7-6 4%; Diabetic >=6 5%; Glycemic control for adults with diabetes <7 0% %     mg/dl   Lipid Panel with Direct LDL reflex    Collection Time: 06/17/21 10:35 AM   Result Value Ref Range    Cholesterol 141 50 - 200 mg/dL    Triglycerides 136 <=150 mg/dL    HDL, Direct 48 >=40 mg/dL    LDL Calculated 66 0 - 100 mg/dL   TSH, 3rd generation    Collection Time: 06/17/21 10:35 AM   Result Value Ref Range    TSH 3RD GENERATON 3 760 (H) 0 358 - 3 740 uIU/mL        Physical Exam  Constitutional:       Appearance: Normal appearance  HENT:      Head: Normocephalic  Right Ear: Tympanic membrane, ear canal and external ear normal       Left Ear: Tympanic membrane, ear canal and external ear normal       Nose: Nose normal  No congestion  Mouth/Throat:      Mouth: Mucous membranes are moist       Pharynx: Oropharynx is clear   No oropharyngeal exudate or posterior oropharyngeal erythema  Eyes:      Extraocular Movements: Extraocular movements intact  Conjunctiva/sclera: Conjunctivae normal       Pupils: Pupils are equal, round, and reactive to light  Cardiovascular:      Rate and Rhythm: Normal rate and regular rhythm  Heart sounds: Normal heart sounds  No murmur heard  Pulmonary:      Effort: Pulmonary effort is normal       Breath sounds: Normal breath sounds  No wheezing or rales  Abdominal:      General: Bowel sounds are normal  There is no distension  Palpations: Abdomen is soft  Tenderness: There is no abdominal tenderness  Musculoskeletal:         General: Normal range of motion  Cervical back: Normal range of motion and neck supple  Right lower leg: No edema  Left lower leg: No edema  Lymphadenopathy:      Cervical: No cervical adenopathy  Skin:     General: Skin is warm  Neurological:      General: No focal deficit present  Mental Status: She is alert and oriented to person, place, and time

## 2021-07-07 NOTE — PATIENT INSTRUCTIONS
Medicare Preventive Visit Patient Instructions  Thank you for completing your Welcome to Medicare Visit or Medicare Annual Wellness Visit today  Your next wellness visit will be due in one year (7/8/2022)  The screening/preventive services that you may require over the next 5-10 years are detailed below  Some tests may not apply to you based off risk factors and/or age  Screening tests ordered at today's visit but not completed yet may show as past due  Also, please note that scanned in results may not display below  Preventive Screenings:  Service Recommendations Previous Testing/Comments   Colorectal Cancer Screening  * Colonoscopy    * Fecal Occult Blood Test (FOBT)/Fecal Immunochemical Test (FIT)  * Fecal DNA/Cologuard Test  * Flexible Sigmoidoscopy Age: 54-65 years old   Colonoscopy: every 10 years (may be performed more frequently if at higher risk)  OR  FOBT/FIT: every 1 year  OR  Cologuard: every 3 years  OR  Sigmoidoscopy: every 5 years  Screening may be recommended earlier than age 48 if at higher risk for colorectal cancer  Also, an individualized decision between you and your healthcare provider will decide whether screening between the ages of 74-80 would be appropriate  Colonoscopy: Not on file  FOBT/FIT: Not on file  Cologuard: Not on file  Sigmoidoscopy: Not on file          Breast Cancer Screening Age: 36 years old  Frequency: every 1-2 years  Not required if history of left and right mastectomy Mammogram: Not on file        Cervical Cancer Screening Between the ages of 21-29, pap smear recommended once every 3 years  Between the ages of 33-67, can perform pap smear with HPV co-testing every 5 years     Recommendations may differ for women with a history of total hysterectomy, cervical cancer, or abnormal pap smears in past  Pap Smear: Not on file    Screening Not Indicated   Hepatitis C Screening Once for adults born between [facility]  More frequently in patients at high risk for Hepatitis C Hep C Antibody: Not on file        Diabetes Screening 1-2 times per year if you're at risk for diabetes or have pre-diabetes Fasting glucose: 124 mg/dL   A1C: 6 8 %    Screening Not Indicated  History Diabetes   Cholesterol Screening Once every 5 years if you don't have a lipid disorder  May order more often based on risk factors  Lipid panel: 06/17/2021    Screening Not Indicated  History Lipid Disorder     Other Preventive Screenings Covered by Medicare:  1  Abdominal Aortic Aneurysm (AAA) Screening: covered once if your at risk  You're considered to be at risk if you have a family history of AAA  2  Lung Cancer Screening: covers low dose CT scan once per year if you meet all of the following conditions: (1) Age 50-69; (2) No signs or symptoms of lung cancer; (3) Current smoker or have quit smoking within the last 15 years; (4) You have a tobacco smoking history of at least 30 pack years (packs per day multiplied by number of years you smoked); (5) You get a written order from a healthcare provider  3  Glaucoma Screening: covered annually if you're considered high risk: (1) You have diabetes OR (2) Family history of glaucoma OR (3)  aged 48 and older OR (3)  American aged 72 and older  3  Osteoporosis Screening: covered every 2 years if you meet one of the following conditions: (1) You're estrogen deficient and at risk for osteoporosis based off medical history and other findings; (2) Have a vertebral abnormality; (3) On glucocorticoid therapy for more than 3 months; (4) Have primary hyperparathyroidism; (5) On osteoporosis medications and need to assess response to drug therapy  · Last bone density test (DXA Scan): Not on file  5  HIV Screening: covered annually if you're between the age of 12-76  Also covered annually if you are younger than 13 and older than 72 with risk factors for HIV infection   For pregnant patients, it is covered up to 3 times per pregnancy  Immunizations:  Immunization Recommendations   Influenza Vaccine Annual influenza vaccination during flu season is recommended for all persons aged >= 6 months who do not have contraindications   Pneumococcal Vaccine (Prevnar and Pneumovax)  * Prevnar = PCV13  * Pneumovax = PPSV23   Adults 25-60 years old: 1-3 doses may be recommended based on certain risk factors  Adults 72 years old: Prevnar (PCV13) vaccine recommended followed by Pneumovax (PPSV23) vaccine  If already received PPSV23 since turning 65, then PCV13 recommended at least one year after PPSV23 dose  Hepatitis B Vaccine 3 dose series if at intermediate or high risk (ex: diabetes, end stage renal disease, liver disease)   Tetanus (Td) Vaccine - COST NOT COVERED BY MEDICARE PART B Following completion of primary series, a booster dose should be given every 10 years to maintain immunity against tetanus  Td may also be given as tetanus wound prophylaxis  Tdap Vaccine - COST NOT COVERED BY MEDICARE PART B Recommended at least once for all adults  For pregnant patients, recommended with each pregnancy  Shingles Vaccine (Shingrix) - COST NOT COVERED BY MEDICARE PART B  2 shot series recommended in those aged 48 and above     Health Maintenance Due:      Topic Date Due    Hepatitis C Screening  Never done     Immunizations Due:      Topic Date Due    Pneumococcal Vaccine: 65+ Years (1 of 2 - PPSV23) Never done    DTaP,Tdap,and Td Vaccines (1 - Tdap) 11/25/1961    Influenza Vaccine (1) 09/01/2021     Advance Directives   What are advance directives? Advance directives are legal documents that state your wishes and plans for medical care  These plans are made ahead of time in case you lose your ability to make decisions for yourself  Advance directives can apply to any medical decision, such as the treatments you want, and if you want to donate organs  What are the types of advance directives?   There are many types of advance directives, and each state has rules about how to use them  You may choose a combination of any of the following:  · Living will: This is a written record of the treatment you want  You can also choose which treatments you do not want, which to limit, and which to stop at a certain time  This includes surgery, medicine, IV fluid, and tube feedings  · Durable power of  for healthcare [facility]): This is a written record that states who you want to make healthcare choices for you when you are unable to make them for yourself  This person, called a proxy, is usually a family member or a friend  You may choose more than 1 proxy  · Do not resuscitate (DNR) order:  A DNR order is used in case your heart stops beating or you stop breathing  It is a request not to have certain forms of treatment, such as CPR  A DNR order may be included in other types of advance directives  · Medical directive: This covers the care that you want if you are in a coma, near death, or unable to make decisions for yourself  You can list the treatments you want for each condition  Treatment may include pain medicine, surgery, blood transfusions, dialysis, IV or tube feedings, and a ventilator (breathing machine)  · Values history: This document has questions about your views, beliefs, and how you feel and think about life  This information can help others choose the care that you would choose  Why are advance directives important? An advance directive helps you control your care  Although spoken wishes may be used, it is better to have your wishes written down  Spoken wishes can be misunderstood, or not followed  Treatments may be given even if you do not want them  An advance directive may make it easier for your family to make difficult choices about your care  Urinary Incontinence   Urinary incontinence (UI)  is when you lose control of your bladder  UI develops because your bladder cannot store or empty urine properly   The 3 most common types of UI are stress incontinence, urge incontinence, or both  Medicines:   · May be given to help strengthen your bladder control  Report any side effects of medication to your healthcare provider  Do pelvic muscle exercises often:  Your pelvic muscles help you stop urinating  Squeeze these muscles tight for 5 seconds, then relax for 5 seconds  Gradually work up to squeezing for 10 seconds  Do 3 sets of 15 repetitions a day, or as directed  This will help strengthen your pelvic muscles and improve bladder control  Train your bladder:  Go to the bathroom at set times, such as every 2 hours, even if you do not feel the urge to go  You can also try to hold your urine when you feel the urge to go  For example, hold your urine for 5 minutes when you feel the urge to go  As that becomes easier, hold your urine for 10 minutes  Self-care:   · Keep a UI record  Write down how often you leak urine and how much you leak  Make a note of what you were doing when you leaked urine  · Drink liquids as directed  You may need to limit the amount of liquid you drink to help control your urine leakage  Do not drink any liquid right before you go to bed  Limit or do not have drinks that contain caffeine or alcohol  · Prevent constipation  Eat a variety of high-fiber foods  Good examples are high-fiber cereals, beans, vegetables, and whole-grain breads  Walking is the best way to trigger your intestines to have a bowel movement  · Exercise regularly and maintain a healthy weight  Weight loss and exercise will decrease pressure on your bladder and help you control your leakage  · Use a catheter as directed  to help empty your bladder  A catheter is a tiny, plastic tube that is put into your bladder to drain your urine  · Go to behavior therapy as directed  Behavior therapy may be used to help you learn to control your urge to urinate      Weight Management   Why it is important to manage your weight: Being overweight increases your risk of health conditions such as heart disease, high blood pressure, type 2 diabetes, and certain types of cancer  It can also increase your risk for osteoarthritis, sleep apnea, and other respiratory problems  Aim for a slow, steady weight loss  Even a small amount of weight loss can lower your risk of health problems  How to lose weight safely:  A safe and healthy way to lose weight is to eat fewer calories and get regular exercise  You can lose up about 1 pound a week by decreasing the number of calories you eat by 500 calories each day  Healthy meal plan for weight management:  A healthy meal plan includes a variety of foods, contains fewer calories, and helps you stay healthy  A healthy meal plan includes the following:  · Eat whole-grain foods more often  A healthy meal plan should contain fiber  Fiber is the part of grains, fruits, and vegetables that is not broken down by your body  Whole-grain foods are healthy and provide extra fiber in your diet  Some examples of whole-grain foods are whole-wheat breads and pastas, oatmeal, brown rice, and bulgur  · Eat a variety of vegetables every day  Include dark, leafy greens such as spinach, kale, ejn greens, and mustard greens  Eat yellow and orange vegetables such as carrots, sweet potatoes, and winter squash  · Eat a variety of fruits every day  Choose fresh or canned fruit (canned in its own juice or light syrup) instead of juice  Fruit juice has very little or no fiber  · Eat low-fat dairy foods  Drink fat-free (skim) milk or 1% milk  Eat fat-free yogurt and low-fat cottage cheese  Try low-fat cheeses such as mozzarella and other reduced-fat cheeses  · Choose meat and other protein foods that are low in fat  Choose beans or other legumes such as split peas or lentils  Choose fish, skinless poultry (chicken or turkey), or lean cuts of red meat (beef or pork)   Before you cook meat or poultry, cut off any visible fat    · Use less fat and oil  Try baking foods instead of frying them  Add less fat, such as margarine, sour cream, regular salad dressing and mayonnaise to foods  Eat fewer high-fat foods  Some examples of high-fat foods include french fries, doughnuts, ice cream, and cakes  · Eat fewer sweets  Limit foods and drinks that are high in sugar  This includes candy, cookies, regular soda, and sweetened drinks  Exercise:  Exercise at least 30 minutes per day on most days of the week  Some examples of exercise include walking, biking, dancing, and swimming  You can also fit in more physical activity by taking the stairs instead of the elevator or parking farther away from stores  Ask your healthcare provider about the best exercise plan for you  © Copyright [employer] 2018 Information is for End User's use only and may not be sold, redistributed or otherwise used for commercial purposes   All illustrations and images included in CareNotes® are the copyrighted property of [employer]  or [address] [place] [address]

## 2021-08-18 ENCOUNTER — TELEPHONE (OUTPATIENT)
Dept: INTERNAL MEDICINE CLINIC | Facility: CLINIC | Age: 81
End: 2021-08-18

## 2021-08-18 DIAGNOSIS — Z29.8 INDICATION PRESENT FOR ENDOCARDITIS PROPHYLAXIS: Primary | ICD-10-CM

## 2021-08-18 RX ORDER — AMOXICILLIN 500 MG/1
2000 CAPSULE ORAL ONCE
Qty: 4 CAPSULE | Refills: 0 | Status: SHIPPED | OUTPATIENT
Start: 2021-08-18 | End: 2021-08-18

## 2021-08-24 DIAGNOSIS — I26.92 CHRONIC SADDLE PULMONARY EMBOLISM WITHOUT ACUTE COR PULMONALE (HCC): ICD-10-CM

## 2021-08-24 DIAGNOSIS — I27.82 CHRONIC SADDLE PULMONARY EMBOLISM WITHOUT ACUTE COR PULMONALE (HCC): ICD-10-CM

## 2021-08-24 RX ORDER — APIXABAN 5 MG/1
5 TABLET, FILM COATED ORAL 2 TIMES DAILY
Qty: 180 TABLET | Refills: 0 | Status: SHIPPED | OUTPATIENT
Start: 2021-08-24 | End: 2021-12-22 | Stop reason: SDUPTHER

## 2021-12-06 DIAGNOSIS — E78.2 MIXED HYPERLIPIDEMIA: ICD-10-CM

## 2021-12-06 RX ORDER — ATORVASTATIN CALCIUM 10 MG/1
TABLET, FILM COATED ORAL
Qty: 90 TABLET | Refills: 3 | Status: SHIPPED | OUTPATIENT
Start: 2021-12-06

## 2021-12-07 ENCOUNTER — CLINICAL SUPPORT (OUTPATIENT)
Dept: INTERNAL MEDICINE CLINIC | Facility: CLINIC | Age: 81
End: 2021-12-07
Payer: COMMERCIAL

## 2021-12-07 DIAGNOSIS — Z23 NEEDS FLU SHOT: Primary | ICD-10-CM

## 2021-12-08 PROCEDURE — 90662 IIV NO PRSV INCREASED AG IM: CPT

## 2021-12-08 PROCEDURE — G0008 ADMIN INFLUENZA VIRUS VAC: HCPCS

## 2021-12-09 ENCOUNTER — RA CDI HCC (OUTPATIENT)
Dept: OTHER | Facility: HOSPITAL | Age: 81
End: 2021-12-09

## 2021-12-10 ENCOUNTER — APPOINTMENT (OUTPATIENT)
Dept: LAB | Age: 81
End: 2021-12-10
Payer: COMMERCIAL

## 2021-12-10 DIAGNOSIS — Z12.11 SCREEN FOR COLON CANCER: ICD-10-CM

## 2021-12-10 PROCEDURE — 36415 COLL VENOUS BLD VENIPUNCTURE: CPT

## 2021-12-10 PROCEDURE — 86803 HEPATITIS C AB TEST: CPT

## 2021-12-11 LAB — HCV AB SER QL: NORMAL

## 2021-12-16 ENCOUNTER — OFFICE VISIT (OUTPATIENT)
Dept: INTERNAL MEDICINE CLINIC | Facility: CLINIC | Age: 81
End: 2021-12-16
Payer: COMMERCIAL

## 2021-12-16 VITALS
OXYGEN SATURATION: 98 % | HEART RATE: 92 BPM | BODY MASS INDEX: 35.16 KG/M2 | DIASTOLIC BLOOD PRESSURE: 82 MMHG | TEMPERATURE: 98.9 F | SYSTOLIC BLOOD PRESSURE: 142 MMHG | WEIGHT: 211 LBS | HEIGHT: 65 IN

## 2021-12-16 DIAGNOSIS — E66.01 OBESITY, MORBID (HCC): ICD-10-CM

## 2021-12-16 DIAGNOSIS — I87.2 VENOUS INSUFFICIENCY OF BOTH LOWER EXTREMITIES: ICD-10-CM

## 2021-12-16 DIAGNOSIS — M16.11 PRIMARY OSTEOARTHRITIS OF RIGHT HIP: ICD-10-CM

## 2021-12-16 DIAGNOSIS — E03.9 ACQUIRED HYPOTHYROIDISM: ICD-10-CM

## 2021-12-16 DIAGNOSIS — I26.92 CHRONIC SADDLE PULMONARY EMBOLISM WITHOUT ACUTE COR PULMONALE (HCC): ICD-10-CM

## 2021-12-16 DIAGNOSIS — Z00.00 MEDICARE ANNUAL WELLNESS VISIT, SUBSEQUENT: ICD-10-CM

## 2021-12-16 DIAGNOSIS — I27.82 CHRONIC SADDLE PULMONARY EMBOLISM WITHOUT ACUTE COR PULMONALE (HCC): ICD-10-CM

## 2021-12-16 DIAGNOSIS — E11.9 DIABETES MELLITUS WITHOUT COMPLICATION (HCC): Primary | ICD-10-CM

## 2021-12-16 DIAGNOSIS — I10 ESSENTIAL HYPERTENSION, BENIGN: ICD-10-CM

## 2021-12-16 DIAGNOSIS — E78.2 MIXED HYPERLIPIDEMIA: ICD-10-CM

## 2021-12-16 PROCEDURE — 3077F SYST BP >= 140 MM HG: CPT | Performed by: INTERNAL MEDICINE

## 2021-12-16 PROCEDURE — G0439 PPPS, SUBSEQ VISIT: HCPCS | Performed by: INTERNAL MEDICINE

## 2021-12-16 PROCEDURE — 99214 OFFICE O/P EST MOD 30 MIN: CPT | Performed by: INTERNAL MEDICINE

## 2021-12-16 PROCEDURE — 1160F RVW MEDS BY RX/DR IN RCRD: CPT | Performed by: INTERNAL MEDICINE

## 2021-12-16 PROCEDURE — 3079F DIAST BP 80-89 MM HG: CPT | Performed by: INTERNAL MEDICINE

## 2021-12-22 DIAGNOSIS — I27.82 CHRONIC SADDLE PULMONARY EMBOLISM WITHOUT ACUTE COR PULMONALE (HCC): ICD-10-CM

## 2021-12-22 DIAGNOSIS — I26.92 CHRONIC SADDLE PULMONARY EMBOLISM WITHOUT ACUTE COR PULMONALE (HCC): ICD-10-CM

## 2021-12-22 RX ORDER — APIXABAN 5 MG/1
5 TABLET, FILM COATED ORAL 2 TIMES DAILY
Qty: 180 TABLET | Refills: 0 | Status: SHIPPED | OUTPATIENT
Start: 2021-12-22 | End: 2022-02-23 | Stop reason: SDUPTHER

## 2021-12-28 ENCOUNTER — IMMUNIZATIONS (OUTPATIENT)
Dept: FAMILY MEDICINE CLINIC | Facility: HOSPITAL | Age: 81
End: 2021-12-28

## 2021-12-28 DIAGNOSIS — Z23 ENCOUNTER FOR IMMUNIZATION: Primary | ICD-10-CM

## 2021-12-28 PROCEDURE — 0001A COVID-19 PFIZER VACC 0.3 ML: CPT

## 2021-12-28 PROCEDURE — 91300 COVID-19 PFIZER VACC 0.3 ML: CPT

## 2022-02-23 DIAGNOSIS — I27.82 CHRONIC SADDLE PULMONARY EMBOLISM WITHOUT ACUTE COR PULMONALE (HCC): ICD-10-CM

## 2022-02-23 DIAGNOSIS — I26.92 CHRONIC SADDLE PULMONARY EMBOLISM WITHOUT ACUTE COR PULMONALE (HCC): ICD-10-CM

## 2022-02-23 RX ORDER — APIXABAN 5 MG/1
5 TABLET, FILM COATED ORAL 2 TIMES DAILY
Qty: 180 TABLET | Refills: 0 | Status: SHIPPED | OUTPATIENT
Start: 2022-02-23 | End: 2022-03-09 | Stop reason: SDUPTHER

## 2022-02-28 DIAGNOSIS — I10 ESSENTIAL HYPERTENSION, BENIGN: ICD-10-CM

## 2022-02-28 DIAGNOSIS — E03.9 HYPOTHYROIDISM (ACQUIRED): ICD-10-CM

## 2022-02-28 RX ORDER — VALSARTAN AND HYDROCHLOROTHIAZIDE 160; 12.5 MG/1; MG/1
TABLET, FILM COATED ORAL
Qty: 90 TABLET | Refills: 3 | Status: SHIPPED | OUTPATIENT
Start: 2022-02-28

## 2022-02-28 RX ORDER — LEVOTHYROXINE SODIUM 0.03 MG/1
TABLET ORAL
Qty: 90 TABLET | Refills: 3 | Status: SHIPPED | OUTPATIENT
Start: 2022-02-28 | End: 2022-03-08 | Stop reason: SDUPTHER

## 2022-03-08 DIAGNOSIS — E03.9 HYPOTHYROIDISM (ACQUIRED): ICD-10-CM

## 2022-03-08 DIAGNOSIS — I10 ESSENTIAL HYPERTENSION, BENIGN: ICD-10-CM

## 2022-03-08 RX ORDER — VALSARTAN AND HYDROCHLOROTHIAZIDE 160; 12.5 MG/1; MG/1
1 TABLET, FILM COATED ORAL EVERY MORNING
Qty: 90 TABLET | Refills: 1 | Status: CANCELLED | OUTPATIENT
Start: 2022-03-08

## 2022-03-08 RX ORDER — LEVOTHYROXINE SODIUM 0.03 MG/1
25 TABLET ORAL DAILY
Qty: 90 TABLET | Refills: 1 | Status: SHIPPED | OUTPATIENT
Start: 2022-03-08

## 2022-03-09 DIAGNOSIS — I27.82 CHRONIC SADDLE PULMONARY EMBOLISM WITHOUT ACUTE COR PULMONALE (HCC): ICD-10-CM

## 2022-03-09 DIAGNOSIS — I26.92 CHRONIC SADDLE PULMONARY EMBOLISM WITHOUT ACUTE COR PULMONALE (HCC): ICD-10-CM

## 2022-03-09 RX ORDER — APIXABAN 5 MG/1
5 TABLET, FILM COATED ORAL 2 TIMES DAILY
Qty: 180 TABLET | Refills: 0 | Status: SHIPPED | OUTPATIENT
Start: 2022-03-09 | End: 2022-06-13 | Stop reason: SDUPTHER

## 2022-03-16 ENCOUNTER — RA CDI HCC (OUTPATIENT)
Dept: OTHER | Facility: HOSPITAL | Age: 82
End: 2022-03-16

## 2022-03-16 NOTE — PROGRESS NOTES
[facility]  coding opportunities       Chart reviewed, no opportunity found:   [address]        Patients Insurance     Medicare Insurance: [employer]

## 2022-03-22 ENCOUNTER — APPOINTMENT (OUTPATIENT)
Dept: LAB | Age: 82
End: 2022-03-22
Payer: COMMERCIAL

## 2022-03-22 DIAGNOSIS — E11.9 DIABETES MELLITUS WITHOUT COMPLICATION (HCC): ICD-10-CM

## 2022-03-22 LAB
ALBUMIN SERPL BCP-MCNC: 3.7 G/DL (ref 3.5–5)
ALP SERPL-CCNC: 74 U/L (ref 46–116)
ALT SERPL W P-5'-P-CCNC: 46 U/L (ref 12–78)
ANION GAP SERPL CALCULATED.3IONS-SCNC: 5 MMOL/L (ref 4–13)
AST SERPL W P-5'-P-CCNC: 30 U/L (ref 5–45)
BASOPHILS # BLD AUTO: 0.03 THOUSANDS/ΜL (ref 0–0.1)
BASOPHILS NFR BLD AUTO: 1 % (ref 0–1)
BILIRUB SERPL-MCNC: 0.58 MG/DL (ref 0.2–1)
BUN SERPL-MCNC: 13 MG/DL (ref 5–25)
CALCIUM SERPL-MCNC: 9.3 MG/DL (ref 8.3–10.1)
CHLORIDE SERPL-SCNC: 103 MMOL/L (ref 100–108)
CHOLEST SERPL-MCNC: 147 MG/DL
CO2 SERPL-SCNC: 28 MMOL/L (ref 21–32)
CREAT SERPL-MCNC: 0.89 MG/DL (ref 0.6–1.3)
CREAT UR-MCNC: 149 MG/DL
EOSINOPHIL # BLD AUTO: 0.04 THOUSAND/ΜL (ref 0–0.61)
EOSINOPHIL NFR BLD AUTO: 1 % (ref 0–6)
ERYTHROCYTE [DISTWIDTH] IN BLOOD BY AUTOMATED COUNT: 12.3 % (ref 11.6–15.1)
EST. AVERAGE GLUCOSE BLD GHB EST-MCNC: 148 MG/DL
GFR SERPL CREATININE-BSD FRML MDRD: 61 ML/MIN/1.73SQ M
GLUCOSE P FAST SERPL-MCNC: 161 MG/DL (ref 65–99)
HBA1C MFR BLD: 6.8 %
HCT VFR BLD AUTO: 39.8 % (ref 34.8–46.1)
HDLC SERPL-MCNC: 53 MG/DL
HGB BLD-MCNC: 13.5 G/DL (ref 11.5–15.4)
IMM GRANULOCYTES # BLD AUTO: 0.01 THOUSAND/UL (ref 0–0.2)
IMM GRANULOCYTES NFR BLD AUTO: 0 % (ref 0–2)
LDLC SERPL CALC-MCNC: 69 MG/DL (ref 0–100)
LYMPHOCYTES # BLD AUTO: 0.19 THOUSANDS/ΜL (ref 0.6–4.47)
LYMPHOCYTES NFR BLD AUTO: 4 % (ref 14–44)
MCH RBC QN AUTO: 31.5 PG (ref 26.8–34.3)
MCHC RBC AUTO-ENTMCNC: 33.9 G/DL (ref 31.4–37.4)
MCV RBC AUTO: 93 FL (ref 82–98)
MICROALBUMIN UR-MCNC: 51.1 MG/L (ref 0–20)
MICROALBUMIN/CREAT 24H UR: 34 MG/G CREATININE (ref 0–30)
MONOCYTES # BLD AUTO: 2.03 THOUSAND/ΜL (ref 0.17–1.22)
MONOCYTES NFR BLD AUTO: 39 % (ref 4–12)
NEUTROPHILS # BLD AUTO: 2.97 THOUSANDS/ΜL (ref 1.85–7.62)
NEUTS SEG NFR BLD AUTO: 55 % (ref 43–75)
NRBC BLD AUTO-RTO: 0 /100 WBCS
PLATELET # BLD AUTO: 282 THOUSANDS/UL (ref 149–390)
PMV BLD AUTO: 9.5 FL (ref 8.9–12.7)
POTASSIUM SERPL-SCNC: 4.2 MMOL/L (ref 3.5–5.3)
PROT SERPL-MCNC: 6.9 G/DL (ref 6.4–8.2)
RBC # BLD AUTO: 4.28 MILLION/UL (ref 3.81–5.12)
SODIUM SERPL-SCNC: 136 MMOL/L (ref 136–145)
TRIGL SERPL-MCNC: 123 MG/DL
TSH SERPL DL<=0.05 MIU/L-ACNC: 2.28 UIU/ML (ref 0.36–3.74)
WBC # BLD AUTO: 5.27 THOUSAND/UL (ref 4.31–10.16)

## 2022-03-22 PROCEDURE — 82570 ASSAY OF URINE CREATININE: CPT | Performed by: INTERNAL MEDICINE

## 2022-03-22 PROCEDURE — 80053 COMPREHEN METABOLIC PANEL: CPT

## 2022-03-22 PROCEDURE — 83036 HEMOGLOBIN GLYCOSYLATED A1C: CPT

## 2022-03-22 PROCEDURE — 36415 COLL VENOUS BLD VENIPUNCTURE: CPT

## 2022-03-22 PROCEDURE — 85025 COMPLETE CBC W/AUTO DIFF WBC: CPT

## 2022-03-22 PROCEDURE — 82043 UR ALBUMIN QUANTITATIVE: CPT | Performed by: INTERNAL MEDICINE

## 2022-03-22 PROCEDURE — 84443 ASSAY THYROID STIM HORMONE: CPT

## 2022-03-22 PROCEDURE — 80061 LIPID PANEL: CPT

## 2022-03-30 ENCOUNTER — OFFICE VISIT (OUTPATIENT)
Dept: INTERNAL MEDICINE CLINIC | Facility: CLINIC | Age: 82
End: 2022-03-30
Payer: COMMERCIAL

## 2022-03-30 VITALS
TEMPERATURE: 98.1 F | HEIGHT: 65 IN | HEART RATE: 97 BPM | WEIGHT: 208 LBS | BODY MASS INDEX: 34.66 KG/M2 | OXYGEN SATURATION: 97 % | SYSTOLIC BLOOD PRESSURE: 136 MMHG | DIASTOLIC BLOOD PRESSURE: 82 MMHG

## 2022-03-30 DIAGNOSIS — E11.9 DIABETES MELLITUS WITHOUT COMPLICATION (HCC): ICD-10-CM

## 2022-03-30 DIAGNOSIS — I27.82 CHRONIC SADDLE PULMONARY EMBOLISM WITHOUT ACUTE COR PULMONALE (HCC): ICD-10-CM

## 2022-03-30 DIAGNOSIS — I10 ESSENTIAL HYPERTENSION, BENIGN: Primary | ICD-10-CM

## 2022-03-30 DIAGNOSIS — E03.9 HYPOTHYROIDISM (ACQUIRED): ICD-10-CM

## 2022-03-30 DIAGNOSIS — I26.92 CHRONIC SADDLE PULMONARY EMBOLISM WITHOUT ACUTE COR PULMONALE (HCC): ICD-10-CM

## 2022-03-30 DIAGNOSIS — E78.2 MIXED HYPERLIPIDEMIA: ICD-10-CM

## 2022-03-30 PROCEDURE — 1036F TOBACCO NON-USER: CPT | Performed by: INTERNAL MEDICINE

## 2022-03-30 PROCEDURE — 3079F DIAST BP 80-89 MM HG: CPT | Performed by: INTERNAL MEDICINE

## 2022-03-30 PROCEDURE — 3075F SYST BP GE 130 - 139MM HG: CPT | Performed by: INTERNAL MEDICINE

## 2022-03-30 PROCEDURE — 99214 OFFICE O/P EST MOD 30 MIN: CPT | Performed by: INTERNAL MEDICINE

## 2022-03-30 PROCEDURE — 1160F RVW MEDS BY RX/DR IN RCRD: CPT | Performed by: INTERNAL MEDICINE

## 2022-03-30 PROCEDURE — 3725F SCREEN DEPRESSION PERFORMED: CPT | Performed by: INTERNAL MEDICINE

## 2022-03-30 NOTE — PROGRESS NOTES
Assessment/Plan:    BMI Counseling: Body mass index is 35 15 kg/m²  The BMI is above normal  Nutrition recommendations include decreasing portion sizes, encouraging healthy choices of fruits and vegetables and decreasing fast food intake  Exercise recommendations include moderate physical activity 150 minutes/week  Rationale for BMI follow-up plan is due to patient being overweight or obese  Depression Screening and Follow-up Plan: Patient was screened for depression during today's encounter  They screened negative with a PHQ-2 score of 0             1  Essential hypertension, benign    2  Hypothyroidism (acquired)    3  Chronic 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)    4  Diabetes mellitus without complication ([address] )    5  Mixed hyperlipidemia           Subjective:      Patient ID: Kath See is a 80 y o  female  Follow-up on blood test done on 03/22/2022 test discussed with her      The following portions of the patient's history were reviewed and updated as appropriate: She  has a past medical history of Acquired hypothyroidism, 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), Adrenal gland disorder ([address] ), Anemia, Anxiety, Arthritis, At risk for falls, Diabetes mellitus ([address] ), Disease of thyroid gland, DJD (degenerative joint disease) of knee, Edema of both legs, History of pulmonary embolus (PE), Hyperlipidemia, Hypertension, Irritable bowel syndrome, Prediabetes, Presence of right artificial hip joint, Pulmonary embolus ([address] ) (2/27/2018 & 2/14/2019), Uses roller walker, Wears glasses, and Wears partial dentures    She   Patient Active Problem List    Diagnosis Date Noted    Medicare annual wellness visit, subsequent 07/07/2021    Obesity, morbid ([address] ) 05/05/2021    Essential hypertension, benign 05/05/2021    Venous insufficiency of both lower extremities 12/09/2020    Diabetes mellitus without complication ([address] ) 64/92/4476    Hypertension     Irritable bowel syndrome     Edema of both legs     Acquired hypothyroidism     Diabetes mellitus [facility]     History of pulmonary embolus (PE)     Pulmonary embolus (HCC)     Presence of right artificial hip joint     Acute blood loss anemia 10/25/2019    Nausea 10/25/2019    Hypothyroid 10/23/2019    Mixed hyperlipidemia 10/23/2019    Essential hypertension 10/23/2019    History of hypercoagulable state 10/09/2019    Primary osteoarthritis of one hip, right 08/09/2019    Primary osteoarthritis of right hip      She  has a past surgical history that includes Cholecystectomy (1991); Colonoscopy (11/09/2017); pr total hip arthroplasty (Right, 10/22/2019); Hysterectomy (1993); Hemorrhoid surgery (1999); and Trigger finger release (Right, 1998)  Her family history includes Hypertension in her family and mother  She  reports that she has never smoked  She has never used smokeless tobacco  She reports previous alcohol use  She reports previous drug use  Current Outpatient Medications   Medication Sig Dispense Refill    acetaminophen (TYLENOL) 325 mg tablet Take 650 mg by mouth every 6 (six) hours as needed for mild pain      atorvastatin (LIPITOR) 10 mg tablet TAKE 1 TABLET DAILY AT BEDTIME 90 tablet 3    Eliquis 5 MG Take 1 tablet (5 mg total) by mouth 2 (two) times a day 180 tablet 0    levothyroxine 25 mcg tablet Take 1 tablet (25 mcg total) by mouth daily 90 tablet 1    Multiple Vitamin (MULTIVITAMIN) tablet Take 1 tablet by mouth daily      Omega-3 Fatty Acids (FISH OIL PO) Take 1 capsule by mouth daily      valsartan-hydrochlorothiazide (DIOVAN-HCT) 160-12 5 MG per tablet TAKE 1 TABLET ONCE DAILY IN THE MORNING 90 tablet 3     No current facility-administered medications for this visit       Current Outpatient Medications on File Prior to Visit   Medication Sig    acetaminophen (TYLENOL) 325 mg tablet Take 650 mg by mouth every 6 (six) hours as needed for mild pain    atorvastatin (LIPITOR) 10 mg tablet TAKE 1 TABLET DAILY AT BEDTIME    Eliquis 5 MG Take 1 tablet (5 mg total) by mouth 2 (two) times a day    levothyroxine 25 mcg tablet Take 1 tablet (25 mcg total) by mouth daily    Multiple Vitamin (MULTIVITAMIN) tablet Take 1 tablet by mouth daily    Omega-3 Fatty Acids (FISH OIL PO) Take 1 capsule by mouth daily    valsartan-hydrochlorothiazide (DIOVAN-HCT) 160-12 5 MG per tablet TAKE 1 TABLET ONCE DAILY IN THE MORNING     No current facility-administered medications on file prior to visit  She is allergic to oxycodone       Review of Systems   Constitutional: Negative for chills and fever  HENT: Negative for congestion, ear pain and sore throat  Eyes: Negative for pain  Respiratory: Negative for cough and shortness of breath  Cardiovascular: Negative for chest pain and leg swelling  Gastrointestinal: Negative for abdominal pain, nausea and vomiting  Endocrine: Negative for polyuria  Genitourinary: Negative for difficulty urinating, frequency and urgency  Musculoskeletal: Positive for arthralgias and back pain  Skin: Negative for rash  Neurological: Negative for weakness and headaches  Psychiatric/Behavioral: Negative for sleep disturbance  The patient is not nervous/anxious  Objective:      /82 (BP Location: Left arm, Patient Position: Sitting, Cuff Size: Adult)   Pulse 97   Temp 98 1 °F (36 7 °C) (Temporal)   Ht 5' 4 5" (1 638 m)   Wt 94 3 kg (208 lb)   SpO2 97%   BMI 35 15 kg/m²     Recent Results (from the past 1344 hour(s))   Microalbumin / creatinine urine ratio    Collection Time: 03/22/22 10:54 AM   Result Value Ref Range    Creatinine, Ur 149 0 mg/dL    Microalbum  ,U,Random 51 1 (H) 0 0 - 20 0 mg/L    Microalb Creat Ratio 34 (H) 0 - 30 mg/g creatinine   CBC and differential    Collection Time: 03/22/22 10:54 AM   Result Value Ref Range    WBC 5 27 4 31 - 10 16 Thousand/uL    RBC 4 28 3 81 - 5 12 Million/uL    Hemoglobin 13 5 11 5 - 15 4 g/dL    Hematocrit 39 8 34 8 - 46 1 %    MCV 93 82 - 98 fL    MCH 31 5 26 8 - 34 3 pg    MCHC 33 9 31 4 - 37 4 g/dL    RDW 12 3 11 6 - 15 1 %    MPV 9 5 8 9 - 12 7 fL    Platelets 997 343 - 841 Thousands/uL    nRBC 0 /100 WBCs    Neutrophils Relative 55 43 - 75 %    Immat GRANS % 0 0 - 2 %    Lymphocytes Relative 4 (L) 14 - 44 %    Monocytes Relative 39 (H) 4 - 12 %    Eosinophils Relative 1 0 - 6 %    Basophils Relative 1 0 - 1 %    Neutrophils Absolute 2 97 1 85 - 7 62 Thousands/µL    Immature Grans Absolute 0 01 0 00 - 0 20 Thousand/uL    Lymphocytes Absolute 0 19 (L) 0 60 - 4 47 Thousands/µL    Monocytes Absolute 2 03 (H) 0 17 - 1 22 Thousand/µL    Eosinophils Absolute 0 04 0 00 - 0 61 Thousand/µL    Basophils Absolute 0 03 0 00 - 0 10 Thousands/µL   Comprehensive metabolic panel    Collection Time: 03/22/22 10:54 AM   Result Value Ref Range    Sodium 136 136 - 145 mmol/L    Potassium 4 2 3 5 - 5 3 mmol/L    Chloride 103 100 - 108 mmol/L    CO2 28 21 - 32 mmol/L    ANION GAP 5 4 - 13 mmol/L    BUN 13 5 - 25 mg/dL    Creatinine 0 89 0 60 - 1 30 mg/dL    Glucose, Fasting 161 (H) 65 - 99 mg/dL    Calcium 9 3 8 3 - 10 1 mg/dL    AST 30 5 - 45 U/L    ALT 46 12 - 78 U/L    Alkaline Phosphatase 74 46 - 116 U/L    Total Protein 6 9 6 4 - 8 2 g/dL    Albumin 3 7 3 5 - 5 0 g/dL    Total Bilirubin 0 58 0 20 - 1 00 mg/dL    eGFR 61 ml/min/1 73sq m   Hemoglobin A1C    Collection Time: 03/22/22 10:54 AM   Result Value Ref Range    Hemoglobin A1C 6 8 (H) Normal 3 8-5 6%; PreDiabetic 5 7-6 4%;  Diabetic >=6 5%; Glycemic control for adults with diabetes <7 0% %     mg/dl   Lipid Panel with Direct LDL reflex    Collection Time: 03/22/22 10:54 AM   Result Value Ref Range    Cholesterol 147 See Comment mg/dL    Triglycerides 123 See Comment mg/dL    HDL, Direct 53 >=50 mg/dL    LDL Calculated 69 0 - 100 mg/dL   TSH, 3rd generation    Collection Time: 03/22/22 10:54 AM   Result Value Ref Range    TSH 3RD GENERATON 2 280 0 358 - 3 740 uIU/mL        Physical Exam  Constitutional:       Appearance: Normal appearance  HENT:      Head: Normocephalic  Right Ear: Tympanic membrane, ear canal and external ear normal       Left Ear: Tympanic membrane, ear canal and external ear normal       Nose: Nose normal  No congestion  Mouth/Throat:      Mouth: Mucous membranes are moist       Pharynx: Oropharynx is clear  No oropharyngeal exudate or posterior oropharyngeal erythema  Eyes:      Extraocular Movements: Extraocular movements intact  Conjunctiva/sclera: Conjunctivae normal       Pupils: Pupils are equal, round, and reactive to light  Cardiovascular:      Rate and Rhythm: Normal rate and regular rhythm  Heart sounds: Normal heart sounds  No murmur heard  Pulmonary:      Effort: Pulmonary effort is normal       Breath sounds: Normal breath sounds  No wheezing or rales  Abdominal:      General: Bowel sounds are normal  There is no distension  Palpations: Abdomen is soft  Tenderness: There is no abdominal tenderness  Musculoskeletal:         General: Normal range of motion  Cervical back: Normal range of motion and neck supple  Right lower leg: No edema  Left lower leg: No edema  Lymphadenopathy:      Cervical: No cervical adenopathy  Skin:     General: Skin is warm  Neurological:      General: No focal deficit present  Mental Status: She is alert and oriented to person, place, and time

## 2022-06-08 LAB
LEFT EYE DIABETIC RETINOPATHY: NORMAL
RIGHT EYE DIABETIC RETINOPATHY: NORMAL
SEVERITY (EYE EXAM): NORMAL

## 2022-06-13 DIAGNOSIS — I27.82 CHRONIC SADDLE PULMONARY EMBOLISM WITHOUT ACUTE COR PULMONALE (HCC): ICD-10-CM

## 2022-06-13 DIAGNOSIS — I26.92 CHRONIC SADDLE PULMONARY EMBOLISM WITHOUT ACUTE COR PULMONALE (HCC): ICD-10-CM

## 2022-06-14 RX ORDER — APIXABAN 5 MG/1
5 TABLET, FILM COATED ORAL 2 TIMES DAILY
Qty: 60 TABLET | Refills: 0 | Status: SHIPPED | OUTPATIENT
Start: 2022-06-14 | End: 2022-06-21 | Stop reason: SDUPTHER

## 2022-06-15 RX ORDER — APIXABAN 5 MG/1
5 TABLET, FILM COATED ORAL 2 TIMES DAILY
Qty: 180 TABLET | Refills: 0 | Status: CANCELLED | OUTPATIENT
Start: 2022-06-15 | End: 2022-09-13

## 2022-06-17 DIAGNOSIS — I26.92 CHRONIC SADDLE PULMONARY EMBOLISM WITHOUT ACUTE COR PULMONALE (HCC): ICD-10-CM

## 2022-06-17 DIAGNOSIS — I27.82 CHRONIC SADDLE PULMONARY EMBOLISM WITHOUT ACUTE COR PULMONALE (HCC): ICD-10-CM

## 2022-06-17 RX ORDER — APIXABAN 5 MG/1
TABLET, FILM COATED ORAL
Qty: 180 TABLET | Refills: 3 | OUTPATIENT
Start: 2022-06-17

## 2022-06-17 RX ORDER — APIXABAN 5 MG/1
5 TABLET, FILM COATED ORAL 2 TIMES DAILY
Qty: 60 TABLET | Refills: 2 | Status: CANCELLED | OUTPATIENT
Start: 2022-06-17 | End: 2022-07-17

## 2022-06-20 NOTE — TELEPHONE ENCOUNTER
Pt called to says she [facility]    Last appt 12/16/21, next appt is 9/7/22

## 2022-06-21 DIAGNOSIS — I26.92 CHRONIC SADDLE PULMONARY EMBOLISM WITHOUT ACUTE COR PULMONALE (HCC): ICD-10-CM

## 2022-06-21 DIAGNOSIS — I27.82 CHRONIC SADDLE PULMONARY EMBOLISM WITHOUT ACUTE COR PULMONALE (HCC): ICD-10-CM

## 2022-06-22 RX ORDER — APIXABAN 5 MG/1
5 TABLET, FILM COATED ORAL 2 TIMES DAILY
Qty: 90 TABLET | Refills: 1 | Status: SHIPPED | OUTPATIENT
Start: 2022-06-22 | End: 2022-07-22

## 2022-06-29 ENCOUNTER — TELEMEDICINE (OUTPATIENT)
Dept: INTERNAL MEDICINE CLINIC | Facility: CLINIC | Age: 82
End: 2022-06-29
Payer: COMMERCIAL

## 2022-06-29 VITALS
SYSTOLIC BLOOD PRESSURE: 116 MMHG | TEMPERATURE: 98 F | HEART RATE: 80 BPM | HEIGHT: 65 IN | BODY MASS INDEX: 34.66 KG/M2 | OXYGEN SATURATION: 92 % | DIASTOLIC BLOOD PRESSURE: 62 MMHG | WEIGHT: 208 LBS

## 2022-06-29 DIAGNOSIS — J04.10 TRACHEITIS: Primary | ICD-10-CM

## 2022-06-29 PROCEDURE — 1036F TOBACCO NON-USER: CPT | Performed by: INTERNAL MEDICINE

## 2022-06-29 PROCEDURE — 3074F SYST BP LT 130 MM HG: CPT | Performed by: INTERNAL MEDICINE

## 2022-06-29 PROCEDURE — 1160F RVW MEDS BY RX/DR IN RCRD: CPT | Performed by: INTERNAL MEDICINE

## 2022-06-29 PROCEDURE — 3078F DIAST BP <80 MM HG: CPT | Performed by: INTERNAL MEDICINE

## 2022-06-29 PROCEDURE — 99213 OFFICE O/P EST LOW 20 MIN: CPT | Performed by: INTERNAL MEDICINE

## 2022-06-29 RX ORDER — AZITHROMYCIN 250 MG/1
TABLET, FILM COATED ORAL
Qty: 6 TABLET | Refills: 0 | Status: SHIPPED | OUTPATIENT
Start: 2022-06-29 | End: 2022-07-04

## 2022-06-29 NOTE — PROGRESS NOTES
Virtual Regular Visit    Verification of patient location:    Patient is located in the following state in which I hold an active license [place]      Assessment/Plan:    Problem List Items Addressed This Visit        Respiratory    Tracheitis - Primary    Relevant Medications    azithromycin (Zithromax) 250 mg tablet               Reason for visit is   Chief Complaint   Patient presents with    Virtual Brief Visit     mychart virtual , sore throat , chest congestion , headache , weak , coughing , body aches all negative covid test yesterday    Virtual Regular Visit        Encounter provider Suraj Fabian MD    Provider located at [address]  [address]  [facility] [id number] [phone]      Recent Visits  No visits were found meeting these conditions  Showing recent visits within past 7 days and meeting all other requirements  Today's Visits  Date Type Provider Dept   06/29/22 Telemedicine Suraj Fabian MD [facility]  [facility] Internal [facility]   Showing today's visits and meeting all other requirements  Future Appointments  No visits were found meeting these conditions  Showing future appointments within next 150 days and meeting all other requirements       The patient was identified by name and date of birth  Nieves Curling was informed that this is a telemedicine visit and that the visit is being conducted through [address] [place] [address] Now and patient was informed that this is a secure, HIPAA-compliant platform  She agrees to proceed     My office door was closed  No one else was in the room  She acknowledged consent and understanding of privacy and security of the video platform  The patient has agreed to participate and understands they can discontinue the visit at any time  Patient is aware this is a billable service  Subjective  Nieves Curling is a 80 y o  female sick         Sick, cough, congestion, feels it is going in the chest,       Past Medical History:   Diagnosis Date    Acquired hypothyroidism     Adenoma of left adrenal gland 02/27/2018    Adrenal gland disorder (HCC)     Anemia     Anxiety     Arthritis     At risk for falls     Diabetes mellitus ([address] )     Disease of thyroid gland     hypo    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    Edema of both legs     History of pulmonary embolus (PE)     Hyperlipidemia     Hypertension     Irritable bowel syndrome     Prediabetes     Presence of right artificial hip joint     Pulmonary embolus (HCC) 2/27/2018 & 2/14/2019    Uses roller walker     Wears glasses     Wears partial dentures     upper partial       Past Surgical History:   Procedure Laterality Date    CHOLECYSTECTOMY  1991    COLONOSCOPY  11/09/2017    HEMORRHOID SURGERY  1999    HYSTERECTOMY  1993    Vaginal    ID TOTAL HIP ARTHROPLASTY Right 10/22/2019    Procedure: ARTHROPLASTY HIP TOTAL;  Surgeon: Pretty Nicole DO;  Location: [facility];  Service: Orthopedics    TRIGGER FINGER RELEASE Right 1998       Current Outpatient Medications   Medication Sig Dispense Refill    acetaminophen (TYLENOL) 325 mg tablet Take 650 mg by mouth every 6 (six) hours as needed for mild pain      atorvastatin (LIPITOR) 10 mg tablet TAKE 1 TABLET DAILY AT BEDTIME 90 tablet 3    azithromycin (Zithromax) 250 mg tablet Take 2 tablets (500 mg total) by mouth daily for 1 day, THEN 1 tablet (250 mg total) daily for 4 days  6 tablet 0    Eliquis 5 MG Take 1 tablet (5 mg total) by mouth 2 (two) times a day 90 tablet 1    levothyroxine 25 mcg tablet Take 1 tablet (25 mcg total) by mouth daily 90 tablet 1    Multiple Vitamin (MULTIVITAMIN) tablet Take 1 tablet by mouth daily      Omega-3 Fatty Acids (FISH OIL PO) Take 1 capsule by mouth daily      valsartan-hydrochlorothiazide (DIOVAN-HCT) 160-12 5 MG per tablet TAKE 1 TABLET ONCE DAILY IN THE MORNING 90 tablet 3     No current facility-administered medications for this visit  Allergies   Allergen Reactions    Oxycodone Vomiting       Review of Systems   Constitutional: Negative for chills and fever  HENT: Positive for congestion and sore throat  Negative for ear pain  Eyes: Negative for pain  Respiratory: Positive for cough  Negative for shortness of breath  Cardiovascular: Negative for chest pain and leg swelling  Gastrointestinal: Negative for abdominal pain, nausea and vomiting  Endocrine: Negative for polyuria  Genitourinary: Negative for difficulty urinating, frequency and urgency  Musculoskeletal: Negative for arthralgias and back pain  Skin: Negative for rash  Neurological: Negative for weakness and headaches  Psychiatric/Behavioral: Negative for sleep disturbance  The patient is not nervous/anxious  Video Exam    Vitals:    06/29/22 0904   BP: 116/62   BP Location: Left arm   Patient Position: Sitting   Cuff Size: Adult   Pulse: 80   Temp: 98 °F (36 7 °C)   TempSrc: Temporal   SpO2: 92%   Weight: 94 3 kg (208 lb)   Height: 5' 4 5" (1 638 m)       Physical Exam  Eyes:      Extraocular Movements: Extraocular movements intact  Conjunctiva/sclera: Conjunctivae normal    Neurological:      General: No focal deficit present  Mental Status: She is alert  I spent 20 minutes directly with the patient during this visit    VIRTUAL VISIT [id number] verbally agrees to participate in [employer]  Pt is aware that [employer] could be limited without vital signs or the ability to perform a full hands-on physical exam  Lubna Bronson understands she or the provider may request at any time to terminate the video visit and request the patient to seek care or treatment in person

## 2022-07-27 ENCOUNTER — RA CDI HCC (OUTPATIENT)
Dept: OTHER | Facility: HOSPITAL | Age: 82
End: 2022-07-27

## 2022-07-27 NOTE — PROGRESS NOTES
Jeannette [facility]  coding opportunities          Chart Reviewed number of suggestions sent to Provider: 1     Patients Insurance     Medicare Insurance: Sabas Zambrano

## 2022-09-07 ENCOUNTER — OFFICE VISIT (OUTPATIENT)
Dept: INTERNAL MEDICINE CLINIC | Facility: CLINIC | Age: 82
End: 2022-09-07
Payer: COMMERCIAL

## 2022-09-07 VITALS
OXYGEN SATURATION: 95 % | BODY MASS INDEX: 36.57 KG/M2 | TEMPERATURE: 98.1 F | SYSTOLIC BLOOD PRESSURE: 142 MMHG | DIASTOLIC BLOOD PRESSURE: 84 MMHG | HEIGHT: 64 IN | WEIGHT: 214.2 LBS | HEART RATE: 90 BPM

## 2022-09-07 DIAGNOSIS — I87.2 VENOUS INSUFFICIENCY OF BOTH LOWER EXTREMITIES: ICD-10-CM

## 2022-09-07 DIAGNOSIS — I27.82 CHRONIC SADDLE PULMONARY EMBOLISM WITHOUT ACUTE COR PULMONALE (HCC): ICD-10-CM

## 2022-09-07 DIAGNOSIS — I10 ESSENTIAL HYPERTENSION, BENIGN: ICD-10-CM

## 2022-09-07 DIAGNOSIS — M17.0 PRIMARY OSTEOARTHRITIS OF BOTH KNEES: ICD-10-CM

## 2022-09-07 DIAGNOSIS — E03.9 HYPOTHYROIDISM (ACQUIRED): ICD-10-CM

## 2022-09-07 DIAGNOSIS — E66.01 OBESITY, MORBID (HCC): ICD-10-CM

## 2022-09-07 DIAGNOSIS — E78.2 MIXED HYPERLIPIDEMIA: ICD-10-CM

## 2022-09-07 DIAGNOSIS — I26.92 CHRONIC SADDLE PULMONARY EMBOLISM WITHOUT ACUTE COR PULMONALE (HCC): ICD-10-CM

## 2022-09-07 DIAGNOSIS — E11.9 DIABETES MELLITUS WITHOUT COMPLICATION (HCC): Primary | ICD-10-CM

## 2022-09-07 PROCEDURE — 3077F SYST BP >= 140 MM HG: CPT | Performed by: INTERNAL MEDICINE

## 2022-09-07 PROCEDURE — 3725F SCREEN DEPRESSION PERFORMED: CPT | Performed by: INTERNAL MEDICINE

## 2022-09-07 PROCEDURE — 3079F DIAST BP 80-89 MM HG: CPT | Performed by: INTERNAL MEDICINE

## 2022-09-07 PROCEDURE — 1160F RVW MEDS BY RX/DR IN RCRD: CPT | Performed by: INTERNAL MEDICINE

## 2022-09-07 PROCEDURE — 99214 OFFICE O/P EST MOD 30 MIN: CPT | Performed by: INTERNAL MEDICINE

## 2022-09-07 RX ORDER — AMOXICILLIN 500 MG/1
CAPSULE ORAL
COMMUNITY
Start: 2022-08-01

## 2022-09-07 NOTE — PROGRESS NOTES
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)    6  Venous insufficiency of both lower extremities    7  Primary osteoarthritis of both knees    8  Obesity, morbid ([address] )         Subjective:      Patient ID: Noris Gold is a 80 y o  female  Follow-up on multiple medical problems to ensure the stable on current medications      The following portions of the patient's history were reviewed and updated as appropriate: She  has a past medical history of Acquired hypothyroidism, 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), Adrenal gland disorder ([address] ), Anemia, Anxiety, Arthritis, At risk for falls, Diabetes mellitus ([address] ), Disease of thyroid gland, DJD (degenerative joint disease) of knee, Edema of both legs, History of pulmonary embolus (PE), Hyperlipidemia, Hypertension, Irritable bowel syndrome, Prediabetes, Presence of right artificial hip joint, Pulmonary embolus ([address] ) (2/27/2018 & 2/14/2019), Uses roller walker, Wears glasses, and Wears partial dentures    She   Patient Active Problem List    Diagnosis Date Noted    Primary osteoarthritis of both knees 09/07/2022    Tracheitis 06/29/2022    Medicare annual wellness visit, subsequent 07/07/2021    Obesity, morbid ([address] ) 05/05/2021    Essential hypertension, benign 05/05/2021    Venous insufficiency of both lower extremities 12/09/2020    Diabetes mellitus without complication ([facility] ) 46/74/7591    Hypertension     Irritable bowel syndrome     Edema of both legs     Acquired hypothyroidism     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, without long-term current use of insulin ([facility] )     History of pulmonary embolus (PE)     Pulmonary embolus (HCC)     Presence of right artificial hip joint     Acute blood loss anemia 10/25/2019    Nausea 10/25/2019    Hypothyroid 10/23/2019    Mixed hyperlipidemia 10/23/2019    Essential hypertension 10/23/2019    History of hypercoagulable state 10/09/2019    Primary osteoarthritis of one hip, right 08/09/2019    Primary osteoarthritis of right hip      She  has a past surgical history that includes Cholecystectomy (1991); Colonoscopy (11/09/2017); pr total hip arthroplasty (Right, 10/22/2019); Hysterectomy (1993); Hemorrhoid surgery (1999); and Trigger finger release (Right, 1998)  Her family history includes Hypertension in her family and mother  She  reports that she has never smoked  She has never used smokeless tobacco  She reports previous alcohol use  She reports previous drug use    Current Outpatient Medications   Medication Sig Dispense Refill    acetaminophen (TYLENOL) 325 mg tablet Take 650 mg by mouth every 6 (six) hours as needed for mild pain      amoxicillin (AMOXIL) 500 mg capsule TAKE ALL 4 CAPSULES ONE HOUR PRIOR TO APPOINTMENT      atorvastatin (LIPITOR) 10 mg tablet TAKE 1 TABLET DAILY AT BEDTIME 90 tablet 3    Eliquis 5 MG Take 1 tablet (5 mg total) by mouth 2 (two) times a day 90 tablet 1    levothyroxine 25 mcg tablet Take 1 tablet (25 mcg total) by mouth daily 90 tablet 1    Multiple Vitamin (MULTIVITAMIN) tablet Take 1 tablet by mouth daily      Omega-3 Fatty Acids (FISH OIL PO) Take 1 capsule by mouth daily      valsartan-hydrochlorothiazide (DIOVAN-HCT) 160-12 5 MG per tablet TAKE 1 TABLET ONCE DAILY IN THE MORNING 90 tablet 3     No current facility-administered medications for this visit  Current Outpatient Medications on File Prior to Visit   Medication Sig    acetaminophen (TYLENOL) 325 mg tablet Take 650 mg by mouth every 6 (six) hours as needed for mild pain    amoxicillin (AMOXIL) 500 mg capsule TAKE ALL 4 CAPSULES ONE HOUR PRIOR TO APPOINTMENT    atorvastatin (LIPITOR) 10 mg tablet TAKE 1 TABLET DAILY AT BEDTIME    Eliquis 5 MG Take 1 tablet (5 mg total) by mouth 2 (two) times a day    levothyroxine 25 mcg tablet Take 1 tablet (25 mcg total) by mouth daily    Multiple Vitamin (MULTIVITAMIN) tablet Take 1 tablet by mouth daily    Omega-3 Fatty Acids (FISH OIL PO) Take 1 capsule by mouth daily    valsartan-hydrochlorothiazide (DIOVAN-HCT) 160-12 5 MG per tablet TAKE 1 TABLET ONCE DAILY IN THE MORNING     No current facility-administered medications on file prior to visit  She is allergic to oxycodone       Review of Systems   Constitutional: Negative for chills and fever  HENT: Negative for congestion, ear pain and sore throat  Eyes: Negative for pain  Respiratory: Negative for cough and shortness of breath  Cardiovascular: Negative for chest pain and leg swelling  Gastrointestinal: Negative for abdominal pain, nausea and vomiting  Endocrine: Negative for polyuria  Genitourinary: Negative for difficulty urinating, frequency and urgency  Musculoskeletal: Positive for arthralgias  Negative for back pain  Skin: Negative for rash  Neurological: Negative for weakness and headaches  Psychiatric/Behavioral: Negative for sleep disturbance  The patient is not nervous/anxious  Objective:      /84 (BP Location: Left arm, Patient Position: Sitting, Cuff Size: Large)   Pulse 90   Temp 98 1 °F (36 7 °C) (Temporal)   Ht 5' 4" (1 626 m)   Wt 97 2 kg (214 lb 3 2 oz)   SpO2 95%   BMI 36 77 kg/m²     No results found for this or any previous visit (from the past 1344 hour(s))       Physical Exam  Constitutional:       Appearance: Normal appearance  HENT:      Head: Normocephalic  Right Ear: Tympanic membrane, ear canal and external ear normal       Left Ear: Tympanic membrane, ear canal and external ear normal       Nose: Nose normal  No congestion  Mouth/Throat:      Mouth: Mucous membranes are moist       Pharynx: Oropharynx is clear  No oropharyngeal exudate or posterior oropharyngeal erythema  Eyes:      Extraocular Movements: Extraocular movements intact  Conjunctiva/sclera: Conjunctivae normal       Pupils: Pupils are equal, round, and reactive to light  Cardiovascular:      Rate and Rhythm: Normal rate and regular rhythm  Heart sounds: Normal heart sounds  No murmur heard  Pulmonary:      Effort: Pulmonary effort is normal       Breath sounds: Normal breath sounds  No wheezing or rales  Abdominal:      General: Bowel sounds are normal  There is no distension  Palpations: Abdomen is soft  Tenderness: There is no abdominal tenderness  Musculoskeletal:         General: Normal range of motion  Cervical back: Normal range of motion and neck supple  Right lower leg: No edema  Left lower leg: No edema  Lymphadenopathy:      Cervical: No cervical adenopathy  Skin:     General: Skin is warm  Neurological:      General: No focal deficit present  Mental Status: She is alert and oriented to person, place, and time

## 2022-09-07 NOTE — PROGRESS NOTES
Diabetic Foot Exam    Patient's shoes and socks removed  Right Foot/Ankle   Right Foot Inspection  Skin Exam: skin normal, skin intact, dry skin, callus and callus  No warmth, no erythema, no maceration, no abnormal color, no pre-ulcer and no ulcer  Toe Exam: ROM and strength within normal limits  No swelling, no tenderness, erythema and  no right toe deformity    Sensory   Monofilament testing: intact    Vascular  Capillary refills: < 3 seconds  The right DP pulse is 2+  The right PT pulse is 2+  Left Foot/Ankle  Left Foot Inspection  Skin Exam: skin normal, skin intact, dry skin and callus  No warmth, no erythema, no maceration, normal color, no pre-ulcer and no ulcer  Toe Exam: No swelling, no tenderness, no erythema and no left toe deformity  Sensory   Monofilament testing: intact    Vascular  Capillary refills: < 3 seconds  The left DP pulse is 2+  The left PT pulse is 2+       Assign Risk Category  No deformity present  No loss of protective sensation  No weak pulses  Risk: 0

## 2022-09-08 ENCOUNTER — TELEPHONE (OUTPATIENT)
Dept: ADMINISTRATIVE | Facility: OTHER | Age: 82
End: 2022-09-08

## 2022-09-08 NOTE — LETTER
Diabetic Eye Exam Form    Date Requested: 22  Patient: Laura Flannery  Patient : 1940   Referring Provider: Kodak Ann MD    DIABETIC Eye Exam Date _______________________________    Type of Exam MUST be documented for Diabetic Eye Exams  Please CHECK ONE  Retinal Exam       Dilated Retinal Exam       OCT       Optomap-Iris Exam      Fundus Photography     Left Eye - Please check Retinopathy AND Type or No Retinopathy      Exam did show retinopathy    Exam did not show retinopathy         Mild     Proliferative           Moderate    Severe            None         Right Eye - Please check Retinopathy AND Type or No Retinopathy     Exam did show retinopathy    Exam did not show retinopathy         Mild     Proliferative        Moderate    Severe        None       Comments __________________________________________________________    Practice Providing Exam ______________________________________________    Exam Performed By (print name) _______________________________________      Provider Signature ___________________________________________________    These reports are needed for  compliance  Please fax this completed form and a copy of the Diabetic Eye Exam report to our office located at [place] [address] as soon as possible via [phone] attention [place]: Phone [phone]  We thank you for your assistance in treating our mutual patient

## 2022-09-08 NOTE — TELEPHONE ENCOUNTER
Upon review of the In Basket request and the patient's chart, initial outreach has been made via fax, please see Contacts section for details       Thank you  Adina Wright

## 2022-09-08 NOTE — TELEPHONE ENCOUNTER
----- Message from Ana Rosa Rubin sent at 9/7/2022 11:40 AM EDT -----  09/07/22 11:41 AM    09/07/22 11:41 AM    Hello, our patient Mahad Lou has had Diabetic Eye Exam completed/performed  Please assist in updating the patient chart by making an External outreach to [facility]  [facility] facility located in [address] [place] [address] The date of service is 06/2022      Thank you,  Ana Rosa Rubin  [facility] INTERNAL MED

## 2022-09-13 NOTE — TELEPHONE ENCOUNTER
Upon review of the In Basket request we were able to locate, review, and update the patient chart as requested for Diabetic Eye Exam     Any additional questions or concerns should be emailed to the Practice Liaisons via [email or web address] email, please do not reply via In Basket      Thank you  Eugene Brown

## 2022-10-12 PROBLEM — Z00.00 MEDICARE ANNUAL WELLNESS VISIT, SUBSEQUENT: Status: RESOLVED | Noted: 2021-07-07 | Resolved: 2022-10-12

## 2022-10-14 DIAGNOSIS — I26.92 CHRONIC SADDLE PULMONARY EMBOLISM WITHOUT ACUTE COR PULMONALE (HCC): ICD-10-CM

## 2022-10-14 DIAGNOSIS — I27.82 CHRONIC SADDLE PULMONARY EMBOLISM WITHOUT ACUTE COR PULMONALE (HCC): ICD-10-CM

## 2022-10-14 RX ORDER — APIXABAN 5 MG/1
5 TABLET, FILM COATED ORAL 2 TIMES DAILY
Qty: 90 TABLET | Refills: 1 | Status: SHIPPED | OUTPATIENT
Start: 2022-10-14 | End: 2022-11-13

## 2022-11-10 ENCOUNTER — TELEPHONE (OUTPATIENT)
Dept: INTERNAL MEDICINE CLINIC | Facility: CLINIC | Age: 82
End: 2022-11-10

## 2022-12-11 ENCOUNTER — RA CDI HCC (OUTPATIENT)
Dept: OTHER | Facility: HOSPITAL | Age: 82
End: 2022-12-11

## 2022-12-11 NOTE — PROGRESS NOTES
[facility]  coding opportunities          Chart Reviewed number of suggestions sent to Provider: 1     Patients Insurance     Medicare Insurance: Sabas Zambrano

## 2022-12-13 ENCOUNTER — APPOINTMENT (OUTPATIENT)
Dept: LAB | Age: 82
End: 2022-12-13

## 2022-12-13 DIAGNOSIS — E11.9 DIABETES MELLITUS WITHOUT COMPLICATION (HCC): ICD-10-CM

## 2022-12-13 LAB
ALBUMIN SERPL BCP-MCNC: 3.7 G/DL (ref 3.5–5)
ALP SERPL-CCNC: 94 U/L (ref 46–116)
ALT SERPL W P-5'-P-CCNC: 64 U/L (ref 12–78)
ANION GAP SERPL CALCULATED.3IONS-SCNC: 7 MMOL/L (ref 4–13)
AST SERPL W P-5'-P-CCNC: 37 U/L (ref 5–45)
BASOPHILS # BLD AUTO: 0.02 THOUSANDS/ÂΜL (ref 0–0.1)
BASOPHILS NFR BLD AUTO: 0 % (ref 0–1)
BILIRUB SERPL-MCNC: 0.64 MG/DL (ref 0.2–1)
BUN SERPL-MCNC: 13 MG/DL (ref 5–25)
CALCIUM SERPL-MCNC: 9.6 MG/DL (ref 8.3–10.1)
CHLORIDE SERPL-SCNC: 99 MMOL/L (ref 96–108)
CHOLEST SERPL-MCNC: 158 MG/DL
CO2 SERPL-SCNC: 26 MMOL/L (ref 21–32)
CREAT SERPL-MCNC: 0.88 MG/DL (ref 0.6–1.3)
CREAT UR-MCNC: 197 MG/DL
EOSINOPHIL # BLD AUTO: 0.08 THOUSAND/ÂΜL (ref 0–0.61)
EOSINOPHIL NFR BLD AUTO: 1 % (ref 0–6)
ERYTHROCYTE [DISTWIDTH] IN BLOOD BY AUTOMATED COUNT: 12.3 % (ref 11.6–15.1)
GFR SERPL CREATININE-BSD FRML MDRD: 62 ML/MIN/1.73SQ M
GLUCOSE P FAST SERPL-MCNC: 167 MG/DL (ref 65–99)
HCT VFR BLD AUTO: 43.7 % (ref 34.8–46.1)
HDLC SERPL-MCNC: 48 MG/DL
HGB BLD-MCNC: 14.5 G/DL (ref 11.5–15.4)
IMM GRANULOCYTES # BLD AUTO: 0.02 THOUSAND/UL (ref 0–0.2)
IMM GRANULOCYTES NFR BLD AUTO: 0 % (ref 0–2)
LDLC SERPL CALC-MCNC: 80 MG/DL (ref 0–100)
LYMPHOCYTES # BLD AUTO: 2.11 THOUSANDS/ÂΜL (ref 0.6–4.47)
LYMPHOCYTES NFR BLD AUTO: 28 % (ref 14–44)
MCH RBC QN AUTO: 31.3 PG (ref 26.8–34.3)
MCHC RBC AUTO-ENTMCNC: 33.2 G/DL (ref 31.4–37.4)
MCV RBC AUTO: 94 FL (ref 82–98)
MICROALBUMIN UR-MCNC: 164 MG/L (ref 0–20)
MICROALBUMIN/CREAT 24H UR: 83 MG/G CREATININE (ref 0–30)
MONOCYTES # BLD AUTO: 0.63 THOUSAND/ÂΜL (ref 0.17–1.22)
MONOCYTES NFR BLD AUTO: 9 % (ref 4–12)
NEUTROPHILS # BLD AUTO: 4.57 THOUSANDS/ÂΜL (ref 1.85–7.62)
NEUTS SEG NFR BLD AUTO: 62 % (ref 43–75)
NRBC BLD AUTO-RTO: 0 /100 WBCS
PLATELET # BLD AUTO: 333 THOUSANDS/UL (ref 149–390)
PMV BLD AUTO: 9.7 FL (ref 8.9–12.7)
POTASSIUM SERPL-SCNC: 3.8 MMOL/L (ref 3.5–5.3)
PROT SERPL-MCNC: 7.7 G/DL (ref 6.4–8.4)
RBC # BLD AUTO: 4.64 MILLION/UL (ref 3.81–5.12)
SODIUM SERPL-SCNC: 132 MMOL/L (ref 135–147)
TRIGL SERPL-MCNC: 151 MG/DL
TSH SERPL DL<=0.05 MIU/L-ACNC: 3.72 UIU/ML (ref 0.45–4.5)
WBC # BLD AUTO: 7.43 THOUSAND/UL (ref 4.31–10.16)

## 2022-12-14 LAB
EST. AVERAGE GLUCOSE BLD GHB EST-MCNC: 171 MG/DL
HBA1C MFR BLD: 7.6 %

## 2022-12-19 ENCOUNTER — OFFICE VISIT (OUTPATIENT)
Dept: INTERNAL MEDICINE CLINIC | Facility: CLINIC | Age: 82
End: 2022-12-19

## 2022-12-19 VITALS
HEART RATE: 97 BPM | OXYGEN SATURATION: 98 % | DIASTOLIC BLOOD PRESSURE: 82 MMHG | HEIGHT: 65 IN | SYSTOLIC BLOOD PRESSURE: 142 MMHG | WEIGHT: 210 LBS | BODY MASS INDEX: 34.99 KG/M2 | TEMPERATURE: 98.8 F

## 2022-12-19 DIAGNOSIS — E03.9 HYPOTHYROIDISM (ACQUIRED): ICD-10-CM

## 2022-12-19 DIAGNOSIS — I26.92 CHRONIC SADDLE PULMONARY EMBOLISM WITHOUT ACUTE COR PULMONALE (HCC): ICD-10-CM

## 2022-12-19 DIAGNOSIS — E78.2 MIXED HYPERLIPIDEMIA: ICD-10-CM

## 2022-12-19 DIAGNOSIS — M17.0 PRIMARY OSTEOARTHRITIS OF BOTH KNEES: ICD-10-CM

## 2022-12-19 DIAGNOSIS — E11.9 DIABETES MELLITUS WITHOUT COMPLICATION (HCC): ICD-10-CM

## 2022-12-19 DIAGNOSIS — I10 ESSENTIAL HYPERTENSION, BENIGN: ICD-10-CM

## 2022-12-19 DIAGNOSIS — Z13.820 SCREENING FOR OSTEOPOROSIS: Primary | ICD-10-CM

## 2022-12-19 DIAGNOSIS — E66.01 OBESITY, MORBID (HCC): ICD-10-CM

## 2022-12-19 DIAGNOSIS — Z00.00 MEDICARE ANNUAL WELLNESS VISIT, SUBSEQUENT: ICD-10-CM

## 2022-12-19 DIAGNOSIS — I87.2 VENOUS INSUFFICIENCY OF BOTH LOWER EXTREMITIES: ICD-10-CM

## 2022-12-19 DIAGNOSIS — I27.82 CHRONIC SADDLE PULMONARY EMBOLISM WITHOUT ACUTE COR PULMONALE (HCC): ICD-10-CM

## 2022-12-19 NOTE — PROGRESS NOTES
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)    5  Essential hypertension, benign    6  Venous insufficiency of both lower extremities    7  Hypothyroidism (acquired)    8  Primary osteoarthritis of both knees    9  Obesity, morbid ([address] )    10  Medicare annual wellness visit, subsequent         Subjective:      Patient ID: Yuri Norwood is a [de-identified] y o  female  Follow-up on blood test on 12/13/2022 test discussed with her, also medical wellness exam      The following portions of the patient's history were reviewed and updated as appropriate: She  has a past medical history of Acquired hypothyroidism, 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), Adrenal gland disorder ([address] ), Anemia, Anxiety, Arthritis, At risk for falls, Diabetes mellitus ([address] ), Disease of thyroid gland, DJD (degenerative joint disease) of knee, Edema of both legs, History of pulmonary embolus (PE), Hyperlipidemia, Hypertension, Irritable bowel syndrome, Prediabetes, Presence of right artificial hip joint, Pulmonary embolus ([address] ) (2/27/2018 & 2/14/2019), Uses roller walker, Wears glasses, and Wears partial dentures    She   Patient Active Problem List    Diagnosis Date Noted   • Primary osteoarthritis of both knees 09/07/2022   • Tracheitis 06/29/2022   • Obesity, morbid ([address] ) 05/05/2021   • Essential hypertension, benign 05/05/2021   • Venous insufficiency of both lower extremities 12/09/2020   • Diabetes mellitus without complication ([address] ) 91/42/2959   • Hypertension    • Irritable bowel syndrome    • Edema of both legs    • Acquired hypothyroidism    • 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, without long-term current use of insulin (HCC)    • History of pulmonary embolus (PE)    • Pulmonary embolus (HCC)    • Presence of right artificial hip joint    • Acute blood loss anemia 10/25/2019   • Nausea 10/25/2019   • Hypothyroid 10/23/2019   • Mixed hyperlipidemia 10/23/2019   • Essential hypertension 10/23/2019   • History of hypercoagulable state 10/09/2019   • Primary osteoarthritis of one hip, right 08/09/2019   • Primary osteoarthritis of right hip      She  has a past surgical history that includes Cholecystectomy (1991); Colonoscopy (11/09/2017); pr arthrp acetblr/prox fem prostc agrft/algrft (Right, 10/22/2019); Hysterectomy (1993); Hemorrhoid surgery (1999); and Trigger finger release (Right, 1998)  Her family history includes Hypertension in her family and mother  She  reports that she has never smoked  She has never used smokeless tobacco  She reports that she does not currently use alcohol  She reports that she does not currently use drugs  Current Outpatient Medications   Medication Sig Dispense Refill   • acetaminophen (TYLENOL) 325 mg tablet Take 650 mg by mouth every 6 (six) hours as needed for mild pain     • atorvastatin (LIPITOR) 10 mg tablet TAKE 1 TABLET DAILY AT BEDTIME 90 tablet 3   • Eliquis 5 MG Take 1 tablet (5 mg total) by mouth 2 (two) times a day 90 tablet 1   • levothyroxine 25 mcg tablet Take 1 tablet (25 mcg total) by mouth daily 90 tablet 1   • Multiple Vitamin (MULTIVITAMIN) tablet Take 1 tablet by mouth daily     • Omega-3 Fatty Acids (FISH OIL PO) Take 1 capsule by mouth daily     • valsartan-hydrochlorothiazide (DIOVAN-HCT) 160-12 5 MG per tablet TAKE 1 TABLET ONCE DAILY IN THE MORNING 90 tablet 3   • amoxicillin (AMOXIL) 500 mg capsule TAKE ALL 4 CAPSULES ONE HOUR PRIOR TO APPOINTMENT (Patient not taking: Reported on 12/19/2022)       No current facility-administered medications for this visit       Current Outpatient Medications on File Prior to Visit   Medication Sig   • acetaminophen (TYLENOL) 325 mg tablet Take 650 mg by mouth every 6 (six) hours as needed for mild pain   • atorvastatin (LIPITOR) 10 mg tablet TAKE 1 TABLET DAILY AT BEDTIME   • Eliquis 5 MG Take 1 tablet (5 mg total) by mouth 2 (two) times a day   • levothyroxine 25 mcg tablet Take 1 tablet (25 mcg total) by mouth daily   • Multiple Vitamin (MULTIVITAMIN) tablet Take 1 tablet by mouth daily   • Omega-3 Fatty Acids (FISH OIL PO) Take 1 capsule by mouth daily   • valsartan-hydrochlorothiazide (DIOVAN-HCT) 160-12 5 MG per tablet TAKE 1 TABLET ONCE DAILY IN THE MORNING   • amoxicillin (AMOXIL) 500 mg capsule TAKE ALL 4 CAPSULES ONE HOUR PRIOR TO APPOINTMENT (Patient not taking: Reported on 12/19/2022)     No current facility-administered medications on file prior to visit  She is allergic to oxycodone       Review of Systems   Constitutional: Negative for chills and fever  HENT: Negative for congestion, ear pain and sore throat  Eyes: Negative for pain  Respiratory: Negative for cough and shortness of breath  Cardiovascular: Negative for chest pain and leg swelling  Gastrointestinal: Negative for abdominal pain, nausea and vomiting  Endocrine: Negative for polyuria  Genitourinary: Negative for difficulty urinating, frequency and urgency  Musculoskeletal: Positive for arthralgias and back pain  Skin: Negative for rash  Neurological: Negative for weakness and headaches  Psychiatric/Behavioral: Negative for sleep disturbance  The patient is not nervous/anxious  Objective:      /82 (BP Location: Left arm, Patient Position: Sitting, Cuff Size: Standard)   Pulse 97   Temp 98 8 °F (37 1 °C) (Temporal)   Ht 5' 4 5" (1 638 m)   Wt 95 3 kg (210 lb)   SpO2 98%   BMI 35 49 kg/m²     Recent Results (from the past 1344 hour(s))   Microalbumin / creatinine urine ratio    Collection Time: 12/13/22 10:45 AM   Result Value Ref Range    Creatinine, Ur 197 0 mg/dL    Microalbum  ,U,Random 164 0 (H) 0 0 - 20 0 mg/L    Microalb Creat Ratio 83 (H) 0 - 30 mg/g creatinine   CBC and differential    Collection Time: 12/13/22 10:45 AM   Result Value Ref Range    WBC 7 43 4 31 - 10 16 Thousand/uL    RBC 4 64 3 81 - 5 12 Million/uL    Hemoglobin 14 5 11 5 - 15 4 g/dL    Hematocrit 43 7 34 8 - 46 1 %    MCV 94 82 - 98 fL    MCH 31 3 26 8 - 34 3 pg    MCHC 33 2 31 4 - 37 4 g/dL    RDW 12 3 11 6 - 15 1 %    MPV 9 7 8 9 - 12 7 fL    Platelets 769 667 - 615 Thousands/uL    nRBC 0 /100 WBCs    Neutrophils Relative 62 43 - 75 %    Immat GRANS % 0 0 - 2 %    Lymphocytes Relative 28 14 - 44 %    Monocytes Relative 9 4 - 12 %    Eosinophils Relative 1 0 - 6 %    Basophils Relative 0 0 - 1 %    Neutrophils Absolute 4 57 1 85 - 7 62 Thousands/µL    Immature Grans Absolute 0 02 0 00 - 0 20 Thousand/uL    Lymphocytes Absolute 2 11 0 60 - 4 47 Thousands/µL    Monocytes Absolute 0 63 0 17 - 1 22 Thousand/µL    Eosinophils Absolute 0 08 0 00 - 0 61 Thousand/µL    Basophils Absolute 0 02 0 00 - 0 10 Thousands/µL   Comprehensive metabolic panel    Collection Time: 12/13/22 10:45 AM   Result Value Ref Range    Sodium 132 (L) 135 - 147 mmol/L    Potassium 3 8 3 5 - 5 3 mmol/L    Chloride 99 96 - 108 mmol/L    CO2 26 21 - 32 mmol/L    ANION GAP 7 4 - 13 mmol/L    BUN 13 5 - 25 mg/dL    Creatinine 0 88 0 60 - 1 30 mg/dL    Glucose, Fasting 167 (H) 65 - 99 mg/dL    Calcium 9 6 8 3 - 10 1 mg/dL    AST 37 5 - 45 U/L    ALT 64 12 - 78 U/L    Alkaline Phosphatase 94 46 - 116 U/L    Total Protein 7 7 6 4 - 8 4 g/dL    Albumin 3 7 3 5 - 5 0 g/dL    Total Bilirubin 0 64 0 20 - 1 00 mg/dL    eGFR 62 ml/min/1 73sq m   Hemoglobin A1C    Collection Time: 12/13/22 10:45 AM   Result Value Ref Range    Hemoglobin A1C 7 6 (H) Normal 3 8-5 6%; PreDiabetic 5 7-6 4%;  Diabetic >=6 5%; Glycemic control for adults with diabetes <7 0% %     mg/dl   Lipid Panel with Direct LDL reflex    Collection Time: 12/13/22 10:45 AM   Result Value Ref Range    Cholesterol 158 See Comment mg/dL    Triglycerides 151 (H) See Comment mg/dL    HDL, Direct 48 (L) >=50 mg/dL    LDL Calculated 80 0 - 100 mg/dL   TSH, 3rd generation    Collection Time: 12/13/22 10:45 AM   Result Value Ref Range    TSH 3RD GENERATON 3 720 0 450 - 4 500 uIU/mL        Physical Exam  Constitutional:       Appearance: Normal appearance  HENT:      Head: Normocephalic  Right Ear: Tympanic membrane, ear canal and external ear normal       Left Ear: Tympanic membrane, ear canal and external ear normal       Nose: Nose normal  No congestion  Mouth/Throat:      Mouth: Mucous membranes are moist       Pharynx: Oropharynx is clear  No oropharyngeal exudate or posterior oropharyngeal erythema  Eyes:      Extraocular Movements: Extraocular movements intact  Conjunctiva/sclera: Conjunctivae normal       Pupils: Pupils are equal, round, and reactive to light  Cardiovascular:      Rate and Rhythm: Normal rate and regular rhythm  Heart sounds: Normal heart sounds  No murmur heard  Pulmonary:      Effort: Pulmonary effort is normal       Breath sounds: Normal breath sounds  No wheezing or rales  Abdominal:      General: Bowel sounds are normal  There is no distension  Palpations: Abdomen is soft  Tenderness: There is no abdominal tenderness  Musculoskeletal:         General: Normal range of motion  Cervical back: Normal range of motion and neck supple  Right lower leg: No edema  Left lower leg: No edema  Lymphadenopathy:      Cervical: No cervical adenopathy  Skin:     General: Skin is warm  Neurological:      General: No focal deficit present  Mental Status: She is alert and oriented to person, place, and time           Answers for HPI/ROS submitted by the patient on 12/19/2022  How would you rate your overall health?: good  Compared to last year, how is your physical health?: same  In general, how satisfied are you with your life?: very satisfied  Compared to last year, how is your eyesight?: same  Compared to last year, how is your hearing?: same  Compared to last year, how is your emotional/mental health?: same  How often is anger a problem for you?: never, rarely  How often do you feel unusually tired/fatigued?: sometimes  In the past 7 days, how much pain have you experienced?: some  If you answered "some" or "a lot", please rate the severity of your pain on a scale of 1 to 10 (1 being the least severe pain and 10 being the most intense pain)  : 4/10  In the past 6 months, have you lost or gained 10 pounds without trying?: No  One or more falls in the last year: No  In the past 6 months, have you accidentally leaked urine?: Yes  Do you have trouble with the stairs inside or outside your home?: Yes  Does your home have working smoke alarms?: Yes  Does your home have a carbon monoxide monitor?: No  Which safety hazards (if any) have you experienced in your home? Please select all that apply : loose rugs on the floor  How would you describe your current diet?  Please select all that apply : Regular  In addition to prescription medications, are you taking any over-the-counter supplements?: Yes  If yes, what supplements are you taking?: Vitamins, fish oil  Can you manage your medications?: Yes  Are you currently taking any opioid medications?: No  Can you walk and transfer into and out of your bed and chair?: Yes  Can you dress and groom yourself?: Yes  Can you bathe or shower yourself?: Yes  Can you feed yourself?: Yes  Can you do your laundry/ housekeeping?: Yes  Can you manage your money, pay your bills, and track your expenses?: Yes  Can you make your own meals?: Yes  Can you do your own shopping?: Yes  Within the last 12 months, have you had any hospitalizations or Emergency Department visits?: No  Do you have a living will?: Yes  Do you have a Durable POA (Power of ) for healthcare decisions?: Yes  Do you have an Advanced Directive for end of life decisions?: Yes  How often have you used an illegal drug (including marijuana) or a prescription medication for non-medical reasons in the past year?: never  What is the typical number of drinks you consume in a day?: 0  What is the typical number of drinks you consume in a week?: 0  How often did you have a drink containing alcohol in the past year?: monthly or less  How many drinks did you have on a typical day  when you were drinking in the past year?: 0  How often did you have 6 or more drinks on one occasion in the past year?: never

## 2022-12-19 NOTE — PROGRESS NOTES
Answers for HPI/ROS submitted by the patient on 12/19/2022  How would you rate your overall health?: good  Compared to last year, how is your physical health?: same  In general, how satisfied are you with your life?: very satisfied  Compared to last year, how is your eyesight?: same  Compared to last year, how is your hearing?: same  Compared to last year, how is your emotional/mental health?: same  How often is anger a problem for you?: never, rarely  How often do you feel unusually tired/fatigued?: sometimes  In the past 7 days, how much pain have you experienced?: some  If you answered "some" or "a lot", please rate the severity of your pain on a scale of 1 to 10 (1 being the least severe pain and 10 being the most intense pain)  : 4/10  In the past 6 months, have you lost or gained 10 pounds without trying?: No  One or more falls in the last year: No  In the past 6 months, have you accidentally leaked urine?: Yes  Do you have trouble with the stairs inside or outside your home?: Yes  Does your home have working smoke alarms?: Yes  Does your home have a carbon monoxide monitor?: No  Which safety hazards (if any) have you experienced in your home? Please select all that apply : loose rugs on the floor  How would you describe your current diet?  Please select all that apply : Regular  In addition to prescription medications, are you taking any over-the-counter supplements?: Yes  If yes, what supplements are you taking?: Vitamins, fish oil  Can you manage your medications?: Yes  Are you currently taking any opioid medications?: No  Can you walk and transfer into and out of your bed and chair?: Yes  Can you dress and groom yourself?: Yes  Can you bathe or shower yourself?: Yes  Can you feed yourself?: Yes  Can you do your laundry/ housekeeping?: Yes  Can you manage your money, pay your bills, and track your expenses?: Yes  Can you make your own meals?: Yes  Can you do your own shopping?: Yes  Within the last 12 months, have you had any hospitalizations or Emergency Department visits?: No  Do you have a living will?: Yes  Do you have a Durable POA (Power of ) for healthcare decisions?: Yes  Do you have an Advanced Directive for end of life decisions?: Yes  How often have you used an illegal drug (including marijuana) or a prescription medication for non-medical reasons in the past year?: never  What is the typical number of drinks you consume in a day?: 0  What is the typical number of drinks you consume in a week?: 0  How often did you have a drink containing alcohol in the past year?: monthly or less  How many drinks did you have on a typical day  when you were drinking in the past year?: 0  How often did you have 6 or more drinks on one occasion in the past year?: never

## 2022-12-27 ENCOUNTER — VBI (OUTPATIENT)
Dept: ADMINISTRATIVE | Facility: OTHER | Age: 82
End: 2022-12-27

## 2023-01-09 DIAGNOSIS — I26.92 CHRONIC SADDLE PULMONARY EMBOLISM WITHOUT ACUTE COR PULMONALE (HCC): ICD-10-CM

## 2023-01-09 DIAGNOSIS — I27.82 CHRONIC SADDLE PULMONARY EMBOLISM WITHOUT ACUTE COR PULMONALE (HCC): ICD-10-CM

## 2023-01-09 RX ORDER — APIXABAN 5 MG/1
5 TABLET, FILM COATED ORAL 2 TIMES DAILY
Qty: 180 TABLET | Refills: 1 | Status: SHIPPED | OUTPATIENT
Start: 2023-01-09 | End: 2023-04-09

## 2023-02-07 NOTE — ANESTHESIA PROCEDURE NOTES
Spinal Block    Patient location during procedure: OR  Start time: 10/22/2019 7:40 AM  Reason for block: procedure for pain and at surgeon's request  Staffing  Anesthesiologist: Kayley Fitzpatrick DO  Performed: anesthesiologist   Preanesthetic Checklist  Completed: patient identified, site marked, surgical consent, pre-op evaluation, timeout performed, IV checked, risks and benefits discussed and monitors and equipment checked  Spinal Block  Patient position: sitting  Prep: ChloraPrep  Patient monitoring: cardiac monitor and frequent blood pressure checks  Approach: midline  Location: L3-4  Injection technique: single-shot  Needle  Needle type: pencil-tip   Needle gauge: 25 G  Needle length: 10 cm  Assessment  Sensory level: T4  Injection Assessment:  negative aspiration for heme, no paresthesia on injection and positive aspiration for clear CSF    Post-procedure:  adhesive bandage applied, pressure dressing applied, secured with tape, site cleaned and sterile dressing applied Pt presents to ED with c/o fever, cough, nausea, and vomiting.

## 2023-02-23 DIAGNOSIS — E78.2 MIXED HYPERLIPIDEMIA: ICD-10-CM

## 2023-02-23 DIAGNOSIS — I10 ESSENTIAL HYPERTENSION, BENIGN: ICD-10-CM

## 2023-02-23 DIAGNOSIS — I27.82 CHRONIC SADDLE PULMONARY EMBOLISM WITHOUT ACUTE COR PULMONALE (HCC): ICD-10-CM

## 2023-02-23 DIAGNOSIS — I26.92 CHRONIC SADDLE PULMONARY EMBOLISM WITHOUT ACUTE COR PULMONALE (HCC): ICD-10-CM

## 2023-02-23 DIAGNOSIS — E03.9 HYPOTHYROIDISM (ACQUIRED): ICD-10-CM

## 2023-02-23 RX ORDER — VALSARTAN AND HYDROCHLOROTHIAZIDE 160; 12.5 MG/1; MG/1
1 TABLET, FILM COATED ORAL EVERY MORNING
Qty: 90 TABLET | Refills: 0 | Status: SHIPPED | OUTPATIENT
Start: 2023-02-23

## 2023-02-23 RX ORDER — ATORVASTATIN CALCIUM 10 MG/1
10 TABLET, FILM COATED ORAL
Qty: 90 TABLET | Refills: 0 | Status: SHIPPED | OUTPATIENT
Start: 2023-02-23

## 2023-02-23 RX ORDER — LEVOTHYROXINE SODIUM 0.03 MG/1
25 TABLET ORAL DAILY
Qty: 90 TABLET | Refills: 0 | Status: SHIPPED | OUTPATIENT
Start: 2023-02-23

## 2023-04-25 ENCOUNTER — OFFICE VISIT (OUTPATIENT)
Dept: INTERNAL MEDICINE CLINIC | Facility: CLINIC | Age: 83
End: 2023-04-25

## 2023-04-25 VITALS — DIASTOLIC BLOOD PRESSURE: 84 MMHG | SYSTOLIC BLOOD PRESSURE: 142 MMHG

## 2023-04-25 DIAGNOSIS — E03.9 ACQUIRED HYPOTHYROIDISM: ICD-10-CM

## 2023-04-25 DIAGNOSIS — E11.9 DIABETES MELLITUS WITHOUT COMPLICATION (HCC): ICD-10-CM

## 2023-04-25 DIAGNOSIS — I87.2 VENOUS INSUFFICIENCY OF BOTH LOWER EXTREMITIES: ICD-10-CM

## 2023-04-25 DIAGNOSIS — E78.2 MIXED HYPERLIPIDEMIA: ICD-10-CM

## 2023-04-25 DIAGNOSIS — E08.36 DIABETES MELLITUS DUE TO UNDERLYING CONDITION WITH DIABETIC CATARACT, WITHOUT LONG-TERM CURRENT USE OF INSULIN (HCC): ICD-10-CM

## 2023-04-25 DIAGNOSIS — E66.01 OBESITY, MORBID (HCC): ICD-10-CM

## 2023-04-25 DIAGNOSIS — I10 ESSENTIAL HYPERTENSION: ICD-10-CM

## 2023-04-25 DIAGNOSIS — M17.0 PRIMARY OSTEOARTHRITIS OF BOTH KNEES: ICD-10-CM

## 2023-04-25 DIAGNOSIS — I26.92 CHRONIC SADDLE PULMONARY EMBOLISM WITHOUT ACUTE COR PULMONALE (HCC): ICD-10-CM

## 2023-04-25 DIAGNOSIS — I27.82 CHRONIC SADDLE PULMONARY EMBOLISM WITHOUT ACUTE COR PULMONALE (HCC): ICD-10-CM

## 2023-04-25 DIAGNOSIS — M16.11 PRIMARY OSTEOARTHRITIS OF ONE HIP, RIGHT: ICD-10-CM

## 2023-04-25 DIAGNOSIS — I10 ESSENTIAL HYPERTENSION, BENIGN: Primary | ICD-10-CM

## 2023-04-25 DIAGNOSIS — Z13.820 SCREENING FOR OSTEOPOROSIS: ICD-10-CM

## 2023-04-25 NOTE — PROGRESS NOTES
Assessment/Plan:    BMI Counseling: Body mass index is 35 49 kg/m² (pended)  The BMI is above normal  Nutrition recommendations include decreasing portion sizes, encouraging healthy choices of fruits and vegetables and decreasing fast food intake  Exercise recommendations include moderate physical activity 150 minutes/week  Rationale for BMI follow-up plan is due to patient being overweight or obese  Depression Screening and Follow-up Plan: Patient was screened for depression during today's encounter  They screened negative with a PHQ-2 score of 0             1  Essential hypertension, benign    2  Screening for osteoporosis  -     DXA bone density spine hip and pelvis; Future; Expected date: 04/25/2023    3  Diabetes mellitus without complication (HCC)  -     CBC and differential; Future  -     Comprehensive metabolic panel; Future  -     Hemoglobin A1C; Future  -     Lipid Panel with Direct LDL reflex; Future  -     Microalbumin / creatinine urine ratio  -     TSH, 3rd generation; Future    4  Essential hypertension    5  Acquired hypothyroidism    6  Mixed hyperlipidemia    7  Primary osteoarthritis of one hip, right    8  Venous insufficiency of both lower extremities    9  Primary osteoarthritis of both knees    10  Obesity, morbid ([address] )    11  Chronic 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)    12  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, without long-term current use of insulin (HCC)           Subjective:      Patient ID: Charlette Jara is a [de-identified] y o  female      Follow-up on multiple medical problems to ensure they are stable on current medications      The following portions of the patient's history were reviewed and updated as appropriate: She  has a past medical history of Acquired hypothyroidism, 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), Adrenal gland disorder ([address] ), Anemia, Anxiety, Arthritis, At risk for falls, Diabetes mellitus ([address] ), Disease of thyroid gland, DJD (degenerative joint disease) of knee, Edema of both legs, History of pulmonary embolus (PE), Hyperlipidemia, Hypertension, Irritable bowel syndrome, Prediabetes, Presence of right artificial hip joint, Pulmonary embolus ([address] ) (2/27/2018 & 2/14/2019), Uses roller walker, Wears glasses, and Wears partial dentures  She   Patient Active Problem List    Diagnosis Date Noted   • Primary osteoarthritis of both knees 09/07/2022   • Tracheitis 06/29/2022   • Obesity, morbid ([address] ) 05/05/2021   • Essential hypertension, benign 05/05/2021   • Venous insufficiency of both lower extremities 12/09/2020   • Diabetes mellitus without complication ([facility] [address] ) 40/66/6555   • Hypertension    • Irritable bowel syndrome    • Edema of both legs    • Acquired hypothyroidism    • 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, without long-term current use of insulin (HCC)    • History of pulmonary embolus (PE)    • Pulmonary embolus ([facility] [address] )    • Presence of right artificial hip joint    • Acute blood loss anemia 10/25/2019   • Nausea 10/25/2019   • Hypothyroid 10/23/2019   • Mixed hyperlipidemia 10/23/2019   • Essential hypertension 10/23/2019   • History of hypercoagulable state 10/09/2019   • Primary osteoarthritis of one hip, right 08/09/2019   • Primary osteoarthritis of right hip      She  has a past surgical history that includes Cholecystectomy (1991); Colonoscopy (11/09/2017); pr arthrp acetblr/prox fem prostc agrft/algrft (Right, 10/22/2019); Hysterectomy (1993); Hemorrhoid surgery (1999); and Trigger finger release (Right, 1998)  Her family history includes Hypertension in her family and mother  She  reports that she has never smoked  She has never used smokeless tobacco  She reports that she does not currently use alcohol  She reports that she does not currently use drugs    Current Outpatient Medications   Medication Sig Dispense Refill   • acetaminophen (TYLENOL) 325 mg tablet Take 650 mg by mouth every 6 (six) hours as needed for mild pain     • atorvastatin (LIPITOR) 10 mg tablet Take 1 tablet (10 mg total) by mouth daily at bedtime 90 tablet 0   • Eliquis 5 MG Take 1 tablet (5 mg total) by mouth 2 (two) times a day 180 tablet 1   • levothyroxine 25 mcg tablet Take 1 tablet (25 mcg total) by mouth daily 90 tablet 0   • Multiple Vitamin (MULTIVITAMIN) tablet Take 1 tablet by mouth daily     • Omega-3 Fatty Acids (FISH OIL PO) Take 1 capsule by mouth daily     • valsartan-hydrochlorothiazide (DIOVAN-HCT) 160-12 5 MG per tablet Take 1 tablet by mouth every morning 90 tablet 0   • amoxicillin (AMOXIL) 500 mg capsule  (Patient not taking: Reported on 4/25/2023)       No current facility-administered medications for this visit  Current Outpatient Medications on File Prior to Visit   Medication Sig   • acetaminophen (TYLENOL) 325 mg tablet Take 650 mg by mouth every 6 (six) hours as needed for mild pain   • atorvastatin (LIPITOR) 10 mg tablet Take 1 tablet (10 mg total) by mouth daily at bedtime   • Eliquis 5 MG Take 1 tablet (5 mg total) by mouth 2 (two) times a day   • levothyroxine 25 mcg tablet Take 1 tablet (25 mcg total) by mouth daily   • Multiple Vitamin (MULTIVITAMIN) tablet Take 1 tablet by mouth daily   • Omega-3 Fatty Acids (FISH OIL PO) Take 1 capsule by mouth daily   • valsartan-hydrochlorothiazide (DIOVAN-HCT) 160-12 5 MG per tablet Take 1 tablet by mouth every morning   • amoxicillin (AMOXIL) 500 mg capsule  (Patient not taking: Reported on 4/25/2023)     No current facility-administered medications on file prior to visit  She is allergic to oxycodone       Review of Systems   Constitutional: Negative for chills and fever  HENT: Negative for congestion, ear pain and sore throat  Eyes: Negative for pain  Respiratory: Negative for cough and shortness of breath  Cardiovascular: Negative for chest pain and leg swelling     Gastrointestinal: Negative for abdominal pain, nausea and "vomiting  Endocrine: Negative for polyuria  Genitourinary: Negative for difficulty urinating, frequency and urgency  Musculoskeletal: Positive for arthralgias and back pain  Skin: Negative for rash  Neurological: Negative for weakness and headaches  Psychiatric/Behavioral: Negative for sleep disturbance  The patient is not nervous/anxious  Objective:      /84 (BP Location: Left arm, Patient Position: Sitting, Cuff Size: Standard)   Pulse (P) 86   Temp (P) 98 1 °F (36 7 °C) (Temporal)   Ht (P) 5' 4 5\" (1 638 m)   Wt (P) 95 3 kg (210 lb)   SpO2 (P) 97%   BMI (P) 35 49 kg/m²     No results found for this or any previous visit (from the past 1344 hour(s))  Physical Exam  Constitutional:       Appearance: Normal appearance  HENT:      Head: Normocephalic  Right Ear: Tympanic membrane, ear canal and external ear normal       Left Ear: Tympanic membrane, ear canal and external ear normal       Nose: Nose normal  No congestion  Mouth/Throat:      Mouth: Mucous membranes are moist       Pharynx: Oropharynx is clear  No oropharyngeal exudate or posterior oropharyngeal erythema  Eyes:      Extraocular Movements: Extraocular movements intact  Conjunctiva/sclera: Conjunctivae normal    Cardiovascular:      Rate and Rhythm: Normal rate and regular rhythm  Heart sounds: Normal heart sounds  No murmur heard  Pulmonary:      Effort: Pulmonary effort is normal       Breath sounds: Normal breath sounds  No wheezing or rales  Abdominal:      General: Bowel sounds are normal  There is no distension  Palpations: Abdomen is soft  Tenderness: There is no abdominal tenderness  Musculoskeletal:         General: Normal range of motion  Cervical back: Normal range of motion and neck supple  Right lower leg: No edema  Left lower leg: No edema  Lymphadenopathy:      Cervical: No cervical adenopathy  Skin:     General: Skin is warm     Neurological:      " General: No focal deficit present  Mental Status: She is alert and oriented to person, place, and time

## 2023-05-25 ENCOUNTER — HOSPITAL ENCOUNTER (OUTPATIENT)
Dept: RADIOLOGY | Facility: IMAGING CENTER | Age: 83
End: 2023-05-25

## 2023-05-25 DIAGNOSIS — Z13.820 SCREENING FOR OSTEOPOROSIS: ICD-10-CM

## 2023-05-31 DIAGNOSIS — I10 ESSENTIAL HYPERTENSION, BENIGN: ICD-10-CM

## 2023-05-31 DIAGNOSIS — E03.9 HYPOTHYROIDISM (ACQUIRED): ICD-10-CM

## 2023-05-31 DIAGNOSIS — E78.2 MIXED HYPERLIPIDEMIA: ICD-10-CM

## 2023-05-31 RX ORDER — VALSARTAN AND HYDROCHLOROTHIAZIDE 160; 12.5 MG/1; MG/1
1 TABLET, FILM COATED ORAL EVERY MORNING
Qty: 90 TABLET | Refills: 0 | Status: SHIPPED | OUTPATIENT
Start: 2023-05-31

## 2023-05-31 RX ORDER — LEVOTHYROXINE SODIUM 0.03 MG/1
25 TABLET ORAL DAILY
Qty: 90 TABLET | Refills: 0 | Status: SHIPPED | OUTPATIENT
Start: 2023-05-31

## 2023-05-31 RX ORDER — ATORVASTATIN CALCIUM 10 MG/1
10 TABLET, FILM COATED ORAL
Qty: 90 TABLET | Refills: 0 | Status: SHIPPED | OUTPATIENT
Start: 2023-05-31

## 2023-06-08 ENCOUNTER — TELEPHONE (OUTPATIENT)
Dept: INTERNAL MEDICINE CLINIC | Facility: CLINIC | Age: 83
End: 2023-06-08

## 2023-06-08 DIAGNOSIS — J01.00 ACUTE MAXILLARY SINUSITIS, RECURRENCE NOT SPECIFIED: Primary | ICD-10-CM

## 2023-06-08 RX ORDER — AMOXICILLIN AND CLAVULANATE POTASSIUM 875; 125 MG/1; MG/1
1 TABLET, FILM COATED ORAL EVERY 12 HOURS SCHEDULED
Qty: 14 TABLET | Refills: 0 | Status: SHIPPED | OUTPATIENT
Start: 2023-06-08 | End: 2023-06-15

## 2023-06-08 NOTE — TELEPHONE ENCOUNTER
patient called and stated she has sore throat and body aches , no fever , she stated dr Maranda Colon usually calls in a antibiotic for her she uses [facility]

## 2023-07-03 DIAGNOSIS — I26.92 CHRONIC SADDLE PULMONARY EMBOLISM WITHOUT ACUTE COR PULMONALE (HCC): ICD-10-CM

## 2023-07-03 DIAGNOSIS — I27.82 CHRONIC SADDLE PULMONARY EMBOLISM WITHOUT ACUTE COR PULMONALE (HCC): ICD-10-CM

## 2023-07-03 RX ORDER — APIXABAN 5 MG/1
5 TABLET, FILM COATED ORAL 2 TIMES DAILY
Qty: 180 TABLET | Refills: 1 | Status: SHIPPED | OUTPATIENT
Start: 2023-07-03 | End: 2023-10-01

## 2023-08-03 DIAGNOSIS — J01.00 ACUTE MAXILLARY SINUSITIS, RECURRENCE NOT SPECIFIED: Primary | ICD-10-CM

## 2023-08-03 RX ORDER — AMOXICILLIN 500 MG/1
500 CAPSULE ORAL EVERY 8 HOURS SCHEDULED
Qty: 21 CAPSULE | Refills: 0 | Status: SHIPPED | OUTPATIENT
Start: 2023-08-03 | End: 2023-08-10

## 2023-08-11 ENCOUNTER — APPOINTMENT (OUTPATIENT)
Dept: LAB | Age: 83
End: 2023-08-11
Payer: COMMERCIAL

## 2023-08-11 DIAGNOSIS — E11.9 DIABETES MELLITUS WITHOUT COMPLICATION (HCC): ICD-10-CM

## 2023-08-11 LAB
ALBUMIN SERPL BCP-MCNC: 3.8 G/DL (ref 3.5–5)
ALP SERPL-CCNC: 74 U/L (ref 46–116)
ALT SERPL W P-5'-P-CCNC: 28 U/L (ref 12–78)
ANION GAP SERPL CALCULATED.3IONS-SCNC: 6 MMOL/L
AST SERPL W P-5'-P-CCNC: 17 U/L (ref 5–45)
BASOPHILS # BLD AUTO: 0.01 THOUSANDS/ÂΜL (ref 0–0.1)
BASOPHILS NFR BLD AUTO: 0 % (ref 0–1)
BILIRUB SERPL-MCNC: 0.72 MG/DL (ref 0.2–1)
BUN SERPL-MCNC: 16 MG/DL (ref 5–25)
CALCIUM SERPL-MCNC: 9.4 MG/DL (ref 8.3–10.1)
CHLORIDE SERPL-SCNC: 102 MMOL/L (ref 96–108)
CHOLEST SERPL-MCNC: 165 MG/DL
CO2 SERPL-SCNC: 29 MMOL/L (ref 21–32)
CREAT SERPL-MCNC: 0.91 MG/DL (ref 0.6–1.3)
CREAT UR-MCNC: 93.1 MG/DL
EOSINOPHIL # BLD AUTO: 0.08 THOUSAND/ÂΜL (ref 0–0.61)
EOSINOPHIL NFR BLD AUTO: 2 % (ref 0–6)
ERYTHROCYTE [DISTWIDTH] IN BLOOD BY AUTOMATED COUNT: 12 % (ref 11.6–15.1)
GFR SERPL CREATININE-BSD FRML MDRD: 59 ML/MIN/1.73SQ M
GLUCOSE P FAST SERPL-MCNC: 178 MG/DL (ref 65–99)
HCT VFR BLD AUTO: 42.9 % (ref 34.8–46.1)
HDLC SERPL-MCNC: 47 MG/DL
HGB BLD-MCNC: 14.1 G/DL (ref 11.5–15.4)
IMM GRANULOCYTES # BLD AUTO: 0.01 THOUSAND/UL (ref 0–0.2)
IMM GRANULOCYTES NFR BLD AUTO: 0 % (ref 0–2)
LDLC SERPL CALC-MCNC: 79 MG/DL (ref 0–100)
LYMPHOCYTES # BLD AUTO: 2.24 THOUSANDS/ÂΜL (ref 0.6–4.47)
LYMPHOCYTES NFR BLD AUTO: 42 % (ref 14–44)
MCH RBC QN AUTO: 31.3 PG (ref 26.8–34.3)
MCHC RBC AUTO-ENTMCNC: 32.9 G/DL (ref 31.4–37.4)
MCV RBC AUTO: 95 FL (ref 82–98)
MICROALBUMIN UR-MCNC: 7.2 MG/L (ref 0–20)
MICROALBUMIN/CREAT 24H UR: 8 MG/G CREATININE (ref 0–30)
MONOCYTES # BLD AUTO: 0.5 THOUSAND/ÂΜL (ref 0.17–1.22)
MONOCYTES NFR BLD AUTO: 9 % (ref 4–12)
NEUTROPHILS # BLD AUTO: 2.46 THOUSANDS/ÂΜL (ref 1.85–7.62)
NEUTS SEG NFR BLD AUTO: 47 % (ref 43–75)
NRBC BLD AUTO-RTO: 0 /100 WBCS
PLATELET # BLD AUTO: 310 THOUSANDS/UL (ref 149–390)
PMV BLD AUTO: 9.8 FL (ref 8.9–12.7)
POTASSIUM SERPL-SCNC: 3.9 MMOL/L (ref 3.5–5.3)
PROT SERPL-MCNC: 7.3 G/DL (ref 6.4–8.4)
RBC # BLD AUTO: 4.51 MILLION/UL (ref 3.81–5.12)
SODIUM SERPL-SCNC: 137 MMOL/L (ref 135–147)
TRIGL SERPL-MCNC: 196 MG/DL
TSH SERPL DL<=0.05 MIU/L-ACNC: 3.88 UIU/ML (ref 0.45–4.5)
WBC # BLD AUTO: 5.3 THOUSAND/UL (ref 4.31–10.16)

## 2023-08-11 PROCEDURE — 80053 COMPREHEN METABOLIC PANEL: CPT

## 2023-08-11 PROCEDURE — 36415 COLL VENOUS BLD VENIPUNCTURE: CPT

## 2023-08-11 PROCEDURE — 83036 HEMOGLOBIN GLYCOSYLATED A1C: CPT

## 2023-08-11 PROCEDURE — 80061 LIPID PANEL: CPT

## 2023-08-11 PROCEDURE — 84443 ASSAY THYROID STIM HORMONE: CPT

## 2023-08-11 PROCEDURE — 85025 COMPLETE CBC W/AUTO DIFF WBC: CPT

## 2023-08-12 LAB
EST. AVERAGE GLUCOSE BLD GHB EST-MCNC: 223 MG/DL
HBA1C MFR BLD: 9.4 %

## 2023-08-25 ENCOUNTER — OFFICE VISIT (OUTPATIENT)
Dept: INTERNAL MEDICINE CLINIC | Facility: CLINIC | Age: 83
End: 2023-08-25
Payer: COMMERCIAL

## 2023-08-25 VITALS
HEART RATE: 86 BPM | WEIGHT: 210 LBS | HEIGHT: 65 IN | OXYGEN SATURATION: 96 % | SYSTOLIC BLOOD PRESSURE: 136 MMHG | TEMPERATURE: 98.4 F | DIASTOLIC BLOOD PRESSURE: 84 MMHG | BODY MASS INDEX: 34.99 KG/M2

## 2023-08-25 DIAGNOSIS — I26.92 CHRONIC SADDLE PULMONARY EMBOLISM WITHOUT ACUTE COR PULMONALE (HCC): ICD-10-CM

## 2023-08-25 DIAGNOSIS — E78.2 MIXED HYPERLIPIDEMIA: ICD-10-CM

## 2023-08-25 DIAGNOSIS — I27.82 CHRONIC SADDLE PULMONARY EMBOLISM WITHOUT ACUTE COR PULMONALE (HCC): ICD-10-CM

## 2023-08-25 DIAGNOSIS — E03.9 ACQUIRED HYPOTHYROIDISM: ICD-10-CM

## 2023-08-25 DIAGNOSIS — Z01.00 DIABETIC EYE EXAM (HCC): ICD-10-CM

## 2023-08-25 DIAGNOSIS — M17.0 PRIMARY OSTEOARTHRITIS OF BOTH KNEES: ICD-10-CM

## 2023-08-25 DIAGNOSIS — I10 ESSENTIAL HYPERTENSION, BENIGN: ICD-10-CM

## 2023-08-25 DIAGNOSIS — E11.65 UNCONTROLLED TYPE 2 DIABETES MELLITUS WITH HYPERGLYCEMIA (HCC): Primary | ICD-10-CM

## 2023-08-25 DIAGNOSIS — E03.9 HYPOTHYROIDISM (ACQUIRED): ICD-10-CM

## 2023-08-25 DIAGNOSIS — I10 ESSENTIAL HYPERTENSION: ICD-10-CM

## 2023-08-25 DIAGNOSIS — E11.9 DIABETIC EYE EXAM (HCC): ICD-10-CM

## 2023-08-25 PROCEDURE — 99214 OFFICE O/P EST MOD 30 MIN: CPT | Performed by: INTERNAL MEDICINE

## 2023-08-25 RX ORDER — VALSARTAN AND HYDROCHLOROTHIAZIDE 160; 12.5 MG/1; MG/1
1 TABLET, FILM COATED ORAL EVERY MORNING
Qty: 90 TABLET | Refills: 1 | Status: SHIPPED | OUTPATIENT
Start: 2023-08-25

## 2023-08-25 RX ORDER — ATORVASTATIN CALCIUM 10 MG/1
10 TABLET, FILM COATED ORAL
Qty: 90 TABLET | Refills: 1 | Status: SHIPPED | OUTPATIENT
Start: 2023-08-25

## 2023-08-25 RX ORDER — LEVOTHYROXINE SODIUM 0.03 MG/1
25 TABLET ORAL DAILY
Qty: 90 TABLET | Refills: 1 | Status: SHIPPED | OUTPATIENT
Start: 2023-08-25

## 2023-08-25 NOTE — PROGRESS NOTES
Diabetic Foot Exam    Patient's shoes and socks removed. Right Foot/Ankle   Right Foot Inspection  Skin Exam: skin normal and skin intact. No dry skin, no warmth, no callus, no erythema, no maceration, no abnormal color, no pre-ulcer, no ulcer and no callus. Toe Exam: ROM and strength within normal limits. No swelling, no tenderness, erythema and  no right toe deformity    Sensory   Monofilament testing: intact    Vascular  Capillary refills: < 3 seconds  The right DP pulse is 2+. The right PT pulse is 2+. Left Foot/Ankle  Left Foot Inspection  Skin Exam: skin normal and skin intact. No dry skin, no warmth, no erythema, no maceration, normal color, no pre-ulcer, no ulcer and no callus. Toe Exam: ROM and strength within normal limits. No swelling, no tenderness, no erythema and no left toe deformity. Sensory   Monofilament testing: intact    Vascular  Capillary refills: < 3 seconds  The left DP pulse is 2+. The left PT pulse is 2+.      Assign Risk Category  No deformity present  No loss of protective sensation  No weak pulses  Risk: 0

## 2023-08-25 NOTE — PROGRESS NOTES
Assessment/Plan:      Falls Plan of Care: balance, strength, and gait training instructions were provided. Urinary Incontinence Plan of Care: counseling topics discussed: use restroom every 2 hours and limit alcohol, caffeine, spicy foods, and acidic foods. 1. Uncontrolled type 2 diabetes mellitus with hyperglycemia (HCC)  -     semaglutide (Rybelsus) 3 MG tablet; Take 1 tablet (3 mg total) by mouth daily before breakfast  -     Hemoglobin A1C; Future    2. Mixed hyperlipidemia  -     atorvastatin (LIPITOR) 10 mg tablet; Take 1 tablet (10 mg total) by mouth daily at bedtime    3. Essential hypertension    4. Acquired hypothyroidism    5. Diabetic eye exam [facility])  -     Ambulatory Referral to Ophthalmology; Future    6. Essential hypertension, benign  -     valsartan-hydrochlorothiazide (DIOVAN-HCT) 160-12.5 MG per tablet; Take 1 tablet by mouth every morning    7. Primary osteoarthritis of both knees    8. 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 Hypothyroidism (acquired)  -     levothyroxine 25 mcg tablet; Take 1 tablet (25 mcg total) by mouth daily           Subjective:      Patient ID: Elfego Fierro is a 82 y.o. female. 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, without long-term current use of insulin (HCC)    • History of pulmonary embolus (PE)    • Pulmonary embolus ([address])    • Presence of right artificial hip joint    • Acute blood loss anemia 10/25/2019   • Nausea 10/25/2019   • Hypothyroid 10/23/2019   • Mixed hyperlipidemia 10/23/2019   • Essential hypertension 10/23/2019   • History of hypercoagulable state 10/09/2019   • Primary osteoarthritis of one hip, right 08/09/2019   • Primary osteoarthritis of right hip      She  has a past surgical history that includes Cholecystectomy (1991); Colonoscopy (11/09/2017); pr arthrp acetblr/prox fem prostc agrft/algrft (Right, 10/22/2019); Hysterectomy (1993); Hemorrhoid surgery (1999); and Trigger finger release (Right, 1998). Her family history includes Hypertension in her family and mother. She  reports that she has never smoked. She has never used smokeless tobacco. She reports that she does not currently use alcohol. She reports that she does not currently use drugs.   Current Outpatient Medications   Medication Sig Dispense Refill   • acetaminophen (TYLENOL) 325 mg tablet Take 650 mg by mouth every 6 (six) hours as needed for mild pain     • atorvastatin (LIPITOR) 10 mg tablet Take 1 tablet (10 mg total) by mouth daily at bedtime 90 tablet 1   • Eliquis 5 MG Take 1 tablet (5 mg total) by mouth 2 (two) times a day 180 tablet 1   • levothyroxine 25 mcg tablet Take 1 tablet (25 mcg total) by mouth daily 90 tablet 1   • Multiple Vitamin (MULTIVITAMIN) tablet Take 1 tablet by mouth daily     • Omega-3 Fatty Acids (FISH OIL PO) Take 1 capsule by mouth daily     • semaglutide (Rybelsus) 3 MG tablet Take 1 tablet (3 mg total) by mouth daily before breakfast 30 tablet 1   • valsartan-hydrochlorothiazide (DIOVAN-HCT) 160-12.5 MG per tablet Take 1 tablet by mouth every morning 90 tablet 1     No current facility-administered medications for this visit. Current Outpatient Medications on File Prior to Visit   Medication Sig   • acetaminophen (TYLENOL) 325 mg tablet Take 650 mg by mouth every 6 (six) hours as needed for mild pain   • Eliquis 5 MG Take 1 tablet (5 mg total) by mouth 2 (two) times a day   • Multiple Vitamin (MULTIVITAMIN) tablet Take 1 tablet by mouth daily   • Omega-3 Fatty Acids (FISH OIL PO) Take 1 capsule by mouth daily   • [DISCONTINUED] atorvastatin (LIPITOR) 10 mg tablet Take 1 tablet (10 mg total) by mouth daily at bedtime   • [DISCONTINUED] levothyroxine 25 mcg tablet Take 1 tablet (25 mcg total) by mouth daily   • [DISCONTINUED] valsartan-hydrochlorothiazide (DIOVAN-HCT) 160-12.5 MG per tablet Take 1 tablet by mouth every morning     No current facility-administered medications on file prior to visit. She is allergic to oxycodone and metformin. .    Review of Systems   Constitutional: Negative for chills and fever. HENT: Negative for congestion, ear pain and sore throat. Eyes: Negative for pain. Respiratory: Negative for cough and shortness of breath. Cardiovascular: Negative for chest pain and leg swelling. Gastrointestinal: Negative for abdominal pain, nausea and vomiting. Endocrine: Negative for polyuria. Genitourinary: Negative for difficulty urinating, frequency and urgency. Musculoskeletal: Positive for arthralgias and back pain. Skin: Negative for rash. Neurological: Negative for weakness and headaches. Psychiatric/Behavioral: Negative for sleep disturbance. The patient is not nervous/anxious. Objective:      /84 (BP Location: Left arm, Patient Position: Sitting, Cuff Size: Standard)   Pulse 86   Temp 98.4 °F (36.9 °C) (Temporal)   Ht 5' 4.5" (1.638 m)   Wt 95.3 kg (210 lb)   SpO2 96%   BMI 35.49 kg/m²     Recent Results (from the past 1344 hour(s))   Microalbumin / creatinine urine ratio    Collection Time: 08/11/23 10:19 AM   Result Value Ref Range    Creatinine, Ur 93.1 mg/dL    Albumin,U,Random 7.2 0.0 - 20.0 mg/L    Albumin Creat Ratio 8 0 - 30 mg/g creatinine   CBC and differential    Collection Time: 08/11/23 10:19 AM   Result Value Ref Range    WBC 5.30 4.31 - 10.16 Thousand/uL    RBC 4.51 3.81 - 5.12 Million/uL    Hemoglobin 14.1 11.5 - 15.4 g/dL    Hematocrit 42.9 34.8 - 46.1 %    MCV 95 82 - 98 fL    MCH 31.3 26.8 - 34.3 pg    MCHC 32.9 31.4 - 37.4 g/dL    RDW 12.0 11.6 - 15.1 %    MPV 9.8 8.9 - 12.7 fL    Platelets 270 163 - 497 Thousands/uL    nRBC 0 /100 WBCs    Neutrophils Relative 47 43 - 75 %    Immat GRANS % 0 0 - 2 %    Lymphocytes Relative 42 14 - 44 %    Monocytes Relative 9 4 - 12 %    Eosinophils Relative 2 0 - 6 %    Basophils Relative 0 0 - 1 %    Neutrophils Absolute 2.46 1.85 - 7.62 Thousands/µL    Immature Grans Absolute 0.01 0.00 - 0.20 Thousand/uL    Lymphocytes Absolute 2.24 0.60 - 4.47 Thousands/µL    Monocytes Absolute 0.50 0.17 - 1.22 Thousand/µL    Eosinophils Absolute 0.08 0.00 - 0.61 Thousand/µL    Basophils Absolute 0.01 0.00 - 0.10 Thousands/µL   Comprehensive metabolic panel    Collection Time: 08/11/23 10:19 AM   Result Value Ref Range    Sodium 137 135 - 147 mmol/L    Potassium 3.9 3.5 - 5.3 mmol/L    Chloride 102 96 - 108 mmol/L    CO2 29 21 - 32 mmol/L    ANION GAP 6 mmol/L    BUN 16 5 - 25 mg/dL    Creatinine 0.91 0.60 - 1.30 mg/dL    Glucose, Fasting 178 (H) 65 - 99 mg/dL    Calcium 9.4 8.3 - 10.1 mg/dL    AST 17 5 - 45 U/L    ALT 28 12 - 78 U/L    Alkaline Phosphatase 74 46 - 116 U/L    Total Protein 7.3 6.4 - 8.4 g/dL    Albumin 3.8 3.5 - 5.0 g/dL    Total Bilirubin 0.72 0.20 - 1.00 mg/dL    eGFR 59 ml/min/1.73sq m   Hemoglobin A1C    Collection Time: 08/11/23 10:19 AM   Result Value Ref Range    Hemoglobin A1C 9.4 (H) Normal 4.0-5.6%; PreDiabetic 5.7-6.4%; Diabetic >=6.5%; Glycemic control for adults with diabetes <7.0% %     mg/dl   Lipid Panel with Direct LDL reflex    Collection Time: 08/11/23 10:19 AM   Result Value Ref Range    Cholesterol 165 See Comment mg/dL    Triglycerides 196 (H) See Comment mg/dL    HDL, Direct 47 (L) >=50 mg/dL    LDL Calculated 79 0 - 100 mg/dL   TSH, 3rd generation    Collection Time: 08/11/23 10:19 AM   Result Value Ref Range    TSH 3RD GENERATON 3.882 0.450 - 4.500 uIU/mL        Physical Exam  Constitutional:       Appearance: Normal appearance. HENT:      Head: Normocephalic. Right Ear: Tympanic membrane, ear canal and external ear normal.      Left Ear: Tympanic membrane, ear canal and external ear normal.      Nose: Nose normal. No congestion. Mouth/Throat:      Mouth: Mucous membranes are moist.      Pharynx: Oropharynx is clear. No oropharyngeal exudate or posterior oropharyngeal erythema. Eyes:      Extraocular Movements: Extraocular movements intact. Conjunctiva/sclera: Conjunctivae normal.      Pupils: Pupils are equal, round, and reactive to light. Cardiovascular:      Rate and Rhythm: Normal rate and regular rhythm. Heart sounds: Normal heart sounds. No murmur heard. Pulmonary:      Effort: Pulmonary effort is normal.      Breath sounds: Normal breath sounds. No wheezing or rales. Abdominal:      General: Bowel sounds are normal. There is no distension. Palpations: Abdomen is soft. Tenderness: There is no abdominal tenderness. Musculoskeletal:         General: Normal range of motion. Cervical back: Normal range of motion and neck supple. Right lower leg: No edema. Left lower leg: No edema. Lymphadenopathy:      Cervical: No cervical adenopathy. Skin:     General: Skin is warm. Neurological:      General: No focal deficit present. Mental Status: She is alert and oriented to person, place, and time.

## 2023-10-23 DIAGNOSIS — E11.65 UNCONTROLLED TYPE 2 DIABETES MELLITUS WITH HYPERGLYCEMIA (HCC): ICD-10-CM

## 2023-11-19 ENCOUNTER — OFFICE VISIT (OUTPATIENT)
Dept: URGENT CARE | Age: 83
End: 2023-11-19
Payer: COMMERCIAL

## 2023-11-19 VITALS
TEMPERATURE: 96.9 F | OXYGEN SATURATION: 97 % | RESPIRATION RATE: 16 BRPM | BODY MASS INDEX: 35.34 KG/M2 | HEART RATE: 114 BPM | WEIGHT: 207 LBS | HEIGHT: 64 IN

## 2023-11-19 DIAGNOSIS — R39.9 UTI SYMPTOMS: Primary | ICD-10-CM

## 2023-11-19 LAB
SL AMB  POCT GLUCOSE, UA: ABNORMAL
SL AMB LEUKOCYTE ESTERASE,UA: ABNORMAL
SL AMB POCT BILIRUBIN,UA: ABNORMAL
SL AMB POCT BLOOD,UA: ABNORMAL
SL AMB POCT CLARITY,UA: ABNORMAL
SL AMB POCT COLOR,UA: YELLOW
SL AMB POCT KETONES,UA: ABNORMAL
SL AMB POCT NITRITE,UA: ABNORMAL
SL AMB POCT PH,UA: 7.5
SL AMB POCT SPECIFIC GRAVITY,UA: 1
SL AMB POCT URINE PROTEIN: ABNORMAL
SL AMB POCT UROBILINOGEN: 0.2

## 2023-11-19 PROCEDURE — 87086 URINE CULTURE/COLONY COUNT: CPT | Performed by: PHYSICIAN ASSISTANT

## 2023-11-19 PROCEDURE — 81002 URINALYSIS NONAUTO W/O SCOPE: CPT | Performed by: PHYSICIAN ASSISTANT

## 2023-11-19 PROCEDURE — 87077 CULTURE AEROBIC IDENTIFY: CPT | Performed by: PHYSICIAN ASSISTANT

## 2023-11-19 PROCEDURE — 99213 OFFICE O/P EST LOW 20 MIN: CPT | Performed by: PHYSICIAN ASSISTANT

## 2023-11-19 PROCEDURE — 87186 SC STD MICRODIL/AGAR DIL: CPT | Performed by: PHYSICIAN ASSISTANT

## 2023-11-19 RX ORDER — CEPHALEXIN 500 MG/1
500 CAPSULE ORAL EVERY 6 HOURS SCHEDULED
Qty: 28 CAPSULE | Refills: 0 | Status: SHIPPED | OUTPATIENT
Start: 2023-11-19 | End: 2023-11-26

## 2023-11-19 RX ORDER — CEPHALEXIN 500 MG/1
500 CAPSULE ORAL EVERY 6 HOURS SCHEDULED
Qty: 28 CAPSULE | Refills: 0 | Status: SHIPPED | OUTPATIENT
Start: 2023-11-19 | End: 2023-11-19 | Stop reason: SDUPTHER

## 2023-11-19 NOTE — PATIENT INSTRUCTIONS
Dysuria   WHAT YOU NEED TO KNOW:   Dysuria is difficulty urinating, or pain, burning, or discomfort with urination. Dysuria is usually a symptom of another problem. DISCHARGE INSTRUCTIONS:   Return to the emergency department if:   You have severe back, side, or abdominal pain. You have fever and shaking chills. You vomit several times in a row. Contact your healthcare provider if:   Your symptoms do not go away, even after treatment. You have questions or concerns about your condition or care. Medicines:   Medicines  may be given to help treat a bacterial infection or help decrease bladder spasms. Take your medicine as directed. Contact your healthcare provider if you think your medicine is not helping or if you have side effects. Tell your provider if you are allergic to any medicine. Keep a list of the medicines, vitamins, and herbs you take. Include the amounts, and when and why you take them. Bring the list or the pill bottles to follow-up visits. Carry your medicine list with you in case of an emergency. Follow up with your healthcare provider as directed: Your healthcare provider may also refer you to a urologist or nephrologist to have additional testing. Write down your questions so you remember to ask them during your visits. Manage your dysuria:   Drink more liquids. Liquids help flush out bacteria that may be causing an infection. Ask your healthcare provider how much liquid to drink each day and which liquids are best for you. Take sitz baths as directed. Fill a bathtub with 4 to 6 inches of warm water. You may also use a sitz bath pan that fits over a toilet. Sit in the sitz bath for 20 minutes. Do this 2 to 3 times a day, or as directed. The warm water can help decrease pain and swelling. © Copyright [employer] 2023 Information is for End User's use only and may not be sold, redistributed or otherwise used for commercial purposes.   The above information is an  only. It is not intended as medical advice for individual conditions or treatments. Talk to your doctor, nurse or pharmacist before following any medical regimen to see if it is safe and effective for you.

## 2023-11-19 NOTE — PROGRESS NOTES
[facility]        NAME: Kayleigh Darling is a 82 y.o. female  : 1940    MRN: [id number]  DATE: 2023  TIME: 5:37 PM    Assessment and Plan   UTI symptoms [R39.9]  1. UTI symptoms  POCT urine dip    Urine culture    cephalexin (KEFLEX) 500 mg capsule    DISCONTINUED: cephalexin (KEFLEX) 500 mg capsule            Patient Instructions       Follow up with PCP in 3-5 days. Proceed to  ER if symptoms worsen. Chief Complaint     Chief Complaint   Patient presents with   • Possible UTI     Started with frequency on Thursday night into Friday. .. then over weekend had more urgency and pain. .. History of Present Illness       Urinary Tract Infection   This is a new problem. The current episode started yesterday. The problem has been unchanged. The quality of the pain is described as aching and burning. The pain is at a severity of 5/10. There has been no fever. She is Not sexually active. There is No history of pyelonephritis. Associated symptoms include flank pain, frequency and urgency. Pertinent negatives include no chills, discharge, hematuria, hesitancy, nausea, sweats or vomiting. She has tried nothing for the symptoms. The treatment provided mild relief. There is no history of recurrent UTIs. Review of Systems   Review of Systems   Constitutional:  Negative for chills, fatigue and fever. HENT:  Negative for congestion, ear pain, hearing loss, postnasal drip, sinus pressure, sinus pain and sore throat. Eyes:  Negative for pain and discharge. Respiratory:  Negative for chest tightness and shortness of breath. Cardiovascular:  Negative for chest pain. Gastrointestinal:  Negative for abdominal pain, constipation, nausea and vomiting. Genitourinary:  Positive for flank pain, frequency and urgency. Negative for difficulty urinating, hematuria and hesitancy. Musculoskeletal:  Negative for arthralgias and myalgias. Skin:  Negative for rash.    Neurological: Negative for dizziness and headaches. Psychiatric/Behavioral:  Negative for behavioral problems.         Current Medications       Current Outpatient Medications:   •  acetaminophen (TYLENOL) 325 mg tablet, Take 650 mg by mouth every 6 (six) hours as needed for mild pain, Disp: , Rfl:   •  atorvastatin (LIPITOR) 10 mg tablet, Take 1 tablet (10 mg total) by mouth daily at bedtime, Disp: 90 tablet, Rfl: 1  •  cephalexin (KEFLEX) 500 mg capsule, Take 1 capsule (500 mg total) by mouth every 6 (six) hours for 7 days, Disp: 28 capsule, Rfl: 0  •  Eliquis 5 MG, Take 1 tablet (5 mg total) by mouth 2 (two) times a day, Disp: 180 tablet, Rfl: 1  •  levothyroxine 25 mcg tablet, Take 1 tablet (25 mcg total) by mouth daily, Disp: 90 tablet, Rfl: 1  •  Multiple Vitamin (MULTIVITAMIN) tablet, Take 1 tablet by mouth daily, Disp: , Rfl:   •  Omega-3 Fatty Acids (FISH OIL PO), Take 1 capsule by mouth daily, Disp: , Rfl:   •  semaglutide (Rybelsus) 3 MG tablet, Take 1 tablet (3 mg total) by mouth daily before breakfast, Disp: 30 tablet, Rfl: 1  •  valsartan-hydrochlorothiazide (DIOVAN-HCT) 160-12.5 MG per tablet, Take 1 tablet by mouth every morning, Disp: 90 tablet, Rfl: 1    Current Allergies     Allergies as of 11/19/2023 - Reviewed 11/19/2023   Allergen Reaction Noted   • Oxycodone Vomiting 12/11/2019   • Metformin Abdominal Pain 08/25/2023            The following portions of the patient's history were reviewed and updated as appropriate: allergies, current medications, past family history, past medical history, past social history, past surgical history and problem list.     Past Medical History:   Diagnosis Date   • Acquired hypothyroidism    • Adenoma of left adrenal gland 02/27/2018   • Adrenal gland disorder ([address])    • Anemia    • Anxiety    • Arthritis    • At risk for falls    • Diabetes mellitus ([address])    • Disease of thyroid gland     hypo   • 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   • Edema of both legs • History of pulmonary embolus (PE)    • Hyperlipidemia    • Hypertension    • Irritable bowel syndrome    • Prediabetes    • Presence of right artificial hip joint    • Pulmonary embolus ([address]) 2/27/2018 & 2/14/2019   • Uses roller walker    • Wears glasses    • Wears partial dentures     upper partial       Past Surgical History:   Procedure Laterality Date   • CHOLECYSTECTOMY  1991   • COLONOSCOPY  11/09/2017   • HEMORRHOID SURGERY  1999   • HYSTERECTOMY  1993    Vaginal   • HI ARTHRP ACETBLR/PROX FEM PROSTC AGRFT/ALGRFT Right 10/22/2019    Procedure: ARTHROPLASTY HIP TOTAL;  Surgeon: Jannet Jorge DO;  Location: [facility];  Service: Orthopedics   • TRIGGER FINGER RELEASE Right 1998       Family History   Problem Relation Age of Onset   • Hypertension Family    • Hypertension Mother          Medications have been verified. Objective   Pulse (!) 114   Temp (!) 96.9 °F (36.1 °C)   Resp 16   Ht 5' 4" (1.626 m)   Wt 93.9 kg (207 lb)   SpO2 97%   BMI 35.53 kg/m²   No LMP recorded. Patient has had a hysterectomy. Physical Exam     Physical Exam  Vitals and nursing note reviewed. Constitutional:       Appearance: Normal appearance. She is well-developed. HENT:      Head: Normocephalic and atraumatic. Cardiovascular:      Rate and Rhythm: Normal rate and regular rhythm. Heart sounds: Normal heart sounds. No murmur heard. Pulmonary:      Effort: Pulmonary effort is normal. No respiratory distress. Breath sounds: Normal breath sounds. Abdominal:      General: Bowel sounds are normal. There is no distension. Palpations: Abdomen is soft. Abdomen is not rigid. Tenderness: There is abdominal tenderness in the suprapubic area. There is no guarding or rebound. Negative signs include Kohli's sign and McBurney's sign. Neurological:      Mental Status: She is alert and oriented to person, place, and time.

## 2023-11-21 LAB — BACTERIA UR CULT: ABNORMAL

## 2023-12-01 ENCOUNTER — NEW PATIENT COMPREHENSIVE (OUTPATIENT)
Dept: URBAN - METROPOLITAN AREA CLINIC 6 | Facility: CLINIC | Age: 83
End: 2023-12-01

## 2023-12-01 DIAGNOSIS — E11.9: ICD-10-CM

## 2023-12-01 DIAGNOSIS — H35.361: ICD-10-CM

## 2023-12-01 DIAGNOSIS — H25.813: ICD-10-CM

## 2023-12-01 DIAGNOSIS — H52.13: ICD-10-CM

## 2023-12-01 DIAGNOSIS — Z83.518: ICD-10-CM

## 2023-12-01 DIAGNOSIS — H35.371: ICD-10-CM

## 2023-12-01 LAB
LEFT EYE DIABETIC RETINOPATHY: NORMAL
RIGHT EYE DIABETIC RETINOPATHY: NORMAL

## 2023-12-01 PROCEDURE — 92015 DETERMINE REFRACTIVE STATE: CPT

## 2023-12-01 PROCEDURE — 92004 COMPRE OPH EXAM NEW PT 1/>: CPT

## 2023-12-01 PROCEDURE — 92134 CPTRZ OPH DX IMG PST SGM RTA: CPT

## 2023-12-01 ASSESSMENT — VISUAL ACUITY
OD_PH: 20/30
OD_CC: 20/50+2
OD_CC: 20/400
OS_CC: 20/50+2
OS_PH: 20/30+1
OU_CC: J1
OS_CC: J1

## 2023-12-01 ASSESSMENT — TONOMETRY
OD_IOP_MMHG: 14
OS_IOP_MMHG: 13

## 2023-12-04 ENCOUNTER — APPOINTMENT (OUTPATIENT)
Dept: LAB | Age: 83
End: 2023-12-04
Payer: COMMERCIAL

## 2023-12-04 ENCOUNTER — RA CDI HCC (OUTPATIENT)
Dept: OTHER | Facility: HOSPITAL | Age: 83
End: 2023-12-04

## 2023-12-04 DIAGNOSIS — E11.65 UNCONTROLLED TYPE 2 DIABETES MELLITUS WITH HYPERGLYCEMIA (HCC): ICD-10-CM

## 2023-12-04 LAB
EST. AVERAGE GLUCOSE BLD GHB EST-MCNC: 166 MG/DL
HBA1C MFR BLD: 7.4 %

## 2023-12-04 PROCEDURE — 83036 HEMOGLOBIN GLYCOSYLATED A1C: CPT

## 2023-12-04 PROCEDURE — 36415 COLL VENOUS BLD VENIPUNCTURE: CPT

## 2023-12-04 NOTE — PROGRESS NOTES
[facility] coding opportunities          Chart Reviewed number of suggestions sent to Provider: 1     Patients Insurance     Medicare Insurance: [address] [place] [address]

## 2023-12-08 ENCOUNTER — OFFICE VISIT (OUTPATIENT)
Dept: INTERNAL MEDICINE CLINIC | Facility: CLINIC | Age: 83
End: 2023-12-08
Payer: COMMERCIAL

## 2023-12-08 VITALS
DIASTOLIC BLOOD PRESSURE: 82 MMHG | BODY MASS INDEX: 35.85 KG/M2 | WEIGHT: 210 LBS | TEMPERATURE: 98.3 F | OXYGEN SATURATION: 98 % | HEART RATE: 88 BPM | HEIGHT: 64 IN | SYSTOLIC BLOOD PRESSURE: 144 MMHG

## 2023-12-08 DIAGNOSIS — E78.2 MIXED HYPERLIPIDEMIA: ICD-10-CM

## 2023-12-08 DIAGNOSIS — E11.65 UNCONTROLLED TYPE 2 DIABETES MELLITUS WITH HYPERGLYCEMIA (HCC): Primary | ICD-10-CM

## 2023-12-08 DIAGNOSIS — I10 ESSENTIAL HYPERTENSION: ICD-10-CM

## 2023-12-08 DIAGNOSIS — E03.9 ACQUIRED HYPOTHYROIDISM: ICD-10-CM

## 2023-12-08 DIAGNOSIS — Z23 ENCOUNTER FOR IMMUNIZATION: ICD-10-CM

## 2023-12-08 DIAGNOSIS — I26.92 CHRONIC SADDLE PULMONARY EMBOLISM WITHOUT ACUTE COR PULMONALE (HCC): ICD-10-CM

## 2023-12-08 DIAGNOSIS — M17.0 PRIMARY OSTEOARTHRITIS OF BOTH KNEES: ICD-10-CM

## 2023-12-08 DIAGNOSIS — I27.82 CHRONIC SADDLE PULMONARY EMBOLISM WITHOUT ACUTE COR PULMONALE (HCC): ICD-10-CM

## 2023-12-08 PROCEDURE — G0008 ADMIN INFLUENZA VIRUS VAC: HCPCS

## 2023-12-08 PROCEDURE — 90662 IIV NO PRSV INCREASED AG IM: CPT

## 2023-12-08 PROCEDURE — 99214 OFFICE O/P EST MOD 30 MIN: CPT | Performed by: INTERNAL MEDICINE

## 2023-12-08 RX ORDER — METFORMIN HYDROCHLORIDE 750 MG/1
750 TABLET, EXTENDED RELEASE ORAL
Qty: 90 TABLET | Refills: 1 | Status: SHIPPED | OUTPATIENT
Start: 2023-12-08

## 2023-12-08 NOTE — PROGRESS NOTES
Assessment/Plan:    BMI Counseling: Body mass index is 36.05 kg/m². The BMI is above normal. Nutrition recommendations include decreasing portion sizes, encouraging healthy choices of fruits and vegetables and decreasing fast food intake. Rationale for BMI follow-up plan is due to patient being overweight or obese. 1. Uncontrolled type 2 diabetes mellitus with hyperglycemia (HCC)  -     metFORMIN (GLUCOPHAGE-XR) 750 mg 24 hr tablet; Take 1 tablet (750 mg total) by mouth daily with breakfast    2. Essential hypertension    3. Acquired hypothyroidism    4. Primary osteoarthritis of both knees    5. Mixed hyperlipidemia    6. Chronic 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)           Subjective:      Patient ID: Tanner Melo is a 82 y.o. female. Follow-up on blood test done on 12/4/2023 test discussed with her        The following portions of the patient's history were reviewed and updated as appropriate: She  has a past medical history of Acquired hypothyroidism, 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), Adrenal gland disorder ([address]), Anemia, Anxiety, Arthritis, At risk for falls, Diabetes mellitus ([address]), Disease of thyroid gland, DJD (degenerative joint disease) of knee, Edema of both legs, History of pulmonary embolus (PE), Hyperlipidemia, Hypertension, Irritable bowel syndrome, Prediabetes, Presence of right artificial hip joint, Pulmonary embolus ([address]) (2/27/2018 & 2/14/2019), Uses roller walker, Wears glasses, and Wears partial dentures.   She   Patient Active Problem List    Diagnosis Date Noted    Primary osteoarthritis of both knees 09/07/2022    Tracheitis 06/29/2022    Obesity, morbid ([address]) 05/05/2021    Essential hypertension, benign 05/05/2021    Venous insufficiency of both lower extremities 12/09/2020    Diabetes mellitus without complication ([address]) 97/96/9071    Hypertension     Irritable bowel syndrome     Edema of both legs     Acquired hypothyroidism     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, without long-term current use of insulin ([address])     History of pulmonary embolus (PE)     Pulmonary embolus (HCC)     Presence of right artificial hip joint     Acute blood loss anemia 10/25/2019    Nausea 10/25/2019    Hypothyroid 10/23/2019    Mixed hyperlipidemia 10/23/2019    Essential hypertension 10/23/2019    History of hypercoagulable state 10/09/2019    Primary osteoarthritis of one hip, right 08/09/2019    Primary osteoarthritis of right hip      She  has a past surgical history that includes Cholecystectomy (1991); Colonoscopy (11/09/2017); pr arthrp acetblr/prox fem prostc agrft/algrft (Right, 10/22/2019); Hysterectomy (1993); Hemorrhoid surgery (1999); and Trigger finger release (Right, 1998). Her family history includes Hypertension in her family and mother. She  reports that she has never smoked. She has never used smokeless tobacco. She reports that she does not currently use alcohol. She reports that she does not currently use drugs. Current Outpatient Medications   Medication Sig Dispense Refill    acetaminophen (TYLENOL) 325 mg tablet Take 650 mg by mouth every 6 (six) hours as needed for mild pain      atorvastatin (LIPITOR) 10 mg tablet Take 1 tablet (10 mg total) by mouth daily at bedtime 90 tablet 1    Eliquis 5 MG Take 1 tablet (5 mg total) by mouth 2 (two) times a day 180 tablet 1    levothyroxine 25 mcg tablet Take 1 tablet (25 mcg total) by mouth daily 90 tablet 1    metFORMIN (GLUCOPHAGE-XR) 750 mg 24 hr tablet Take 1 tablet (750 mg total) by mouth daily with breakfast 90 tablet 1    Multiple Vitamin (MULTIVITAMIN) tablet Take 1 tablet by mouth daily      Omega-3 Fatty Acids (FISH OIL PO) Take 1 capsule by mouth daily      valsartan-hydrochlorothiazide (DIOVAN-HCT) 160-12.5 MG per tablet Take 1 tablet by mouth every morning 90 tablet 1     No current facility-administered medications for this visit.      Current Outpatient Medications on File Prior to Visit   Medication Sig    acetaminophen (TYLENOL) 325 mg tablet Take 650 mg by mouth every 6 (six) hours as needed for mild pain    atorvastatin (LIPITOR) 10 mg tablet Take 1 tablet (10 mg total) by mouth daily at bedtime    Eliquis 5 MG Take 1 tablet (5 mg total) by mouth 2 (two) times a day    levothyroxine 25 mcg tablet Take 1 tablet (25 mcg total) by mouth daily    Multiple Vitamin (MULTIVITAMIN) tablet Take 1 tablet by mouth daily    Omega-3 Fatty Acids (FISH OIL PO) Take 1 capsule by mouth daily    valsartan-hydrochlorothiazide (DIOVAN-HCT) 160-12.5 MG per tablet Take 1 tablet by mouth every morning    [DISCONTINUED] semaglutide (Rybelsus) 3 MG tablet Take 1 tablet (3 mg total) by mouth daily before breakfast     No current facility-administered medications on file prior to visit. She is allergic to oxycodone and metformin. .    Review of Systems   Constitutional:  Negative for chills and fever. HENT:  Negative for congestion, ear pain and sore throat. Eyes:  Negative for pain. Respiratory:  Negative for cough and shortness of breath. Cardiovascular:  Negative for chest pain and leg swelling. Gastrointestinal:  Negative for abdominal pain, nausea and vomiting. Endocrine: Negative for polyuria. Genitourinary:  Negative for difficulty urinating, frequency and urgency. Musculoskeletal:  Positive for arthralgias. Negative for back pain. Skin:  Negative for rash. Neurological:  Negative for weakness and headaches. Psychiatric/Behavioral:  Negative for sleep disturbance. The patient is not nervous/anxious.           Objective:      /82 (BP Location: Left arm, Patient Position: Sitting, Cuff Size: Standard)   Pulse 88   Temp 98.3 °F (36.8 °C)   Ht 5' 4" (1.626 m)   Wt 95.3 kg (210 lb)   SpO2 98%   BMI 36.05 kg/m²     Recent Results (from the past 1344 hour(s))   Urine culture    Collection Time: 11/19/23  5:30 PM    Specimen: Urine, Clean Catch Result Value Ref Range    Urine Culture >100,000 cfu/ml Escherichia coli (A)        Susceptibility    Escherichia coli - MAYTE     Amoxicillin + Clavulanate <=8/4 Susceptible ug/ml     Ampicillin ($$) >16.00 Resistant ug/ml     Ampicillin + Sulbactam ($) 16/8 Intermediate ug/ml     Aztreonam ($$$)  <=4 Susceptible ug/ml     Cefazolin ($) 4.00 Susceptible ug/ml     Ciprofloxacin ($)  <=0.25 Susceptible ug/ml     Ertapenem ($$$) <=0.5 Susceptible ug/ml     Gentamicin ($$) <=2 Susceptible ug/ml     Levofloxacin ($) <=0.50 Susceptible ug/ml     Minocycline <=4 Susceptible ug/ml     Nitrofurantoin <=32 Susceptible ug/ml     Piperacillin + Tazobactam ($$$) <=8 Susceptible ug/ml     Tetracycline >8 Resistant ug/ml     Tobramycin ($) <=2 Susceptible ug/ml     Trimethoprim + Sulfamethoxazole ($$$) >2/38 Resistant ug/ml     ZID Performed Yes     POCT urine dip    Collection Time: 11/19/23  5:30 PM   Result Value Ref Range    LEUKOCYTE ESTERASE,UA LARGE     NITRITE,UA POS     SL AMB POCT UROBILINOGEN 0.2     POCT URINE PROTEIN TRACE      PH,UA 7.5     BLOOD,UA MOD     SPECIFIC GRAVITY,UA 1.000     KETONES,UA NEG     BILIRUBIN,UA NEG     GLUCOSE, UA NEG      COLOR,UA YELLOW     CLARITY,UA CLOUDY    Hemoglobin A1C    Collection Time: 12/04/23  9:40 AM   Result Value Ref Range    Hemoglobin A1C 7.4 (H) Normal 4.0-5.6%; PreDiabetic 5.7-6.4%; Diabetic >=6.5%; Glycemic control for adults with diabetes <7.0% %     mg/dl        Physical Exam  Constitutional:       Appearance: Normal appearance. HENT:      Head: Normocephalic. Right Ear: Tympanic membrane, ear canal and external ear normal.      Left Ear: Tympanic membrane, ear canal and external ear normal.      Nose: Nose normal. No congestion. Mouth/Throat:      Mouth: Mucous membranes are moist.      Pharynx: Oropharynx is clear. No oropharyngeal exudate or posterior oropharyngeal erythema. Eyes:      Extraocular Movements: Extraocular movements intact. Conjunctiva/sclera: Conjunctivae normal.   Cardiovascular:      Rate and Rhythm: Normal rate and regular rhythm. Heart sounds: Normal heart sounds. No murmur heard. Pulmonary:      Effort: Pulmonary effort is normal.      Breath sounds: Normal breath sounds. No wheezing or rales. Abdominal:      General: Abdomen is flat. There is no distension. Palpations: Abdomen is soft. Tenderness: There is no abdominal tenderness. Musculoskeletal:         General: Normal range of motion. Cervical back: Normal range of motion and neck supple. Right lower leg: No edema. Left lower leg: No edema. Lymphadenopathy:      Cervical: No cervical adenopathy. Skin:     General: Skin is warm. Neurological:      General: No focal deficit present. Mental Status: She is alert and oriented to person, place, and time.

## 2023-12-11 DIAGNOSIS — E03.9 HYPOTHYROIDISM (ACQUIRED): ICD-10-CM

## 2023-12-11 DIAGNOSIS — I10 ESSENTIAL HYPERTENSION, BENIGN: ICD-10-CM

## 2023-12-11 RX ORDER — LEVOTHYROXINE SODIUM 0.03 MG/1
25 TABLET ORAL DAILY
Qty: 90 TABLET | Refills: 1 | Status: SHIPPED | OUTPATIENT
Start: 2023-12-11

## 2023-12-11 RX ORDER — VALSARTAN AND HYDROCHLOROTHIAZIDE 160; 12.5 MG/1; MG/1
1 TABLET, FILM COATED ORAL EVERY MORNING
Qty: 90 TABLET | Refills: 1 | Status: SHIPPED | OUTPATIENT
Start: 2023-12-11

## 2023-12-15 ENCOUNTER — TELEPHONE (OUTPATIENT)
Dept: INTERNAL MEDICINE CLINIC | Facility: CLINIC | Age: 83
End: 2023-12-15

## 2023-12-15 DIAGNOSIS — N39.0 URINARY TRACT INFECTION WITHOUT HEMATURIA, SITE UNSPECIFIED: Primary | ICD-10-CM

## 2023-12-15 RX ORDER — CEPHALEXIN 500 MG/1
500 CAPSULE ORAL EVERY 12 HOURS SCHEDULED
Qty: 14 CAPSULE | Refills: 0 | Status: SHIPPED | OUTPATIENT
Start: 2023-12-15 | End: 2023-12-22

## 2023-12-15 RX ORDER — CEPHALEXIN 500 MG/1
500 CAPSULE ORAL EVERY 12 HOURS SCHEDULED
Qty: 14 CAPSULE | Refills: 0 | Status: SHIPPED | OUTPATIENT
Start: 2023-12-15 | End: 2023-12-15

## 2023-12-15 NOTE — TELEPHONE ENCOUNTER
Patient called and stated she was seen last Friday she has a UTI and didn't want it to go through the weekend could she get an antibiotic called in for her to [facility] [place]

## 2024-01-02 ENCOUNTER — UNSCHEDULED FOLLOW UP (OUTPATIENT)
Dept: URBAN - METROPOLITAN AREA CLINIC 6 | Facility: CLINIC | Age: 84
End: 2024-01-02

## 2024-01-02 DIAGNOSIS — H35.361: ICD-10-CM

## 2024-01-02 DIAGNOSIS — H04.123: ICD-10-CM

## 2024-01-02 DIAGNOSIS — E11.9: ICD-10-CM

## 2024-01-02 DIAGNOSIS — H35.371: ICD-10-CM

## 2024-01-02 PROCEDURE — 92012 INTRM OPH EXAM EST PATIENT: CPT

## 2024-01-02 ASSESSMENT — VISUAL ACUITY
OD_CC: 20/50+1
OS_CC: 20/40+1

## 2024-01-02 ASSESSMENT — TONOMETRY
OS_IOP_MMHG: 11
OD_IOP_MMHG: 13

## 2024-01-03 DIAGNOSIS — E78.2 MIXED HYPERLIPIDEMIA: ICD-10-CM

## 2024-01-03 RX ORDER — ATORVASTATIN CALCIUM 10 MG/1
10 TABLET, FILM COATED ORAL
Qty: 90 TABLET | Refills: 1 | Status: SHIPPED | OUTPATIENT
Start: 2024-01-03

## 2024-01-22 DIAGNOSIS — I27.82 CHRONIC SADDLE PULMONARY EMBOLISM WITHOUT ACUTE COR PULMONALE (HCC): ICD-10-CM

## 2024-01-22 DIAGNOSIS — I26.92 CHRONIC SADDLE PULMONARY EMBOLISM WITHOUT ACUTE COR PULMONALE (HCC): ICD-10-CM

## 2024-01-22 RX ORDER — APIXABAN 5 MG/1
5 TABLET, FILM COATED ORAL 2 TIMES DAILY
Qty: 180 TABLET | Refills: 1 | Status: SHIPPED | OUTPATIENT
Start: 2024-01-22 | End: 2024-04-21

## 2024-02-07 ENCOUNTER — VBI (OUTPATIENT)
Dept: ADMINISTRATIVE | Facility: OTHER | Age: 84
End: 2024-02-07

## 2024-02-07 NOTE — TELEPHONE ENCOUNTER
Upon review of the In Basket request we were able to locate, review, and update the patient chart as requested for Diabetic Eye Exam.    Any additional questions or concerns should be emailed to the Practice Liaisons via the appropriate education email address, please do not reply via In Basket.    Thank you  Lupe Christine

## 2024-02-15 DIAGNOSIS — E11.65 UNCONTROLLED TYPE 2 DIABETES MELLITUS WITH HYPERGLYCEMIA (HCC): ICD-10-CM

## 2024-02-15 RX ORDER — METFORMIN HYDROCHLORIDE 750 MG/1
750 TABLET, EXTENDED RELEASE ORAL
Qty: 90 TABLET | Refills: 0 | Status: SHIPPED | OUTPATIENT
Start: 2024-02-15

## 2024-03-13 DIAGNOSIS — E03.9 HYPOTHYROIDISM (ACQUIRED): ICD-10-CM

## 2024-03-13 DIAGNOSIS — E11.65 UNCONTROLLED TYPE 2 DIABETES MELLITUS WITH HYPERGLYCEMIA (HCC): ICD-10-CM

## 2024-03-13 DIAGNOSIS — I10 ESSENTIAL HYPERTENSION, BENIGN: ICD-10-CM

## 2024-03-14 ENCOUNTER — TELEPHONE (OUTPATIENT)
Dept: INTERNAL MEDICINE CLINIC | Facility: CLINIC | Age: 84
End: 2024-03-14

## 2024-03-14 RX ORDER — VALSARTAN AND HYDROCHLOROTHIAZIDE 160; 12.5 MG/1; MG/1
1 TABLET, FILM COATED ORAL EVERY MORNING
Qty: 90 TABLET | Refills: 1 | Status: SHIPPED | OUTPATIENT
Start: 2024-03-14

## 2024-03-14 RX ORDER — METFORMIN HYDROCHLORIDE 750 MG/1
750 TABLET, EXTENDED RELEASE ORAL
Qty: 90 TABLET | Refills: 1 | Status: SHIPPED | OUTPATIENT
Start: 2024-03-14

## 2024-03-14 RX ORDER — LEVOTHYROXINE SODIUM 0.03 MG/1
25 TABLET ORAL DAILY
Qty: 90 TABLET | Refills: 1 | Status: SHIPPED | OUTPATIENT
Start: 2024-03-14

## 2024-03-14 NOTE — TELEPHONE ENCOUNTER
Patient put in for a refill of her medication to be sent to a [place] pharmacy she has a virtual angelia for may 8th , we just need to know if [place] can not do virtual [place]

## 2024-05-02 DIAGNOSIS — I10 ESSENTIAL HYPERTENSION, BENIGN: ICD-10-CM

## 2024-05-05 RX ORDER — VALSARTAN AND HYDROCHLOROTHIAZIDE 160; 12.5 MG/1; MG/1
1 TABLET, FILM COATED ORAL EVERY MORNING
Qty: 90 TABLET | Refills: 1 | Status: SHIPPED | OUTPATIENT
Start: 2024-05-05

## 2024-05-08 ENCOUNTER — OFFICE VISIT (OUTPATIENT)
Dept: INTERNAL MEDICINE CLINIC | Facility: CLINIC | Age: 84
End: 2024-05-08
Payer: COMMERCIAL

## 2024-05-08 VITALS
HEART RATE: 88 BPM | TEMPERATURE: 97.5 F | DIASTOLIC BLOOD PRESSURE: 84 MMHG | BODY MASS INDEX: 35.85 KG/M2 | WEIGHT: 210 LBS | OXYGEN SATURATION: 99 % | SYSTOLIC BLOOD PRESSURE: 136 MMHG | HEIGHT: 64 IN

## 2024-05-08 DIAGNOSIS — I26.92 CHRONIC SADDLE PULMONARY EMBOLISM WITHOUT ACUTE COR PULMONALE (HCC): ICD-10-CM

## 2024-05-08 DIAGNOSIS — R30.0 DYSURIA: ICD-10-CM

## 2024-05-08 DIAGNOSIS — I87.2 VENOUS INSUFFICIENCY OF BOTH LOWER EXTREMITIES: ICD-10-CM

## 2024-05-08 DIAGNOSIS — E78.2 MIXED HYPERLIPIDEMIA: ICD-10-CM

## 2024-05-08 DIAGNOSIS — M17.0 PRIMARY OSTEOARTHRITIS OF BOTH KNEES: ICD-10-CM

## 2024-05-08 DIAGNOSIS — E08.36 DIABETES MELLITUS DUE TO UNDERLYING CONDITION WITH DIABETIC CATARACT, WITHOUT LONG-TERM CURRENT USE OF INSULIN (HCC): ICD-10-CM

## 2024-05-08 DIAGNOSIS — E66.01 OBESITY, MORBID (HCC): ICD-10-CM

## 2024-05-08 DIAGNOSIS — E11.65 UNCONTROLLED TYPE 2 DIABETES MELLITUS WITH HYPERGLYCEMIA (HCC): ICD-10-CM

## 2024-05-08 DIAGNOSIS — I27.82 CHRONIC SADDLE PULMONARY EMBOLISM WITHOUT ACUTE COR PULMONALE (HCC): ICD-10-CM

## 2024-05-08 DIAGNOSIS — I10 ESSENTIAL HYPERTENSION: ICD-10-CM

## 2024-05-08 DIAGNOSIS — N39.0 URINARY TRACT INFECTION WITHOUT HEMATURIA, SITE UNSPECIFIED: Primary | ICD-10-CM

## 2024-05-08 DIAGNOSIS — E53.8 B12 DEFICIENCY: ICD-10-CM

## 2024-05-08 LAB
SL AMB  POCT GLUCOSE, UA: NORMAL
SL AMB LEUKOCYTE ESTERASE,UA: NORMAL
SL AMB POCT BILIRUBIN,UA: NORMAL
SL AMB POCT BLOOD,UA: NORMAL
SL AMB POCT CLARITY,UA: NORMAL
SL AMB POCT COLOR,UA: YELLOW
SL AMB POCT KETONES,UA: NORMAL
SL AMB POCT NITRITE,UA: NORMAL
SL AMB POCT PH,UA: 6
SL AMB POCT SPECIFIC GRAVITY,UA: 1.01
SL AMB POCT URINE PROTEIN: NORMAL
SL AMB POCT UROBILINOGEN: 0.2

## 2024-05-08 PROCEDURE — 99214 OFFICE O/P EST MOD 30 MIN: CPT | Performed by: INTERNAL MEDICINE

## 2024-05-08 PROCEDURE — G2211 COMPLEX E/M VISIT ADD ON: HCPCS | Performed by: INTERNAL MEDICINE

## 2024-05-08 PROCEDURE — 81003 URINALYSIS AUTO W/O SCOPE: CPT | Performed by: INTERNAL MEDICINE

## 2024-05-08 RX ORDER — CEPHALEXIN 500 MG/1
500 CAPSULE ORAL EVERY 8 HOURS SCHEDULED
Qty: 21 CAPSULE | Refills: 0 | Status: SHIPPED | OUTPATIENT
Start: 2024-05-08 | End: 2024-05-15

## 2024-05-08 RX ORDER — METFORMIN HYDROCHLORIDE 750 MG/1
750 TABLET, EXTENDED RELEASE ORAL
Qty: 90 TABLET | Refills: 1 | Status: SHIPPED | OUTPATIENT
Start: 2024-05-08

## 2024-05-08 NOTE — PROGRESS NOTES
Assessment/Plan:             1. Urinary tract infection without hematuria, site unspecified  -     cephalexin (KEFLEX) 500 mg capsule; Take 1 capsule (500 mg total) by mouth every 8 (eight) hours for 7 days    2. Dysuria  -     POCT urine dip auto non-scope    3. Uncontrolled type 2 diabetes mellitus with hyperglycemia (HCC)  -     metFORMIN (GLUCOPHAGE-XR) 750 mg 24 hr tablet; Take 1 tablet (750 mg total) by mouth daily with breakfast  -     Albumin / creatinine urine ratio; Future  -     CBC and differential; Future  -     Comprehensive metabolic panel; Future  -     Lipid Panel with Direct LDL reflex; Future  -     TSH, 3rd generation; Future  -     Hemoglobin A1C; Future    4. 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 Obesity, morbid (HCC)    6. 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 Essential hypertension  -     CBC and differential; Future  -     Comprehensive metabolic panel; Future  -     Lipid Panel with Direct LDL reflex; Future  -     TSH, 3rd generation; Future    8. Venous insufficiency of both lower extremities    9. Primary osteoarthritis of both knees    10. Mixed hyperlipidemia  -     Comprehensive metabolic panel; Future  -     Lipid Panel with Direct LDL reflex; Future  -     TSH, 3rd generation; Future    11. B12 deficiency  -     Vitamin B12; Future           Subjective:      Patient ID: Lubna Bronson is a 83 y.o. female.    Urinary burning and frequency, also follow-up on other medical issues to ensure they are stable    Urinary Tract Infection   Associated symptoms include frequency and urgency. Pertinent negatives include no chills, nausea or vomiting.   Diabetes  Pertinent negatives for hypoglycemia include no headaches or nervousness/anxiousness. Pertinent negatives for diabetes include no chest pain, no polyuria and no weakness.       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, without long-term current use of insulin (HCC)     History of pulmonary embolus (PE)     Pulmonary embolus (HCC)     Presence of right artificial hip joint     Acute blood loss anemia 10/25/2019    Nausea 10/25/2019    Hypothyroid 10/23/2019    Mixed hyperlipidemia 10/23/2019    Essential hypertension 10/23/2019    History of hypercoagulable state 10/09/2019    Primary osteoarthritis of one hip, right 08/09/2019    Primary osteoarthritis of right hip      She  has a past surgical history that includes Cholecystectomy (1991); Colonoscopy (11/09/2017); pr arthrp acetblr/prox fem prostc agrft/algrft (Right, 10/22/2019); Hysterectomy (1993); Hemorrhoid surgery (1999); and Trigger finger release (Right, 1998).  Her family history includes Hypertension in her family and mother.  She  reports that she has never smoked. She has  never used smokeless tobacco. She reports that she does not currently use alcohol. She reports that she does not currently use drugs.  Current Outpatient Medications   Medication Sig Dispense Refill    acetaminophen (TYLENOL) 325 mg tablet Take 650 mg by mouth every 6 (six) hours as needed for mild pain      atorvastatin (LIPITOR) 10 mg tablet Take 1 tablet (10 mg total) by mouth daily at bedtime 90 tablet 1    cephalexin (KEFLEX) 500 mg capsule Take 1 capsule (500 mg total) by mouth every 8 (eight) hours for 7 days 21 capsule 0    Eliquis 5 MG Take 1 tablet (5 mg total) by mouth 2 (two) times a day 180 tablet 1    levothyroxine 25 mcg tablet Take 1 tablet (25 mcg total) by mouth daily 90 tablet 1    metFORMIN (GLUCOPHAGE-XR) 750 mg 24 hr tablet Take 1 tablet (750 mg total) by mouth daily with breakfast 90 tablet 1    Multiple Vitamin (MULTIVITAMIN) tablet Take 1 tablet by mouth daily      Omega-3 Fatty Acids (FISH OIL PO) Take 1 capsule by mouth daily      valsartan-hydrochlorothiazide (DIOVAN-HCT) 160-12.5 MG per tablet TAKE 1 TABLET BY MOUTH EVERY DAY IN THE MORNING 90 tablet 1     No current facility-administered medications for this visit.     Current Outpatient Medications on File Prior to Visit   Medication Sig    acetaminophen (TYLENOL) 325 mg tablet Take 650 mg by mouth every 6 (six) hours as needed for mild pain    atorvastatin (LIPITOR) 10 mg tablet Take 1 tablet (10 mg total) by mouth daily at bedtime    Eliquis 5 MG Take 1 tablet (5 mg total) by mouth 2 (two) times a day    levothyroxine 25 mcg tablet Take 1 tablet (25 mcg total) by mouth daily    Multiple Vitamin (MULTIVITAMIN) tablet Take 1 tablet by mouth daily    Omega-3 Fatty Acids (FISH OIL PO) Take 1 capsule by mouth daily    valsartan-hydrochlorothiazide (DIOVAN-HCT) 160-12.5 MG per tablet TAKE 1 TABLET BY MOUTH EVERY DAY IN THE MORNING    [DISCONTINUED] metFORMIN (GLUCOPHAGE-XR) 750 mg 24 hr tablet Take 1 tablet (750 mg total) by mouth daily  "with breakfast     No current facility-administered medications on file prior to visit.     She is allergic to oxycodone and metformin..    Review of Systems   Constitutional:  Negative for chills and fever.   HENT:  Negative for congestion, ear pain and sore throat.    Eyes:  Negative for pain.   Respiratory:  Negative for cough and shortness of breath.    Cardiovascular:  Negative for chest pain and leg swelling.   Gastrointestinal:  Negative for abdominal pain, nausea and vomiting.   Endocrine: Negative for polyuria.   Genitourinary:  Positive for difficulty urinating, frequency and urgency.   Musculoskeletal:  Positive for arthralgias and back pain.   Skin:  Negative for rash.   Neurological:  Negative for weakness and headaches.   Psychiatric/Behavioral:  Negative for sleep disturbance. The patient is not nervous/anxious.          Objective:      /84 (BP Location: Left arm, Patient Position: Sitting, Cuff Size: Adult)   Pulse 88   Temp 97.5 °F (36.4 °C) (Temporal)   Ht 5' 4\" (1.626 m)   Wt 95.3 kg (210 lb)   SpO2 99%   BMI 36.05 kg/m²     Recent Results (from the past 1344 hour(s))   POCT urine dip auto non-scope    Collection Time: 05/08/24 10:45 AM   Result Value Ref Range     COLOR,UA yellow     CLARITY,UA slightly cloudy     SPECIFIC GRAVITY,UA 1.015      PH,UA 6.0     LEUKOCYTE ESTERASE,UA 2+     NITRITE,UA Neg     GLUCOSE, UA Neg     KETONES,UA Neg     BILIRUBIN,UA Neg     BLOOD,UA 2+     POCT URINE PROTEIN Neg     SL AMB POCT UROBILINOGEN 0.2         Physical Exam  Constitutional:       Appearance: Normal appearance.   HENT:      Head: Normocephalic.      Right Ear: Tympanic membrane, ear canal and external ear normal.      Left Ear: Tympanic membrane, ear canal and external ear normal.      Nose: Nose normal. No congestion.      Mouth/Throat:      Mouth: Mucous membranes are moist.      Pharynx: Oropharynx is clear. No oropharyngeal exudate or posterior oropharyngeal erythema.   Eyes:      " Extraocular Movements: Extraocular movements intact.      Conjunctiva/sclera: Conjunctivae normal.   Cardiovascular:      Rate and Rhythm: Normal rate and regular rhythm.      Heart sounds: Normal heart sounds. No murmur heard.  Pulmonary:      Effort: Pulmonary effort is normal.      Breath sounds: Normal breath sounds. No wheezing or rales.   Abdominal:      General: Abdomen is flat. There is no distension.      Palpations: Abdomen is soft.      Tenderness: There is no abdominal tenderness. There is no right CVA tenderness or left CVA tenderness.   Musculoskeletal:      Cervical back: Normal range of motion and neck supple.      Right lower leg: No edema.      Left lower leg: No edema.   Lymphadenopathy:      Cervical: No cervical adenopathy.   Skin:     General: Skin is warm.   Neurological:      General: No focal deficit present.      Mental Status: She is alert and oriented to person, place, and time.

## 2024-05-16 ENCOUNTER — TELEPHONE (OUTPATIENT)
Age: 84
End: 2024-05-16

## 2024-05-16 DIAGNOSIS — N39.0 URINARY TRACT INFECTION WITHOUT HEMATURIA, SITE UNSPECIFIED: Primary | ICD-10-CM

## 2024-05-16 RX ORDER — SULFAMETHOXAZOLE AND TRIMETHOPRIM 800; 160 MG/1; MG/1
1 TABLET ORAL EVERY 12 HOURS SCHEDULED
Qty: 14 TABLET | Refills: 0 | Status: SHIPPED | OUTPATIENT
Start: 2024-05-16 | End: 2024-05-21

## 2024-05-16 NOTE — TELEPHONE ENCOUNTER
Patient called she finished Keflex 500 mg capsule on 5/14/24.  Patient last seen in office 5/8/24.  Patient is still having urinary frequency,burning and nocturia.     Patient uses [facility] in [place].    Thank you

## 2024-05-16 NOTE — TELEPHONE ENCOUNTER
Please let her know, I send bactrim, different antibiotic for uti to pharmacy [facility] in [place], she needs to take 1 pill twice a day after food for 1 week. And if she has any side effects or not better ,please let me know.

## 2024-05-20 ENCOUNTER — APPOINTMENT (EMERGENCY)
Dept: RADIOLOGY | Facility: HOSPITAL | Age: 84
DRG: 690 | End: 2024-05-20
Payer: COMMERCIAL

## 2024-05-20 ENCOUNTER — HOSPITAL ENCOUNTER (INPATIENT)
Facility: HOSPITAL | Age: 84
LOS: 1 days | Discharge: HOME | DRG: 690 | End: 2024-05-22
Attending: EMERGENCY MEDICINE | Admitting: INTERNAL MEDICINE
Payer: COMMERCIAL

## 2024-05-20 ENCOUNTER — OFFICE VISIT (OUTPATIENT)
Dept: URGENT CARE | Age: 84
End: 2024-05-20
Payer: COMMERCIAL

## 2024-05-20 VITALS
RESPIRATION RATE: 20 BRPM | SYSTOLIC BLOOD PRESSURE: 145 MMHG | DIASTOLIC BLOOD PRESSURE: 82 MMHG | TEMPERATURE: 98.8 F | HEART RATE: 94 BPM | OXYGEN SATURATION: 98 %

## 2024-05-20 DIAGNOSIS — R39.9 UTI SYMPTOMS: Primary | ICD-10-CM

## 2024-05-20 DIAGNOSIS — I10 ESSENTIAL HYPERTENSION, BENIGN: ICD-10-CM

## 2024-05-20 DIAGNOSIS — N39.0 URINARY TRACT INFECTION: Primary | ICD-10-CM

## 2024-05-20 DIAGNOSIS — R10.30 LOWER ABDOMINAL PAIN: ICD-10-CM

## 2024-05-20 DIAGNOSIS — N30.01 ACUTE CYSTITIS WITH HEMATURIA: ICD-10-CM

## 2024-05-20 LAB
ALBUMIN SERPL BCP-MCNC: 4.4 G/DL (ref 3.5–5)
ALP SERPL-CCNC: 69 U/L (ref 34–104)
ALT SERPL W P-5'-P-CCNC: 19 U/L (ref 7–52)
ANION GAP SERPL CALCULATED.3IONS-SCNC: 9 MMOL/L (ref 4–13)
AST SERPL W P-5'-P-CCNC: 17 U/L (ref 13–39)
BACTERIA UR QL AUTO: ABNORMAL /HPF
BASOPHILS # BLD AUTO: 0.03 THOUSANDS/ÂΜL (ref 0–0.1)
BASOPHILS NFR BLD AUTO: 0 % (ref 0–1)
BILIRUB SERPL-MCNC: 0.39 MG/DL (ref 0.2–1)
BILIRUB UR QL STRIP: NEGATIVE
BUN SERPL-MCNC: 17 MG/DL (ref 5–25)
CALCIUM SERPL-MCNC: 9.5 MG/DL (ref 8.4–10.2)
CHLORIDE SERPL-SCNC: 96 MMOL/L (ref 96–108)
CLARITY UR: CLEAR
CO2 SERPL-SCNC: 27 MMOL/L (ref 21–32)
COLOR UR: ABNORMAL
CREAT SERPL-MCNC: 1.12 MG/DL (ref 0.6–1.3)
EOSINOPHIL # BLD AUTO: 0.04 THOUSAND/ÂΜL (ref 0–0.61)
EOSINOPHIL NFR BLD AUTO: 1 % (ref 0–6)
ERYTHROCYTE [DISTWIDTH] IN BLOOD BY AUTOMATED COUNT: 12.4 % (ref 11.6–15.1)
GFR SERPL CREATININE-BSD FRML MDRD: 46 ML/MIN/1.73SQ M
GLUCOSE SERPL-MCNC: 173 MG/DL (ref 65–140)
GLUCOSE UR STRIP-MCNC: NEGATIVE MG/DL
HCT VFR BLD AUTO: 40.5 % (ref 34.8–46.1)
HGB BLD-MCNC: 13.7 G/DL (ref 11.5–15.4)
HGB UR QL STRIP.AUTO: NEGATIVE
HYALINE CASTS #/AREA URNS LPF: ABNORMAL /LPF
IMM GRANULOCYTES # BLD AUTO: 0.03 THOUSAND/UL (ref 0–0.2)
IMM GRANULOCYTES NFR BLD AUTO: 0 % (ref 0–2)
KETONES UR STRIP-MCNC: NEGATIVE MG/DL
LEUKOCYTE ESTERASE UR QL STRIP: ABNORMAL
LIPASE SERPL-CCNC: 66 U/L (ref 11–82)
LYMPHOCYTES # BLD AUTO: 1.93 THOUSANDS/ÂΜL (ref 0.6–4.47)
LYMPHOCYTES NFR BLD AUTO: 25 % (ref 14–44)
MCH RBC QN AUTO: 31.7 PG (ref 26.8–34.3)
MCHC RBC AUTO-ENTMCNC: 33.8 G/DL (ref 31.4–37.4)
MCV RBC AUTO: 94 FL (ref 82–98)
MONOCYTES # BLD AUTO: 0.7 THOUSAND/ÂΜL (ref 0.17–1.22)
MONOCYTES NFR BLD AUTO: 9 % (ref 4–12)
MUCOUS THREADS UR QL AUTO: ABNORMAL
NEUTROPHILS # BLD AUTO: 4.87 THOUSANDS/ÂΜL (ref 1.85–7.62)
NEUTS SEG NFR BLD AUTO: 65 % (ref 43–75)
NITRITE UR QL STRIP: NEGATIVE
NON-SQ EPI CELLS URNS QL MICRO: ABNORMAL /HPF
NRBC BLD AUTO-RTO: 0 /100 WBCS
PH UR STRIP.AUTO: 6.5 [PH]
PLATELET # BLD AUTO: 313 THOUSANDS/UL (ref 149–390)
PMV BLD AUTO: 9.1 FL (ref 8.9–12.7)
POTASSIUM SERPL-SCNC: 4.2 MMOL/L (ref 3.5–5.3)
PROT SERPL-MCNC: 7.1 G/DL (ref 6.4–8.4)
PROT UR STRIP-MCNC: ABNORMAL MG/DL
RBC # BLD AUTO: 4.32 MILLION/UL (ref 3.81–5.12)
RBC #/AREA URNS AUTO: ABNORMAL /HPF
SL AMB  POCT GLUCOSE, UA: ABNORMAL
SL AMB LEUKOCYTE ESTERASE,UA: ABNORMAL
SL AMB POCT BILIRUBIN,UA: ABNORMAL
SL AMB POCT BLOOD,UA: ABNORMAL
SL AMB POCT CLARITY,UA: ABNORMAL
SL AMB POCT COLOR,UA: YELLOW
SL AMB POCT KETONES,UA: ABNORMAL
SL AMB POCT NITRITE,UA: ABNORMAL
SL AMB POCT PH,UA: 6.5
SL AMB POCT SPECIFIC GRAVITY,UA: 1.02
SL AMB POCT URINE PROTEIN: 300
SL AMB POCT UROBILINOGEN: 0.2
SODIUM SERPL-SCNC: 132 MMOL/L (ref 135–147)
SP GR UR STRIP.AUTO: 1.02 (ref 1–1.03)
TRANS CELLS #/AREA URNS HPF: PRESENT /[HPF]
UROBILINOGEN UR STRIP-ACNC: <2 MG/DL
WBC # BLD AUTO: 7.6 THOUSAND/UL (ref 4.31–10.16)
WBC #/AREA URNS AUTO: ABNORMAL /HPF

## 2024-05-20 PROCEDURE — 74177 CT ABD & PELVIS W/CONTRAST: CPT

## 2024-05-20 PROCEDURE — 99215 OFFICE O/P EST HI 40 MIN: CPT | Performed by: STUDENT IN AN ORGANIZED HEALTH CARE EDUCATION/TRAINING PROGRAM

## 2024-05-20 PROCEDURE — 99284 EMERGENCY DEPT VISIT MOD MDM: CPT

## 2024-05-20 PROCEDURE — 85025 COMPLETE CBC W/AUTO DIFF WBC: CPT

## 2024-05-20 PROCEDURE — 81001 URINALYSIS AUTO W/SCOPE: CPT

## 2024-05-20 PROCEDURE — 96375 TX/PRO/DX INJ NEW DRUG ADDON: CPT

## 2024-05-20 PROCEDURE — 36415 COLL VENOUS BLD VENIPUNCTURE: CPT

## 2024-05-20 PROCEDURE — 99285 EMERGENCY DEPT VISIT HI MDM: CPT | Performed by: EMERGENCY MEDICINE

## 2024-05-20 PROCEDURE — 87086 URINE CULTURE/COLONY COUNT: CPT

## 2024-05-20 PROCEDURE — 83690 ASSAY OF LIPASE: CPT | Performed by: EMERGENCY MEDICINE

## 2024-05-20 PROCEDURE — 96374 THER/PROPH/DIAG INJ IV PUSH: CPT

## 2024-05-20 PROCEDURE — 81002 URINALYSIS NONAUTO W/O SCOPE: CPT | Performed by: STUDENT IN AN ORGANIZED HEALTH CARE EDUCATION/TRAINING PROGRAM

## 2024-05-20 PROCEDURE — 80053 COMPREHEN METABOLIC PANEL: CPT

## 2024-05-20 PROCEDURE — 87086 URINE CULTURE/COLONY COUNT: CPT | Performed by: STUDENT IN AN ORGANIZED HEALTH CARE EDUCATION/TRAINING PROGRAM

## 2024-05-20 RX ORDER — ONDANSETRON 2 MG/ML
4 INJECTION INTRAMUSCULAR; INTRAVENOUS ONCE
Status: COMPLETED | OUTPATIENT
Start: 2024-05-20 | End: 2024-05-20

## 2024-05-20 RX ORDER — HYDROMORPHONE HCL/PF 1 MG/ML
0.5 SYRINGE (ML) INJECTION ONCE
Status: COMPLETED | OUTPATIENT
Start: 2024-05-20 | End: 2024-05-20

## 2024-05-20 RX ADMIN — HYDROMORPHONE HYDROCHLORIDE 0.5 MG: 1 INJECTION, SOLUTION INTRAMUSCULAR; INTRAVENOUS; SUBCUTANEOUS at 22:35

## 2024-05-20 RX ADMIN — ONDANSETRON 4 MG: 2 INJECTION INTRAMUSCULAR; INTRAVENOUS at 23:45

## 2024-05-20 RX ADMIN — IOHEXOL 100 ML: 350 INJECTION, SOLUTION INTRAVENOUS at 23:25

## 2024-05-20 NOTE — Clinical Note
Case was discussed with HERIBERTO and the patient's admission status was agreed to be Admission Status: observation status to the service of Dr. Villarreal.

## 2024-05-21 PROBLEM — N30.01 ACUTE CYSTITIS WITH HEMATURIA: Status: ACTIVE | Noted: 2024-05-21

## 2024-05-21 PROBLEM — R79.89 ELEVATED SERUM CREATININE: Status: ACTIVE | Noted: 2024-05-21

## 2024-05-21 PROBLEM — R11.2 NAUSEA & VOMITING: Status: ACTIVE | Noted: 2019-10-25

## 2024-05-21 LAB
ANION GAP SERPL CALCULATED.3IONS-SCNC: 11 MMOL/L (ref 4–13)
BACTERIA UR CULT: ABNORMAL
BASOPHILS # BLD AUTO: 0.02 THOUSANDS/ÂΜL (ref 0–0.1)
BASOPHILS NFR BLD AUTO: 0 % (ref 0–1)
BUN SERPL-MCNC: 14 MG/DL (ref 5–25)
CALCIUM SERPL-MCNC: 9.1 MG/DL (ref 8.4–10.2)
CHLORIDE SERPL-SCNC: 94 MMOL/L (ref 96–108)
CO2 SERPL-SCNC: 25 MMOL/L (ref 21–32)
CREAT SERPL-MCNC: 0.92 MG/DL (ref 0.6–1.3)
EOSINOPHIL # BLD AUTO: 0 THOUSAND/ÂΜL (ref 0–0.61)
EOSINOPHIL NFR BLD AUTO: 0 % (ref 0–6)
ERYTHROCYTE [DISTWIDTH] IN BLOOD BY AUTOMATED COUNT: 12.5 % (ref 11.6–15.1)
GFR SERPL CREATININE-BSD FRML MDRD: 58 ML/MIN/1.73SQ M
GLUCOSE P FAST SERPL-MCNC: 186 MG/DL (ref 65–99)
GLUCOSE SERPL-MCNC: 159 MG/DL (ref 65–140)
GLUCOSE SERPL-MCNC: 168 MG/DL (ref 65–140)
GLUCOSE SERPL-MCNC: 186 MG/DL (ref 65–140)
GLUCOSE SERPL-MCNC: 189 MG/DL (ref 65–140)
HCT VFR BLD AUTO: 40 % (ref 34.8–46.1)
HGB BLD-MCNC: 13.4 G/DL (ref 11.5–15.4)
IMM GRANULOCYTES # BLD AUTO: 0.04 THOUSAND/UL (ref 0–0.2)
IMM GRANULOCYTES NFR BLD AUTO: 1 % (ref 0–2)
LYMPHOCYTES # BLD AUTO: 1.1 THOUSANDS/ÂΜL (ref 0.6–4.47)
LYMPHOCYTES NFR BLD AUTO: 13 % (ref 14–44)
MCH RBC QN AUTO: 31.4 PG (ref 26.8–34.3)
MCHC RBC AUTO-ENTMCNC: 33.5 G/DL (ref 31.4–37.4)
MCV RBC AUTO: 94 FL (ref 82–98)
MONOCYTES # BLD AUTO: 0.39 THOUSAND/ÂΜL (ref 0.17–1.22)
MONOCYTES NFR BLD AUTO: 5 % (ref 4–12)
NEUTROPHILS # BLD AUTO: 6.82 THOUSANDS/ÂΜL (ref 1.85–7.62)
NEUTS SEG NFR BLD AUTO: 81 % (ref 43–75)
NRBC BLD AUTO-RTO: 0 /100 WBCS
PLATELET # BLD AUTO: 302 THOUSANDS/UL (ref 149–390)
PMV BLD AUTO: 9 FL (ref 8.9–12.7)
POTASSIUM SERPL-SCNC: 4.2 MMOL/L (ref 3.5–5.3)
RBC # BLD AUTO: 4.27 MILLION/UL (ref 3.81–5.12)
SODIUM SERPL-SCNC: 130 MMOL/L (ref 135–147)
WBC # BLD AUTO: 8.37 THOUSAND/UL (ref 4.31–10.16)

## 2024-05-21 PROCEDURE — 99223 1ST HOSP IP/OBS HIGH 75: CPT | Performed by: INTERNAL MEDICINE

## 2024-05-21 PROCEDURE — 36415 COLL VENOUS BLD VENIPUNCTURE: CPT

## 2024-05-21 PROCEDURE — 82948 REAGENT STRIP/BLOOD GLUCOSE: CPT

## 2024-05-21 PROCEDURE — 80048 BASIC METABOLIC PNL TOTAL CA: CPT

## 2024-05-21 PROCEDURE — 85025 COMPLETE CBC W/AUTO DIFF WBC: CPT

## 2024-05-21 RX ORDER — LEVOTHYROXINE SODIUM 0.03 MG/1
25 TABLET ORAL DAILY
Status: DISCONTINUED | OUTPATIENT
Start: 2024-05-21 | End: 2024-05-22 | Stop reason: HOSPADM

## 2024-05-21 RX ORDER — ATORVASTATIN CALCIUM 10 MG/1
10 TABLET, FILM COATED ORAL
Status: DISCONTINUED | OUTPATIENT
Start: 2024-05-21 | End: 2024-05-22 | Stop reason: HOSPADM

## 2024-05-21 RX ORDER — AMLODIPINE BESYLATE 5 MG/1
5 TABLET ORAL DAILY
Status: DISCONTINUED | OUTPATIENT
Start: 2024-05-22 | End: 2024-05-22

## 2024-05-21 RX ORDER — LOSARTAN POTASSIUM 50 MG/1
100 TABLET ORAL DAILY
Status: DISCONTINUED | OUTPATIENT
Start: 2024-05-21 | End: 2024-05-21

## 2024-05-21 RX ORDER — PHENAZOPYRIDINE HYDROCHLORIDE 100 MG/1
100 TABLET, FILM COATED ORAL
Status: DISCONTINUED | OUTPATIENT
Start: 2024-05-21 | End: 2024-05-22 | Stop reason: HOSPADM

## 2024-05-21 RX ORDER — LABETALOL HYDROCHLORIDE 5 MG/ML
10 INJECTION, SOLUTION INTRAVENOUS ONCE
Status: COMPLETED | OUTPATIENT
Start: 2024-05-21 | End: 2024-05-21

## 2024-05-21 RX ORDER — HYDROCHLOROTHIAZIDE 12.5 MG/1
12.5 TABLET ORAL DAILY
Status: DISCONTINUED | OUTPATIENT
Start: 2024-05-21 | End: 2024-05-21

## 2024-05-21 RX ORDER — LABETALOL HYDROCHLORIDE 5 MG/ML
10 INJECTION, SOLUTION INTRAVENOUS EVERY 6 HOURS PRN
Status: DISCONTINUED | OUTPATIENT
Start: 2024-05-21 | End: 2024-05-22 | Stop reason: HOSPADM

## 2024-05-21 RX ORDER — ONDANSETRON 2 MG/ML
4 INJECTION INTRAMUSCULAR; INTRAVENOUS EVERY 6 HOURS PRN
Status: DISCONTINUED | OUTPATIENT
Start: 2024-05-21 | End: 2024-05-22 | Stop reason: HOSPADM

## 2024-05-21 RX ORDER — LOSARTAN POTASSIUM 50 MG/1
100 TABLET ORAL DAILY
Status: DISCONTINUED | OUTPATIENT
Start: 2024-05-22 | End: 2024-05-22 | Stop reason: HOSPADM

## 2024-05-21 RX ORDER — INSULIN LISPRO 100 [IU]/ML
1-5 INJECTION, SOLUTION INTRAVENOUS; SUBCUTANEOUS
Status: DISCONTINUED | OUTPATIENT
Start: 2024-05-21 | End: 2024-05-22 | Stop reason: HOSPADM

## 2024-05-21 RX ORDER — ACETAMINOPHEN 325 MG/1
650 TABLET ORAL EVERY 6 HOURS PRN
Status: DISCONTINUED | OUTPATIENT
Start: 2024-05-21 | End: 2024-05-22 | Stop reason: HOSPADM

## 2024-05-21 RX ORDER — HYDROCHLOROTHIAZIDE 25 MG/1
25 TABLET ORAL DAILY
Status: DISCONTINUED | OUTPATIENT
Start: 2024-05-22 | End: 2024-05-22 | Stop reason: HOSPADM

## 2024-05-21 RX ADMIN — ONDANSETRON 4 MG: 2 INJECTION INTRAMUSCULAR; INTRAVENOUS at 17:33

## 2024-05-21 RX ADMIN — PHENAZOPYRIDINE HYDROCHLORIDE 100 MG: 100 TABLET ORAL at 11:45

## 2024-05-21 RX ADMIN — CEFTRIAXONE 1000 MG: 1 INJECTION, POWDER, FOR SOLUTION INTRAMUSCULAR; INTRAVENOUS at 09:10

## 2024-05-21 RX ADMIN — LABETALOL HYDROCHLORIDE 10 MG: 5 INJECTION, SOLUTION INTRAVENOUS at 20:26

## 2024-05-21 RX ADMIN — LABETALOL HYDROCHLORIDE 10 MG: 5 INJECTION, SOLUTION INTRAVENOUS at 14:50

## 2024-05-21 RX ADMIN — PHENAZOPYRIDINE HYDROCHLORIDE 100 MG: 100 TABLET ORAL at 17:09

## 2024-05-21 RX ADMIN — LEVOTHYROXINE SODIUM 25 MCG: 25 TABLET ORAL at 06:32

## 2024-05-21 RX ADMIN — LABETALOL HYDROCHLORIDE 10 MG: 5 INJECTION, SOLUTION INTRAVENOUS at 09:05

## 2024-05-21 RX ADMIN — LOSARTAN POTASSIUM 100 MG: 50 TABLET, FILM COATED ORAL at 09:57

## 2024-05-21 RX ADMIN — APIXABAN 5 MG: 5 TABLET, FILM COATED ORAL at 09:05

## 2024-05-21 RX ADMIN — HYDROCHLOROTHIAZIDE 12.5 MG: 12.5 TABLET ORAL at 09:57

## 2024-05-21 RX ADMIN — ATORVASTATIN CALCIUM 10 MG: 10 TABLET, FILM COATED ORAL at 21:32

## 2024-05-21 RX ADMIN — APIXABAN 5 MG: 5 TABLET, FILM COATED ORAL at 17:08

## 2024-05-21 RX ADMIN — ONDANSETRON 4 MG: 2 INJECTION INTRAMUSCULAR; INTRAVENOUS at 23:40

## 2024-05-21 RX ADMIN — PHENAZOPYRIDINE HYDROCHLORIDE 100 MG: 100 TABLET ORAL at 06:32

## 2024-05-21 RX ADMIN — ACETAMINOPHEN 650 MG: 325 TABLET, FILM COATED ORAL at 23:39

## 2024-05-21 NOTE — ASSESSMENT & PLAN NOTE
"Lab Results   Component Value Date    HGBA1C 7.4 (H) 12/04/2023       No results for input(s): \"POCGLU\" in the last 72 hours.    Blood Sugar Average: Last 72 hrs:  Controlled diabetes on metformin at home.  Start sliding scale insulin 3 times daily with meals    "

## 2024-05-21 NOTE — PROGRESS NOTES
[facility]        NAME: Lubna Bronson is a 83 y.o. female  : 1940    MRN: [id number]  DATE: May 20, 2024  TIME: 9:11 PM    Assessment and Plan   UTI symptoms [R39.9]  1. UTI symptoms  POCT urine dip    Urine culture    Transfer to other facility      2. Lower abdominal pain  Transfer to other facility      Discussed options today, she does have some signs of inflammation/possible infection in her urine with large leukocytes, small blood.  However, she has already been on courses of Keflex and currently on Bactrim without improvement.  We discussed options which would include continuing Bactrim waiting for urine culture, switching to a different antibiotic while waiting for culture, she notes that her symptoms and pain are out of proportion to any UTI symptoms that she has been experience before, we discussed limitations of evaluation in urgent care, she opted to transfer to ER now for further investigation, possible imaging.      Patient Instructions       Follow up with PCP in 3-5 days.  Proceed to  ER if symptoms worsen.    If tests have been performed at [facility], our office will contact you with results if changes need to be made to the care plan discussed with you at the visit.  [facility].    Chief Complaint     Chief Complaint   Patient presents with    Possible UTI     Patient states that she had UTI symptoms for about 2 weeks and so she went to PCP and was started on abx. She is on her second course of abx and she notes that she has urgency and frequency with lower abdominal pain. She is going constantly within the past two hours and has burning with urination.          History of Present Illness       Patient presents with her  for worsening UTI symptoms, lower abdominal pain.  She notes that her symptoms for started at the end of April after traveling to [place].  She saw her PCP about a week later, initially she was placed on  Keflex, she completed this course, notes minimal improvement in her symptoms.  She called PCP office again and was switched to Bactrim, she is currently still on Bactrim, however her symptoms have been acutely worsening over the last few hours which brings her in today.  She notes significant dysuria, pain in her suprapubic/lower abdominal area that is worse with going over bumps in the car, urgency, frequency, she is having to use the restroom at least every 1/2 hour.  No flank pain, no fevers, tried Tylenol without relief.  She has had UTIs before, but notes that this was much worse than any of her previous episodes.  Has been continuing with regular brown stools, last was today.        Review of Systems   Review of Systems   All other systems reviewed and are negative.        Current Medications       Current Outpatient Medications:     acetaminophen (TYLENOL) 325 mg tablet, Take 650 mg by mouth every 6 (six) hours as needed for mild pain, Disp: , Rfl:     atorvastatin (LIPITOR) 10 mg tablet, Take 1 tablet (10 mg total) by mouth daily at bedtime, Disp: 90 tablet, Rfl: 1    levothyroxine 25 mcg tablet, Take 1 tablet (25 mcg total) by mouth daily, Disp: 90 tablet, Rfl: 1    metFORMIN (GLUCOPHAGE-XR) 750 mg 24 hr tablet, Take 1 tablet (750 mg total) by mouth daily with breakfast, Disp: 90 tablet, Rfl: 1    Multiple Vitamin (MULTIVITAMIN) tablet, Take 1 tablet by mouth daily, Disp: , Rfl:     Omega-3 Fatty Acids (FISH OIL PO), Take 1 capsule by mouth daily, Disp: , Rfl:     sulfamethoxazole-trimethoprim (BACTRIM DS) 800-160 mg per tablet, Take 1 tablet by mouth every 12 (twelve) hours for 7 days, Disp: 14 tablet, Rfl: 0    valsartan-hydrochlorothiazide (DIOVAN-HCT) 160-12.5 MG per tablet, TAKE 1 TABLET BY MOUTH EVERY DAY IN THE MORNING, Disp: 90 tablet, Rfl: 1    Eliquis 5 MG, Take 1 tablet (5 mg total) by mouth 2 (two) times a day, Disp: 180 tablet, Rfl: 1    Current Allergies     Allergies as of 05/20/2024 -  Reviewed 05/20/2024   Allergen Reaction Noted    Oxycodone Vomiting 12/11/2019    Metformin Abdominal Pain 08/25/2023            The following portions of the patient's history were reviewed and updated as appropriate: allergies, current medications, past family history, past medical history, past social history, past surgical history and problem list.     Past Medical History:   Diagnosis Date    Acquired hypothyroidism     Adenoma of left adrenal gland 02/27/2018    Adrenal gland disorder (HCC)     Anemia     Anxiety     Arthritis     At risk for falls     Diabetes mellitus (HCC)     Disease of thyroid gland     hypo    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    Edema of both legs     History of pulmonary embolus (PE)     Hyperlipidemia     Hypertension     Irritable bowel syndrome     Prediabetes     Presence of right artificial hip joint     Pulmonary embolus (HCC) 2/27/2018 & 2/14/2019    Uses roller walker     Wears glasses     Wears partial dentures     upper partial       Past Surgical History:   Procedure Laterality Date    CHOLECYSTECTOMY  1991    COLONOSCOPY  11/09/2017    HEMORRHOID SURGERY  1999    HYSTERECTOMY  1993    Vaginal    [facility] ARTHRP ACETBLR/PROX FEM PROSTC AGRFT/ALGRFT Right 10/22/2019    Procedure: ARTHROPLASTY HIP TOTAL;  Surgeon: Lupe Driscoll DO;  Location: [facility];  Service: Orthopedics    TRIGGER FINGER RELEASE Right 1998       Family History   Problem Relation Age of Onset    Hypertension Family     Hypertension Mother          Medications have been verified.        Objective   /82   Pulse 94   Temp 98.8 °F (37.1 °C)   Resp 20   SpO2 98%   No LMP recorded. Patient has had a hysterectomy.       Physical Exam     Physical Exam  Vitals and nursing note reviewed.   Constitutional:       General: She is not in acute distress.     Appearance: Normal appearance. She is not ill-appearing or toxic-appearing.   HENT:      Head: Normocephalic and atraumatic.       Right Ear: External ear normal.      Left Ear: External ear normal.      Nose: Nose normal.      Mouth/Throat:      Mouth: Mucous membranes are moist.   Eyes:      Extraocular Movements: Extraocular movements intact.   Cardiovascular:      Rate and Rhythm: Normal rate and regular rhythm.      Heart sounds: Normal heart sounds.   Pulmonary:      Effort: Pulmonary effort is normal. No respiratory distress.      Breath sounds: Normal breath sounds. No stridor. No wheezing, rhonchi or rales.   Abdominal:      Palpations: Abdomen is soft.      Tenderness: There is abdominal tenderness. There is no right CVA tenderness or left CVA tenderness.      Comments: Significant tenderness and left lower, right lower quadrants, suprapubic   Skin:     General: Skin is warm and dry.      Capillary Refill: Capillary refill takes less than 2 seconds.      Findings: No rash.   Neurological:      Mental Status: She is alert.      Gait: Gait normal.   Psychiatric:         Behavior: Behavior normal.

## 2024-05-21 NOTE — ED ATTENDING ATTESTATION
5/20/2024  I, Fidel Donis MD, saw and evaluated the patient. I have discussed the patient with the resident/non-physician practitioner and agree with the resident's/non-physician practitioner's findings, Plan of Care, and MDM as documented in the resident's/non-physician practitioner's note, except where noted. All available labs and Radiology studies were reviewed.  I was present for key portions of any procedure(s) performed by the resident/non-physician practitioner and I was immediately available to provide assistance.       At this point I agree with the current assessment done in the Emergency Department.  I have conducted an independent evaluation of this patient a history and physical is as follows:    83-year-old female with a history of hysterectomy, diverticulitis, cholecystectomy presents to the emergency department for evaluation of ongoing lower abdominal pain associated with urinary frequency and burning with urination.  She already completed a 7-day course of Keflex and is now on day 4 or 5 of Bactrim but reports that her symptoms are only worsening.  Denies any associated flank pain, nausea or vomiting.  No constipation or diarrhea.  No fevers or chills.  No chest pain or shortness of breath.  No history of kidney stones.  No abnormal vaginal bleeding or discharge.    On exam, patient is sitting comfortably in bed in no acute distress, head is normocephalic atraumatic, pupils equal round reactive, neck is supple without meningismus signs, heart is regular rate and rhythm with intact distal pulses, no increased work of breathing, respiratory distress, or stridor.  Abdomen is soft with lower abdominal tenderness, no rebound or guarding.  No CVA tenderness.    Differential diagnosis includes but is not limited to cystitis, pyelonephritis, appendicitis, diverticulitis, less likely represent bowel obstruction.  Plan to check abdominal labs, urinalysis, CT scan abdomen and pelvis.            ED  Course  ED Course as of 05/21/24 0221   Tue May 21, 2024   0220 Labs grossly unremarkable, urinalysis positive for leukocytes and occasional bacteria.  CT scan shows signs of cystitis, but was otherwise unremarkable.  Patient continues to be symptomatic.  She has already been on over 10 days of antibiotics and her symptoms are only worsening, will discuss case with medicine for possible admission to the hospital for IV antibiotics and further management and evaluation.         Critical Care Time  Procedures

## 2024-05-21 NOTE — ASSESSMENT & PLAN NOTE
History of multiple PE and hypercoagulable state.  On chronic anticoagulation with Eliquis.    Continue home Eliquis

## 2024-05-21 NOTE — ASSESSMENT & PLAN NOTE
Mild creatinine elevation of 1.1 from baseline of 0.9.  No poor p.o. intake per patient --she ate 3 meals today without problems.  Nausea and vomiting has now resolved.  Possibly in setting of urinary tract infection.  No signs of kidney stones/obstruction on CT abdomen pelvis with contrast.    Trend creatinine with BMP  Will avoid nephrotoxins  Patient able to take PO intake -- continue PO hydration

## 2024-05-21 NOTE — H&P
INTERNAL MEDICINE RESIDENCY ADMISSION H&P     Name: Lubna Bronson   Age & Sex: 83 y.o. female   MRN: [id number]  Unit/Bed#: [facility]   Encounter: [id number]  Primary Care Provider: Umu Hayes MD    Code Status: No Order  Admission Status: OBSERVATION  Disposition: Patient requires Med/Surg    Admit to team: SOD Team B     ASSESSMENT/PLAN     Active Problems:    Hypothyroid    Opioid induced nausea & vomiting    Hypertension    History of pulmonary embolus (PE)    Diabetes mellitus without complication (HCC)    Acute cystitis with hematuria    Elevated serum creatinine      Elevated serum creatinine  Assessment & Plan  Mild creatinine elevation of 1.1 from baseline of 0.9.  No poor p.o. intake per patient --she ate 3 meals today without problems.  Nausea and vomiting has now resolved.  Possibly in setting of urinary tract infection.  No signs of kidney stones/obstruction on CT abdomen pelvis with contrast.    Trend creatinine with BMP  Will avoid nephrotoxins  Patient able to take PO intake -- continue PO hydration    Acute cystitis with hematuria  Assessment & Plan  Patient presented with dysuria, suprapubic pain intractable to p.o. home analgesics and 11 days of p.o. antibiotics with Keflex x 7 and Bactrim x 4 patient. No resulted urine culture yet available.  Patient complaining of pain with even walking or any bumps on car ride.  CT scan did not reveal any hydronephrosis or signs of fat stranding/pyelo.  No CVA tenderness.  Afebrile, normal white count no signs of sepsis at this time.  In setting of unresolved symptoms despite prolonged course of antibiotics, severe symptoms and pyuria on UA, slight creatinine elevation, suggestive of UTI --will follow with urine culture to guide treatment and pain control while observation.  Differentials include acute cystitis versus early pyelonephritis versus interstitial cystitis.    No prior resistant organisms seen on urine culture I.e. ESBL or Pseudomonas  Start  "ceftriaxone IV, follow-up with urine culture --if symptoms better may follow-up outpatient with culture to guide p.o. antibiotics  Pyridium 3 times daily  Tylenol 975 mg tid PRN  For now we will try to avoid opioid as patient has nausea/vomiting associated with oxycodone as well as Dilaudid; if needed administer with Zofran  Hold off Toradol in setting of creatinine elevation  Trend Cr    Diabetes mellitus without complication (HCC)  Assessment & Plan  Lab Results   Component Value Date    HGBA1C 7.4 (H) 12/04/2023       No results for input(s): \"POCGLU\" in the last 72 hours.    Blood Sugar Average: Last 72 hrs:  Controlled diabetes on metformin at home.  Start sliding scale insulin 3 times daily with meals      History of pulmonary embolus (PE)  Assessment & Plan  History of multiple PE and hypercoagulable state.  On chronic anticoagulation with Eliquis.    Continue home Eliquis    Hypertension  Assessment & Plan  Patient on valsartan hydrochlorothiazide 160-12.5 at home.  Blood pressure elevated to 200s over 120 and 181/87 secondary to pain, resolved without intervention.    Continue on hospital formulation with losartan 100 daily and HCTZ 12.5 mg p.o. daily    Opioid induced nausea & vomiting  Assessment & Plan  1 episode of nausea and vomiting s/p Dilaudid 0.5 mg dose administered in the ED.  Nonbilious nonbloody.  Resolved after a dose of Zofran.    Zofran as needed      Hypothyroid  Assessment & Plan  History of hypothyroidism on levothyroxine    Continue levothyroxine 25 mcg daily        VTE Pharmacologic Prophylaxis: eliquis  VTE Mechanical Prophylaxis: sequential compression device    CHIEF COMPLAINT     Chief Complaint   Patient presents with    Pelvic Pain     Patient reports urinary frequency, pelvic pain and painful urination. Patient being treated for UTI starting with keflex without relief and changed to Bactrim and half a week in without relief.       HISTORY OF PRESENT ILLNESS     Patient is a " 83-year-old female with past medical history of hysterectomy, multiple PE on Eliquis, hypertension, hyperlipidemia, diabetes, hypothyroidism, benign left adrenal adenoma presenting with urinary frequency, pelvic pain, dysuria sent from urgent care to ED.  Patient has been treated outpatient with Keflex x 7 days and 4 days of Bactrim treated empirically without relief and has been having worsening symptoms.  Outpatient UA with large leukocytes, negative nitrites and urine culture pending from 5/20.    Around 6 PM prior to admission patient had 8 out of 10 pain with difficulty walking due to suprapubic pain.  Denies any gross hematuria, only yellow urine, or history of kidney stones.  Did not notice any stones passing.  She states that she has had frequency with urination every 15 minutes.  Pain not improved with Tylenol.  Denies any fevers, chills, CVA/back pain, radiation of pain, nausea, vomiting, upper or lower abdominal pain.     In the ED, patient had stable vitals, afebrile.  Patient hypertensive initially however resolved without intervention.  Patient had pain improved from 8 out of 10 to 2 out of 10 after Dilaudid 0.5 IV x 1 --however had nausea and nonbloody none bilious emesis after Dilaudid administration, which resolved with Zofran.  UA with 4-10 RBCs, WBCs 30-50, occasional bacteria.  Creatinine elevated from 0.9-1.2 white blood count normal.  Lipase normal.  CT showed mild thickening of the urinary bladder possibly due to cystitis, unremarkable kidneys without hydronephrosis, no significant stranding, diverticulosis without evidence of diverticulitis, status post hysterectomy, small to moderate hiatal hernia, no evidence of bowel obstruction.  Patient admitted for observation overnight for pain control.     REVIEW OF SYSTEMS     Review of Systems   Constitutional:  Negative for chills and fever.   HENT:  Negative for ear pain and sore throat.    Eyes:  Negative for pain and visual disturbance.    Respiratory:  Negative for cough and shortness of breath.    Cardiovascular:  Negative for chest pain, palpitations and leg swelling.   Gastrointestinal:  Positive for nausea and vomiting. Negative for abdominal pain and diarrhea.   Genitourinary:  Positive for dysuria, frequency, pelvic pain and urgency. Negative for decreased urine volume, difficulty urinating, enuresis, genital sores, hematuria and vaginal discharge.   Musculoskeletal:  Negative for arthralgias and back pain.   Skin:  Negative for color change and rash.   Neurological:  Negative for seizures and syncope.   All other systems reviewed and are negative.    OBJECTIVE     Vitals:    24 2126 24 2349   BP: (!) 200/120 (!) 181/87   BP Location: Left arm Right arm   Pulse: 99 84   Resp: 18 18   Temp: 98.5 °F (36.9 °C)    TempSrc: Temporal    SpO2: 98% 98%      Temperature:   Temp (24hrs), Av.7 °F (37.1 °C), Min:98.5 °F (36.9 °C), Max:98.8 °F (37.1 °C)    Temperature: 98.5 °F (36.9 °C)  Intake & Output:  I/O       None          Weights:        There is no height or weight on file to calculate BMI.  Weight (last 2 days)       None          Physical Exam  Vitals and nursing note reviewed.   Constitutional:       General: She is not in acute distress.     Appearance: She is well-developed.   HENT:      Head: Normocephalic and atraumatic.   Eyes:      Conjunctiva/sclera: Conjunctivae normal.   Cardiovascular:      Rate and Rhythm: Normal rate and regular rhythm.      Heart sounds: No murmur heard.  Pulmonary:      Effort: Pulmonary effort is normal. No respiratory distress.      Breath sounds: Normal breath sounds.   Abdominal:      General: There is no distension.      Palpations: Abdomen is soft.      Tenderness: There is no abdominal tenderness. There is no right CVA tenderness, left CVA tenderness, guarding or rebound.      Comments: Mild suprapubic tenderness  Abd nontender through all 4 quadrants  No abd distension  No CVA tenderness    Musculoskeletal:         General: No swelling.      Cervical back: Neck supple.   Skin:     General: Skin is warm and dry.      Capillary Refill: Capillary refill takes less than 2 seconds.   Neurological:      Mental Status: She is alert.   Psychiatric:         Mood and Affect: Mood normal.       PAST MEDICAL HISTORY     Past Medical History:   Diagnosis Date    Acquired hypothyroidism     Adenoma of left adrenal gland 02/27/2018    Adrenal gland disorder (HCC)     Anemia     Anxiety     Arthritis     At risk for falls     Diabetes mellitus (HCC)     Disease of thyroid gland     hypo    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    Edema of both legs     History of pulmonary embolus (PE)     Hyperlipidemia     Hypertension     Irritable bowel syndrome     Prediabetes     Presence of right artificial hip joint     Pulmonary embolus (HCC) 2/27/2018 & 2/14/2019    Uses roller walker     Wears glasses     Wears partial dentures     upper partial     PAST SURGICAL HISTORY     Past Surgical History:   Procedure Laterality Date    CHOLECYSTECTOMY  1991    COLONOSCOPY  11/09/2017    HEMORRHOID SURGERY  1999    HYSTERECTOMY  1993    Vaginal    AZ ARTHRP ACETBLR/PROX FEM PROSTC AGRFT/ALGRFT Right 10/22/2019    Procedure: ARTHROPLASTY HIP TOTAL;  Surgeon: Lupe Driscoll DO;  Location: [facility];  Service: Orthopedics    TRIGGER FINGER RELEASE Right 1998     SOCIAL & FAMILY HISTORY     Social History     Substance and Sexual Activity   Alcohol Use Not Currently       Social History     Substance and Sexual Activity   Drug Use Not Currently     Social History     Tobacco Use   Smoking Status Never   Smokeless Tobacco Never     Family History   Problem Relation Age of Onset    Hypertension Family     Hypertension Mother      LABORATORY DATA     Labs: I have personally reviewed pertinent reports.    Results from last 7 days   Lab Units 05/20/24  2234   WBC Thousand/uL 7.60   HEMOGLOBIN g/dL 13.7   HEMATOCRIT %  40.5   PLATELETS Thousands/uL 313   SEGS PCT % 65   MONO PCT % 9   EOS PCT % 1      Results from last 7 days   Lab Units 05/20/24  2234   POTASSIUM mmol/L 4.2   CHLORIDE mmol/L 96   CO2 mmol/L 27   BUN mg/dL 17   CREATININE mg/dL 1.12   CALCIUM mg/dL 9.5   ALK PHOS U/L 69   ALT U/L 19   AST U/L 17                          Micro:  Lab Results   Component Value Date    URINECX >100,000 cfu/ml Escherichia coli (A) 11/19/2023    URINECX 70,000-79,000 cfu/ml Escherichia coli (A) 11/29/2019    URINECX 50,000-59,000 cfu/ml 11/29/2019     IMAGING & DIAGNOSTIC TESTS     Imaging: I have personally reviewed pertinent reports.    CT abdomen pelvis with contrast    Result Date: 5/21/2024  Impression: 1.  Mild thickening of the urinary bladder wall which may be due to cystitis. Unremarkable kidneys without hydronephrosis. 2.  Small to moderate hiatal hernia. 3.  Diverticulosis without evidence of diverticulitis. 4.  Stable 1.9 cm left adrenal nodule, likely benign. Workstation performed: [id number]     EKG, Pathology, and Other Studies: I have personally reviewed pertinent reports.     ALLERGIES     Allergies   Allergen Reactions    Oxycodone Vomiting     MEDICATIONS PRIOR TO ARRIVAL     Prior to Admission medications    Medication Sig Start Date End Date Taking? Authorizing Provider   acetaminophen (TYLENOL) 325 mg tablet Take 650 mg by mouth every 6 (six) hours as needed for mild pain   Yes Historical Provider, MD   atorvastatin (LIPITOR) 10 mg tablet Take 1 tablet (10 mg total) by mouth daily at bedtime 1/3/24  Yes Umu Hayes MD   levothyroxine 25 mcg tablet Take 1 tablet (25 mcg total) by mouth daily 3/14/24  Yes Umu Hayes MD   valsartan-hydrochlorothiazide (DIOVAN-HCT) 160-12.5 MG per tablet TAKE 1 TABLET BY MOUTH EVERY DAY IN THE MORNING 5/5/24  Yes Umu Hayes MD   metFORMIN (GLUCOPHAGE-XR) 750 mg 24 hr tablet Take 1 tablet (750 mg total) by mouth daily with breakfast 5/8/24 5/21/24 Yes Umu Hayes MD   Multiple  "Vitamin (MULTIVITAMIN) tablet Take 1 tablet by mouth daily  5/21/24 Yes Historical Provider, MD   Omega-3 Fatty Acids (FISH OIL PO) Take 1 capsule by mouth daily  5/21/24 Yes Historical Provider, MD   sulfamethoxazole-trimethoprim (BACTRIM DS) 800-160 mg per tablet Take 1 tablet by mouth every 12 (twelve) hours for 7 days 5/16/24 5/21/24 Yes Umu Hayes MD   Eliquis 5 MG Take 1 tablet (5 mg total) by mouth 2 (two) times a day 1/22/24 5/8/24  Umu Hayes MD     MEDICATIONS ADMINISTERED IN LAST 24 HOURS     Medication Administration - last 24 hours from 05/20/2024 0244 to 05/21/2024 0244         Date/Time Order Dose Route Action Action by     05/20/2024 2235 EDT HYDROmorphone (DILAUDID) injection 0.5 mg 0.5 mg Intravenous Given Joshua Carolina     05/20/2024 2325 EDT iohexol (OMNIPAQUE) 350 MG/ML injection (MULTI-DOSE) 100 mL 100 mL Intravenous Given Nora Ferraira     05/20/2024 2345 EDT ondansetron (ZOFRAN) injection 4 mg 4 mg Intravenous Given Joshua Carolina          CURRENT MEDICATIONS     No current facility-administered medications for this encounter.        Admission Time  I spent 15 minutes admitting the patient.  This involved direct patient contact where I performed a full history and physical, reviewing previous records, and reviewing laboratory and other diagnostic studies.    Portions of the record may have been created with voice recognition software.  Occasional wrong word or \"sound a like\" substitutions may have occurred due to the inherent limitations of voice recognition software.  Read the chart carefully and recognize, using context, where substitutions have occurred.    ==  Emilie Soyeon Kim, MD  [facility]  Internal Medicine Residency PGY-3    "

## 2024-05-21 NOTE — ASSESSMENT & PLAN NOTE
Patient on valsartan hydrochlorothiazide 160-12.5 at home.  Blood pressure elevated to 200s over 120 and 181/87 secondary to pain, resolved without intervention.    Continue on hospital formulation with losartan 100 daily and HCTZ 12.5 mg p.o. daily

## 2024-05-21 NOTE — ASSESSMENT & PLAN NOTE
1 episode of nausea and vomiting s/p Dilaudid 0.5 mg dose administered in the ED.  Nonbilious nonbloody.  Resolved after a dose of Zofran.    Zofran as needed

## 2024-05-21 NOTE — ED PROVIDER NOTES
History  Chief Complaint   Patient presents with    Pelvic Pain     Patient reports urinary frequency, pelvic pain and painful urination. Patient being treated for UTI starting with keflex without relief and changed to Bactrim and half a week in without relief.      83 year old female with pmhx of PEs Eliquis, DM, HLD, HTN, s/p lawson and hysterectomy presents to the ED for evaluation of sharp lower abdominal pain, dysuria, and urinary frequency. Pt was diagnosed with UTI 10 days ago and was placed on Keflex for 7 days. Pt completed her Keflex but symptoms continued to progress especially her lower abdominal pain. Pt was then started on Bactrim and have been compliant with it for 4 days. She is still symptomatic with lower abdominal pain being constant now. Pt notes pain is worse when she drives over bumps in her car. Pt notes the pain has been keep her up at night. Last episode of UTI was in Feb and was treated with abx. Last BM was this afternoon and has been passing gas. Pt notes eating and drinking does not make the pain worse. Pt tried tylenol with no improvement in pain. Denies chest pain, SOB, fever, chills, hematuria, recent illness, travels.      History provided by:  Patient   used: No         Prior to Admission Medications   Prescriptions Last Dose Informant Patient Reported? Taking?   Eliquis 5 MG   No No   Sig: Take 1 tablet (5 mg total) by mouth 2 (two) times a day   Multiple Vitamin (MULTIVITAMIN) tablet  Self Yes No   Sig: Take 1 tablet by mouth daily   Omega-3 Fatty Acids (FISH OIL PO)  Self Yes No   Sig: Take 1 capsule by mouth daily   acetaminophen (TYLENOL) 325 mg tablet  Self Yes No   Sig: Take 650 mg by mouth every 6 (six) hours as needed for mild pain   atorvastatin (LIPITOR) 10 mg tablet  Self No No   Sig: Take 1 tablet (10 mg total) by mouth daily at bedtime   levothyroxine 25 mcg tablet  Self No No   Sig: Take 1 tablet (25 mcg total) by mouth daily   metFORMIN  (GLUCOPHAGE-XR) 750 mg 24 hr tablet   No No   Sig: Take 1 tablet (750 mg total) by mouth daily with breakfast   sulfamethoxazole-trimethoprim (BACTRIM DS) 800-160 mg per tablet   No No   Sig: Take 1 tablet by mouth every 12 (twelve) hours for 7 days   valsartan-hydrochlorothiazide (DIOVAN-HCT) 160-12.5 MG per tablet  Self No No   Sig: TAKE 1 TABLET BY MOUTH EVERY DAY IN THE MORNING      Facility-Administered Medications: None       Past Medical History:   Diagnosis Date    Acquired hypothyroidism     Adenoma of left adrenal gland 02/27/2018    Adrenal gland disorder (HCC)     Anemia     Anxiety     Arthritis     At risk for falls     Diabetes mellitus (HCC)     Disease of thyroid gland     hypo    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    Edema of both legs     History of pulmonary embolus (PE)     Hyperlipidemia     Hypertension     Irritable bowel syndrome     Prediabetes     Presence of right artificial hip joint     Pulmonary embolus (HCC) 2/27/2018 & 2/14/2019    Uses roller walker     Wears glasses     Wears partial dentures     upper partial       Past Surgical History:   Procedure Laterality Date    CHOLECYSTECTOMY  1991    COLONOSCOPY  11/09/2017    HEMORRHOID SURGERY  1999    HYSTERECTOMY  1993    Vaginal    RI ARTHRP ACETBLR/PROX FEM PROSTC AGRFT/ALGRFT Right 10/22/2019    Procedure: ARTHROPLASTY HIP TOTAL;  Surgeon: Lupe Driscoll DO;  Location: [facility];  Service: Orthopedics    TRIGGER FINGER RELEASE Right 1998       Family History   Problem Relation Age of Onset    Hypertension Family     Hypertension Mother      I have reviewed and agree with the history as documented.    E-Cigarette/Vaping    E-Cigarette Use Never User      E-Cigarette/Vaping Substances    Nicotine No     THC No     CBD No     Flavoring No     Other No     Unknown No      Social History     Tobacco Use    Smoking status: Never    Smokeless tobacco: Never   Vaping Use    Vaping status: Never Used   Substance Use  Topics    Alcohol use: Not Currently    Drug use: Not Currently        Review of Systems   Constitutional:  Negative for appetite change, chills, diaphoresis, fever and unexpected weight change.   HENT:  Negative for dental problem, ear pain, facial swelling, sore throat and trouble swallowing.    Eyes:  Negative for pain and visual disturbance.   Respiratory:  Negative for cough, chest tightness and shortness of breath.    Cardiovascular:  Negative for chest pain, palpitations and leg swelling.   Gastrointestinal:  Negative for abdominal distention, abdominal pain, constipation, diarrhea, nausea and vomiting.   Endocrine: Negative for polyuria.   Genitourinary:  Positive for dysuria and frequency. Negative for difficulty urinating and hematuria.   Musculoskeletal:  Negative for arthralgias and back pain.   Skin:  Negative for color change and rash.   Neurological:  Negative for dizziness, seizures, syncope, light-headedness and headaches.   Psychiatric/Behavioral:  Negative for confusion.    All other systems reviewed and are negative.      Physical Exam  ED Triage Vitals [05/20/24 2126]   Temperature Pulse Respirations Blood Pressure SpO2   98.5 °F (36.9 °C) 99 18 (!) 200/120 98 %      Temp Source Heart Rate Source Patient Position - Orthostatic VS BP Location FiO2 (%)   Temporal Monitor Sitting Left arm --      Pain Score       8             Orthostatic Vital Signs  Vitals:    05/20/24 2126 05/20/24 2349   BP: (!) 200/120 (!) 181/87   Pulse: 99 84   Patient Position - Orthostatic VS: Sitting Lying       Physical Exam  Vitals and nursing note reviewed.   Constitutional:       General: She is not in acute distress.     Appearance: Normal appearance. She is well-developed. She is not ill-appearing, toxic-appearing or diaphoretic.      Comments: Pt appears mildly uncomfortable    HENT:      Head: Normocephalic and atraumatic.      Right Ear: External ear normal.      Left Ear: External ear normal.      Nose: Nose  normal.      Mouth/Throat:      Mouth: Mucous membranes are moist.   Eyes:      General: No scleral icterus.        Right eye: No discharge.      Extraocular Movements: Extraocular movements intact.      Conjunctiva/sclera: Conjunctivae normal.   Cardiovascular:      Rate and Rhythm: Normal rate and regular rhythm.      Pulses: Normal pulses.      Heart sounds: No murmur heard.  Pulmonary:      Effort: Pulmonary effort is normal. No respiratory distress.      Breath sounds: Normal breath sounds. No wheezing.   Chest:      Chest wall: No tenderness.   Abdominal:      General: Abdomen is flat. There is no distension.      Palpations: Abdomen is soft.      Tenderness: There is abdominal tenderness in the right lower quadrant, suprapubic area and left lower quadrant. There is no right CVA tenderness, left CVA tenderness, guarding or rebound. Negative signs include Kohli's sign, Rovsing's sign, McBurney's sign, psoas sign and obturator sign.   Musculoskeletal:         General: No swelling, deformity or signs of injury. Normal range of motion.      Cervical back: Normal range of motion and neck supple. No rigidity or tenderness.      Right lower leg: No edema.      Left lower leg: No edema.   Skin:     General: Skin is warm and dry.      Capillary Refill: Capillary refill takes less than 2 seconds.   Neurological:      General: No focal deficit present.      Mental Status: She is alert and oriented to person, place, and time.   Psychiatric:         Mood and Affect: Mood normal.       ED Medications  Medications   HYDROmorphone (DILAUDID) injection 0.5 mg (0.5 mg Intravenous Given 5/20/24 2235)   iohexol (OMNIPAQUE) 350 MG/ML injection (MULTI-DOSE) 100 mL (100 mL Intravenous Given 5/20/24 2325)   ondansetron (ZOFRAN) injection 4 mg (4 mg Intravenous Given 5/20/24 2345)       Diagnostic Studies  Results Reviewed       Procedure Component Value Units Date/Time    Comprehensive metabolic panel [[id number]]  (Abnormal)  Collected: 05/20/24 2234    Lab Status: Final result Specimen: Blood from Arm, Left Updated: 05/20/24 2308     Sodium 132 mmol/L      Potassium 4.2 mmol/L      Chloride 96 mmol/L      CO2 27 mmol/L      ANION GAP 9 mmol/L      BUN 17 mg/dL      Creatinine 1.12 mg/dL      Glucose 173 mg/dL      Calcium 9.5 mg/dL      AST 17 U/L      ALT 19 U/L      Alkaline Phosphatase 69 U/L      Total Protein 7.1 g/dL      Albumin 4.4 g/dL      Total Bilirubin 0.39 mg/dL      eGFR 46 ml/min/1.73sq m     Narrative:      National Kidney Disease Foundation guidelines for Chronic Kidney Disease (CKD):     Stage 1 with normal or high GFR (GFR > 90 mL/min/1.73 square meters)    Stage 2 Mild CKD (GFR = 60-89 mL/min/1.73 square meters)    Stage 3A Moderate CKD (GFR = 45-59 mL/min/1.73 square meters)    Stage 3B Moderate CKD (GFR = 30-44 mL/min/1.73 square meters)    Stage 4 Severe CKD (GFR = 15-29 mL/min/1.73 square meters)    Stage 5 End Stage CKD (GFR <15 mL/min/1.73 square meters)  Note: GFR calculation is accurate only with a steady state creatinine    Lipase [[id number]]  (Normal) Collected: 05/20/24 2234    Lab Status: Final result Specimen: Blood from Arm, Left Updated: 05/20/24 2307     Lipase 66 u/L     Urine Microscopic [[id number]]  (Abnormal) Collected: 05/20/24 2236    Lab Status: Final result Specimen: Urine, Clean Catch Updated: 05/20/24 2257     RBC, UA 4-10 /hpf      WBC, UA 30-50 /hpf      Epithelial Cells Occasional /hpf      Bacteria, UA Occasional /hpf      MUCUS THREADS Occasional     Hyaline Casts, UA 5-10 /lpf      Transitional Epithelial Cells Present    Urine culture [[id number]] Collected: 05/20/24 2236    Lab Status: In process Specimen: Urine, Clean Catch Updated: 05/20/24 2257    UA w Reflex to Microscopic w Reflex to Culture [[id number]]  (Abnormal) Collected: 05/20/24 2236    Lab Status: Final result Specimen: Urine, Clean Catch Updated: 05/20/24 2253     Color, UA Light Yellow     Clarity, UA Clear     Specific  Gravity, UA 1.025     pH, UA 6.5     Leukocytes, UA Moderate     Nitrite, UA Negative     Protein, UA 50 (1+) mg/dl      Glucose, UA Negative mg/dl      Ketones, UA Negative mg/dl      Urobilinogen, UA <2.0 mg/dl      Bilirubin, UA Negative     Occult Blood, UA Negative    CBC and differential [[id number]] Collected: 05/20/24 2234    Lab Status: Final result Specimen: Blood from Arm, Left Updated: 05/20/24 2249     WBC 7.60 Thousand/uL      RBC 4.32 Million/uL      Hemoglobin 13.7 g/dL      Hematocrit 40.5 %      MCV 94 fL      MCH 31.7 pg      MCHC 33.8 g/dL      RDW 12.4 %      MPV 9.1 fL      Platelets 313 Thousands/uL      nRBC 0 /100 WBCs      Segmented % 65 %      Immature Grans % 0 %      Lymphocytes % 25 %      Monocytes % 9 %      Eosinophils Relative 1 %      Basophils Relative 0 %      Absolute Neutrophils 4.87 Thousands/µL      Absolute Immature Grans 0.03 Thousand/uL      Absolute Lymphocytes 1.93 Thousands/µL      Absolute Monocytes 0.70 Thousand/µL      Eosinophils Absolute 0.04 Thousand/µL      Basophils Absolute 0.03 Thousands/µL                    CT abdomen pelvis with contrast   Final Result by Sean Nieves MD (05/21 0104)      1.  Mild thickening of the urinary bladder wall which may be due to cystitis. Unremarkable kidneys without hydronephrosis.   2.  Small to moderate hiatal hernia.   3.  Diverticulosis without evidence of diverticulitis.   4.  Stable 1.9 cm left adrenal nodule, likely benign.      Workstation performed: [id number]               Procedures  Procedures      ED Course  ED Course as of 05/21/24 0143   Mon May 20, 2024   2309 WBC, UA(!): 30-50   2309 Leukocytes, UA(!): Moderate   2309 Sodium(!): 132   2310 Creatinine: 1.12   2310 GLUCOSE(!): 173   2310 LIPASE: 66   2310 WBC: 7.60                                       Medical Decision Making  83 year old female with pmhx of PEs Eliquis, DM, HLD, HTN, s/p lawson and hysterectomy presents to the ED for evaluation of sharp lower  abdominal pain, dysuria, and urinary frequency.   Ddx: UTI, appendicitis, diverticulitis, pyelonephritis  Will evaluate with CBC, CMP, UA, CT abd/pelvis  Will treat symptomatically with Dilaudid   Upon re-evaluation pt reports resolution of pain, just experiencing pressure now.  Pt had an episode of NBNB emesis, will give dilaudid. Pt notes she does not do well with pain meds and typically has vomiting with it.  CT abd/pelvis negative for acute findings. Discussed labs and read impression of CT scan to pt and her  who expressed understanding and agrees with plan for admission for further workup.   Pt admitted       Amount and/or Complexity of Data Reviewed  External Data Reviewed: labs and radiology.  Labs: ordered. Decision-making details documented in ED Course.  Radiology: ordered.    Risk  Prescription drug management.  Decision regarding hospitalization.          Disposition  Final diagnoses:   Complicated UTI (urinary tract infection)     Time reflects when diagnosis was documented in both MDM as applicable and the Disposition within this note       Time User Action Codes Description Comment    5/21/2024  1:36 AM Fermin Martinez Add [N39.0] Complicated UTI (urinary tract infection)     5/21/2024  1:37 AM Fermin Martinez Remove [N39.0] Complicated UTI (urinary tract infection)     5/21/2024  1:37 AM VoFermin Add [N39.0] Complicated UTI (urinary tract infection)           ED Disposition       ED Disposition   Admit    Condition   Stable    Date/Time   Tue May 21, 2024  1:36 AM    Comment   Case was discussed with HERIBERTO and the patient's admission status was agreed to be Admission Status: observation status to the service of Dr. Villarreal.               Follow-up Information    None         Patient's Medications   Discharge Prescriptions    No medications on file     No discharge procedures on file.    PDMP Review         Value Time User    PDMP Reviewed  Yes 12/8/2023 11:15 AM Umu Hayes MD             ED  Provider  Attending physically available and evaluated Lubna Bronson. I managed the patient along with the ED Attending.    Electronically Signed by           Fermin Martinez DO  05/23/24 9881

## 2024-05-21 NOTE — ASSESSMENT & PLAN NOTE
Patient presented with dysuria, suprapubic pain intractable to p.o. home analgesics and 11 days of p.o. antibiotics with Keflex x 7 and Bactrim x 4 patient. No resulted urine culture yet available.  Patient complaining of pain with even walking or any bumps on car ride.  CT scan did not reveal any hydronephrosis or signs of fat stranding/pyelo.  No CVA tenderness.  Afebrile, normal white count no signs of sepsis at this time.  In setting of unresolved symptoms despite prolonged course of antibiotics, severe symptoms and pyuria on UA, slight creatinine elevation, suggestive of UTI --will follow with urine culture to guide treatment and pain control while observation.  Differentials include acute cystitis versus early pyelonephritis versus interstitial cystitis.    No prior resistant organisms seen on urine culture I.e. ESBL or Pseudomonas  Start ceftriaxone IV, follow-up with urine culture --if symptoms better may follow-up outpatient with culture to guide p.o. antibiotics  Pyridium 3 times daily  Tylenol 975 mg tid PRN  For now we will try to avoid opioid as patient has nausea/vomiting associated with oxycodone as well as Dilaudid; if needed administer with Zofran  Hold off Toradol in setting of creatinine elevation  Trend Cr

## 2024-05-22 VITALS
RESPIRATION RATE: 18 BRPM | SYSTOLIC BLOOD PRESSURE: 160 MMHG | DIASTOLIC BLOOD PRESSURE: 90 MMHG | TEMPERATURE: 97.9 F | HEART RATE: 68 BPM | OXYGEN SATURATION: 93 %

## 2024-05-22 PROBLEM — R79.89 ELEVATED SERUM CREATININE: Status: RESOLVED | Noted: 2024-05-21 | Resolved: 2024-05-22

## 2024-05-22 PROBLEM — N30.01 ACUTE CYSTITIS WITH HEMATURIA: Status: RESOLVED | Noted: 2024-05-21 | Resolved: 2024-05-22

## 2024-05-22 PROBLEM — R11.2 NAUSEA & VOMITING: Status: RESOLVED | Noted: 2019-10-25 | Resolved: 2024-05-22

## 2024-05-22 LAB
ANION GAP SERPL CALCULATED.3IONS-SCNC: 10 MMOL/L (ref 4–13)
BACTERIA UR CULT: NORMAL
BUN SERPL-MCNC: 14 MG/DL (ref 5–25)
CALCIUM SERPL-MCNC: 9 MG/DL (ref 8.4–10.2)
CHLORIDE SERPL-SCNC: 93 MMOL/L (ref 96–108)
CO2 SERPL-SCNC: 24 MMOL/L (ref 21–32)
CREAT SERPL-MCNC: 0.85 MG/DL (ref 0.6–1.3)
ERYTHROCYTE [DISTWIDTH] IN BLOOD BY AUTOMATED COUNT: 12.3 % (ref 11.6–15.1)
GFR SERPL CREATININE-BSD FRML MDRD: 64 ML/MIN/1.73SQ M
GLUCOSE SERPL-MCNC: 146 MG/DL (ref 65–140)
GLUCOSE SERPL-MCNC: 155 MG/DL (ref 65–140)
GLUCOSE SERPL-MCNC: 163 MG/DL (ref 65–140)
HCT VFR BLD AUTO: 39.6 % (ref 34.8–46.1)
HGB BLD-MCNC: 13.1 G/DL (ref 11.5–15.4)
MCH RBC QN AUTO: 31.3 PG (ref 26.8–34.3)
MCHC RBC AUTO-ENTMCNC: 33.1 G/DL (ref 31.4–37.4)
MCV RBC AUTO: 95 FL (ref 82–98)
PLATELET # BLD AUTO: 296 THOUSANDS/UL (ref 149–390)
PMV BLD AUTO: 9 FL (ref 8.9–12.7)
POTASSIUM SERPL-SCNC: 3.9 MMOL/L (ref 3.5–5.3)
RBC # BLD AUTO: 4.19 MILLION/UL (ref 3.81–5.12)
SODIUM SERPL-SCNC: 127 MMOL/L (ref 135–147)
WBC # BLD AUTO: 7.51 THOUSAND/UL (ref 4.31–10.16)

## 2024-05-22 PROCEDURE — 82948 REAGENT STRIP/BLOOD GLUCOSE: CPT

## 2024-05-22 PROCEDURE — 97166 OT EVAL MOD COMPLEX 45 MIN: CPT

## 2024-05-22 PROCEDURE — 80048 BASIC METABOLIC PNL TOTAL CA: CPT | Performed by: STUDENT IN AN ORGANIZED HEALTH CARE EDUCATION/TRAINING PROGRAM

## 2024-05-22 PROCEDURE — 97162 PT EVAL MOD COMPLEX 30 MIN: CPT

## 2024-05-22 PROCEDURE — 85027 COMPLETE CBC AUTOMATED: CPT | Performed by: STUDENT IN AN ORGANIZED HEALTH CARE EDUCATION/TRAINING PROGRAM

## 2024-05-22 PROCEDURE — 99238 HOSP IP/OBS DSCHRG MGMT 30/<: CPT | Performed by: INTERNAL MEDICINE

## 2024-05-22 RX ORDER — LOSARTAN POTASSIUM 100 MG/1
100 TABLET ORAL DAILY
Qty: 30 TABLET | Refills: 0 | Status: SHIPPED | OUTPATIENT
Start: 2024-05-23 | End: 2024-06-22

## 2024-05-22 RX ORDER — AMLODIPINE BESYLATE 5 MG/1
5 TABLET ORAL DAILY
Status: DISCONTINUED | OUTPATIENT
Start: 2024-05-22 | End: 2024-05-22 | Stop reason: HOSPADM

## 2024-05-22 RX ORDER — PHENAZOPYRIDINE HYDROCHLORIDE 100 MG/1
100 TABLET, FILM COATED ORAL
Qty: 10 TABLET | Refills: 0 | Status: SHIPPED | OUTPATIENT
Start: 2024-05-22 | End: 2024-05-31

## 2024-05-22 RX ORDER — HYDROCHLOROTHIAZIDE 25 MG/1
25 TABLET ORAL DAILY
Qty: 30 TABLET | Refills: 0 | Status: SHIPPED | OUTPATIENT
Start: 2024-05-23 | End: 2024-05-31

## 2024-05-22 RX ORDER — NYSTATIN 100000 [USP'U]/G
POWDER TOPICAL 2 TIMES DAILY
Status: DISCONTINUED | OUTPATIENT
Start: 2024-05-22 | End: 2024-05-22 | Stop reason: HOSPADM

## 2024-05-22 RX ORDER — AMLODIPINE BESYLATE 5 MG/1
5 TABLET ORAL
Qty: 30 TABLET | Refills: 0 | Status: SHIPPED | OUTPATIENT
Start: 2024-05-22 | End: 2024-06-21

## 2024-05-22 RX ADMIN — PHENAZOPYRIDINE HYDROCHLORIDE 100 MG: 100 TABLET ORAL at 12:46

## 2024-05-22 RX ADMIN — AMLODIPINE BESYLATE 5 MG: 5 TABLET ORAL at 07:58

## 2024-05-22 RX ADMIN — PHENAZOPYRIDINE HYDROCHLORIDE 100 MG: 100 TABLET ORAL at 09:13

## 2024-05-22 RX ADMIN — APIXABAN 5 MG: 5 TABLET, FILM COATED ORAL at 08:01

## 2024-05-22 RX ADMIN — ACETAMINOPHEN 650 MG: 325 TABLET, FILM COATED ORAL at 11:11

## 2024-05-22 RX ADMIN — LEVOTHYROXINE SODIUM 25 MCG: 25 TABLET ORAL at 05:30

## 2024-05-22 RX ADMIN — ONDANSETRON 4 MG: 2 INJECTION INTRAMUSCULAR; INTRAVENOUS at 07:57

## 2024-05-22 RX ADMIN — LOSARTAN POTASSIUM 100 MG: 50 TABLET, FILM COATED ORAL at 08:01

## 2024-05-22 RX ADMIN — HYDROCHLOROTHIAZIDE 25 MG: 25 TABLET ORAL at 08:01

## 2024-05-22 NOTE — OCCUPATIONAL THERAPY NOTE
Occupational Therapy Evaluation     Patient Name: Lubna Bronson  Today's Date: 5/22/2024  Problem List  Active Problems:    Hypothyroid    Opioid induced nausea & vomiting    Hypertension    History of pulmonary embolus (PE)    Diabetes mellitus without complication (HCC)    Acute cystitis with hematuria    Elevated serum creatinine    Past Medical History  Past Medical History:   Diagnosis Date    Acquired hypothyroidism     Adenoma of left adrenal gland 02/27/2018    Adrenal gland disorder (HCC)     Anemia     Anxiety     Arthritis     At risk for falls     Diabetes mellitus (HCC)     Disease of thyroid gland     hypo    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    Edema of both legs     History of pulmonary embolus (PE)     Hyperlipidemia     Hypertension     Irritable bowel syndrome     Prediabetes     Presence of right artificial hip joint     Pulmonary embolus (HCC) 2/27/2018 & 2/14/2019    Uses roller walker     Wears glasses     Wears partial dentures     upper partial     Past Surgical History  Past Surgical History:   Procedure Laterality Date    CHOLECYSTECTOMY  1991    COLONOSCOPY  11/09/2017    HEMORRHOID SURGERY  1999    HYSTERECTOMY  1993    Vaginal    MT ARTHRP ACETBLR/PROX FEM PROSTC AGRFT/ALGRFT Right 10/22/2019    Procedure: ARTHROPLASTY HIP TOTAL;  Surgeon: Lupe Driscoll DO;  Location: [facility];  Service: Orthopedics    TRIGGER FINGER RELEASE Right 1998 05/22/24 0848   OT Last Visit   OT Visit Date 05/22/24   Note Type   Note type Evaluation   Pain Assessment   Pain Assessment Tool 0-10   Pain Score 1   Pain Location/Orientation Orientation: Lower;Location: Abdomen   Hospital Pain Intervention(s) Repositioned;Ambulation/increased activity   Restrictions/Precautions   Weight Bearing Precautions Per Order No   Other Precautions Fall Risk;Pain   Home Living   Type of Home House   Home Layout One level  (2STE)   Bathroom Shower/Tub Walk-in shower  (has both tub and  "walk-in shower)   Bathroom Toilet Raised   Bathroom Equipment Grab bars in shower   Home Equipment Walker;Cane;Wheelchair-manual  (did not use PTA)   Prior Function   Level of [place] Independent with ADLs;Independent with IADLS   Lives With Spouse   Receives Help From Family   IADLs Independent with driving;Independent with medication management   Falls in the last 6 months 0   Vocational Retired   Comments Pt reports being primarily I with ADLs. Rarely wears socks but if she does, spouse helps put them on   Lifestyle   Autonomy PTA, pt reports being I with ADLs (except A for socks), shares IADLs with spouse, I fnxl mobility, (+)    Reciprocal Relationships Supportive spouse   Service to Others Retired RN   Intrinsic Gratification Go for drives, go to [place], read, puzzles   Subjective   Subjective \"Getting better\"   ADL   Where Assessed Chair   Eating Assistance 7  Independent   Grooming Assistance 7  Independent   UB Bathing Assistance 7  Independent   LB Bathing Assistance 4  Minimal Assistance   UB Dressing Assistance 7  Independent   LB Dressing Assistance 4  Minimal Assistance   Toileting Assistance  5  Supervision/Setup   Bed Mobility   Supine to Sit Unable to assess   Sit to Supine Unable to assess   Additional Comments Pt seated OOB in chair upon arrival   Transfers   Sit to Stand 6  Modified independent   Stand to Sit 7  Independent   Additional Comments initially with RW, progressed to no AD   Functional Mobility   Functional Mobility 5  Supervision   Additional Comments initially with RW, progressed to no AD   Balance   Static Sitting Good   Dynamic Sitting Fair +   Static Standing Fair   Dynamic Standing Fair   Ambulatory Fair -   Activity Tolerance   Activity Tolerance Patient tolerated treatment well   Medical Staff Made Aware PT Kaela   Nurse Made Aware RN clearance for session   RUE Assessment   RUE Assessment WFL   LUE Assessment   LUE Assessment WFL   Vision-Basic Assessment   Current " Vision Wears glasses all the time   Cognition   Overall Cognitive Status WFL   Arousal/Participation Responsive;Alert;Cooperative   Attention Within functional limits   Orientation Level Oriented X4   Memory Within functional limits   Following Commands Follows all commands and directions without difficulty   Comments Pt very pleasant and cooperative t/o session   Assessment   Assessment Pt is a 83 y.o. female admitted to hospitals on 5/20/2024 w/ acute cystitis, UTI.  has a past medical history of Acquired hypothyroidism, Adenoma of left adrenal gland, Anemia, Anxiety, Arthritis, Diabetes mellitus, Disease of thyroid gland, DJD, Edema of both legs, Hyperlipidemia, Hypertension, Irritable bowel syndrome, and Pulmonary embolus. Pt with active OT orders and up and OOB as tolerated orders. Pt seen as a co-evaluation with PT due to the patient's co-morbidities, clinically unstable presentation/clinical complexity, and present impairments. As per pt report, pta, resides with her  in a 1STH, 2STE. Pt was primarily I w/  ADLS (spouse A with socks), shares IADLS with spouse, (+) drove. Upon evaluation, pt currently MI/I for transfers and S for mobility. Pt currently requires I eating, I grooming, I UB ADLs, MIN A LB ADLs, and S toileting. Pt only requires A for socks, has A with this at baseline. Pt with supportive spouse who assists as needed. No questions or concerns at this time. From OT standpoint, recommendation would be home with social support. No further acute OT needs. D/C OT. Please re-consult if needed. Thank you. Pt was left after session with all current needs met. The patient's raw score on the AM-PAC Daily Activity Inpatient Short Form is 21. A raw score of greater than or equal to 19 suggests the patient may benefit from discharge to home. Please refer to the recommendation of the Occupational Therapist for safe discharge planning.   Plan   OT Frequency Eval only   Discharge Recommendation   Rehab Resource  Intensity Level, OT No post-acute rehabilitation needs   AM-PAC Daily Activity Inpatient   Lower Body Dressing 3   Bathing 3   Toileting 3   Upper Body Dressing 4   Grooming 4   Eating 4   Daily Activity Raw Score 21   Daily Activity Standardized Score (Calc for Raw Score >=11) 44.27   AM-PAC Applied Cognition Inpatient   Following a Speech/Presentation 4   Understanding Ordinary Conversation 4   Taking Medications 4   Remembering Where Things Are Placed or Put Away 4   Remembering List of 4-5 Errands 4   Taking Care of Complicated Tasks 4   Applied Cognition Raw Score 24   Applied Cognition Standardized Score 62.21     Martha Díaz MS, OTR/L

## 2024-05-22 NOTE — DISCHARGE SUMMARY
"      INTERNAL MEDICINE RESIDENCY DISCHARGE SUMMARY     Lubna Bronson   83 y.o. female  MRN: [id number]  Room/Bed: /-01     [facility] BE MED SURG 5   Encounter: [id number]    Active Problems:    Hypothyroid    Hypertension    History of pulmonary embolus (PE)    Diabetes mellitus without complication (HCC)      Diabetes mellitus without complication (HCC)  Assessment & Plan  Lab Results   Component Value Date    HGBA1C 7.4 (H) 12/04/2023       No results for input(s): \"POCGLU\" in the last 72 hours.    Blood Sugar Average: Last 72 hrs:  Controlled diabetes on metformin at home.  Start sliding scale insulin 3 times daily with meals      History of pulmonary embolus (PE)  Assessment & Plan  History of multiple PE and hypercoagulable state.  On chronic anticoagulation with Eliquis.    Continue home Eliquis    Hypertension  Assessment & Plan  Patient on valsartan hydrochlorothiazide 160-12.5 at home.  Blood pressure elevated to 200s over 120 and 181/87 secondary to pain, resolved without intervention.    Continue on hospital formulation with losartan 100 daily and HCTZ 12.5 mg p.o. daily    Hypothyroid  Assessment & Plan  History of hypothyroidism on levothyroxine    Continue levothyroxine 25 mcg daily    Elevated serum creatinine-resolved as of 5/22/2024  Assessment & Plan  Mild creatinine elevation of 1.1 from baseline of 0.9.  No poor p.o. intake per patient --she ate 3 meals today without problems.  Nausea and vomiting has now resolved.  Possibly in setting of urinary tract infection.  No signs of kidney stones/obstruction on CT abdomen pelvis with contrast.    Trend creatinine with BMP  Will avoid nephrotoxins  Patient able to take PO intake -- continue PO hydration    Opioid induced nausea & vomiting-resolved as of 5/22/2024  Assessment & Plan  1 episode of nausea and vomiting s/p Dilaudid 0.5 mg dose administered in the ED.  Nonbilious nonbloody.  Resolved " "after a dose of Zofran.    Zofran as needed      * Acute cystitis with hematuria-resolved as of 5/22/2024  Assessment & Plan  Patient presented with dysuria, suprapubic pain intractable to p.o. home analgesics and 11 days of p.o. antibiotics with Keflex x 7 and Bactrim x 4 patient. No resulted urine culture yet available.  Patient complaining of pain with even walking or any bumps on car ride.  CT scan did not reveal any hydronephrosis or signs of fat stranding/pyelo.  No CVA tenderness.  Afebrile, normal white count no signs of sepsis at this time.  In setting of unresolved symptoms despite prolonged course of antibiotics, severe symptoms and pyuria on UA, slight creatinine elevation, suggestive of UTI --will follow with urine culture to guide treatment and pain control while observation.  Differentials include acute cystitis versus early pyelonephritis versus interstitial cystitis.    No prior resistant organisms seen on urine culture I.e. ESBL or Pseudomonas  Start ceftriaxone IV, follow-up with urine culture --if symptoms better may follow-up outpatient with culture to guide p.o. antibiotics  Pyridium 3 times daily  Tylenol 975 mg tid PRN  For now we will try to avoid opioid as patient has nausea/vomiting associated with oxycodone as well as Dilaudid; if needed administer with Zofran  Hold off Toradol in setting of creatinine elevation  Trend Cr        DETAILS OF HOSPITAL COURSE     As per HPI by Dr. Severino, \"Patient is a 83-year-old female with past medical history of hysterectomy, multiple PE on Eliquis, hypertension, hyperlipidemia, diabetes, hypothyroidism, benign left adrenal adenoma presenting with urinary frequency, pelvic pain, dysuria sent from urgent care to ED.  Patient has been treated outpatient with Keflex x 7 days and 4 days of Bactrim treated empirically without relief and has been having worsening symptoms.  Outpatient UA with large leukocytes, negative nitrites and urine culture pending from " "5/20.     Around 6 PM prior to admission patient had 8 out of 10 pain with difficulty walking due to suprapubic pain.  Denies any gross hematuria, only yellow urine, or history of kidney stones.  Did not notice any stones passing.  She states that she has had frequency with urination every 15 minutes.  Pain not improved with Tylenol.  Denies any fevers, chills, CVA/back pain, radiation of pain, nausea, vomiting, upper or lower abdominal pain.      In the ED, patient had stable vitals, afebrile.  Patient hypertensive initially however resolved without intervention.  Patient had pain improved from 8 out of 10 to 2 out of 10 after Dilaudid 0.5 IV x 1 --however had nausea and nonbloody none bilious emesis after Dilaudid administration, which resolved with Zofran.  UA with 4-10 RBCs, WBCs 30-50, occasional bacteria.  Creatinine elevated from 0.9-1.2 white blood count normal.  Lipase normal.  CT showed mild thickening of the urinary bladder possibly due to cystitis, unremarkable kidneys without hydronephrosis, no significant stranding, diverticulosis without evidence of diverticulitis, status post hysterectomy, small to moderate hiatal hernia, no evidence of bowel obstruction.  Patient admitted for observation overnight for pain control.\"    Patient was subsequently admitted to medicine service and was placed on ceftriaxone for antibiotic.  Urine cultures were initially collected before initiation of antibiotics and their final result was mixed contaminants and less than 10,000 CFU gram-negative rods.  Based on the fact the patient had received antibiotics in the outpatient setting before presenting to the hospital and that she got ceftriaxone while in the hospital, and the results of her cultures, ultimately ceftriaxone was discontinued, patient reported clinical improvement and was doing well with no leukocytosis at the time.  She remained afebrile on the day of discharge.  Furthermore, her blood pressure was noted to " be elevated with systolics in the 180-190 mmHg and as a result, her home antihypertensive regimen was modified she was placed on amlodipine 5 mg at bedtime, hydrochlorothiazide 25 mg in the morning and losartan 100 mg in the morning.  She was advised to maintain blood pressure logs at home following discharge and follow-up with her PCP for management of this.  In the future, patient is strongly advised to follow-up with her PCP for this.      Physical Exam  Vitals reviewed.   Constitutional:       Comments: Seen sitting up in chair   HENT:      Head: Normocephalic.   Cardiovascular:      Rate and Rhythm: Normal rate and regular rhythm.      Pulses: Normal pulses.      Heart sounds: Normal heart sounds.   Pulmonary:      Effort: Pulmonary effort is normal.      Breath sounds: Normal breath sounds.   Abdominal:      General: Bowel sounds are normal.      Palpations: Abdomen is soft.   Skin:     General: Skin is warm and dry.      Capillary Refill: Capillary refill takes less than 2 seconds.   Neurological:      Mental Status: She is alert and oriented to person, place, and time.       DISCHARGE INFORMATION     PCP at Discharge:  Umu Hayes MD      Admitting Provider: Thomas Toussaint MD  Admission Date: 5/20/2024    Discharge Provider: Thomas Toussaint MD  Discharge Date: 05/22/2024    Discharge Disposition: Home with Home Health Care  Discharge Condition: stable  Discharge with Lines: no    Discharge Diet: diabetic diet  Activity Restrictions: none  Test Results Pending at Discharge: None    Discharge Diagnoses:  Principal Problem (Resolved):    Acute cystitis with hematuria  Active Problems:    Hypothyroid    Hypertension    History of pulmonary embolus (PE)    Diabetes mellitus without complication (HCC)  Resolved Problems:    Opioid induced nausea & vomiting    Elevated serum creatinine      Consulting Providers:      Diagnostic & Therapeutic Procedures Performed:  CT abdomen pelvis with contrast    Result Date:  5/21/2024  Impression: 1.  Mild thickening of the urinary bladder wall which may be due to cystitis. Unremarkable kidneys without hydronephrosis. 2.  Small to moderate hiatal hernia. 3.  Diverticulosis without evidence of diverticulitis. 4.  Stable 1.9 cm left adrenal nodule, likely benign. Workstation performed: [id number]       Code Status: Level 1 - Full Code  Advance Directive & Living Will: <no information>  Power of :    POLST:      Medications:  Current Discharge Medication List        STOP taking these medications       valsartan-hydrochlorothiazide (DIOVAN-HCT) 160-12.5 MG per tablet Comments:   Reason for Stopping:             Current Discharge Medication List        START taking these medications    Details   amLODIPine (NORVASC) 5 mg tablet Take 1 tablet (5 mg total) by mouth daily at bedtime  Qty: 30 tablet, Refills: 0    Associated Diagnoses: Essential hypertension, benign      hydroCHLOROthiazide 25 mg tablet Take 1 tablet (25 mg total) by mouth daily  Qty: 30 tablet, Refills: 0    Associated Diagnoses: Essential hypertension, benign      losartan (COZAAR) 100 MG tablet Take 1 tablet (100 mg total) by mouth daily  Qty: 30 tablet, Refills: 0    Associated Diagnoses: Essential hypertension, benign      phenazopyridine (PYRIDIUM) 100 mg tablet Take 1 tablet (100 mg total) by mouth 3 (three) times a day with meals  Qty: 10 tablet, Refills: 0    Associated Diagnoses: Acute cystitis with hematuria           Current Discharge Medication List        CONTINUE these medications which have NOT CHANGED    Details   acetaminophen (TYLENOL) 325 mg tablet Take 650 mg by mouth every 6 (six) hours as needed for mild pain      atorvastatin (LIPITOR) 10 mg tablet Take 1 tablet (10 mg total) by mouth daily at bedtime  Qty: 90 tablet, Refills: 1    Associated Diagnoses: Mixed hyperlipidemia      levothyroxine 25 mcg tablet Take 1 tablet (25 mcg total) by mouth daily  Qty: 90 tablet, Refills: 1    Associated  "Diagnoses: Hypothyroidism (acquired)      Eliquis 5 MG Take 1 tablet (5 mg total) by mouth 2 (two) times a day  Qty: 180 tablet, Refills: 1    Associated Diagnoses: 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 This time was spent on the day of discharge. I had direct contact with the patient on the day of discharge. Additional documentation is required if more than 30 minutes were spent on discharge.    Portions of the record may have been created with voice recognition software.  Occasional wrong word or \"sound a like\" substitutions may have occurred due to the inherent limitations of voice recognition software.  Read the chart carefully and recognize, using context, where substitutions have occurred.        Pk Rnagel MD  Internal Medicine Residency PGY-3  [facility], [place]          "

## 2024-05-22 NOTE — UTILIZATION REVIEW
Initial Clinical Review  Observation on 05/21 @ 0232 upgraded to Inpatient on 05/21 @ 1715. Pt requiring continued stay d/t continued management of acute cystitis w/ hematuria, on IV abx, pending Urine cxs results    Admission: Date/Time/Statement:   Admission Orders (From admission, onward)       Ordered        05/21/24 1715  INPATIENT ADMISSION  Once            05/21/24 0232  Place in Observation  Once                          Orders Placed This Encounter   Procedures    INPATIENT ADMISSION     Standing Status:   Standing     Number of Occurrences:   1     Order Specific Question:   Level of Care     Answer:   Med Surg [16]     Order Specific Question:   Estimated length of stay     Answer:   More than 2 Midnights     Order Specific Question:   Certification     Answer:   I certify that inpatient services are medically necessary for this patient for a duration of greater than two midnights. See H&P and MD Progress Notes for additional information about the patient's course of treatment.     ED Arrival Information       Expected   5/20/2024     Arrival   5/20/2024 21:23    Acuity   Urgent              Means of arrival   Walk-In    Escorted by   Self    Service   Hospitalist    Admission type   Emergency              Arrival complaint   UTI symptoms             Chief Complaint   Patient presents with    Pelvic Pain     Patient reports urinary frequency, pelvic pain and painful urination. Patient being treated for UTI starting with keflex without relief and changed to Bactrim and half a week in without relief.        Initial Presentation: 83 y.o. female w/ PMH of hysterectomy, multiple PE on Eliquis, HTN, HLD, DM, hypothyroidism, benign left adrenal adenoma presented to the ED from the urgent care w/ urinary frequency, pelvic pain, dysuria.   Pt initially placed on Keflex x 7 days by PCP but noted minimal improvement and switched to Bactrim w/c she has taken x 4 days however w/o relief and has been having worsening  symptoms. OP UA w/ large leukocytes, negative nitrites and urine culture pending from 5/20.   Reports having 8/10 pain w/ difficulty walking d/r suprapubic pain. Having freq in urination q 15 mins. Pain not improved w/ tylenol  In the ED, HR 99, /120. pain improved from 8 out of 10 to 2 out of 10 after Dilaudid 0.5 IV x 1 --however had nausea and nonbloody none bilious emesis after Dilaudid administration, which resolved with Zofran.   UA with 4-10 RBCs, WBCs 30-50, occasional bacteria. Creatinine elevated from 0.9-1.2   CT showed mild thickening of the urinary bladder possibly due to cystitis, unremarkable kidneys without hydronephrosis, no significant stranding, diverticulosis without evidence of diverticulitis, status post hysterectomy, small to moderate hiatal hernia, no evidence of bowel obstruction.     On exam,  Significant tenderness and left lower, right lower quadrants, suprapubic    Also given IV Labetalol, IV Rocephin.    Admit as observation level of care for acute cystitis w/ hematuria, elevated serum creatinine, hypertension.  Plan: Start ceftriaxone IV, follow-up with urine culture. Pyridium 3 times daily. Tylenol 975 mg tid PRN. Zofran prn. Hold off Toradol in setting of creatinine elevation. Trend Cr. losartan 100 daily and HCTZ 12.5 mg p.o. daily.    Date: 05/21   Day 2: PE: suprapubic tenderness present. Cont IV Ceftriaxone, f/u cxs. Cont pyridium, Pain and nausea control prn. Trend cr. Cont losartan and HCTZ.    Date: 05/22  Day 3: Has surpassed a 2nd midnight with active treatments and services.  Urine cx showed mixed contaminants and less than 10,000 CFU gram-negative rods. Ceftriaxone dc'd. Pt reports clinical improvement, no leukocytosis. Noted BP still elevated, placed on amlodipine 5 mg at bedtime, hydrochlorothiazide 25 mg in the morning and losartan 100 mg in the morning.     ED Triage Vitals [05/20/24 2126]   Temperature Pulse Respirations Blood Pressure SpO2   98.5 °F (36.9 °C)  99 18 (!) 200/120 98 %      Temp Source Heart Rate Source Patient Position - Orthostatic VS BP Location FiO2 (%)   Temporal Monitor Sitting Left arm --      Pain Score       8          Wt Readings from Last 1 Encounters:   05/08/24 95.3 kg (210 lb)     Additional Vital Signs:   Date/Time Temp Pulse Resp BP MAP (mmHg) SpO2 O2 Device Patient Position - Orthostatic VS   05/21/24 2133 -- 73 -- 171/85 Abnormal  -- 93 % -- --   05/21/24 2025 -- 79 -- 181/93 Abnormal  -- 94 % -- --   05/21/24 1900 97.9 °F (36.6 °C) -- 18 202/93 Abnormal  134 -- None (Room air) Lying   05/21/24 1511 97.6 °F (36.4 °C) 74 -- 183/103 Abnormal  130 95 % None (Room air) Lying   05/21/24 1449 -- 77 18 200/95 Abnormal  137 96 % None (Room air) Sitting   05/21/24 1154 -- 75 16 186/86 Abnormal  123 94 % None (Room air) Sitting   05/21/24 1000 -- 79 -- -- -- 96 % -- --   05/21/24 0634 -- 90 18 223/105 Abnormal  -- 98 % None (Room air) Sitting   05/21/24 0325 -- 75 18 164/86 -- 99 % None (Room air) Lying   05/20/24 2349 -- 84 18 181/87 Abnormal  -- 98 % None (Room air) Lying   05/20/24 2250 -- -- -- -- -- -- None (Room air) --       Pertinent Labs/Diagnostic Test Results:   CT abdomen pelvis with contrast   Final Result by Sean Nieves MD (05/21 0104)      1.  Mild thickening of the urinary bladder wall which may be due to cystitis. Unremarkable kidneys without hydronephrosis.   2.  Small to moderate hiatal hernia.   3.  Diverticulosis without evidence of diverticulitis.   4.  Stable 1.9 cm left adrenal nodule, likely benign.      Workstation performed: [id number]               Results from last 7 days   Lab Units 05/21/24  0509 05/20/24  2234   WBC Thousand/uL 8.37 7.60   HEMOGLOBIN g/dL 13.4 13.7   HEMATOCRIT % 40.0 40.5   PLATELETS Thousands/uL 302 313   TOTAL NEUT ABS Thousands/µL 6.82 4.87         Results from last 7 days   Lab Units 05/21/24  0509 05/20/24  2234   SODIUM mmol/L 130* 132*   POTASSIUM mmol/L 4.2 4.2   CHLORIDE mmol/L 94* 96   CO2  mmol/L 25 27   ANION GAP mmol/L 11 9   BUN mg/dL 14 17   CREATININE mg/dL 0.92 1.12   EGFR ml/min/1.73sq m 58 46   CALCIUM mg/dL 9.1 9.5     Results from last 7 days   Lab Units 05/20/24  2234   AST U/L 17   ALT U/L 19   ALK PHOS U/L 69   TOTAL PROTEIN g/dL 7.1   ALBUMIN g/dL 4.4   TOTAL BILIRUBIN mg/dL 0.39     Results from last 7 days   Lab Units 05/21/24  1641 05/21/24  1121 05/21/24  0713   POC GLUCOSE mg/dl 189* 168* 159*     Results from last 7 days   Lab Units 05/21/24  0509 05/20/24  2234   GLUCOSE RANDOM mg/dL 186* 173*                             Results from last 7 days   Lab Units 05/20/24  2234   LIPASE u/L 66                 Results from last 7 days   Lab Units 05/20/24  2236 05/20/24 2053   CLARITY UA  Clear hazy   COLOR UA  Light Yellow yellow   SPEC GRAV UA  1.025  --    PH UA  6.5  --    GLUCOSE UA mg/dl Negative neg   KETONES UA mg/dl Negative neg   BLOOD UA  Negative small   PROTEIN UA mg/dl 50 (1+)* 300   NITRITE UA  Negative neg   BILIRUBIN UA  Negative  --    BILIRUBIN UA POC   --  small   UROBILINOGEN UA   --  0.2   UROBILINOGEN UA (BE) mg/dl <2.0  --    LEUKOCYTES UA  Moderate* large   WBC UA /hpf 30-50*  --    RBC UA /hpf 4-10*  --    BACTERIA UA /hpf Occasional  --    EPITHELIAL CELLS WET PREP /hpf Occasional  --    MUCUS THREADS  Occasional*  --                                  Results from last 7 days   Lab Units 05/20/24 2047   URINE CULTURE  <10,000 cfu/ml Gram Negative Godfrey*                   ED Treatment:   Medication Administration from 05/20/2024 2110 to 05/21/2024 1507         Date/Time Order Dose Route Action     05/20/2024 2235 EDT HYDROmorphone (DILAUDID) injection 0.5 mg 0.5 mg Intravenous Given     05/20/2024 2325 EDT iohexol (OMNIPAQUE) 350 MG/ML injection (MULTI-DOSE) 100 mL 100 mL Intravenous Given     05/20/2024 2345 EDT ondansetron (ZOFRAN) injection 4 mg 4 mg Intravenous Given     05/21/2024 0905 EDT apixaban (ELIQUIS) tablet 5 mg 5 mg Oral Given     05/21/2024 0649  EDT levothyroxine tablet 25 mcg 25 mcg Oral Given     05/21/2024 0957 EDT losartan (COZAAR) tablet 100 mg 100 mg Oral Given     05/21/2024 0957 EDT hydroCHLOROthiazide tablet 12.5 mg 12.5 mg Oral Given     05/21/2024 1145 EDT phenazopyridine (PYRIDIUM) tablet 100 mg 100 mg Oral Given     05/21/2024 0632 EDT phenazopyridine (PYRIDIUM) tablet 100 mg 100 mg Oral Given     05/21/2024 1450 EDT labetalol (NORMODYNE) injection 10 mg 10 mg Intravenous Given     05/21/2024 0905 EDT labetalol (NORMODYNE) injection 10 mg 10 mg Intravenous Given     05/21/2024 0940 EDT ceftriaxone (ROCEPHIN) 1 g/50 mL in dextrose IVPB 0 mg Intravenous Stopped     05/21/2024 0910 EDT ceftriaxone (ROCEPHIN) 1 g/50 mL in dextrose IVPB 1,000 mg Intravenous New Bag          Past Medical History:   Diagnosis Date    Acquired hypothyroidism     Adenoma of left adrenal gland 02/27/2018    Adrenal gland disorder (HCC)     Anemia     Anxiety     Arthritis     At risk for falls     Diabetes mellitus (HCC)     Disease of thyroid gland     hypo    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    Edema of both legs     History of pulmonary embolus (PE)     Hyperlipidemia     Hypertension     Irritable bowel syndrome     Prediabetes     Presence of right artificial hip joint     Pulmonary embolus (HCC) 2/27/2018 & 2/14/2019    Uses roller walker     Wears glasses     Wears partial dentures     upper partial     Present on Admission:   Opioid induced nausea & vomiting   Hypothyroid   Hypertension   History of pulmonary embolus (PE)   Diabetes mellitus without complication (HCC)      Admitting Diagnosis: Urinary tract infection [N39.0]  UTI symptoms [R39.9]  Age/Sex: 83 y.o. female  Admission Orders:  SCD    Scheduled Medications:  apixaban, 5 mg, Oral, BID  atorvastatin, 10 mg, Oral, HS  cefTRIAXone, 1,000 mg, Intravenous, Q24H  losartan, 100 mg, Oral, Daily   And  hydroCHLOROthiazide, 12.5 mg, Oral, Daily  insulin lispro, 1-5 Units, Subcutaneous, TID  AC  levothyroxine, 25 mcg, Oral, Daily  phenazopyridine, 100 mg, Oral, TID With Meals      Continuous IV Infusions: none     PRN Meds:  acetaminophen, 650 mg, Oral, Q6H PRN 05/21 x 1, 05/22 x 1  labetalol, 10 mg, Intravenous, Q6H PRN 05/21 x 1  ondansetron, 4 mg, Intravenous, Q6H PRN 05/21 x 2, 05/22 x 1        None    Network Utilization Review Department  ATTENTION: Please call with any questions or concerns to [phone] and carefully listen to the prompts so that you are directed to the right person. All voicemails are confidential.   For Discharge needs, contact Care Management DC Support Team at [phone] opt. 2  Send all requests for admission clinical reviews, approved or denied determinations and any other requests to dedicated fax number below belonging to the [facility] where the patient is receiving treatment. List of dedicated fax numbers for the Facilities:  FACILITY NAME UR FAX NUMBER   ADMISSION DENIALS (Administrative/Medical Necessity) [phone]   DISCHARGE SUPPORT TEAM (NETWORK) [phone]   PARENT CHILD HEALTH (Maternity/NICU/Pediatrics) [phone]   [facility] [phone]   [facility] [phone]   [facility] [phone]   [facility] [phone]   [facility] [phone]   [facility] [phone]   [facility] [phone]   [facility] [phone]   [facility] [phone]   [facility] [phone]   [facility] [phone]   [facility] [phone]

## 2024-05-22 NOTE — PHYSICAL THERAPY NOTE
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, without long-term current use of insulin (HCC)    History of pulmonary embolus (PE)    Pulmonary embolus (HCC)    Presence of right artificial hip joint    Venous insufficiency of both lower extremities    Diabetes mellitus without complication (HCC)    Obesity, morbid (HCC)    Essential hypertension, benign    Tracheitis    Primary osteoarthritis of both knees    Acute cystitis with hematuria    Elevated serum creatinine       Past Medical History  Past Medical History:   Diagnosis Date    Acquired hypothyroidism     Adenoma of left adrenal gland 02/27/2018    Adrenal gland disorder (HCC)     Anemia     Anxiety     Arthritis     At risk for falls     Diabetes mellitus (HCC)     Disease of thyroid gland     hypo    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    Edema of both legs     History of pulmonary embolus (PE)     Hyperlipidemia     Hypertension     Irritable bowel syndrome     Prediabetes     Presence of right artificial hip joint     Pulmonary embolus (HCC) 2/27/2018 & 2/14/2019    Uses roller walker     Wears glasses     Wears partial dentures     upper partial       Past Surgical History  Past Surgical History:   Procedure Laterality Date    CHOLECYSTECTOMY  1991    COLONOSCOPY  11/09/2017    HEMORRHOID SURGERY  1999    HYSTERECTOMY  1993    Vaginal    MI ARTHRP ACETBLR/PROX FEM PROSTC  AGRFT/ALGRFT Right 10/22/2019    Procedure: ARTHROPLASTY HIP TOTAL;  Surgeon: Lupe Driscoll DO;  Location: [facility];  Service: Orthopedics    TRIGGER FINGER RELEASE Right 1998 05/22/24 0847   PT Last Visit   PT Visit Date 05/22/24   Note Type   Note type Evaluation   Pain Assessment   Pain Assessment Tool 0-10   Pain Score 1   Pain Location/Orientation Orientation: Lower;Location: Abdomen   Restrictions/Precautions   Weight Bearing Precautions Per Order No   Other Precautions Fall Risk;Pain   Home Living   Type of Home House   Home Layout One level  (1-2STE)   Bathroom Shower/Tub Walk-in shower   Bathroom Toilet Raised   Home Equipment Walker;Cane  (denies use PTA)   Prior Function   Level of [place] Independent with ADLs;Independent with functional mobility;Independent with IADLS  (assist with donning socks)   Lives With Spouse   Receives Help From Family   IADLs Independent with driving   Falls in the last 6 months 0   Vocational Retired   General   Family/Caregiver Present No   Cognition   Overall Cognitive Status WFL   Arousal/Participation Alert   Orientation Level Oriented X4   Memory Within functional limits   Following Commands Follows all commands and directions without difficulty   Subjective   Subjective Pleasant and agreeable to participate in therapy session.   RLE Assessment   RLE Assessment   (functionally 3+/5)   LLE Assessment   LLE Assessment   (functionally 3+/5)   Bed Mobility   Additional Comments Found and left sitting OOB in chair with all needs in reach.   Transfers   Sit to Stand 6  Modified independent   Stand to Sit 6  Modified independent   Additional Comments with RW   Ambulation/Elevation   Gait pattern Excessively slow   Gait Assistance 5  Supervision   Assistive Device Rolling walker;None   Distance 120 ft x 1 with RW, 120 ft x 1 without AD   Balance   Static Sitting Good   Dynamic Sitting Fair +   Static Standing Fair   Dynamic Standing Fair   Ambulatory Fair -    Activity Tolerance   Activity Tolerance Patient tolerated treatment well   Medical Staff Made Aware OT Martha   Nurse Made Aware RN cleared pt to be seen by PT   Assessment   Prognosis Good   Assessment Pt seen for moderate complexity PT evaluation due to decrease in functional mobility status compared to baseline.  Pt with active PT eval/treat orders at this time.  Pt is a 83 y.o. F who presented to [facility] with urinary frequency, pelvic pain, dysuria on 5/20/24, found to have acute cystitis with hematuria.  Pt  has a past medical history of Acquired hypothyroidism, 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  Pt resides with spouse in 1SH with 1-2STE.  Pt requires supervision for all mobility at this time.  Pt left upright in bedside chair with all needs in reach.  Pt denies any concerns regarding mobility upon DC. PT to DC pt as pt has no further acute skilled PT needs and recommending no needs.  The patient's AM-PAC Basic Mobility Inpatient Short Form Raw Score is 20. A Raw score of greater than 16 suggests the patient may benefit from discharge to home. Please also refer to the recommendation of the Physical Therapist for safe discharge planning.   Barriers to Discharge None   Goals   Patient Goals to go home   Discharge Recommendation   Rehab Resource Intensity Level, PT No post-acute rehabilitation needs   Equipment Recommended   (pt owns all she needs)   AM-PAC Basic Mobility Inpatient   Turning in Flat Bed Without Bedrails 4   Lying on Back to Sitting on  Edge of Flat Bed Without Bedrails 4   Moving Bed to Chair 3   Standing Up From Chair Using Arms 3   Walk in Room 3   Climb 3-5 Stairs With Railing 3   Basic Mobility Inpatient Raw Score 20   Basic Mobility Standardized Score 43.99   [facility] Highest Level Of Mobility   -[facility] Goal 6: Walk 10 steps or more   -HLM Achieved 7: Walk 25 feet or more   Modified Sumner Scale   Modified Sumner Scale 2         Kaela Fagan, PT, DPT

## 2024-05-22 NOTE — PLAN OF CARE
Problem: PAIN - ADULT  Goal: Verbalizes/displays adequate comfort level or baseline comfort level  Description: Interventions:  - Encourage patient to monitor pain and request assistance  - Assess pain using appropriate pain scale  - Administer analgesics based on type and severity of pain and evaluate response  - Implement non-pharmacological measures as appropriate and evaluate response  - Consider cultural and social influences on pain and pain management  - Notify physician/advanced practitioner if interventions unsuccessful or patient reports new pain  Outcome: Progressing     Problem: INFECTION - ADULT  Goal: Absence or prevention of progression during hospitalization  Description: INTERVENTIONS:  - Assess and monitor for signs and symptoms of infection  - Monitor lab/diagnostic results  - Monitor all insertion sites, i.e. indwelling lines, tubes, and drains  - Monitor endotracheal if appropriate and nasal secretions for changes in amount and color  - [place] appropriate cooling/warming therapies per order  - Administer medications as ordered  - Instruct and encourage patient and family to use good hand hygiene technique  - Identify and instruct in appropriate isolation precautions for identified infection/condition  Outcome: Progressing  Goal: Absence of fever/infection during neutropenic period  Description: INTERVENTIONS:  - Monitor WBC    Outcome: Progressing     Problem: SAFETY ADULT  Goal: Patient will remain free of falls  Description: INTERVENTIONS:  - Educate patient/family on patient safety including physical limitations  - Instruct patient to call for assistance with activity   - Consult OT/PT to assist with strengthening/mobility   - Keep Call bell within reach  - Keep bed low and locked with side rails adjusted as appropriate  - Keep care items and personal belongings within reach  - Initiate and maintain comfort rounds  - Make Fall Risk Sign visible to staff  - Offer Toileting every 2 Hours,  in advance of need  - Initiate/Maintain 2alarm  - Obtain necessary fall risk management equipment: 2  - Apply yellow socks and bracelet for high fall risk patients  - Consider moving patient to room near nurses station  Outcome: Progressing  Goal: Maintain or return to baseline ADL function  Description: INTERVENTIONS:  -  Assess patient's ability to carry out ADLs; assess patient's baseline for ADL function and identify physical deficits which impact ability to perform ADLs (bathing, care of mouth/teeth, toileting, grooming, dressing, etc.)  - Assess/evaluate cause of self-care deficits   - Assess range of motion  - Assess patient's mobility; develop plan if impaired  - Assess patient's need for assistive devices and provide as appropriate  - Encourage maximum independence but intervene and supervise when necessary  - Involve family in performance of ADLs  - Assess for home care needs following discharge   - Consider OT consult to assist with ADL evaluation and planning for discharge  - Provide patient education as appropriate  Outcome: Progressing  Goal: Maintains/Returns to pre admission functional level  Description: INTERVENTIONS:  - Perform AM-PAC 6 Click Basic Mobility/ Daily Activity assessment daily.  - Set and communicate daily mobility goal to care team and patient/family/caregiver.   - Collaborate with rehabilitation services on mobility goals if consulted  - Perform Range of Motion 2 times a day.  - Reposition patient every 2 hours.  - Dangle patient 2 times a day  - Stand patient 2 times a day  - Ambulate patient 2 times a day  - Out of bed to chair 2 times a day   - Out of bed for meals 2 times a day  - Out of bed for toileting  - Record patient progress and toleration of activity level   Outcome: Progressing     Problem: DISCHARGE PLANNING  Goal: Discharge to home or other facility with appropriate resources  Description: INTERVENTIONS:  - Identify barriers to discharge w/patient and caregiver  -  Arrange for needed discharge resources and transportation as appropriate  - Identify discharge learning needs (meds, wound care, etc.)  - Arrange for interpretive services to assist at discharge as needed  - Refer to Case Management Department for coordinating discharge planning if the patient needs post-hospital services based on physician/advanced practitioner order or complex needs related to functional status, cognitive ability, or social support system  Outcome: Progressing     Problem: Knowledge Deficit  Goal: Patient/family/caregiver demonstrates understanding of disease process, treatment plan, medications, and discharge instructions  Description: Complete learning assessment and assess knowledge base.  Interventions:  - Provide teaching at level of understanding  - Provide teaching via preferred learning methods  Outcome: Progressing

## 2024-05-23 ENCOUNTER — TELEPHONE (OUTPATIENT)
Age: 84
End: 2024-05-23

## 2024-05-23 NOTE — UTILIZATION REVIEW
NOTIFICATION OF ADMISSION DISCHARGE   This is a Notification of Discharge from [facility]. Please be advised that this patient has been discharge from our facility. Below you will find the admission and discharge date and time including the patient’s disposition.   UTILIZATION REVIEW CONTACT:  [employer]  Utilization   Network Utilization Review Department  Phone: [phone] x carefully listen to the prompts. All voicemails are confidential.  Email: [email or web address]     ADMISSION INFORMATION  PRESENTATION DATE: 5/20/2024  9:29 PM  OBERVATION ADMISSION DATE:   INPATIENT ADMISSION DATE: 5/21/24  5:15 PM   DISCHARGE DATE: 5/22/2024  6:27 PM   DISPOSITION:Home/Self Care    Network Utilization Review Department  ATTENTION: Please call with any questions or concerns to [phone] and carefully listen to the prompts so that you are directed to the right person. All voicemails are confidential.   For Discharge needs, contact Care Management DC Support Team at [phone] opt. 2  Send all requests for admission clinical reviews, approved or denied determinations and any other requests to dedicated fax number below belonging to the campus where the patient is receiving treatment. List of dedicated fax numbers for the Facilities:  FACILITY NAME UR FAX NUMBER   ADMISSION DENIALS (Administrative/Medical Necessity) [phone]   DISCHARGE SUPPORT TEAM ([facility]) [phone]   PARENT CHILD HEALTH (Maternity/NICU/Pediatrics) [phone]   [facility] [phone]   [facility] [phone]   [facility] [phone]   [facility] [phone]   [facility] [phone]   [facility] [phone]   [facility] [phone]   [facility] [phone]    [facility] [phone]   [facility] [phone]   [facility] [phone]   [facility] [phone]

## 2024-05-23 NOTE — TELEPHONE ENCOUNTER
Patient calling discharged from hospital yesterday would like to know If still has to have blood work done prior ordered by Dr. Hayes since she had a lot of blood work done in hospital,.    Patient was prescribed Rx: Pyridium was only given 3 days worth. Inquiring if should have old prescription sent in by .    Please review and advise.  Thank You.

## 2024-05-24 NOTE — PROGRESS NOTES
Pastoral Care Progress Note    2024  Patient: Lubna Bronson : 1940  Admission Date & Time: 2024  MRN: [id number] [facility]: [id number]           24 0922   Clinical Encounter Type   Visited With Patient  (Fr Dc gave blessing and pastoral visit)   Jain Encounters   Jain Needs Prayer

## 2024-05-28 ENCOUNTER — RA CDI HCC (OUTPATIENT)
Dept: OTHER | Facility: HOSPITAL | Age: 84
End: 2024-05-28

## 2024-05-31 ENCOUNTER — OFFICE VISIT (OUTPATIENT)
Dept: INTERNAL MEDICINE CLINIC | Facility: CLINIC | Age: 84
End: 2024-05-31
Payer: COMMERCIAL

## 2024-05-31 VITALS
OXYGEN SATURATION: 96 % | HEART RATE: 86 BPM | DIASTOLIC BLOOD PRESSURE: 72 MMHG | SYSTOLIC BLOOD PRESSURE: 134 MMHG | BODY MASS INDEX: 35.34 KG/M2 | WEIGHT: 207 LBS | TEMPERATURE: 97.4 F | HEIGHT: 64 IN

## 2024-05-31 DIAGNOSIS — E11.65 UNCONTROLLED TYPE 2 DIABETES MELLITUS WITH HYPERGLYCEMIA (HCC): ICD-10-CM

## 2024-05-31 DIAGNOSIS — M17.0 PRIMARY OSTEOARTHRITIS OF BOTH KNEES: ICD-10-CM

## 2024-05-31 DIAGNOSIS — I10 ESSENTIAL HYPERTENSION, BENIGN: ICD-10-CM

## 2024-05-31 DIAGNOSIS — I87.2 VENOUS INSUFFICIENCY OF BOTH LOWER EXTREMITIES: ICD-10-CM

## 2024-05-31 DIAGNOSIS — N30.01 ACUTE CYSTITIS WITH HEMATURIA: Primary | ICD-10-CM

## 2024-05-31 DIAGNOSIS — E78.2 MIXED HYPERLIPIDEMIA: ICD-10-CM

## 2024-05-31 DIAGNOSIS — E87.1 HYPONATREMIA: ICD-10-CM

## 2024-05-31 PROCEDURE — 99495 TRANSJ CARE MGMT MOD F2F 14D: CPT | Performed by: INTERNAL MEDICINE

## 2024-05-31 RX ORDER — METFORMIN HYDROCHLORIDE 750 MG/1
750 TABLET, EXTENDED RELEASE ORAL
COMMUNITY

## 2024-05-31 RX ORDER — HYDROCHLOROTHIAZIDE 12.5 MG/1
12.5 TABLET ORAL DAILY
Qty: 30 TABLET | Refills: 1 | Status: SHIPPED | OUTPATIENT
Start: 2024-05-31 | End: 2024-11-27

## 2024-05-31 NOTE — PROGRESS NOTES
Transitional Care Management Review:  Lubna Bronson is a 83 y.o. female here for TCM follow up.     During the TCM phone call patient stated:    TCM Call       Date and time call was made  5/23/2024  1:17 PM    Hospital care reviewed  Records reviewed    Patient was hospitialized at  [facility]    Date of Admission  05/20/24    Date of discharge  05/22/24    Diagnosis  acute cystitis with hematuria    Disposition  Home    Current Symptoms  Fatigue; Neausea    Neausea severity  Mild    Fatigue severity  Severe          TCM Call       Post hospital issues  None    Scheduled for follow up?  Yes    Did you obtain your prescribed medications  Yes    Do you need help managing your prescriptions or medications  No    Is transportation to your appointment needed  No    I have advised the patient to call PCP with any new or worsening symptoms  Keyur Hayes MD      Assessment/Plan:      Depression Screening and Follow-up Plan: Patient was screened for depression during today's encounter. They screened negative with a PHQ-2 score of 0.            1. Acute cystitis with hematuria  Comments:  better, stable  2. Essential hypertension, benign  Comments:  stable, HCTZ adjusted  Orders:  -     CBC and differential; Future  -     hydroCHLOROthiazide 12.5 mg tablet; Take 1 tablet (12.5 mg total) by mouth daily  3. Venous insufficiency of both lower extremities  4. Primary osteoarthritis of both knees  5. Mixed hyperlipidemia  6. Hyponatremia  Comments:  check blood test  Orders:  -     Basic metabolic panel; Future  7. Uncontrolled type 2 diabetes mellitus with hyperglycemia (HCC)  -     Hemoglobin A1C; Future         Subjective:      Patient ID: Lubna Bronson is a 83 y.o. female.    Follow-up from hospitalization, doing well, feeling tired, weak,    Answers submitted by the patient for this visit:  Medicare Annual Wellness Visit (Submitted on 5/30/2024)  How would you rate your  "overall health?: fair  Compared to last year, how is your physical health?: same  In general, how satisfied are you with your life?: very satisfied  Compared to last year, how is your eyesight?: same  Compared to last year, how is your hearing?: same  Compared to last year, how is your emotional/mental health?: same  How often is anger a problem for you?: never, rarely  How often do you feel unusually tired/fatigued?: sometimes  In the past 7 days, how much pain have you experienced?: some  If you answered \"some\" or \"a lot\", please rate the severity of your pain on a scale of 1 to 10 (1 being the least severe pain and 10 being the most intense pain).: 5/10  In the past 6 months, have you lost or gained 10 pounds without trying?: No  One or more falls in the last year: No  In the past 6 months, have you accidentally leaked urine?: Yes  Do you have trouble with the stairs inside or outside your home?: Yes  Does your home have working smoke alarms?: Yes  Does your home have a carbon monoxide monitor?: No  Which safety hazards (if any) have you experienced in your home? Please select all that apply.: none  How would you describe your current diet? Please select all that apply.: Regular  In addition to prescription medications, are you taking any over-the-counter supplements?: No  Can you manage your medications?: Yes  Are you currently taking any opioid medications?: No  Can you walk and transfer into and out of your bed and chair?: Yes  Can you dress and groom yourself?: Yes  Can you bathe or shower yourself?: Yes  Can you feed yourself?: Yes  Can you do your laundry/ housekeeping?: Yes  Can you manage your money, pay your bills, and track your expenses?: Yes  Can you make your own meals?: Yes  Can you do your own shopping?: Yes  Within the last 12 months, have you had any hospitalizations or Emergency Department visits?: Yes  If yes, how many times have you been hospitalized within the past year?: 1-2  Do you have a " living will?: Yes  Do you have a Durable POA (Power of ) for healthcare decisions?: Yes  Do you have an Advanced Directive for end of life decisions?: Yes  How often have you used an illegal drug (including marijuana) or a prescription medication for non-medical reasons in the past year?: never  What is the typical number of drinks you consume in a day?: 0  What is the typical number of drinks you consume in a week?: 0  How often did you have a drink containing alcohol in the past year?: monthly or less  How many drinks did you have on a typical day  when you were drinking in the past year?: 0  How often did you have 6 or more drinks on one occasion in the past year?: never      The following portions of the patient's history were reviewed and updated as appropriate: She  has a past medical history of Acquired hypothyroidism, 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  She   Patient Active Problem List    Diagnosis Date Noted    Hyponatremia 05/31/2024    Acute cystitis with hematuria 05/21/2024    Primary osteoarthritis of both knees 09/07/2022    Tracheitis 06/29/2022    Obesity, morbid (HCC) 05/05/2021    Essential hypertension, benign 05/05/2021    Venous insufficiency of both lower extremities 12/09/2020    Diabetes mellitus without complication (HCC) 12/09/2020    Hypertension     Irritable bowel syndrome     Edema of both legs     Acquired hypothyroidism     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, without long-term current use of  insulin (HCC)     History of pulmonary embolus (PE)     Pulmonary embolus (HCC)     Presence of right artificial hip joint     Acute blood loss anemia 10/25/2019    Hypothyroid 10/23/2019    Mixed hyperlipidemia 10/23/2019    Essential hypertension 10/23/2019    History of hypercoagulable state 10/09/2019    Primary osteoarthritis of one hip, right 08/09/2019    Primary osteoarthritis of right hip      She  has a past surgical history that includes Cholecystectomy (1991); Colonoscopy (11/09/2017); pr arthrp acetblr/prox fem prostc agrft/algrft (Right, 10/22/2019); Hysterectomy (1993); Hemorrhoid surgery (1999); and Trigger finger release (Right, 1998).  Her family history includes Hypertension in her family and mother.  She  reports that she has never smoked. She has never used smokeless tobacco. She reports that she does not currently use alcohol. She reports that she does not currently use drugs.  Current Outpatient Medications   Medication Sig Dispense Refill    acetaminophen (TYLENOL) 325 mg tablet Take 650 mg by mouth every 6 (six) hours as needed for mild pain      amLODIPine (NORVASC) 5 mg tablet Take 1 tablet (5 mg total) by mouth daily at bedtime 30 tablet 0    atorvastatin (LIPITOR) 10 mg tablet Take 1 tablet (10 mg total) by mouth daily at bedtime 90 tablet 1    Eliquis 5 MG Take 1 tablet (5 mg total) by mouth 2 (two) times a day 180 tablet 1    hydroCHLOROthiazide 12.5 mg tablet Take 1 tablet (12.5 mg total) by mouth daily 30 tablet 1    levothyroxine 25 mcg tablet Take 1 tablet (25 mcg total) by mouth daily 90 tablet 1    losartan (COZAAR) 100 MG tablet Take 1 tablet (100 mg total) by mouth daily 30 tablet 0    metFORMIN (GLUCOPHAGE-XR) 750 mg 24 hr tablet Take 750 mg by mouth daily with breakfast       No current facility-administered medications for this visit.     She is allergic to oxycodone..    Review of Systems   Constitutional:  Negative for chills and fever.   HENT:  Negative for congestion,  "ear pain and sore throat.    Eyes:  Negative for pain.   Respiratory:  Negative for cough and shortness of breath.    Cardiovascular:  Negative for chest pain and leg swelling.   Gastrointestinal:  Negative for abdominal pain, nausea and vomiting.   Endocrine: Negative for polyuria.   Genitourinary:  Negative for difficulty urinating, frequency and urgency.   Musculoskeletal:  Positive for arthralgias and back pain.   Skin:  Negative for rash.   Neurological:  Negative for weakness and headaches.   Psychiatric/Behavioral:  Negative for sleep disturbance. The patient is not nervous/anxious.          Objective:      /72 (BP Location: Left arm, Patient Position: Sitting, Cuff Size: Large)   Pulse 86   Temp (!) 97.4 °F (36.3 °C) (Temporal)   Ht 5' 4\" (1.626 m)   Wt 93.9 kg (207 lb)   SpO2 96%   BMI 35.53 kg/m²     Recent Results (from the past 336 hour(s))   Urine culture    Collection Time: 05/20/24  8:47 PM    Specimen: Urine   Result Value Ref Range    Urine Culture <10,000 cfu/ml Gram Negative Godfrey (A)    POCT urine dip    Collection Time: 05/20/24  8:53 PM   Result Value Ref Range    LEUKOCYTE ESTERASE,UA large     NITRITE,UA neg     SL AMB POCT UROBILINOGEN 0.2     POCT URINE PROTEIN 300      PH,UA 6.5     BLOOD,UA small     SPECIFIC GRAVITY,UA 1.020     KETONES,UA neg     BILIRUBIN,UA small     GLUCOSE, UA neg      COLOR,UA yellow     CLARITY,UA hazy    CBC and differential    Collection Time: 05/20/24 10:34 PM   Result Value Ref Range    WBC 7.60 4.31 - 10.16 Thousand/uL    RBC 4.32 3.81 - 5.12 Million/uL    Hemoglobin 13.7 11.5 - 15.4 g/dL    Hematocrit 40.5 34.8 - 46.1 %    MCV 94 82 - 98 fL    MCH 31.7 26.8 - 34.3 pg    MCHC 33.8 31.4 - 37.4 g/dL    RDW 12.4 11.6 - 15.1 %    MPV 9.1 8.9 - 12.7 fL    Platelets 313 149 - 390 Thousands/uL    nRBC 0 /100 WBCs    Segmented % 65 43 - 75 %    Immature Grans % 0 0 - 2 %    Lymphocytes % 25 14 - 44 %    Monocytes % 9 4 - 12 %    Eosinophils Relative 1 0 - 6 " %    Basophils Relative 0 0 - 1 %    Absolute Neutrophils 4.87 1.85 - 7.62 Thousands/µL    Absolute Immature Grans 0.03 0.00 - 0.20 Thousand/uL    Absolute Lymphocytes 1.93 0.60 - 4.47 Thousands/µL    Absolute Monocytes 0.70 0.17 - 1.22 Thousand/µL    Eosinophils Absolute 0.04 0.00 - 0.61 Thousand/µL    Basophils Absolute 0.03 0.00 - 0.10 Thousands/µL   Comprehensive metabolic panel    Collection Time: 05/20/24 10:34 PM   Result Value Ref Range    Sodium 132 (L) 135 - 147 mmol/L    Potassium 4.2 3.5 - 5.3 mmol/L    Chloride 96 96 - 108 mmol/L    CO2 27 21 - 32 mmol/L    ANION GAP 9 4 - 13 mmol/L    BUN 17 5 - 25 mg/dL    Creatinine 1.12 0.60 - 1.30 mg/dL    Glucose 173 (H) 65 - 140 mg/dL    Calcium 9.5 8.4 - 10.2 mg/dL    AST 17 13 - 39 U/L    ALT 19 7 - 52 U/L    Alkaline Phosphatase 69 34 - 104 U/L    Total Protein 7.1 6.4 - 8.4 g/dL    Albumin 4.4 3.5 - 5.0 g/dL    Total Bilirubin 0.39 0.20 - 1.00 mg/dL    eGFR 46 ml/min/1.73sq m   Lipase    Collection Time: 05/20/24 10:34 PM   Result Value Ref Range    Lipase 66 11 - 82 u/L   UA w Reflex to Microscopic w Reflex to Culture    Collection Time: 05/20/24 10:36 PM    Specimen: Urine, Clean Catch   Result Value Ref Range    Color, UA Light Yellow     Clarity, UA Clear     Specific Gravity, UA 1.025 1.003 - 1.030    pH, UA 6.5 4.5, 5.0, 5.5, 6.0, 6.5, 7.0, 7.5, 8.0    Leukocytes, UA Moderate (A) Negative    Nitrite, UA Negative Negative    Protein, UA 50 (1+) (A) Negative mg/dl    Glucose, UA Negative Negative mg/dl    Ketones, UA Negative Negative mg/dl    Urobilinogen, UA <2.0 <2.0 mg/dl mg/dl    Bilirubin, UA Negative Negative    Occult Blood, UA Negative Negative   Urine Microscopic    Collection Time: 05/20/24 10:36 PM   Result Value Ref Range    RBC, UA 4-10 (A) None Seen, 1-2 /hpf    WBC, UA 30-50 (A) None Seen, 1-2 /hpf    Epithelial Cells Occasional None Seen, Occasional /hpf    Bacteria, UA Occasional None Seen, Occasional /hpf    MUCUS THREADS Occasional (A)  None Seen    Hyaline Casts, UA 5-10 (A) None Seen /lpf    Transitional Epithelial Cells Present    Urine culture    Collection Time: 05/20/24 10:36 PM    Specimen: Urine, Clean Catch   Result Value Ref Range    Urine Culture 50,000-59,000 cfu/ml    CBC and differential    Collection Time: 05/21/24  5:09 AM   Result Value Ref Range    WBC 8.37 4.31 - 10.16 Thousand/uL    RBC 4.27 3.81 - 5.12 Million/uL    Hemoglobin 13.4 11.5 - 15.4 g/dL    Hematocrit 40.0 34.8 - 46.1 %    MCV 94 82 - 98 fL    MCH 31.4 26.8 - 34.3 pg    MCHC 33.5 31.4 - 37.4 g/dL    RDW 12.5 11.6 - 15.1 %    MPV 9.0 8.9 - 12.7 fL    Platelets 302 149 - 390 Thousands/uL    nRBC 0 /100 WBCs    Segmented % 81 (H) 43 - 75 %    Immature Grans % 1 0 - 2 %    Lymphocytes % 13 (L) 14 - 44 %    Monocytes % 5 4 - 12 %    Eosinophils Relative 0 0 - 6 %    Basophils Relative 0 0 - 1 %    Absolute Neutrophils 6.82 1.85 - 7.62 Thousands/µL    Absolute Immature Grans 0.04 0.00 - 0.20 Thousand/uL    Absolute Lymphocytes 1.10 0.60 - 4.47 Thousands/µL    Absolute Monocytes 0.39 0.17 - 1.22 Thousand/µL    Eosinophils Absolute 0.00 0.00 - 0.61 Thousand/µL    Basophils Absolute 0.02 0.00 - 0.10 Thousands/µL   Basic metabolic panel    Collection Time: 05/21/24  5:09 AM   Result Value Ref Range    Sodium 130 (L) 135 - 147 mmol/L    Potassium 4.2 3.5 - 5.3 mmol/L    Chloride 94 (L) 96 - 108 mmol/L    CO2 25 21 - 32 mmol/L    ANION GAP 11 4 - 13 mmol/L    BUN 14 5 - 25 mg/dL    Creatinine 0.92 0.60 - 1.30 mg/dL    Glucose 186 (H) 65 - 140 mg/dL    Glucose, Fasting 186 (H) 65 - 99 mg/dL    Calcium 9.1 8.4 - 10.2 mg/dL    eGFR 58 ml/min/1.73sq m   Fingerstick Glucose (POCT)    Collection Time: 05/21/24  7:13 AM   Result Value Ref Range    POC Glucose 159 (H) 65 - 140 mg/dl   Fingerstick Glucose (POCT)    Collection Time: 05/21/24 11:21 AM   Result Value Ref Range    POC Glucose 168 (H) 65 - 140 mg/dl   Fingerstick Glucose (POCT)    Collection Time: 05/21/24  4:41 PM   Result  Value Ref Range    POC Glucose 189 (H) 65 - 140 mg/dl   Basic metabolic panel    Collection Time: 05/22/24  5:29 AM   Result Value Ref Range    Sodium 127 (L) 135 - 147 mmol/L    Potassium 3.9 3.5 - 5.3 mmol/L    Chloride 93 (L) 96 - 108 mmol/L    CO2 24 21 - 32 mmol/L    ANION GAP 10 4 - 13 mmol/L    BUN 14 5 - 25 mg/dL    Creatinine 0.85 0.60 - 1.30 mg/dL    Glucose 146 (H) 65 - 140 mg/dL    Calcium 9.0 8.4 - 10.2 mg/dL    eGFR 64 ml/min/1.73sq m   CBC    Collection Time: 05/22/24  5:29 AM   Result Value Ref Range    WBC 7.51 4.31 - 10.16 Thousand/uL    RBC 4.19 3.81 - 5.12 Million/uL    Hemoglobin 13.1 11.5 - 15.4 g/dL    Hematocrit 39.6 34.8 - 46.1 %    MCV 95 82 - 98 fL    MCH 31.3 26.8 - 34.3 pg    MCHC 33.1 31.4 - 37.4 g/dL    RDW 12.3 11.6 - 15.1 %    Platelets 296 149 - 390 Thousands/uL    MPV 9.0 8.9 - 12.7 fL   Fingerstick Glucose (POCT)    Collection Time: 05/22/24  7:36 AM   Result Value Ref Range    POC Glucose 155 (H) 65 - 140 mg/dl   Fingerstick Glucose (POCT)    Collection Time: 05/22/24 11:58 AM   Result Value Ref Range    POC Glucose 163 (H) 65 - 140 mg/dl        Physical Exam  Constitutional:       Appearance: Normal appearance.   HENT:      Head: Normocephalic.      Right Ear: External ear normal.      Left Ear: External ear normal.      Nose: Nose normal. No congestion.      Mouth/Throat:      Mouth: Mucous membranes are moist.      Pharynx: Oropharynx is clear. No oropharyngeal exudate or posterior oropharyngeal erythema.   Eyes:      Extraocular Movements: Extraocular movements intact.      Conjunctiva/sclera: Conjunctivae normal.   Cardiovascular:      Rate and Rhythm: Normal rate and regular rhythm.      Heart sounds: Normal heart sounds. No murmur heard.  Pulmonary:      Effort: Pulmonary effort is normal.      Breath sounds: Normal breath sounds. No wheezing or rales.   Abdominal:      General: Abdomen is flat. There is no distension.      Palpations: Abdomen is soft.      Tenderness:  There is no abdominal tenderness.   Musculoskeletal:         General: Normal range of motion.      Cervical back: Normal range of motion and neck supple.      Right lower leg: Edema present.      Left lower leg: Edema present.   Lymphadenopathy:      Cervical: No cervical adenopathy.   Skin:     General: Skin is warm.   Neurological:      General: No focal deficit present.      Mental Status: She is alert and oriented to person, place, and time.

## 2024-05-31 NOTE — PROGRESS NOTES
Diabetic Foot Exam    Patient's shoes and socks removed.    Right Foot/Ankle   Right Foot Inspection  Skin Exam: skin normal, skin intact and dry skin. No warmth, no callus, no erythema, no maceration, no abnormal color, no pre-ulcer, no ulcer and no callus.     Toe Exam: ROM and strength within normal limits. No swelling, no tenderness, erythema and  no right toe deformity    Sensory   Monofilament testing: intact    Vascular  Capillary refills: < 3 seconds  The right DP pulse is 2+. The right PT pulse is 2+.     Left Foot/Ankle  Left Foot Inspection  Skin Exam: skin normal, skin intact and dry skin. No warmth, no erythema, no maceration, normal color, no pre-ulcer, no ulcer and no callus.     Toe Exam: ROM and strength within normal limits. No swelling, no tenderness, no erythema and no left toe deformity.     Sensory   Monofilament testing: intact    Vascular  Capillary refills: < 3 seconds  The left DP pulse is 2+. The left PT pulse is 2+.     Assign Risk Category  No deformity present  No loss of protective sensation  No weak pulses  Risk: 0

## 2024-06-03 ENCOUNTER — APPOINTMENT (OUTPATIENT)
Dept: LAB | Age: 84
End: 2024-06-03
Payer: COMMERCIAL

## 2024-06-03 DIAGNOSIS — E78.2 MIXED HYPERLIPIDEMIA: ICD-10-CM

## 2024-06-03 DIAGNOSIS — I10 ESSENTIAL HYPERTENSION, BENIGN: ICD-10-CM

## 2024-06-03 DIAGNOSIS — E11.65 UNCONTROLLED TYPE 2 DIABETES MELLITUS WITH HYPERGLYCEMIA (HCC): ICD-10-CM

## 2024-06-03 DIAGNOSIS — I10 ESSENTIAL HYPERTENSION: ICD-10-CM

## 2024-06-03 DIAGNOSIS — E53.8 B12 DEFICIENCY: ICD-10-CM

## 2024-06-03 DIAGNOSIS — E87.1 HYPONATREMIA: ICD-10-CM

## 2024-06-03 LAB
ALBUMIN SERPL BCP-MCNC: 4.1 G/DL (ref 3.5–5)
ALP SERPL-CCNC: 61 U/L (ref 34–104)
ALT SERPL W P-5'-P-CCNC: 20 U/L (ref 7–52)
ANION GAP SERPL CALCULATED.3IONS-SCNC: 11 MMOL/L (ref 4–13)
AST SERPL W P-5'-P-CCNC: 18 U/L (ref 13–39)
BASOPHILS # BLD AUTO: 0.03 THOUSANDS/ÂΜL (ref 0–0.1)
BASOPHILS NFR BLD AUTO: 1 % (ref 0–1)
BILIRUB SERPL-MCNC: 0.63 MG/DL (ref 0.2–1)
BUN SERPL-MCNC: 18 MG/DL (ref 5–25)
CALCIUM SERPL-MCNC: 9.3 MG/DL (ref 8.4–10.2)
CHLORIDE SERPL-SCNC: 98 MMOL/L (ref 96–108)
CHOLEST SERPL-MCNC: 152 MG/DL
CO2 SERPL-SCNC: 27 MMOL/L (ref 21–32)
CREAT SERPL-MCNC: 0.73 MG/DL (ref 0.6–1.3)
CREAT UR-MCNC: 102.7 MG/DL
EOSINOPHIL # BLD AUTO: 0.13 THOUSAND/ÂΜL (ref 0–0.61)
EOSINOPHIL NFR BLD AUTO: 2 % (ref 0–6)
ERYTHROCYTE [DISTWIDTH] IN BLOOD BY AUTOMATED COUNT: 12.1 % (ref 11.6–15.1)
EST. AVERAGE GLUCOSE BLD GHB EST-MCNC: 151 MG/DL
GFR SERPL CREATININE-BSD FRML MDRD: 77 ML/MIN/1.73SQ M
GLUCOSE P FAST SERPL-MCNC: 136 MG/DL (ref 65–99)
HBA1C MFR BLD: 6.9 %
HCT VFR BLD AUTO: 41.4 % (ref 34.8–46.1)
HDLC SERPL-MCNC: 46 MG/DL
HGB BLD-MCNC: 13.6 G/DL (ref 11.5–15.4)
IMM GRANULOCYTES # BLD AUTO: 0.02 THOUSAND/UL (ref 0–0.2)
IMM GRANULOCYTES NFR BLD AUTO: 0 % (ref 0–2)
LDLC SERPL CALC-MCNC: 66 MG/DL (ref 0–100)
LYMPHOCYTES # BLD AUTO: 2.49 THOUSANDS/ÂΜL (ref 0.6–4.47)
LYMPHOCYTES NFR BLD AUTO: 40 % (ref 14–44)
MCH RBC QN AUTO: 31.3 PG (ref 26.8–34.3)
MCHC RBC AUTO-ENTMCNC: 32.9 G/DL (ref 31.4–37.4)
MCV RBC AUTO: 95 FL (ref 82–98)
MICROALBUMIN UR-MCNC: 45 MG/L
MICROALBUMIN/CREAT 24H UR: 44 MG/G CREATININE (ref 0–30)
MONOCYTES # BLD AUTO: 0.64 THOUSAND/ÂΜL (ref 0.17–1.22)
MONOCYTES NFR BLD AUTO: 10 % (ref 4–12)
NEUTROPHILS # BLD AUTO: 2.85 THOUSANDS/ÂΜL (ref 1.85–7.62)
NEUTS SEG NFR BLD AUTO: 47 % (ref 43–75)
NRBC BLD AUTO-RTO: 0 /100 WBCS
PLATELET # BLD AUTO: 343 THOUSANDS/UL (ref 149–390)
PMV BLD AUTO: 9.4 FL (ref 8.9–12.7)
POTASSIUM SERPL-SCNC: 4.4 MMOL/L (ref 3.5–5.3)
PROT SERPL-MCNC: 6.7 G/DL (ref 6.4–8.4)
RBC # BLD AUTO: 4.35 MILLION/UL (ref 3.81–5.12)
SODIUM SERPL-SCNC: 136 MMOL/L (ref 135–147)
TRIGL SERPL-MCNC: 201 MG/DL
TSH SERPL DL<=0.05 MIU/L-ACNC: 4.05 UIU/ML (ref 0.45–4.5)
VIT B12 SERPL-MCNC: 367 PG/ML (ref 180–914)
WBC # BLD AUTO: 6.16 THOUSAND/UL (ref 4.31–10.16)

## 2024-06-03 PROCEDURE — 80053 COMPREHEN METABOLIC PANEL: CPT

## 2024-06-03 PROCEDURE — 82570 ASSAY OF URINE CREATININE: CPT

## 2024-06-03 PROCEDURE — 82607 VITAMIN B-12: CPT

## 2024-06-03 PROCEDURE — 80061 LIPID PANEL: CPT

## 2024-06-03 PROCEDURE — 82043 UR ALBUMIN QUANTITATIVE: CPT

## 2024-06-03 PROCEDURE — 36415 COLL VENOUS BLD VENIPUNCTURE: CPT

## 2024-06-03 PROCEDURE — 85025 COMPLETE CBC W/AUTO DIFF WBC: CPT

## 2024-06-03 PROCEDURE — 83036 HEMOGLOBIN GLYCOSYLATED A1C: CPT

## 2024-06-03 PROCEDURE — 84443 ASSAY THYROID STIM HORMONE: CPT

## 2024-06-11 DIAGNOSIS — I10 ESSENTIAL HYPERTENSION, BENIGN: ICD-10-CM

## 2024-06-11 DIAGNOSIS — E78.2 MIXED HYPERLIPIDEMIA: ICD-10-CM

## 2024-06-11 NOTE — TELEPHONE ENCOUNTER
Reason for call:   [x] Refill   [] Prior Auth  [] Other:     Office:   [x] PCP/Provider -  Umu Hayes MD   [] Specialty/Provider -     Medication:       Does the patient have enough for 3 days?   [x] Yes   [] No - Send as HP to POD

## 2024-06-12 RX ORDER — ATORVASTATIN CALCIUM 10 MG/1
10 TABLET, FILM COATED ORAL
Qty: 90 TABLET | Refills: 0 | Status: SHIPPED | OUTPATIENT
Start: 2024-06-12

## 2024-06-12 RX ORDER — AMLODIPINE BESYLATE 5 MG/1
5 TABLET ORAL
Qty: 30 TABLET | Refills: 0 | Status: SHIPPED | OUTPATIENT
Start: 2024-06-12 | End: 2024-07-12

## 2024-06-12 RX ORDER — HYDROCHLOROTHIAZIDE 12.5 MG/1
12.5 TABLET ORAL DAILY
Qty: 30 TABLET | Refills: 5 | Status: SHIPPED | OUTPATIENT
Start: 2024-06-12 | End: 2024-06-20 | Stop reason: SDUPTHER

## 2024-06-12 RX ORDER — LOSARTAN POTASSIUM 100 MG/1
100 TABLET ORAL DAILY
Qty: 30 TABLET | Refills: 0 | Status: SHIPPED | OUTPATIENT
Start: 2024-06-12 | End: 2024-07-12

## 2024-06-14 ENCOUNTER — OFFICE VISIT (OUTPATIENT)
Dept: INTERNAL MEDICINE CLINIC | Facility: CLINIC | Age: 84
End: 2024-06-14
Payer: COMMERCIAL

## 2024-06-14 VITALS
WEIGHT: 206 LBS | TEMPERATURE: 98.7 F | HEART RATE: 88 BPM | OXYGEN SATURATION: 96 % | HEIGHT: 64 IN | BODY MASS INDEX: 35.17 KG/M2 | SYSTOLIC BLOOD PRESSURE: 142 MMHG | DIASTOLIC BLOOD PRESSURE: 78 MMHG

## 2024-06-14 DIAGNOSIS — E11.65 UNCONTROLLED TYPE 2 DIABETES MELLITUS WITH HYPERGLYCEMIA (HCC): ICD-10-CM

## 2024-06-14 DIAGNOSIS — M16.11 PRIMARY OSTEOARTHRITIS OF RIGHT HIP: ICD-10-CM

## 2024-06-14 DIAGNOSIS — N32.89 BLADDER SPASM: ICD-10-CM

## 2024-06-14 DIAGNOSIS — E03.9 ACQUIRED HYPOTHYROIDISM: ICD-10-CM

## 2024-06-14 DIAGNOSIS — Z00.00 MEDICARE ANNUAL WELLNESS VISIT, SUBSEQUENT: ICD-10-CM

## 2024-06-14 DIAGNOSIS — I10 ESSENTIAL HYPERTENSION, BENIGN: Primary | ICD-10-CM

## 2024-06-14 DIAGNOSIS — E78.2 MIXED HYPERLIPIDEMIA: ICD-10-CM

## 2024-06-14 DIAGNOSIS — N39.0 URINARY TRACT INFECTION WITHOUT HEMATURIA, SITE UNSPECIFIED: ICD-10-CM

## 2024-06-14 LAB
BACTERIA UR QL AUTO: ABNORMAL /HPF
BILIRUB UR QL STRIP: NEGATIVE
CLARITY UR: CLEAR
COLOR UR: ABNORMAL
GLUCOSE UR STRIP-MCNC: NEGATIVE MG/DL
HGB UR QL STRIP.AUTO: ABNORMAL
KETONES UR STRIP-MCNC: NEGATIVE MG/DL
LEUKOCYTE ESTERASE UR QL STRIP: NEGATIVE
NITRITE UR QL STRIP: NEGATIVE
NON-SQ EPI CELLS URNS QL MICRO: ABNORMAL /HPF
PH UR STRIP.AUTO: 6.5 [PH]
PROT UR STRIP-MCNC: ABNORMAL MG/DL
RBC #/AREA URNS AUTO: ABNORMAL /HPF
SL AMB  POCT GLUCOSE, UA: NORMAL
SL AMB LEUKOCYTE ESTERASE,UA: NORMAL
SL AMB POCT BILIRUBIN,UA: NORMAL
SL AMB POCT BLOOD,UA: NORMAL
SL AMB POCT CLARITY,UA: CLEAR
SL AMB POCT COLOR,UA: YELLOW
SL AMB POCT KETONES,UA: NORMAL
SL AMB POCT NITRITE,UA: NORMAL
SL AMB POCT PH,UA: 6
SL AMB POCT SPECIFIC GRAVITY,UA: 1.01
SL AMB POCT URINE PROTEIN: NORMAL
SL AMB POCT UROBILINOGEN: NORMAL
SP GR UR STRIP.AUTO: 1.02 (ref 1–1.03)
UROBILINOGEN UR STRIP-ACNC: <2 MG/DL
WBC #/AREA URNS AUTO: ABNORMAL /HPF

## 2024-06-14 PROCEDURE — 81001 URINALYSIS AUTO W/SCOPE: CPT | Performed by: INTERNAL MEDICINE

## 2024-06-14 PROCEDURE — G0439 PPPS, SUBSEQ VISIT: HCPCS | Performed by: INTERNAL MEDICINE

## 2024-06-14 PROCEDURE — 87086 URINE CULTURE/COLONY COUNT: CPT | Performed by: INTERNAL MEDICINE

## 2024-06-14 PROCEDURE — 99214 OFFICE O/P EST MOD 30 MIN: CPT | Performed by: INTERNAL MEDICINE

## 2024-06-14 PROCEDURE — 81002 URINALYSIS NONAUTO W/O SCOPE: CPT | Performed by: INTERNAL MEDICINE

## 2024-06-14 RX ORDER — OXYBUTYNIN CHLORIDE 5 MG/1
5 TABLET, EXTENDED RELEASE ORAL DAILY
Qty: 30 TABLET | Refills: 0 | Status: SHIPPED | OUTPATIENT
Start: 2024-06-14 | End: 2024-06-21

## 2024-06-14 RX ORDER — SULFAMETHOXAZOLE AND TRIMETHOPRIM 800; 160 MG/1; MG/1
1 TABLET ORAL EVERY 12 HOURS SCHEDULED
Qty: 14 TABLET | Refills: 0 | Status: SHIPPED | OUTPATIENT
Start: 2024-06-14 | End: 2024-06-21

## 2024-06-14 NOTE — PROGRESS NOTES
Ambulatory Visit  Name: Lubna Bronson      : 1940      MRN: [id number]  Encounter Provider: Umu Hayes MD  Encounter Date: 2024   Encounter department: [facility] INTERNAL MEDICINE    Assessment & Plan   1. Essential hypertension, benign  Comments:  stable, continue same med  2. Primary osteoarthritis of right hip  3. Mixed hyperlipidemia  Comments:  stable, continue same med  4. Uncontrolled type 2 diabetes mellitus with hyperglycemia (HCC)  Comments:  stable, continue same med  5. Medicare annual wellness visit, subsequent  6. Acquired hypothyroidism  Comments:  stable, continue same med  7. Bladder spasm  -     oxybutynin (DITROPAN-XL) 5 mg 24 hr tablet; Take 1 tablet (5 mg total) by mouth daily  8. Urinary tract infection without hematuria, site unspecified  -     sulfamethoxazole-trimethoprim (BACTRIM DS) 800-160 mg per tablet; Take 1 tablet by mouth every 12 (twelve) hours for 7 days      Depression Screening and Follow-up Plan: Patient was screened for depression during today's encounter. They screened negative with a PHQ-2 score of 0.    Urinary Incontinence Plan of Care: counseling topics discussed: use restroom every 2 hours and limit alcohol, caffeine, spicy foods, and acidic foods.       Preventive health issues were discussed with patient, and age appropriate screening tests were ordered as noted in patient's After Visit Summary. Personalized health advice and appropriate referrals for health education or preventive services given if needed, as noted in patient's After Visit Summary.    History of Present Illness     Follow-up on blood test done on 6/3/2024 test discussed with her, urinary issue, frequency, also medical wellness exam       Patient Care Team:  Umu Hayes MD as PCP - General (Internal Medicine)    Review of Systems   Constitutional:  Negative for chills and fever.   HENT:  Negative for congestion, ear pain and sore throat.    Eyes:  Negative for pain.    Respiratory:  Negative for cough and shortness of breath.    Cardiovascular:  Negative for chest pain and leg swelling.   Gastrointestinal:  Negative for abdominal pain, nausea and vomiting.   Endocrine: Negative for polyuria.   Genitourinary:  Positive for difficulty urinating. Negative for frequency and urgency.   Musculoskeletal:  Negative for arthralgias and back pain.   Skin:  Negative for rash.   Neurological:  Negative for weakness and headaches.   Psychiatric/Behavioral:  Negative for sleep disturbance. The patient is not nervous/anxious.      Medical History Reviewed by provider this encounter:  Tobacco  Allergies  Meds  Problems  Med Hx  Surg Hx  Fam Hx       Annual Wellness Visit Questionnaire   Lubna is here for her Subsequent Wellness visit. Last Medicare Wellness visit information reviewed, patient interviewed and updates made to the record today.      Health Risk Assessment:   Patient rates overall health as fair. Patient feels that their physical health rating is slightly worse. Patient is satisfied with their life. Eyesight was rated as same. Hearing was rated as same. Patient feels that their emotional and mental health rating is same. Patients states they are never, rarely angry. Patient states they are sometimes unusually tired/fatigued. Pain experienced in the last 7 days has been some. Patient's pain rating has been 4/10. Patient states that she has experienced no weight loss or gain in last 6 months.     Depression Screening:   PHQ-2 Score: 0      Fall Risk Screening:   In the past year, patient has experienced: no history of falling in past year      Urinary Incontinence Screening:   Patient has leaked urine accidently in the last six months.     Home Safety:  Patient has trouble with stairs inside or outside of their home. Patient has working smoke alarms and has no working carbon monoxide detector. Home safety hazards include: none.     Nutrition:   Current diet is Regular.      Medications:   Patient is not currently taking any over-the-counter supplements. Patient is able to manage medications.     Activities of Daily Living (ADLs)/Instrumental Activities of Daily Living (IADLs):   Walk and transfer into and out of bed and chair?: Yes  Dress and groom yourself?: Yes    Bathe or shower yourself?: Yes    Feed yourself? Yes  Do your laundry/housekeeping?: Yes  Manage your money, pay your bills and track your expenses?: Yes  Make your own meals?: Yes    Do your own shopping?: Yes    Previous Hospitalizations:   Any hospitalizations or ED visits within the last 12 months?: Yes    How many hospitalizations have you had in the last year?: 1-2    Advance Care Planning:   Living will: Yes    Durable POA for healthcare: Yes    Advanced directive: Yes      Cognitive Screening:   Provider or family/friend/caregiver concerned regarding cognition?: No    PREVENTIVE SCREENINGS      Cardiovascular Screening:    General: Screening Not Indicated and History Lipid Disorder      Diabetes Screening:     General: Screening Not Indicated and History Diabetes      Cervical Cancer Screening:    General: Screening Not Indicated      Lung Cancer Screening:     General: Screening Not Indicated      Hepatitis C Screening:    General: Screening Current    Screening, Brief Intervention, and Referral to Treatment (SBIRT)    Screening  Typical number of drinks in a day: 0  Typical number of drinks in a week: 0  Interpretation: Low risk drinking behavior.    AUDIT-C Screenin) How often did you have a drink containing alcohol in the past year? never  2) How many drinks did you have on a typical day when you were drinking in the past year? 0  3) How often did you have 6 or more drinks on one occasion in the past year? never    AUDIT-C Score: 0  Interpretation: Score 0-2 (female): Negative screen for alcohol misuse    Single Item Drug Screening:  How often have you used an illegal drug (including marijuana) or a  "prescription medication for non-medical reasons in the past year? never    Single Item Drug Screen Score: 0  Interpretation: Negative screen for possible drug use disorder    Social Determinants of Health     Financial Resource Strain: Low Risk  (12/19/2022)    Overall Financial Resource Strain (CARDIA)     Difficulty of Paying Living Expenses: Not hard at all   Food Insecurity: No Food Insecurity (6/14/2024)    Hunger Vital Sign     Worried About Running Out of Food in the Last Year: Never true     Ran Out of Food in the Last Year: Never true   Transportation Needs: No Transportation Needs (6/14/2024)    PRAPARE - Transportation     Lack of Transportation (Medical): No     Lack of Transportation (Non-Medical): No   Housing Stability: Low Risk  (6/14/2024)    Housing Stability Vital Sign     Unable to Pay for Housing in the Last Year: No     Number of Times Moved in the Last Year: 1     Homeless in the Last Year: No   Utilities: Not At Risk (6/14/2024)    [facility] Utilities     Threatened with loss of utilities: No     No results found.    Objective     /78 (BP Location: Left arm, Patient Position: Sitting, Cuff Size: Standard)   Pulse 88   Temp 98.7 °F (37.1 °C) (Temporal)   Ht 5' 4\" (1.626 m)   Wt 93.4 kg (206 lb)   SpO2 96%   BMI 35.36 kg/m²     Physical Exam  Vitals and nursing note reviewed.   Constitutional:       General: She is not in acute distress.     Appearance: She is well-developed.   HENT:      Head: Normocephalic and atraumatic.      Right Ear: External ear normal.      Left Ear: External ear normal.      Nose: Nose normal.      Mouth/Throat:      Mouth: Mucous membranes are moist.      Pharynx: Oropharynx is clear.   Eyes:      Extraocular Movements: Extraocular movements intact.      Conjunctiva/sclera: Conjunctivae normal.   Cardiovascular:      Rate and Rhythm: Normal rate and regular rhythm.      Heart sounds: Normal heart sounds. No murmur heard.  Pulmonary:      Effort: Pulmonary effort " is normal. No respiratory distress.      Breath sounds: Normal breath sounds. No wheezing or rales.   Abdominal:      General: Abdomen is flat. There is no distension.      Palpations: Abdomen is soft.      Tenderness: There is no abdominal tenderness.   Musculoskeletal:         General: No swelling.      Cervical back: Neck supple.      Right lower leg: No edema.      Left lower leg: No edema.   Skin:     General: Skin is warm and dry.      Capillary Refill: Capillary refill takes less than 2 seconds.   Neurological:      General: No focal deficit present.      Mental Status: She is alert and oriented to person, place, and time.   Psychiatric:         Mood and Affect: Mood normal.       Administrative Statements   I have spent a total time of 25 minutes on 06/14/24 In caring for this patient including Diagnostic results, Risks and benefits of tx options, Instructions for management, Patient and family education, Importance of tx compliance, Risk factor reductions, Impressions, Counseling / Coordination of care, Documenting in the medical record, Reviewing / ordering tests, medicine, procedures  , and Obtaining or reviewing history  .

## 2024-06-14 NOTE — PATIENT INSTRUCTIONS
Medicare Preventive Visit Patient Instructions  Thank you for completing your Welcome to Medicare Visit or Medicare Annual Wellness Visit today. Your next wellness visit will be due in one year (6/15/2025).  The screening/preventive services that you may require over the next 5-10 years are detailed below. Some tests may not apply to you based off risk factors and/or age. Screening tests ordered at today's visit but not completed yet may show as past due. Also, please note that scanned in results may not display below.  Preventive Screenings:  Service Recommendations Previous Testing/Comments   Colorectal Cancer Screening  * Colonoscopy    * Fecal Occult Blood Test (FOBT)/Fecal Immunochemical Test (FIT)  * Fecal DNA/Cologuard Test  * Flexible Sigmoidoscopy Age: 45-75 years old   Colonoscopy: every 10 years (may be performed more frequently if at higher risk)  OR  FOBT/FIT: every 1 year  OR  Cologuard: every 3 years  OR  Sigmoidoscopy: every 5 years  Screening may be recommended earlier than age 45 if at higher risk for colorectal cancer. Also, an individualized decision between you and your healthcare provider will decide whether screening between the ages of 76-85 would be appropriate. Colonoscopy: Not on file  FOBT/FIT: Not on file  Cologuard: Not on file  Sigmoidoscopy: Not on file          Breast Cancer Screening Age: 40+ years old  Frequency: every 1-2 years  Not required if history of left and right mastectomy Mammogram: Not on file        Cervical Cancer Screening Between the ages of 21-29, pap smear recommended once every 3 years.   Between the ages of 30-65, can perform pap smear with HPV co-testing every 5 years.   Recommendations may differ for women with a history of total hysterectomy, cervical cancer, or abnormal pap smears in past. Pap Smear: Not on file    Screening Not Indicated   Hepatitis C Screening Once for adults born between 1945 and 1965  More frequently in patients at high risk for Hepatitis  C Hep C Antibody: 12/10/2021    Screening Current   Diabetes Screening 1-2 times per year if you're at risk for diabetes or have pre-diabetes Fasting glucose: 136 mg/dL (6/3/2024)  A1C: 6.9 % (6/3/2024)  Screening Not Indicated  History Diabetes   Cholesterol Screening Once every 5 years if you don't have a lipid disorder. May order more often based on risk factors. Lipid panel: 06/03/2024    Screening Not Indicated  History Lipid Disorder     Other Preventive Screenings Covered by Medicare:  Abdominal Aortic Aneurysm (AAA) Screening: covered once if your at risk. You're considered to be at risk if you have a family history of AAA.  Lung Cancer Screening: covers low dose CT scan once per year if you meet all of the following conditions: (1) Age 55-77; (2) No signs or symptoms of lung cancer; (3) Current smoker or have quit smoking within the last 15 years; (4) You have a tobacco smoking history of at least 20 pack years (packs per day multiplied by number of years you smoked); (5) You get a written order from a healthcare provider.  Glaucoma Screening: covered annually if you're considered high risk: (1) You have diabetes OR (2) Family history of glaucoma OR (3)  aged 50 and older OR (4)  American aged 65 and older  Osteoporosis Screening: covered every 2 years if you meet one of the following conditions: (1) You're estrogen deficient and at risk for osteoporosis based off medical history and other findings; (2) Have a vertebral abnormality; (3) On glucocorticoid therapy for more than 3 months; (4) Have primary hyperparathyroidism; (5) On osteoporosis medications and need to assess response to drug therapy.   Last bone density test (DXA Scan): 05/25/2023.  HIV Screening: covered annually if you're between the age of 15-65. Also covered annually if you are younger than 15 and older than 65 with risk factors for HIV infection. For pregnant patients, it is covered up to 3 times per  pregnancy.    Immunizations:  Immunization Recommendations   Influenza Vaccine Annual influenza vaccination during flu season is recommended for all persons aged >= 6 months who do not have contraindications   Pneumococcal Vaccine   * Pneumococcal conjugate vaccine = PCV13 (Prevnar 13), PCV15 (Vaxneuvance), PCV20 (Prevnar 20)  * Pneumococcal polysaccharide vaccine = PPSV23 (Pneumovax) Adults 19-65 yo with certain risk factors or if 65+ yo  If never received any pneumonia vaccine: recommend Prevnar 20 (PCV20)  Give PCV20 if previously received 1 dose of PCV13 or PPSV23   Hepatitis B Vaccine 3 dose series if at intermediate or high risk (ex: diabetes, end stage renal disease, liver disease)   Respiratory syncytial virus (RSV) Vaccine - COVERED BY MEDICARE PART D  * RSVPreF3 (Arexvy) CDC recommends that adults 60 years of age and older may receive a single dose of RSV vaccine using shared clinical decision-making (SCDM)   Tetanus (Td) Vaccine - COST NOT COVERED BY MEDICARE PART B Following completion of primary series, a booster dose should be given every 10 years to maintain immunity against tetanus. Td may also be given as tetanus wound prophylaxis.   Tdap Vaccine - COST NOT COVERED BY MEDICARE PART B Recommended at least once for all adults. For pregnant patients, recommended with each pregnancy.   Shingles Vaccine (Shingrix) - COST NOT COVERED BY MEDICARE PART B  2 shot series recommended in those 19 years and older who have or will have weakened immune systems or those 50 years and older     Health Maintenance Due:      Topic Date Due   • Hepatitis C Screening  Completed     Immunizations Due:      Topic Date Due   • Pneumococcal Vaccine: 65+ Years (1 of 2 - PCV) Never done   • COVID-19 Vaccine (4 - 2023-24 season) 09/01/2023     Advance Directives   What are advance directives?  Advance directives are legal documents that state your wishes and plans for medical care. These plans are made ahead of time in case you  lose your ability to make decisions for yourself. Advance directives can apply to any medical decision, such as the treatments you want, and if you want to donate organs.   What are the types of advance directives?  There are many types of advance directives, and each state has rules about how to use them. You may choose a combination of any of the following:  Living will:  This is a written record of the treatment you want. You can also choose which treatments you do not want, which to limit, and which to stop at a certain time. This includes surgery, medicine, IV fluid, and tube feedings.   Durable power of  for healthcare (DPAHC):  This is a written record that states who you want to make healthcare choices for you when you are unable to make them for yourself. This person, called a proxy, is usually a family member or a friend. You may choose more than 1 proxy.  Do not resuscitate (DNR) order:  A DNR order is used in case your heart stops beating or you stop breathing. It is a request not to have certain forms of treatment, such as CPR. A DNR order may be included in other types of advance directives.  Medical directive:  This covers the care that you want if you are in a coma, near death, or unable to make decisions for yourself. You can list the treatments you want for each condition. Treatment may include pain medicine, surgery, blood transfusions, dialysis, IV or tube feedings, and a ventilator (breathing machine).  Values history:  This document has questions about your views, beliefs, and how you feel and think about life. This information can help others choose the care that you would choose.  Why are advance directives important?  An advance directive helps you control your care. Although spoken wishes may be used, it is better to have your wishes written down. Spoken wishes can be misunderstood, or not followed. Treatments may be given even if you do not want them. An advance directive may make  it easier for your family to make difficult choices about your care.   Urinary Incontinence   Urinary incontinence (UI)  is when you lose control of your bladder. UI develops because your bladder cannot store or empty urine properly. The 3 most common types of UI are stress incontinence, urge incontinence, or both.  Medicines:   May be given to help strengthen your bladder control. Report any side effects of medication to your healthcare provider.  Do pelvic muscle exercises often:  Your pelvic muscles help you stop urinating. Squeeze these muscles tight for 5 seconds, then relax for 5 seconds. Gradually work up to squeezing for 10 seconds. Do 3 sets of 15 repetitions a day, or as directed. This will help strengthen your pelvic muscles and improve bladder control.  Train your bladder:  Go to the bathroom at set times, such as every 2 hours, even if you do not feel the urge to go. You can also try to hold your urine when you feel the urge to go. For example, hold your urine for 5 minutes when you feel the urge to go. As that becomes easier, hold your urine for 10 minutes.   Self-care:   Keep a UI record.  Write down how often you leak urine and how much you leak. Make a note of what you were doing when you leaked urine.  Drink liquids as directed. You may need to limit the amount of liquid you drink to help control your urine leakage. Do not drink any liquid right before you go to bed. Limit or do not have drinks that contain caffeine or alcohol.   Prevent constipation.  Eat a variety of high-fiber foods. Good examples are high-fiber cereals, beans, vegetables, and whole-grain breads. Walking is the best way to trigger your intestines to have a bowel movement.  Exercise regularly and maintain a healthy weight.  Weight loss and exercise will decrease pressure on your bladder and help you control your leakage.   Use a catheter as directed  to help empty your bladder. A catheter is a tiny, plastic tube that is put into  your bladder to drain your urine.   Go to behavior therapy as directed.  Behavior therapy may be used to help you learn to control your urge to urinate.    Weight Management   Why it is important to manage your weight:  Being overweight increases your risk of health conditions such as heart disease, high blood pressure, type 2 diabetes, and certain types of cancer. It can also increase your risk for osteoarthritis, sleep apnea, and other respiratory problems. Aim for a slow, steady weight loss. Even a small amount of weight loss can lower your risk of health problems.  How to lose weight safely:  A safe and healthy way to lose weight is to eat fewer calories and get regular exercise. You can lose up about 1 pound a week by decreasing the number of calories you eat by 500 calories each day.   Healthy meal plan for weight management:  A healthy meal plan includes a variety of foods, contains fewer calories, and helps you stay healthy. A healthy meal plan includes the following:  Eat whole-grain foods more often.  A healthy meal plan should contain fiber. Fiber is the part of grains, fruits, and vegetables that is not broken down by your body. Whole-grain foods are healthy and provide extra fiber in your diet. Some examples of whole-grain foods are whole-wheat breads and pastas, oatmeal, brown rice, and bulgur.  Eat a variety of vegetables every day.  Include dark, leafy greens such as spinach, kale, jen greens, and mustard greens. Eat yellow and orange vegetables such as carrots, sweet potatoes, and winter squash.   Eat a variety of fruits every day.  Choose fresh or canned fruit (canned in its own juice or light syrup) instead of juice. Fruit juice has very little or no fiber.  Eat low-fat dairy foods.  Drink fat-free (skim) milk or 1% milk. Eat fat-free yogurt and low-fat cottage cheese. Try low-fat cheeses such as mozzarella and other reduced-fat cheeses.  Choose meat and other protein foods that are low in  fat.  Choose beans or other legumes such as split peas or lentils. Choose fish, skinless poultry (chicken or turkey), or lean cuts of red meat (beef or pork). Before you cook meat or poultry, cut off any visible fat.   Use less fat and oil.  Try baking foods instead of frying them. Add less fat, such as margarine, sour cream, regular salad dressing and mayonnaise to foods. Eat fewer high-fat foods. Some examples of high-fat foods include french fries, doughnuts, ice cream, and cakes.  Eat fewer sweets.  Limit foods and drinks that are high in sugar. This includes candy, cookies, regular soda, and sweetened drinks.  Exercise:  Exercise at least 30 minutes per day on most days of the week. Some examples of exercise include walking, biking, dancing, and swimming. You can also fit in more physical activity by taking the stairs instead of the elevator or parking farther away from stores. Ask your healthcare provider about the best exercise plan for you.      © Copyright [employer] 2018 Information is for End User's use only and may not be sold, redistributed or otherwise used for commercial purposes. All illustrations and images included in CareNotes® are the copyrighted property of [employer] Inc[employer] or [employer]

## 2024-06-15 LAB — BACTERIA UR CULT: NORMAL

## 2024-06-18 ENCOUNTER — TELEPHONE (OUTPATIENT)
Age: 84
End: 2024-06-18

## 2024-06-18 NOTE — TELEPHONE ENCOUNTER
Patient calling regarding Rx: Oxybutynin and Rx: Bactrim. Patient calling medication has not helped symptoms have been on going and patient has been up all night.    Please review and advise.  Thank You.

## 2024-06-19 DIAGNOSIS — N39.0 URINARY TRACT INFECTION WITHOUT HEMATURIA, SITE UNSPECIFIED: Primary | ICD-10-CM

## 2024-06-19 RX ORDER — PHENAZOPYRIDINE HYDROCHLORIDE 100 MG/1
100 TABLET, FILM COATED ORAL 3 TIMES DAILY PRN
Qty: 15 TABLET | Refills: 0 | Status: SHIPPED | OUTPATIENT
Start: 2024-06-19 | End: 2024-06-21 | Stop reason: SDUPTHER

## 2024-06-19 NOTE — TELEPHONE ENCOUNTER
Patient called back regarding unrelieved symptoms after 5 days on Bactrim and no improvement after adding Oxybutynin. Patient expressing concern with recent hospitalization for UTI and apparent resistance to multiple antibiotics. Requesting call back at earliest convenience to discuss next steps.

## 2024-06-19 NOTE — TELEPHONE ENCOUNTER
Discussed with her, advised her to drink more water, Pyridium faxed, continue Bactrim, let me know how she feels on Friday,

## 2024-06-20 DIAGNOSIS — I10 ESSENTIAL HYPERTENSION, BENIGN: ICD-10-CM

## 2024-06-20 RX ORDER — HYDROCHLOROTHIAZIDE 12.5 MG/1
12.5 TABLET ORAL DAILY
Qty: 30 TABLET | Refills: 5 | Status: SHIPPED | OUTPATIENT
Start: 2024-06-20 | End: 2024-12-17

## 2024-06-20 NOTE — TELEPHONE ENCOUNTER
Reason for call:     Patient called stating [facility] does not have the script for the hctz 12.5 mg, they only have the 25 mg script . But the doctor changed the dose of this medication.    Please resend the 12.5 of hctz    [x] Refill   [] Prior Auth  [] Other:     Office:   [x] PCP/Provider -   [] Specialty/Provider -     Medication:   Hctz 12.5, 1 qd, 30      Pharmacy:   [address]    Does the patient have enough for 3 days?   [] Yes   [x] No - Send as HP to POD

## 2024-06-21 DIAGNOSIS — R39.15 URINARY URGENCY: ICD-10-CM

## 2024-06-21 DIAGNOSIS — N39.0 URINARY TRACT INFECTION WITHOUT HEMATURIA, SITE UNSPECIFIED: ICD-10-CM

## 2024-06-21 DIAGNOSIS — N32.89 BLADDER SPASM: Primary | ICD-10-CM

## 2024-06-21 DIAGNOSIS — N32.89 BLADDER SPASM: ICD-10-CM

## 2024-06-21 RX ORDER — MIRABEGRON 25 MG/1
25 TABLET, FILM COATED, EXTENDED RELEASE ORAL EVERY MORNING
Qty: 30 TABLET | Refills: 0 | Status: SHIPPED | OUTPATIENT
Start: 2024-06-21

## 2024-06-21 RX ORDER — MIRABEGRON 25 MG/1
25 TABLET, FILM COATED, EXTENDED RELEASE ORAL DAILY
Qty: 30 TABLET | Refills: 0 | Status: SHIPPED | OUTPATIENT
Start: 2024-06-21 | End: 2024-06-21

## 2024-06-21 RX ORDER — PHENAZOPYRIDINE HYDROCHLORIDE 100 MG/1
100 TABLET, FILM COATED ORAL 3 TIMES DAILY PRN
Qty: 15 TABLET | Refills: 0 | Status: SHIPPED | OUTPATIENT
Start: 2024-06-21

## 2024-06-21 NOTE — TELEPHONE ENCOUNTER
Patient called in regarding follow up. She is still not feeling bettter. Urinating every half hour to hour at night. She finished the bactrim and has been taking the pyridium. States that if she is to continue with puyridium she needs refill. She is open to seeing urologist but just request a recommendation on who to see. Please advise.

## 2024-06-30 ENCOUNTER — OFFICE VISIT (OUTPATIENT)
Dept: URGENT CARE | Age: 84
End: 2024-06-30
Payer: COMMERCIAL

## 2024-06-30 VITALS
DIASTOLIC BLOOD PRESSURE: 80 MMHG | HEART RATE: 85 BPM | TEMPERATURE: 97.2 F | SYSTOLIC BLOOD PRESSURE: 140 MMHG | RESPIRATION RATE: 20 BRPM | OXYGEN SATURATION: 95 %

## 2024-06-30 DIAGNOSIS — S80.11XA TRAUMATIC ECCHYMOSIS OF RIGHT LOWER LEG, INITIAL ENCOUNTER: Primary | ICD-10-CM

## 2024-06-30 PROBLEM — N30.01 ACUTE CYSTITIS WITH HEMATURIA: Status: RESOLVED | Noted: 2024-05-21 | Resolved: 2024-06-30

## 2024-06-30 PROCEDURE — 99213 OFFICE O/P EST LOW 20 MIN: CPT | Performed by: STUDENT IN AN ORGANIZED HEALTH CARE EDUCATION/TRAINING PROGRAM

## 2024-06-30 NOTE — PATIENT INSTRUCTIONS
It looks like you had some bruising and swelling due to hitting her shin, this is now migrated into your ankle and foot.  We gave you an Ace wrap for compression.  It is very important to focus on elevation of the leg, above heart level if able.  Continue with Ace wrap for compression.  In the morning when your ankle that it swells, you can transition to your compression sock.  You may use Tylenol, Voltaren gel for pain.  Your symptoms are not resolving, please follow-up with your PCP.  If you have any severe worsening symptoms such as severe pain in the leg, spreading redness, swelling, please go to the ER.

## 2024-06-30 NOTE — PROGRESS NOTES
[facility]        NAME: Lubna Bronson is a 83 y.o. female  : 1940    MRN: [id number]  DATE: 2024  TIME: 2:39 PM    Assessment and Plan   Traumatic ecchymosis of right lower leg, initial encounter [S80.11XA]  1. Traumatic ecchymosis of right lower leg, initial encounter              Patient Instructions   It looks like you had some bruising and swelling due to hitting her shin, this is now migrated into your ankle and foot.  We gave you an Ace wrap for compression.  It is very important to focus on elevation of the leg, above heart level if able.  Continue with Ace wrap for compression.  In the morning when your ankle that it swells, you can transition to your compression sock.  You may use Tylenol, Voltaren gel for pain.  Your symptoms are not resolving, please follow-up with your PCP.  If you have any severe worsening symptoms such as severe pain in the leg, spreading redness, swelling, please go to the ER.    Follow up with PCP in 3-5 days.  Proceed to  ER if symptoms worsen.    If tests have been performed at [facility], our office will contact you with results if changes need to be made to the care plan discussed with you at the visit.  You can review your full results on [facility].    Chief Complaint     Chief Complaint   Patient presents with    Leg Injury     Patient reports hit shin right knee at golf cart . Small abrasion. Reports increasing pain . Last dose of tylenol today. Pain with weight bearing. Swelling with bruising on foot.          History of Present Illness       Patient presents for injury of her right leg.  She was getting into a golf cart, she hit her shin against the golf cart, she had immediate pain, bruising.  She notes easy bruising on Eliquis.  Since then, she is continue to have bruising and swelling which is also migrated below the area of injury toward her ankle and foot.  She is having bruising and some pain in the ankle now as well.  She denies  any injury or twisting of the ankle.  Has been doing some elevation, but it has been hard to keep up with.  Tylenol for pain.          Review of Systems   Review of Systems   All other systems reviewed and are negative.        Current Medications       Current Outpatient Medications:     acetaminophen (TYLENOL) 325 mg tablet, Take 650 mg by mouth every 6 (six) hours as needed for mild pain, Disp: , Rfl:     amLODIPine (NORVASC) 5 mg tablet, Take 1 tablet (5 mg total) by mouth daily at bedtime, Disp: 30 tablet, Rfl: 0    atorvastatin (LIPITOR) 10 mg tablet, Take 1 tablet (10 mg total) by mouth daily at bedtime, Disp: 90 tablet, Rfl: 0    Eliquis 5 MG, Take 1 tablet (5 mg total) by mouth 2 (two) times a day, Disp: 180 tablet, Rfl: 1    hydroCHLOROthiazide 12.5 mg tablet, Take 1 tablet (12.5 mg total) by mouth daily, Disp: 30 tablet, Rfl: 5    levothyroxine 25 mcg tablet, Take 1 tablet (25 mcg total) by mouth daily, Disp: 90 tablet, Rfl: 1    losartan (COZAAR) 100 MG tablet, Take 1 tablet (100 mg total) by mouth daily, Disp: 30 tablet, Rfl: 0    metFORMIN (GLUCOPHAGE-XR) 750 mg 24 hr tablet, Take 750 mg by mouth daily with breakfast, Disp: , Rfl:     Myrbetriq 25 MG TB24, TAKE 1 TABLET BY MOUTH IN THE MORNING (Patient not taking: Reported on 6/30/2024), Disp: 30 tablet, Rfl: 0    phenazopyridine (PYRIDIUM) 100 mg tablet, Take 1 tablet (100 mg total) by mouth 3 (three) times a day as needed for bladder spasms (Patient not taking: Reported on 6/30/2024), Disp: 15 tablet, Rfl: 0    Current Allergies     Allergies as of 06/30/2024 - Reviewed 06/30/2024   Allergen Reaction Noted    Oxycodone Vomiting 12/11/2019            The following portions of the patient's history were reviewed and updated as appropriate: allergies, current medications, past family history, past medical history, past social history, past surgical history and problem list.     Past Medical History:   Diagnosis Date    Acquired hypothyroidism     Adenoma  of left adrenal gland 02/27/2018    Adrenal gland disorder (HCC)     Anemia     Anxiety     Arthritis     At risk for falls     Diabetes mellitus (HCC)     Disease of thyroid gland     hypo    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    Edema of both legs     History of pulmonary embolus (PE)     Hyperlipidemia     Hypertension     Irritable bowel syndrome     Obesity 2000    Prediabetes     Presence of right artificial hip joint     Pulmonary embolus (HCC) 2/27/2018 & 2/14/2019    Uses roller walker     Wears glasses     Wears partial dentures     upper partial       Past Surgical History:   Procedure Laterality Date    CHOLECYSTECTOMY  1991    COLONOSCOPY  11/09/2017    HEMORRHOID SURGERY  1999    HIP SURGERY  2019    HYSTERECTOMY  1993    Vaginal    ID ARTHRP ACETBLR/PROX FEM PROSTC AGRFT/ALGRFT Right 10/22/2019    Procedure: ARTHROPLASTY HIP TOTAL;  Surgeon: Lupe Driscoll DO;  Location: [facility];  Service: Orthopedics    TRIGGER FINGER RELEASE Right 1998       Family History   Problem Relation Age of Onset    Hypertension Family     Hypertension Mother          Medications have been verified.        Objective   /80   Pulse 85   Temp (!) 97.2 °F (36.2 °C) (Tympanic)   Resp 20   SpO2 95%   No LMP recorded. Patient has had a hysterectomy.       Physical Exam     Physical Exam  Vitals and nursing note reviewed.   Constitutional:       General: She is not in acute distress.     Appearance: She is not toxic-appearing.   HENT:      Head: Normocephalic and atraumatic.      Right Ear: External ear normal.      Left Ear: External ear normal.      Nose: Nose normal.      Mouth/Throat:      Mouth: Mucous membranes are moist.   Eyes:      Extraocular Movements: Extraocular movements intact.      Conjunctiva/sclera: Conjunctivae normal.   Musculoskeletal:         General: Swelling and tenderness present. No deformity.      Comments: Anterior right shin with area of ecchymosis and possible  resolving hematoma.  Swelling of lower leg, foot.  Dependent ecchymosis into her lateral and medial ankle.  This area is tender.  Not particularly tender in her calf, no warmth  No open wound  Full ankle ROM, some discomfort with eversion, none with dorsi/plantarflexion   Skin:     General: Skin is warm and dry.   Neurological:      Mental Status: She is alert.

## 2024-07-10 NOTE — TELEPHONE ENCOUNTER
Last seen 12/8, next appt 5/8. Refill needed   Discontinue Regimen: Efudex Cream Samples Given: Follow in 6 weeks Detail Level: Zone

## 2024-07-12 ENCOUNTER — OFFICE VISIT (OUTPATIENT)
Dept: UROLOGY | Facility: CLINIC | Age: 84
End: 2024-07-12
Payer: COMMERCIAL

## 2024-07-12 VITALS
DIASTOLIC BLOOD PRESSURE: 80 MMHG | HEART RATE: 83 BPM | HEIGHT: 64 IN | WEIGHT: 206 LBS | BODY MASS INDEX: 35.17 KG/M2 | OXYGEN SATURATION: 97 % | SYSTOLIC BLOOD PRESSURE: 140 MMHG

## 2024-07-12 DIAGNOSIS — R39.15 URINARY URGENCY: ICD-10-CM

## 2024-07-12 DIAGNOSIS — R31.29 MICROSCOPIC HEMATURIA: Primary | ICD-10-CM

## 2024-07-12 LAB
BACTERIA UR QL AUTO: ABNORMAL /HPF
BILIRUB UR QL STRIP: NEGATIVE
CLARITY UR: CLEAR
COLOR UR: ABNORMAL
GLUCOSE UR STRIP-MCNC: NEGATIVE MG/DL
HGB UR QL STRIP.AUTO: NEGATIVE
HYALINE CASTS #/AREA URNS LPF: ABNORMAL /LPF
KETONES UR STRIP-MCNC: NEGATIVE MG/DL
LEUKOCYTE ESTERASE UR QL STRIP: ABNORMAL
NITRITE UR QL STRIP: NEGATIVE
NON-SQ EPI CELLS URNS QL MICRO: ABNORMAL /HPF
PH UR STRIP.AUTO: 6.5 [PH]
POST-VOID RESIDUAL VOLUME, ML POC: 35 ML
PROT UR STRIP-MCNC: ABNORMAL MG/DL
RBC #/AREA URNS AUTO: ABNORMAL /HPF
SP GR UR STRIP.AUTO: 1.01 (ref 1–1.03)
UROBILINOGEN UR STRIP-ACNC: <2 MG/DL
WBC #/AREA URNS AUTO: ABNORMAL /HPF

## 2024-07-12 PROCEDURE — 87086 URINE CULTURE/COLONY COUNT: CPT

## 2024-07-12 PROCEDURE — 51798 US URINE CAPACITY MEASURE: CPT

## 2024-07-12 PROCEDURE — 81001 URINALYSIS AUTO W/SCOPE: CPT

## 2024-07-12 PROCEDURE — 88112 CYTOPATH CELL ENHANCE TECH: CPT | Performed by: STUDENT IN AN ORGANIZED HEALTH CARE EDUCATION/TRAINING PROGRAM

## 2024-07-12 PROCEDURE — 99204 OFFICE O/P NEW MOD 45 MIN: CPT

## 2024-07-12 RX ORDER — MIRABEGRON 50 MG/1
50 TABLET, EXTENDED RELEASE ORAL DAILY
Qty: 30 TABLET | Refills: 2 | Status: SHIPPED | OUTPATIENT
Start: 2024-07-12

## 2024-07-12 NOTE — PROGRESS NOTES
Office Visit- Urology  Lubna Bronson 1940 MRN: [id number]      Assessment/Discussion/Plan    83 y.o. female managed by     Urinary frequency/urgency/urinary incontinence, dysuria  -Patient previously trialed Myrbetriq 25 mg but this not able to be picked up from the pharmacy  -Will trial Myrbetriq 50 mg for symptom relief as patient states she is urinating every hour.  Discussed  administration and potential side effects.  She is to make me aware if she has difficulty obtaining this from pharmacy  -PVR 35 mL  -Will send out urine today for micro and culture  -Patient with history of total hysterectomy.  No reported gynecologic bleeding  -See item 2.  Workup with cystoscopy and CT urogram    2.  Microscopic hematuria  -Patient has had 2 episodes of clinically significant microscopic hematuria with 4-10 RBCs seen in May 2024 with negative urine culture and 30-50 RBC seen in context of negative urine culture on 6/14/2024  -Patient had a CT of the abdomen pelvis with contrast in May 2024 with mild thickening of the urinary bladder wall  -Will send out urine cytology  -Because of irritative urinary symptoms in context of microscopic hematuria without positive urine culture we will plan to complete microscopic workup with cystoscopy and CT urogram to ensure no visual bladder abnormalities/ureteral defect respectively.  Patient is in agreement    3.  Adrenal adenoma  -1.9 cm left adrenal nodule that is stable on last imaging on 5/20/2024  -Was imaged as far back as 2018  -Discussed possible biochemical workup at follow-up appointment        Chief Complaint:   Lubna is a 83 y.o. female presenting to the office for       Subjective    Patient is a 83-year-old female with no significant past urologic history who presents to the office today for evaluation of low urinary tract symptoms has been ongoing for the past few months.  She states that she was driving home in the spring from [place] with her spouse when she  "began to experience symptoms such as frequency, urgency, suprapubic pain with \" bumps in the road\", dysuria.  She consulted with her primary care physician who ordered urine testing which in both cases did not demonstrate culture proven UTI.  Patient was also placed on a course of antibiotics with Keflex and Bactrim which once again did not do anything for symptoms.  Of note.  Urine analysis did demonstrate significant microscopic hematuria.  Patient denies gross hematuria.  She states that she was utilizing Azo which did help with sense of dysuria.  She was initiated on oxybutynin which did not do anything for her frequency and urgency.  She was prescribed Myrbetriq by her PCP but that is not able to be obtained from her pharmacy so she did not yet utilize.  She states that the frequency and urgency is affecting her quality of life and that she is not able to travel and is keeping her up at night.  Patient denies any smoking history.  She previously worked as a labor and delivery nurse.  She denies any known family history of malignancy although notes that she does not know her father's side of history.  She has previous history of gynecologic surgery with a total hysterectomy at the age of 52 for fibroids and abnormal uterine bleeding.  She denies any vaginal bleeding      ROS:   Review of Systems   Constitutional: Negative.  Negative for chills, fatigue and fever.   HENT: Negative.     Respiratory:  Negative for shortness of breath.    Cardiovascular:  Negative for chest pain.   Gastrointestinal: Negative.  Negative for abdominal pain.   Endocrine: Negative.    Musculoskeletal: Negative.    Skin: Negative.    Neurological: Negative.  Negative for dizziness and light-headedness.   Hematological: Negative.    Psychiatric/Behavioral: Negative.           Past Medical History  Past Medical History:   Diagnosis Date    Acquired hypothyroidism     Adenoma of left adrenal gland 02/27/2018    Adrenal gland disorder (HCC)  "    Anemia     Anxiety     Arthritis     At risk for falls     Diabetes mellitus (HCC)     Disease of thyroid gland     hypo    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    Edema of both legs     History of pulmonary embolus (PE)     Hyperlipidemia     Hypertension     Irritable bowel syndrome     Obesity 2000    Prediabetes     Presence of right artificial hip joint     Pulmonary embolus (HCC) 2/27/2018 & 2/14/2019    Uses roller walker     Wears glasses     Wears partial dentures     upper partial       Past Surgical History  Past Surgical History:   Procedure Laterality Date    CHOLECYSTECTOMY  1991    COLONOSCOPY  11/09/2017    HEMORRHOID SURGERY  1999    HIP SURGERY  2019    HYSTERECTOMY  1993    Vaginal    CO ARTHRP ACETBLR/PROX FEM PROSTC AGRFT/ALGRFT Right 10/22/2019    Procedure: ARTHROPLASTY HIP TOTAL;  Surgeon: Lupe Driscoll DO;  Location: [facility];  Service: Orthopedics    TRIGGER FINGER RELEASE Right 1998       Past Family History  Family History   Problem Relation Age of Onset    Hypertension Family     Hypertension Mother        Past Social history  Social History     Socioeconomic History    Marital status: /Civil Union     Spouse name: Not on file    Number of children: Not on file    Years of education: Not on file    Highest education level: Not on file   Occupational History    Occupation: RN, retired   Tobacco Use    Smoking status: Never    Smokeless tobacco: Never   Vaping Use    Vaping status: Never Used   Substance and Sexual Activity    Alcohol use: Never    Drug use: Never    Sexual activity: Not Currently     Partners: Male     Birth control/protection: None   Other Topics Concern    Not on file   Social History Narrative    Not on file     Social Determinants of Health     Financial Resource Strain: Low Risk  (12/19/2022)    Overall Financial Resource Strain (CARDIA)     Difficulty of Paying Living Expenses: Not hard at all   Food Insecurity: No Food Insecurity  (6/14/2024)    Hunger Vital Sign     Worried About Running Out of Food in the Last Year: Never true     Ran Out of Food in the Last Year: Never true   Transportation Needs: No Transportation Needs (6/14/2024)    PRAPARE - Transportation     Lack of Transportation (Medical): No     Lack of Transportation (Non-Medical): No   Physical Activity: Not on file   Stress: Not on file   Social Connections: Not on file   Intimate Partner Violence: Not on file   Housing Stability: Low Risk  (6/14/2024)    Housing Stability Vital Sign     Unable to Pay for Housing in the Last Year: No     Number of Times Moved in the Last Year: 1     Homeless in the Last Year: No       Current Medications  Current Outpatient Medications   Medication Sig Dispense Refill    acetaminophen (TYLENOL) 325 mg tablet Take 650 mg by mouth every 6 (six) hours as needed for mild pain      amLODIPine (NORVASC) 5 mg tablet Take 1 tablet (5 mg total) by mouth daily at bedtime 30 tablet 0    atorvastatin (LIPITOR) 10 mg tablet Take 1 tablet (10 mg total) by mouth daily at bedtime 90 tablet 0    Eliquis 5 MG Take 1 tablet (5 mg total) by mouth 2 (two) times a day 180 tablet 1    hydroCHLOROthiazide 12.5 mg tablet Take 1 tablet (12.5 mg total) by mouth daily 30 tablet 5    levothyroxine 25 mcg tablet Take 1 tablet (25 mcg total) by mouth daily 90 tablet 1    losartan (COZAAR) 100 MG tablet Take 1 tablet (100 mg total) by mouth daily 30 tablet 0    metFORMIN (GLUCOPHAGE-XR) 750 mg 24 hr tablet Take 750 mg by mouth daily with breakfast      Mirabegron ER (Myrbetriq) 50 MG TB24 Take 1 tablet (50 mg total) by mouth in the morning 30 tablet 2    phenazopyridine (PYRIDIUM) 100 mg tablet Take 1 tablet (100 mg total) by mouth 3 (three) times a day as needed for bladder spasms (Patient not taking: Reported on 6/30/2024) 15 tablet 0     No current facility-administered medications for this visit.       Allergies  Allergies   Allergen Reactions    Oxycodone Vomiting  "      OBJECTIVE    Vitals   Vitals:    07/12/24 0832   BP: 140/80   BP Location: Left arm   Patient Position: Sitting   Cuff Size: Adult   Pulse: 83   SpO2: 97%   Weight: 93.4 kg (206 lb)   Height: 5' 4\" (1.626 m)       PVR:    Physical Exam  Constitutional:       General: She is not in acute distress.     Appearance: Normal appearance. She is normal weight. She is not ill-appearing or toxic-appearing.   HENT:      Head: Normocephalic and atraumatic.   Eyes:      Conjunctiva/sclera: Conjunctivae normal.   Cardiovascular:      Rate and Rhythm: Normal rate.   Pulmonary:      Effort: Pulmonary effort is normal. No respiratory distress.   Skin:     General: Skin is warm and dry.   Neurological:      General: No focal deficit present.      Mental Status: She is alert and oriented to person, place, and time.      Cranial Nerves: No cranial nerve deficit.   Psychiatric:         Mood and Affect: Mood normal.         Behavior: Behavior normal.         Thought Content: Thought content normal.          Labs:     Lab Results   Component Value Date    CREATININE 0.73 06/03/2024      Lab Results   Component Value Date    HGBA1C 6.9 (H) 06/03/2024     Lab Results   Component Value Date    CALCIUM 9.3 06/03/2024    K 4.4 06/03/2024    CO2 27 06/03/2024    CL 98 06/03/2024    BUN 18 06/03/2024    CREATININE 0.73 06/03/2024       I have personally reviewed all pertinent lab results and reviewed with patient    Imaging       Eder Stewart PA-C  Date: 7/12/2024 Time: 10:25 AM  [facility]    This note was written using fluency dictation software. Please excuse any resulting minor grammatical errors.      "

## 2024-07-13 LAB — BACTERIA UR CULT: NORMAL

## 2024-07-14 PROBLEM — Z00.00 MEDICARE ANNUAL WELLNESS VISIT, SUBSEQUENT: Status: RESOLVED | Noted: 2021-07-07 | Resolved: 2024-07-14

## 2024-07-15 ENCOUNTER — TELEPHONE (OUTPATIENT)
Dept: UROLOGY | Facility: CLINIC | Age: 84
End: 2024-07-15

## 2024-07-15 NOTE — TELEPHONE ENCOUNTER
----- Message from Eder Stewart PA-C sent at 7/15/2024 12:33 PM EDT -----  Negative for4 infection sustained microscopic hematuria. Get workup as discussed in office

## 2024-07-16 PROCEDURE — 88112 CYTOPATH CELL ENHANCE TECH: CPT | Performed by: STUDENT IN AN ORGANIZED HEALTH CARE EDUCATION/TRAINING PROGRAM

## 2024-07-17 ENCOUNTER — HOSPITAL ENCOUNTER (OUTPATIENT)
Dept: RADIOLOGY | Age: 84
Discharge: HOME | End: 2024-07-17
Payer: COMMERCIAL

## 2024-07-17 DIAGNOSIS — R31.29 MICROSCOPIC HEMATURIA: ICD-10-CM

## 2024-07-17 PROCEDURE — 74178 CT ABD&PLV WO CNTR FLWD CNTR: CPT

## 2024-07-17 RX ADMIN — IOHEXOL 100 ML: 350 INJECTION, SOLUTION INTRAVENOUS at 09:37

## 2024-07-18 ENCOUNTER — TELEPHONE (OUTPATIENT)
Dept: UROLOGY | Facility: CLINIC | Age: 84
End: 2024-07-18

## 2024-07-18 NOTE — TELEPHONE ENCOUNTER
----- Message from Eder Stewart PA-C sent at 7/18/2024  2:23 PM EDT -----  No specifically concerning urologic findings.  Specifically no renal masses, masses within the ureter or within the bladder that we can see.  She does have a left adrenal adenoma which is benign but we will talk about further workup for that at follow-up.  She has an abdominal wall hernia that contains fat.  Follow-up for cystoscopy as currently scheduled

## 2024-07-19 DIAGNOSIS — I10 ESSENTIAL HYPERTENSION, BENIGN: ICD-10-CM

## 2024-07-19 RX ORDER — HYDROCHLOROTHIAZIDE 12.5 MG/1
12.5 TABLET ORAL DAILY
Qty: 30 TABLET | Refills: 5 | Status: SHIPPED | OUTPATIENT
Start: 2024-07-19 | End: 2025-01-15

## 2024-07-19 RX ORDER — LOSARTAN POTASSIUM 100 MG/1
100 TABLET ORAL DAILY
Qty: 30 TABLET | Refills: 5 | Status: SHIPPED | OUTPATIENT
Start: 2024-07-19 | End: 2024-08-18

## 2024-07-19 RX ORDER — AMLODIPINE BESYLATE 5 MG/1
5 TABLET ORAL
Qty: 30 TABLET | Refills: 5 | Status: SHIPPED | OUTPATIENT
Start: 2024-07-19 | End: 2024-08-18

## 2024-07-25 ENCOUNTER — PROCEDURE VISIT (OUTPATIENT)
Dept: UROLOGY | Facility: CLINIC | Age: 84
End: 2024-07-25
Payer: COMMERCIAL

## 2024-07-25 VITALS
HEIGHT: 64 IN | SYSTOLIC BLOOD PRESSURE: 150 MMHG | BODY MASS INDEX: 35.51 KG/M2 | DIASTOLIC BLOOD PRESSURE: 90 MMHG | OXYGEN SATURATION: 97 % | HEART RATE: 98 BPM | WEIGHT: 208 LBS

## 2024-07-25 DIAGNOSIS — R39.15 URGENCY OF URINATION: Primary | ICD-10-CM

## 2024-07-25 DIAGNOSIS — N95.2 ATROPHIC VAGINITIS: ICD-10-CM

## 2024-07-25 PROCEDURE — 52000 CYSTOURETHROSCOPY: CPT | Performed by: UROLOGY

## 2024-07-25 RX ORDER — ESTRADIOL 0.1 MG/G
1 CREAM VAGINAL DAILY
Qty: 42.5 G | Refills: 2 | Status: SHIPPED | OUTPATIENT
Start: 2024-07-25

## 2024-07-25 RX ORDER — SOLIFENACIN SUCCINATE 10 MG/1
10 TABLET, FILM COATED ORAL DAILY
Qty: 30 TABLET | Refills: 2 | Status: SHIPPED | OUTPATIENT
Start: 2024-07-25 | End: 2024-10-23

## 2024-07-25 NOTE — PATIENT INSTRUCTIONS
After the look in the bladder, it is not uncommon to have some mild urinary bleeding or discomfort with the first couple of urinations.  We try to avoid taking oral antibiotic.  To that end we recommend urinating before leaving the office, increasing fluids over the next few hours, and advise to call if the bleeding and discomfort continue after 24 hours.  Also you are advised to call if you have any trouble emptying the bladder.  Rarely people get infections up in the kidneys, as evidenced by pain up underneath the rib cage, and this would require our attention also.     We will add Solifenacin 10 mg to help with the urgency.  Sometimes d-mannose can be helpful.  See the instructions below.    We recommend taking 1 gram of D-Mannose powder in the morning, and another in the evening for a standard maintenance dose.    You can purchase a 250 g bag from [employer] on [employer] for $23 (look for a white foil bag with an orange person on the front). This bag that should last you a few months.  The dosing is half of a scoop twice daily in about 4 ounces of liquid. We advise waiting about a half hour for the mannose to get excreted into your urinary tract to coat any bacteria there. After the half hour, drink 16 ounces over a few minutes to flush out the coated bacteria.    Sometimes if you get symptoms of an infection and need urgent relief, we recommend taking 2 grams of D-Mannose every 3 - 4 hours, for up to 48 hours.  Failure to respond to that dose most likely means that it is a resistant organism.  There are some infections that D-mannose does not work for, so a urine culture is necessary as is a prescription antibiotic.  So if symptoms persist after 48 hours or there are accompanying fevers or chills, please contact your health care provider.     Also it is possible that atrophic changes in the vagina related to low estrogen levels is contributing to your symptoms.  I will prescribe Estrace cream use 1 g or  basically a fingertip on the anterior vaginal wall nightly for 3 weeks and then every other day until you are seen back.

## 2024-07-25 NOTE — PROGRESS NOTES
"   Cystoscopy     Date/Time  7/25/2024 1:00 PM     Performed by  Alphonse Pardo MD   Authorized by  Alphonse Pardo MD     Universal Protocol:  Consent: Written consent obtained.  Risks and benefits: risks, benefits and alternatives were discussed  Consent given by: patient  Patient identity confirmed: verbally with patient      Procedure Details:  Procedure type: cystoscopy    Patient tolerance: Patient tolerated the procedure well with no immediate complications    Additional Procedure Details: 7/12/2024 office visit Eder   83-year-old female with no significant past urologic history who presents to the office today for evaluation of low urinary tract symptoms has been ongoing for the past few months.  She states that she was driving home in the spring from [place] with her spouse when she began to experience symptoms such as frequency, urgency, suprapubic pain with \" bumps in the road\", dysuria.  She consulted with her primary care physician who ordered urine testing which in both cases did not demonstrate culture proven UTI.  Patient was also placed on a course of antibiotics with Keflex and Bactrim which once again did not do anything for symptoms.  Of note.  Urine analysis did demonstrate significant microscopic hematuria.  Patient denies gross hematuria.  She states that she was utilizing Azo which did help with sense of dysuria.  She was initiated on oxybutynin which did not do anything for her frequency and urgency.  She was prescribed Myrbetriq by her PCP but that is not able to be obtained from her pharmacy so she did not yet utilize.  She states that the frequency and urgency is affecting her quality of life and that she is not able to travel and is keeping her up at night.  Patient denies any smoking history.  She previously worked as a labor and delivery nurse.  She denies any known family history of malignancy although notes that she does not know her father's side of history.  She has " previous history of gynecologic surgery with a total hysterectomy at the age of 52 for fibroids and abnormal uterine bleeding.  She denies any vaginal bleeding.    Findings: She has atrophic changes in the vagina and urethral meatus.  No urethral prolapse.  Very tender on entry with the scope.  Trigone is blanched.  There is some redness on the right lateral wall.  Nothing raised.  Nothing papillary.  Do not suspect carcinoma in situ.  Last infection was about a month ago.  She has had E. coli and other infections.  CT imaging was normal.    Plan: D-mannose trial twice daily.  Addition of Vesicare 10 mg daily along with the Myrbetriq.  The Myrbetriq unfortunately has not helped much over the last 2 weeks of use.  Addition of Estrace cream vaginally.  She had a history of PE so systemic estrogens is probably not indicated or safe for her.  She had a hysterectomy many many years ago.  She is probably just terribly postmenopausal in the pelvic floor area.

## 2024-08-05 DIAGNOSIS — I27.82 CHRONIC SADDLE PULMONARY EMBOLISM WITHOUT ACUTE COR PULMONALE (HCC): ICD-10-CM

## 2024-08-05 DIAGNOSIS — N95.2 ATROPHIC VAGINITIS: ICD-10-CM

## 2024-08-05 DIAGNOSIS — I26.92 CHRONIC SADDLE PULMONARY EMBOLISM WITHOUT ACUTE COR PULMONALE (HCC): ICD-10-CM

## 2024-08-05 RX ORDER — ESTRADIOL 0.1 MG/G
1 CREAM VAGINAL DAILY
Qty: 42.5 G | Refills: 2 | Status: SHIPPED | OUTPATIENT
Start: 2024-08-05

## 2024-08-05 RX ORDER — APIXABAN 5 MG/1
5 TABLET, FILM COATED ORAL 2 TIMES DAILY
Qty: 180 TABLET | Refills: 0 | Status: SHIPPED | OUTPATIENT
Start: 2024-08-05 | End: 2024-11-03

## 2024-08-16 DIAGNOSIS — E11.65 UNCONTROLLED TYPE 2 DIABETES MELLITUS WITH HYPERGLYCEMIA (HCC): Primary | ICD-10-CM

## 2024-08-16 DIAGNOSIS — R39.15 URGENCY OF URINATION: ICD-10-CM

## 2024-08-16 RX ORDER — METFORMIN HYDROCHLORIDE 750 MG/1
750 TABLET, EXTENDED RELEASE ORAL
Qty: 90 TABLET | Refills: 0 | Status: SHIPPED | OUTPATIENT
Start: 2024-08-16

## 2024-08-16 RX ORDER — SOLIFENACIN SUCCINATE 10 MG/1
10 TABLET, FILM COATED ORAL DAILY
Qty: 90 TABLET | Refills: 0 | Status: SHIPPED | OUTPATIENT
Start: 2024-08-16

## 2024-08-21 ENCOUNTER — OFFICE VISIT (OUTPATIENT)
Dept: INTERNAL MEDICINE CLINIC | Facility: CLINIC | Age: 84
End: 2024-08-21
Payer: COMMERCIAL

## 2024-08-21 ENCOUNTER — TELEPHONE (OUTPATIENT)
Age: 84
End: 2024-08-21

## 2024-08-21 VITALS
SYSTOLIC BLOOD PRESSURE: 179 MMHG | OXYGEN SATURATION: 97 % | TEMPERATURE: 98 F | WEIGHT: 197 LBS | HEART RATE: 88 BPM | HEIGHT: 64 IN | DIASTOLIC BLOOD PRESSURE: 79 MMHG | BODY MASS INDEX: 33.63 KG/M2

## 2024-08-21 DIAGNOSIS — N95.2 ATROPHIC VAGINITIS: ICD-10-CM

## 2024-08-21 DIAGNOSIS — I10 ESSENTIAL HYPERTENSION, BENIGN: Primary | ICD-10-CM

## 2024-08-21 DIAGNOSIS — R60.0 LOCALIZED EDEMA: ICD-10-CM

## 2024-08-21 DIAGNOSIS — T14.8XXA HEMATOMA AND CONTUSION: ICD-10-CM

## 2024-08-21 PROCEDURE — G2211 COMPLEX E/M VISIT ADD ON: HCPCS | Performed by: INTERNAL MEDICINE

## 2024-08-21 PROCEDURE — 99215 OFFICE O/P EST HI 40 MIN: CPT | Performed by: INTERNAL MEDICINE

## 2024-08-21 RX ORDER — ESTRADIOL 0.1 MG/G
1 CREAM VAGINAL DAILY
Qty: 42.5 G | Refills: 1 | Status: SHIPPED | OUTPATIENT
Start: 2024-08-21

## 2024-08-21 RX ORDER — FUROSEMIDE 20 MG
20 TABLET ORAL DAILY PRN
Qty: 30 TABLET | Refills: 0 | Status: SHIPPED | OUTPATIENT
Start: 2024-08-21

## 2024-08-21 RX ORDER — ESTRADIOL 0.1 MG/G
1 CREAM VAGINAL DAILY
Qty: 42.5 G | Refills: 1 | Status: SHIPPED | OUTPATIENT
Start: 2024-08-21 | End: 2024-08-21 | Stop reason: SDUPTHER

## 2024-08-21 RX ORDER — LABETALOL 100 MG/1
100 TABLET, FILM COATED ORAL 2 TIMES DAILY
Qty: 60 TABLET | Refills: 1 | Status: SHIPPED | OUTPATIENT
Start: 2024-08-21 | End: 2024-09-06

## 2024-08-21 NOTE — TELEPHONE ENCOUNTER
Patient called in as she fell last Wednesday morning. States that she landed on her knee. She elevated it and iced it however states each day it gets worse. She is having pain, and its black and blue and swelling. Appt made for this afternoon with provider.

## 2024-08-21 NOTE — PROGRESS NOTES
Assessment/Plan:           1. Essential hypertension, benign  Comments:  Labetalol prescribed.  2. Atrophic vaginitis  -     estradiol (ESTRACE VAGINAL) 0.1 mg/g vaginal cream; Insert 1 g into the vagina daily Daily for 3 weeks then 3 times a week to anterior vaginal wall  3. Hematoma and contusion R knee  Comments:  Conservative management discussed.  4. Localized edema  -     furosemide (LASIX) 20 mg tablet; Take 1 tablet (20 mg total) by mouth daily as needed (edema)         1. Essential hypertension, benign    - labetalol (NORMODYNE) 100 mg tablet; Take 1 tablet (100 mg total) by mouth 2 (two) times a day  Dispense: 60 tablet; Refill: 1    2. Atrophic vaginitis    - estradiol (ESTRACE VAGINAL) 0.1 mg/g vaginal cream; Insert 1 g into the vagina daily Daily for 3 weeks then 3 times a week to anterior vaginal wall  Dispense: 42.5 g; Refill: 1    3. Hematoma and contusion R knee         No problem-specific Assessment & Plan notes found for this encounter.           Subjective:      Patient ID: Lubna Bronson is a 83 y.o. female.    HPI    The following portions of the patient's history were reviewed and updated as appropriate: She  has a past medical history of Acquired hypothyroidism, 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), Adrenal gland disorder (HCC), Anemia, Anxiety, Arthritis, At risk for falls, Diabetes mellitus (HCC), Disease of thyroid gland, DJD (degenerative joint disease) of knee, Edema of both legs, History of pulmonary embolus (PE), Hyperlipidemia, Hypertension, Irritable bowel syndrome, Obesity (2000), Prediabetes, Presence of right artificial hip joint, Pulmonary embolus (HCC) (2/27/2018 & 2/14/2019), Uses roller walker, Wears glasses, and Wears partial dentures.  She   Patient Active Problem List    Diagnosis Date Noted    Uncontrolled type 2 diabetes mellitus with hyperglycemia (HCC) 06/14/2024    Hyponatremia 05/31/2024    Primary osteoarthritis of both knees 09/07/2022    Tracheitis  06/29/2022    Obesity, morbid (HCC) 05/05/2021    Essential hypertension, benign 05/05/2021    Venous insufficiency of both lower extremities 12/09/2020    Diabetes mellitus without complication (HCC) 12/09/2020    Hypertension     Irritable bowel syndrome     Edema of both legs     Acquired hypothyroidism     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, without long-term current use of insulin (HCC)     History of pulmonary embolus (PE)     Pulmonary embolus (HCC)     Presence of right artificial hip joint     Acute blood loss anemia 10/25/2019    Hypothyroid 10/23/2019    Mixed hyperlipidemia 10/23/2019    Essential hypertension 10/23/2019    History of hypercoagulable state 10/09/2019    Primary osteoarthritis of one hip, right 08/09/2019    Primary osteoarthritis of right hip      She  has a past surgical history that includes Cholecystectomy (1991); Colonoscopy (11/09/2017); pr arthrp acetblr/prox fem prostc agrft/algrft (Right, 10/22/2019); Hysterectomy (1993); Hemorrhoid surgery (1999); Trigger finger release (Right, 1998); and Hip surgery (2019).  Her family history includes Hypertension in her family and mother.  She  reports that she has never smoked. She has never used smokeless tobacco. She reports that she does not drink alcohol and does not use drugs.  Current Outpatient Medications   Medication Sig Dispense Refill    acetaminophen (TYLENOL) 325 mg tablet Take 650 mg by mouth every 6 (six) hours as needed for mild pain      Eliquis 5 MG Take 1 tablet (5 mg total) by mouth 2 (two) times a day 180 tablet 0    estradiol (ESTRACE VAGINAL) 0.1 mg/g vaginal cream Insert 1 g into the vagina daily Daily for 3 weeks then 3 times a week to anterior vaginal wall 42.5 g 1    furosemide (LASIX) 20 mg tablet Take 1 tablet (20 mg total) by mouth daily as needed (edema) 30 tablet 0    levothyroxine 25 mcg tablet Take 1 tablet (25 mcg total) by mouth daily 90 tablet 1    metFORMIN  (GLUCOPHAGE-XR) 750 mg 24 hr tablet Take 1 tablet (750 mg total) by mouth daily with breakfast 90 tablet 0    Mirabegron ER (Myrbetriq) 50 MG TB24 Take 1 tablet (50 mg total) by mouth in the morning 30 tablet 2    solifenacin (VESICARE) 10 MG tablet TAKE 1 TABLET BY MOUTH EVERY DAY 90 tablet 0    atorvastatin (LIPITOR) 10 mg tablet TAKE 1 TABLET BY MOUTH DAILY AT BEDTIME 90 tablet 0    hydroCHLOROthiazide 12.5 mg tablet Take 1 tablet (12.5 mg total) by mouth daily 90 tablet 1    labetalol (NORMODYNE) 200 mg tablet Take 1 tablet (200 mg total) by mouth 2 (two) times a day 180 tablet 0    losartan (COZAAR) 100 MG tablet Take 1 tablet (100 mg total) by mouth daily 90 tablet 1     No current facility-administered medications for this visit.     Current Outpatient Medications on File Prior to Visit   Medication Sig    acetaminophen (TYLENOL) 325 mg tablet Take 650 mg by mouth every 6 (six) hours as needed for mild pain    Eliquis 5 MG Take 1 tablet (5 mg total) by mouth 2 (two) times a day    levothyroxine 25 mcg tablet Take 1 tablet (25 mcg total) by mouth daily    metFORMIN (GLUCOPHAGE-XR) 750 mg 24 hr tablet Take 1 tablet (750 mg total) by mouth daily with breakfast    Mirabegron ER (Myrbetriq) 50 MG TB24 Take 1 tablet (50 mg total) by mouth in the morning    solifenacin (VESICARE) 10 MG tablet TAKE 1 TABLET BY MOUTH EVERY DAY     No current facility-administered medications on file prior to visit.     Medications Discontinued During This Encounter   Medication Reason    estradiol (ESTRACE VAGINAL) 0.1 mg/g vaginal cream Reorder      She is allergic to oxycodone..    Review of Systems   Constitutional:  Negative for appetite change, chills, fatigue and fever.   HENT:  Negative for sore throat and trouble swallowing.    Eyes:  Negative for redness.   Respiratory:  Negative for shortness of breath.    Cardiovascular:  Negative for chest pain and palpitations.   Gastrointestinal:  Negative for abdominal pain,  "constipation and diarrhea.   Genitourinary:  Negative for dysuria and hematuria.   Musculoskeletal:  Positive for arthralgias and joint swelling. Negative for back pain and neck pain.   Skin:  Positive for color change. Negative for rash.   Neurological:  Negative for seizures, weakness and headaches.   Hematological:  Negative for adenopathy.   Psychiatric/Behavioral:  Negative for confusion. The patient is not nervous/anxious.          Objective:      BP (!) 179/79 (BP Location: Left arm, Patient Position: Sitting, Cuff Size: Large)   Pulse 88   Temp 98 °F (36.7 °C) (Temporal)   Ht 5' 4\" (1.626 m)   Wt 89.4 kg (197 lb)   SpO2 97%   BMI 33.81 kg/m²     Results Reviewed       None            No results found for this or any previous visit (from the past 1344 hour(s)).       Physical Exam  Constitutional:       General: She is not in acute distress.     Appearance: Normal appearance.   HENT:      Head: Normocephalic and atraumatic.      Nose: Nose normal.      Mouth/Throat:      Mouth: Mucous membranes are moist.   Eyes:      Extraocular Movements: Extraocular movements intact.      Pupils: Pupils are equal, round, and reactive to light.   Cardiovascular:      Rate and Rhythm: Normal rate and regular rhythm.      Pulses: Normal pulses.      Heart sounds: Normal heart sounds. No murmur heard.     No friction rub.   Pulmonary:      Effort: Pulmonary effort is normal. No respiratory distress.      Breath sounds: Normal breath sounds. No wheezing.   Abdominal:      General: Abdomen is flat. Bowel sounds are normal. There is no distension.      Palpations: Abdomen is soft. There is no mass.      Tenderness: There is no abdominal tenderness. There is no guarding.   Musculoskeletal:         General: Normal range of motion.      Cervical back: Neck supple.   Skin:     Findings: Erythema present.   Neurological:      General: No focal deficit present.      Mental Status: She is alert and oriented to person, place, and " time. Mental status is at baseline.      Cranial Nerves: No cranial nerve deficit.      Gait: Gait abnormal.   Psychiatric:         Mood and Affect: Mood normal.         Behavior: Behavior normal.       I have spent a total time of 45 minutes in caring for this patient on the day of the visit/encounter including Diagnostic results, Prognosis, Risks and benefits of tx options, and Instructions for management.

## 2024-08-23 ENCOUNTER — TELEPHONE (OUTPATIENT)
Age: 84
End: 2024-08-23

## 2024-08-23 NOTE — TELEPHONE ENCOUNTER
Patient called to report BP readings to Dr. Sosa.  Yesterday her BP was 133/76, 123/79, 123/76 and 112/75 and this morning it was 113/77.    Thank you

## 2024-09-06 ENCOUNTER — OFFICE VISIT (OUTPATIENT)
Dept: INTERNAL MEDICINE CLINIC | Facility: CLINIC | Age: 84
End: 2024-09-06
Payer: COMMERCIAL

## 2024-09-06 VITALS
OXYGEN SATURATION: 99 % | BODY MASS INDEX: 36.12 KG/M2 | HEIGHT: 64 IN | HEART RATE: 92 BPM | SYSTOLIC BLOOD PRESSURE: 142 MMHG | TEMPERATURE: 98.8 F | DIASTOLIC BLOOD PRESSURE: 86 MMHG | WEIGHT: 211.6 LBS

## 2024-09-06 DIAGNOSIS — I10 ESSENTIAL HYPERTENSION, BENIGN: Primary | ICD-10-CM

## 2024-09-06 DIAGNOSIS — I27.82 CHRONIC SADDLE PULMONARY EMBOLISM WITHOUT ACUTE COR PULMONALE (HCC): ICD-10-CM

## 2024-09-06 DIAGNOSIS — E03.9 ACQUIRED HYPOTHYROIDISM: ICD-10-CM

## 2024-09-06 DIAGNOSIS — I26.92 CHRONIC SADDLE PULMONARY EMBOLISM WITHOUT ACUTE COR PULMONALE (HCC): ICD-10-CM

## 2024-09-06 DIAGNOSIS — E11.65 UNCONTROLLED TYPE 2 DIABETES MELLITUS WITH HYPERGLYCEMIA (HCC): ICD-10-CM

## 2024-09-06 DIAGNOSIS — M17.0 PRIMARY OSTEOARTHRITIS OF BOTH KNEES: ICD-10-CM

## 2024-09-06 DIAGNOSIS — E78.2 MIXED HYPERLIPIDEMIA: ICD-10-CM

## 2024-09-06 DIAGNOSIS — I87.2 VENOUS INSUFFICIENCY OF BOTH LOWER EXTREMITIES: ICD-10-CM

## 2024-09-06 PROCEDURE — 99214 OFFICE O/P EST MOD 30 MIN: CPT | Performed by: INTERNAL MEDICINE

## 2024-09-06 PROCEDURE — G2211 COMPLEX E/M VISIT ADD ON: HCPCS | Performed by: INTERNAL MEDICINE

## 2024-09-06 RX ORDER — LOSARTAN POTASSIUM 100 MG/1
100 TABLET ORAL DAILY
Qty: 90 TABLET | Refills: 1 | Status: SHIPPED | OUTPATIENT
Start: 2024-09-06 | End: 2024-12-05

## 2024-09-06 RX ORDER — HYDROCHLOROTHIAZIDE 12.5 MG/1
12.5 TABLET ORAL DAILY
Qty: 90 TABLET | Refills: 1 | Status: SHIPPED | OUTPATIENT
Start: 2024-09-06 | End: 2025-03-05

## 2024-09-06 RX ORDER — LABETALOL 200 MG/1
200 TABLET, FILM COATED ORAL 2 TIMES DAILY
Qty: 180 TABLET | Refills: 0 | Status: SHIPPED | OUTPATIENT
Start: 2024-09-06

## 2024-09-06 NOTE — PROGRESS NOTES
Assessment/Plan:             1. Essential hypertension, benign  Comments:  stop amlodipine, start labetalol 200 BID  Orders:  -     losartan (COZAAR) 100 MG tablet; Take 1 tablet (100 mg total) by mouth daily  -     hydroCHLOROthiazide 12.5 mg tablet; Take 1 tablet (12.5 mg total) by mouth daily  -     labetalol (NORMODYNE) 200 mg tablet; Take 1 tablet (200 mg total) by mouth 2 (two) times a day  2. Mixed hyperlipidemia  Comments:  continue same med  3. Uncontrolled type 2 diabetes mellitus with hyperglycemia (HCC)  Comments:  continue same med  4. Acquired hypothyroidism  Comments:  continue same med  5. Primary osteoarthritis of both knees  6. Venous insufficiency of both lower extremities  7. 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 female.    Follow-up on multiple medical problems with her stable on current medication, leg swelling, right more than left, status post fall        The following portions of the patient's history were reviewed and updated as appropriate: She  has a past medical history of Acquired hypothyroidism, 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), Adrenal gland disorder ([facility] [place]), Anemia, Anxiety, Arthritis, At risk for falls, Diabetes mellitus (HCC), Disease of thyroid gland, DJD (degenerative joint disease) of knee, Edema of both legs, History of pulmonary embolus (PE), Hyperlipidemia, Hypertension, Irritable bowel syndrome, Obesity (2000), Prediabetes, Presence of right artificial hip joint, Pulmonary embolus (HCC) (2/27/2018 & 2/14/2019), Uses roller walker, Wears glasses, and Wears partial dentures.  She   Patient Active Problem List    Diagnosis Date Noted    Uncontrolled type 2 diabetes mellitus with hyperglycemia ([facility] [place]) 06/14/2024    Hyponatremia 05/31/2024    Primary osteoarthritis of both knees 09/07/2022    Tracheitis 06/29/2022    Obesity, morbid (HCC) 05/05/2021    Essential hypertension,  benign 05/05/2021    Venous insufficiency of both lower extremities 12/09/2020    Diabetes mellitus without complication (HCC) 12/09/2020    Hypertension     Irritable bowel syndrome     Edema of both legs     Acquired hypothyroidism     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, without long-term current use of insulin (HCC)     History of pulmonary embolus (PE)     Pulmonary embolus (HCC)     Presence of right artificial hip joint     Acute blood loss anemia 10/25/2019    Hypothyroid 10/23/2019    Mixed hyperlipidemia 10/23/2019    Essential hypertension 10/23/2019    History of hypercoagulable state 10/09/2019    Primary osteoarthritis of one hip, right 08/09/2019    Primary osteoarthritis of right hip      She  has a past surgical history that includes Cholecystectomy (1991); Colonoscopy (11/09/2017); pr arthrp acetblr/prox fem prostc agrft/algrft (Right, 10/22/2019); Hysterectomy (1993); Hemorrhoid surgery (1999); Trigger finger release (Right, 1998); and Hip surgery (2019).  Her family history includes Hypertension in her family and mother.  She  reports that she has never smoked. She has never used smokeless tobacco. She reports that she does not drink alcohol and does not use drugs.  Current Outpatient Medications   Medication Sig Dispense Refill    acetaminophen (TYLENOL) 325 mg tablet Take 650 mg by mouth every 6 (six) hours as needed for mild pain      atorvastatin (LIPITOR) 10 mg tablet Take 1 tablet (10 mg total) by mouth daily at bedtime 90 tablet 0    Eliquis 5 MG Take 1 tablet (5 mg total) by mouth 2 (two) times a day 180 tablet 0    estradiol (ESTRACE VAGINAL) 0.1 mg/g vaginal cream Insert 1 g into the vagina daily Daily for 3 weeks then 3 times a week to anterior vaginal wall 42.5 g 1    furosemide (LASIX) 20 mg tablet Take 1 tablet (20 mg total) by mouth daily as needed (edema) 30 tablet 0    hydroCHLOROthiazide 12.5 mg tablet Take 1 tablet (12.5 mg total) by mouth daily  90 tablet 1    labetalol (NORMODYNE) 200 mg tablet Take 1 tablet (200 mg total) by mouth 2 (two) times a day 180 tablet 0    levothyroxine 25 mcg tablet Take 1 tablet (25 mcg total) by mouth daily 90 tablet 1    losartan (COZAAR) 100 MG tablet Take 1 tablet (100 mg total) by mouth daily 90 tablet 1    metFORMIN (GLUCOPHAGE-XR) 750 mg 24 hr tablet Take 1 tablet (750 mg total) by mouth daily with breakfast 90 tablet 0    Mirabegron ER (Myrbetriq) 50 MG TB24 Take 1 tablet (50 mg total) by mouth in the morning 30 tablet 2    solifenacin (VESICARE) 10 MG tablet TAKE 1 TABLET BY MOUTH EVERY DAY 90 tablet 0     No current facility-administered medications for this visit.     Current Outpatient Medications on File Prior to Visit   Medication Sig    acetaminophen (TYLENOL) 325 mg tablet Take 650 mg by mouth every 6 (six) hours as needed for mild pain    atorvastatin (LIPITOR) 10 mg tablet Take 1 tablet (10 mg total) by mouth daily at bedtime    Eliquis 5 MG Take 1 tablet (5 mg total) by mouth 2 (two) times a day    estradiol (ESTRACE VAGINAL) 0.1 mg/g vaginal cream Insert 1 g into the vagina daily Daily for 3 weeks then 3 times a week to anterior vaginal wall    furosemide (LASIX) 20 mg tablet Take 1 tablet (20 mg total) by mouth daily as needed (edema)    levothyroxine 25 mcg tablet Take 1 tablet (25 mcg total) by mouth daily    metFORMIN (GLUCOPHAGE-XR) 750 mg 24 hr tablet Take 1 tablet (750 mg total) by mouth daily with breakfast    Mirabegron ER (Myrbetriq) 50 MG TB24 Take 1 tablet (50 mg total) by mouth in the morning    solifenacin (VESICARE) 10 MG tablet TAKE 1 TABLET BY MOUTH EVERY DAY    [DISCONTINUED] amLODIPine (NORVASC) 5 mg tablet Take 1 tablet (5 mg total) by mouth daily at bedtime    [DISCONTINUED] hydroCHLOROthiazide 12.5 mg tablet Take 1 tablet (12.5 mg total) by mouth daily    [DISCONTINUED] losartan (COZAAR) 100 MG tablet Take 1 tablet (100 mg total) by mouth daily    [DISCONTINUED] labetalol (NORMODYNE)  "100 mg tablet Take 1 tablet (100 mg total) by mouth 2 (two) times a day (Patient not taking: Reported on 9/6/2024)    [DISCONTINUED] oxybutynin (DITROPAN-XL) 5 mg 24 hr tablet Take 5 mg by mouth daily    [DISCONTINUED] phenazopyridine (PYRIDIUM) 100 mg tablet Take 1 tablet (100 mg total) by mouth 3 (three) times a day as needed for bladder spasms (Patient not taking: Reported on 6/30/2024)     No current facility-administered medications on file prior to visit.     She is allergic to oxycodone..    Review of Systems   Constitutional:  Negative for chills and fever.   HENT:  Negative for congestion, ear pain and sore throat.    Eyes:  Negative for pain.   Respiratory:  Positive for shortness of breath. Negative for cough.    Cardiovascular:  Negative for chest pain and leg swelling.   Gastrointestinal:  Negative for abdominal pain, nausea and vomiting.   Endocrine: Negative for polyuria.   Genitourinary:  Negative for difficulty urinating, frequency and urgency.   Musculoskeletal:  Positive for arthralgias and back pain.   Skin:  Negative for rash.   Neurological:  Negative for weakness and headaches.   Psychiatric/Behavioral:  Negative for sleep disturbance. The patient is not nervous/anxious.          Objective:      /86 (BP Location: Right arm, Patient Position: Sitting, Cuff Size: Standard)   Pulse 92   Temp 98.8 °F (37.1 °C)   Ht 5' 4\" (1.626 m)   Wt 96 kg (211 lb 9.6 oz)   SpO2 99%   BMI 36.32 kg/m²     No results found for this or any previous visit (from the past 1344 hour(s)).     Physical Exam  Constitutional:       Appearance: Normal appearance.   HENT:      Head: Normocephalic.      Right Ear: External ear normal.      Left Ear: External ear normal.      Nose: Nose normal. No congestion.      Mouth/Throat:      Mouth: Mucous membranes are moist.      Pharynx: Oropharynx is clear. No oropharyngeal exudate or posterior oropharyngeal erythema.   Eyes:      Extraocular Movements: Extraocular " movements intact.      Conjunctiva/sclera: Conjunctivae normal.   Cardiovascular:      Rate and Rhythm: Normal rate and regular rhythm.      Heart sounds: Normal heart sounds. No murmur heard.  Pulmonary:      Effort: Pulmonary effort is normal.      Breath sounds: Normal breath sounds. No wheezing or rales.   Abdominal:      General: Abdomen is flat. There is no distension.      Palpations: Abdomen is soft.      Tenderness: There is no abdominal tenderness.   Musculoskeletal:         General: Normal range of motion.      Cervical back: Normal range of motion and neck supple.      Right lower leg: Edema present.      Left lower leg: Edema present.   Lymphadenopathy:      Cervical: No cervical adenopathy.   Skin:     General: Skin is warm.   Neurological:      General: No focal deficit present.      Mental Status: She is alert and oriented to person, place, and time.

## 2024-09-08 DIAGNOSIS — E78.2 MIXED HYPERLIPIDEMIA: ICD-10-CM

## 2024-09-08 RX ORDER — ATORVASTATIN CALCIUM 10 MG/1
10 TABLET, FILM COATED ORAL
Qty: 90 TABLET | Refills: 0 | Status: SHIPPED | OUTPATIENT
Start: 2024-09-08 | End: 2024-09-20 | Stop reason: SDUPTHER

## 2024-09-16 ENCOUNTER — OFFICE VISIT (OUTPATIENT)
Dept: UROLOGY | Facility: CLINIC | Age: 84
End: 2024-09-16
Payer: COMMERCIAL

## 2024-09-16 VITALS
DIASTOLIC BLOOD PRESSURE: 90 MMHG | OXYGEN SATURATION: 97 % | BODY MASS INDEX: 35.85 KG/M2 | SYSTOLIC BLOOD PRESSURE: 150 MMHG | WEIGHT: 210 LBS | HEART RATE: 86 BPM | HEIGHT: 64 IN

## 2024-09-16 DIAGNOSIS — R39.15 URGENCY OF URINATION: Primary | ICD-10-CM

## 2024-09-16 DIAGNOSIS — D35.00 ADRENAL ADENOMA, UNSPECIFIED LATERALITY: ICD-10-CM

## 2024-09-16 PROCEDURE — 99213 OFFICE O/P EST LOW 20 MIN: CPT

## 2024-09-16 RX ORDER — TROSPIUM CHLORIDE 20 MG/1
20 TABLET, FILM COATED ORAL 2 TIMES DAILY
Qty: 60 TABLET | Refills: 3 | Status: SHIPPED | OUTPATIENT
Start: 2024-09-16

## 2024-09-16 RX ORDER — DEXAMETHASONE 1 MG
1 TABLET ORAL ONCE
Qty: 1 TABLET | Refills: 0 | Status: SHIPPED | OUTPATIENT
Start: 2024-09-16 | End: 2024-09-16

## 2024-09-16 NOTE — PROGRESS NOTES
Office Visit- Urology  Lubna Bronson 1940 MRN: [id number]      Assessment/Discussion/Plan    83 y.o. female managed by     Urinary frequency/urgency/urinary incontinence, dysuria  -Patient previously trialed Myrbetriq 25 mg but this not able to be picked up from the pharmacy  -At last visit patient had addition of Vesicare 10 mg along with Myrbetriq  -Addition of vaginal estrogen cream which has resolved patient's sensation of dysuria  -Patient is still having sensation of frequency urgency pressure.  Myrbetriq plus Vesicare has not been beneficial for this purpose  Will trial-trospium 20 mg twice daily as alternative medication.  Will also have patient be evaluated by pelvic floor physical therapy to see if there is any component of pelvic floor hypertonicity  -If trial of trospium and pelvic floor physical therapy is unaffected may need to consider doing intravesical Botox as symptomatology is significantly affecting patient's quality of life.  Will have this scheduled in case he needs to proceed with this    2.  Microscopic hematuria  -Patient has had 2 episodes of clinically significant microscopic hematuria with 4-10 RBCs seen in May 2024 with negative urine culture and 30-50 RBC seen in context of negative urine culture on 6/14/2024  -Patient had a CT of the abdomen pelvis with contrast in May 2024 with mild thickening of the urinary bladder wall  -Will send out urine cytology  -Status post negative CT urogram  -Cystoscopy with evidence of vaginal atrophy.  Redness in the right lateral wall with nothing raised or papillary with physician not suspecting renal cell carcinoma  -Monitor for gross hematuria in which case repeat cystoscopy may be indicated    3.  Adrenal adenoma  -1.9 cm left adrenal nodule that is stable on last imaging on 5/20/2024  -Was imaged as far back as 2018  -Biochemical workup ordered and reviewed with patient will call with results       Chief Complaint:   Lubna is a 83 y.o.  "female presenting to the office for a follow up visit regarding urinary tract symptoms        Subjective    Hx 7/12/2024  Patient is a 83-year-old female with no significant past urologic history who presents to the office today for evaluation of low urinary tract symptoms has been ongoing for the past few months.  She states that she was driving home in the spring from [place] with her spouse when she began to experience symptoms such as frequency, urgency, suprapubic pain with \" bumps in the road\", dysuria.  She consulted with her primary care physician who ordered urine testing which in both cases did not demonstrate culture proven UTI.  Patient was also placed on a course of antibiotics with Keflex and Bactrim which once again did not do anything for symptoms.  Of note.  Urine analysis did demonstrate significant microscopic hematuria.  Patient denies gross hematuria.  She states that she was utilizing Azo which did help with sense of dysuria.  She was initiated on oxybutynin which did not do anything for her frequency and urgency.  She was prescribed Myrbetriq by her PCP but that is not able to be obtained from her pharmacy so she did not yet utilize.  She states that the frequency and urgency is affecting her quality of life and that she is not able to travel and is keeping her up at night.  Patient denies any smoking history.  She previously worked as a labor and delivery nurse.  She denies any known family history of malignancy although notes that she does not know her father's side of history.  She has previous history of gynecologic surgery with a total hysterectomy at the age of 52 for fibroids and abnormal uterine bleeding.  She denies any vaginal bleeding     Hx 9/16/2024  In the interim since patient was last seen by myself she had a cystoscopy was demonstrated significant vaginal atrophy as well as irritation/erythema noted within the bladder that was not felt to be indicative of carcinoma in situ by " physician.  CT urogram was negative. she was initiated on Vesicare in addition to Myrbetriq but she states that there has been no significant symptomatology change in terms of frequency, urgency, bladder pressure.  With use of vaginal estrogen cream she has had significant relief and sensation of dysuria      ROS:   Review of Systems   Constitutional: Negative.  Negative for chills, fatigue and fever.   HENT: Negative.     Respiratory:  Negative for shortness of breath.    Cardiovascular:  Negative for chest pain.   Gastrointestinal: Negative.  Negative for abdominal pain.   Endocrine: Negative.    Musculoskeletal: Negative.    Skin: Negative.    Neurological: Negative.  Negative for dizziness and light-headedness.   Hematological: Negative.    Psychiatric/Behavioral: Negative.           Past Medical History  Past Medical History:   Diagnosis Date    Acquired hypothyroidism     Adenoma of left adrenal gland 02/27/2018    Adrenal gland disorder (HCC)     Anemia     Anxiety     Arthritis     At risk for falls     Diabetes mellitus (HCC)     Disease of thyroid gland     hypo    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    Edema of both legs     History of pulmonary embolus (PE)     Hyperlipidemia     Hypertension     Irritable bowel syndrome     Obesity 2000    Prediabetes     Presence of right artificial hip joint     Pulmonary embolus (HCC) 2/27/2018 & 2/14/2019    Uses roller walker     Wears glasses     Wears partial dentures     upper partial       Past Surgical History  Past Surgical History:   Procedure Laterality Date    CHOLECYSTECTOMY  1991    COLONOSCOPY  11/09/2017    HEMORRHOID SURGERY  1999    HIP SURGERY  2019    HYSTERECTOMY  1993    Vaginal    [facility] ARTHRP ACETBLR/PROX FEM PROSTC AGRFT/ALGRFT Right 10/22/2019    Procedure: ARTHROPLASTY HIP TOTAL;  Surgeon: Lupe Driscoll DO;  Location: [facility];  Service: Orthopedics    TRIGGER FINGER RELEASE Right 1998       Past Family History  Family  History   Problem Relation Age of Onset    Hypertension Family     Hypertension Mother        Past Social history  Social History     Socioeconomic History    Marital status: /Civil Union     Spouse name: Not on file    Number of children: Not on file    Years of education: Not on file    Highest education level: Not on file   Occupational History    Occupation: RN, retired   Tobacco Use    Smoking status: Never    Smokeless tobacco: Never   Vaping Use    Vaping status: Never Used   Substance and Sexual Activity    Alcohol use: Never    Drug use: Never    Sexual activity: Not Currently     Partners: Male     Birth control/protection: None   Other Topics Concern    Not on file   Social History Narrative    Not on file     Social Determinants of Health     Financial Resource Strain: Low Risk  (12/19/2022)    Overall Financial Resource Strain (CARDIA)     Difficulty of Paying Living Expenses: Not hard at all   Food Insecurity: No Food Insecurity (8/7/2024)    Hunger Vital Sign     Worried About Running Out of Food in the Last Year: Never true     Ran Out of Food in the Last Year: Never true   Transportation Needs: No Transportation Needs (8/7/2024)    PRAPARE - Transportation     Lack of Transportation (Medical): No     Lack of Transportation (Non-Medical): No   Physical Activity: Not on file   Stress: Not on file   Social Connections: Not on file   Intimate Partner Violence: Not on file   Housing Stability: Low Risk  (8/7/2024)    Housing Stability Vital Sign     Unable to Pay for Housing in the Last Year: No     Number of Times Moved in the Last Year: 1     Homeless in the Last Year: No       Current Medications  Current Outpatient Medications   Medication Sig Dispense Refill    acetaminophen (TYLENOL) 325 mg tablet Take 650 mg by mouth every 6 (six) hours as needed for mild pain      atorvastatin (LIPITOR) 10 mg tablet TAKE 1 TABLET BY MOUTH DAILY AT BEDTIME 90 tablet 0    Eliquis 5 MG Take 1 tablet (5 mg  "total) by mouth 2 (two) times a day 180 tablet 0    estradiol (ESTRACE VAGINAL) 0.1 mg/g vaginal cream Insert 1 g into the vagina daily Daily for 3 weeks then 3 times a week to anterior vaginal wall 42.5 g 1    furosemide (LASIX) 20 mg tablet Take 1 tablet (20 mg total) by mouth daily as needed (edema) 30 tablet 0    hydroCHLOROthiazide 12.5 mg tablet Take 1 tablet (12.5 mg total) by mouth daily 90 tablet 1    labetalol (NORMODYNE) 200 mg tablet Take 1 tablet (200 mg total) by mouth 2 (two) times a day 180 tablet 0    levothyroxine 25 mcg tablet Take 1 tablet (25 mcg total) by mouth daily 90 tablet 1    losartan (COZAAR) 100 MG tablet Take 1 tablet (100 mg total) by mouth daily 90 tablet 1    metFORMIN (GLUCOPHAGE-XR) 750 mg 24 hr tablet Take 1 tablet (750 mg total) by mouth daily with breakfast 90 tablet 0    Mirabegron ER (Myrbetriq) 50 MG TB24 Take 1 tablet (50 mg total) by mouth in the morning 30 tablet 2    solifenacin (VESICARE) 10 MG tablet TAKE 1 TABLET BY MOUTH EVERY DAY 90 tablet 0     No current facility-administered medications for this visit.       Allergies  Allergies   Allergen Reactions    Oxycodone Vomiting       OBJECTIVE    Vitals   Vitals:    09/16/24 1055   BP: 150/90   BP Location: Left arm   Patient Position: Sitting   Cuff Size: Standard   Pulse: 86   SpO2: 97%   Weight: 95.3 kg (210 lb)   Height: 5' 4\" (1.626 m)       PVR:    Physical Exam  Constitutional:       General: She is not in acute distress.     Appearance: Normal appearance. She is normal weight. She is not ill-appearing or toxic-appearing.   HENT:      Head: Normocephalic and atraumatic.   Eyes:      Conjunctiva/sclera: Conjunctivae normal.   Cardiovascular:      Rate and Rhythm: Normal rate.   Pulmonary:      Effort: Pulmonary effort is normal. No respiratory distress.   Skin:     General: Skin is warm and dry.   Neurological:      General: No focal deficit present.      Mental Status: She is alert and oriented to person, place, " and time.      Cranial Nerves: No cranial nerve deficit.   Psychiatric:         Mood and Affect: Mood normal.         Behavior: Behavior normal.         Thought Content: Thought content normal.          Labs:     Lab Results   Component Value Date    CREATININE 0.73 06/03/2024      Lab Results   Component Value Date    HGBA1C 6.9 (H) 06/03/2024     Lab Results   Component Value Date    CALCIUM 9.3 06/03/2024    K 4.4 06/03/2024    CO2 27 06/03/2024    CL 98 06/03/2024    BUN 18 06/03/2024    CREATININE 0.73 06/03/2024       I have personally reviewed all pertinent lab results and reviewed with patient    Imaging   CT ABDOMEN AND PELVIS WITH AND WITHOUT IV CONTRAST     INDICATION:   Other microscopic hematuria.        COMPARISON: 5/20/2024     TECHNIQUE: Initial CT of the abdomen and pelvis was performed without intravenous contrast.  Subsequent dynamic CT evaluation of the abdomen and pelvis was performed after the administration of intravenous contrast in both nephrographic and delayed   phases. Multiplanar 2D reformatted images were created from the source data.     This examination, like all CT scans performed in the [facility], was performed utilizing techniques to minimize radiation dose exposure, including the use of iterative reconstruction and automated exposure control. Radiation dose length   product (DLP) for this visit:     IV Contrast:  Enteric Contrast:  Enteric contrast was not administered.     FINDINGS:     ABDOMEN     RIGHT KIDNEY AND URETER:  No solid renal mass.  No detectable urothelial mass.  No hydronephrosis or hydroureter.  No urinary tract calculi. No perinephric collection.     LEFT KIDNEY AND URETER:  No solid renal mass.  No detectable urothelial mass.  No hydronephrosis or hydroureter.  No urinary tract calculi. No perinephric collection.     URINARY BLADDER:  No bladder wall mass. Mild bladder wall thickening  No calculi.     LOWER CHEST:  No clinically significant  abnormality identified in the visualized lower chest.     LIVER/BILIARY TREE:  Unremarkable.     GALLBLADDER: Gallbladder is surgically absent.     SPLEEN:  Unremarkable.     PANCREAS:  Unremarkable.     ADRENAL GLANDS: 2 x 1.5 cm low-attenuation left adrenal nodule with greater than 50% washout on delayed images consistent with adenoma. Normal size right adrenal gland     STOMACH AND BOWEL: Small sliding hiatal hernia. There is colonic diverticulosis without evidence of acute diverticulitis.     APPENDIX:  No findings to suggest appendicitis.     ABDOMINOPELVIC CAVITY:  No ascites.  No free intraperitoneal air. No lymphadenopathy.     VESSELS:  Unremarkable for patient's age.     PELVIS     REPRODUCTIVE ORGANS: Patient is status post hysterectomy.     ABDOMINAL WALL/INGUINAL REGIONS: Fat-containing left lower quadrant abdominal wall hernia with fascial defect measuring 2.7 cm..     OSSEOUS STRUCTURES:  No acute fracture or destructive osseous lesion. Total right hip arthroplasty     IMPRESSION:        1. No renal or ureteral stones. No bladder stones. No suspicious renal or ureteral lesions. No pelvicalyceal filling defects. No bladder lesions.     2. Incidental left adrenal adenoma.     3. Small sliding hiatal hernia. Colonic diverticulosis..     4. Left lower quadrant abdominal wall hernia containing fat.     Electronically signed: 07/18/2024 08:04 AM MD Eder Price PA-C  Date: 9/16/2024 Time: 10:59 AM  [facility]    This note was written using fluency dictation software. Please excuse any resulting minor grammatical errors.

## 2024-09-20 DIAGNOSIS — E78.2 MIXED HYPERLIPIDEMIA: ICD-10-CM

## 2024-09-20 RX ORDER — ATORVASTATIN CALCIUM 10 MG/1
10 TABLET, FILM COATED ORAL
Qty: 90 TABLET | Refills: 1 | Status: SHIPPED | OUTPATIENT
Start: 2024-09-20

## 2024-09-23 ENCOUNTER — APPOINTMENT (OUTPATIENT)
Dept: LAB | Age: 84
End: 2024-09-23
Payer: COMMERCIAL

## 2024-09-23 ENCOUNTER — OFFICE VISIT (OUTPATIENT)
Dept: URGENT CARE | Age: 84
End: 2024-09-23
Payer: COMMERCIAL

## 2024-09-23 ENCOUNTER — TELEPHONE (OUTPATIENT)
Age: 84
End: 2024-09-23

## 2024-09-23 VITALS
DIASTOLIC BLOOD PRESSURE: 94 MMHG | BODY MASS INDEX: 35.36 KG/M2 | RESPIRATION RATE: 20 BRPM | OXYGEN SATURATION: 98 % | HEART RATE: 88 BPM | TEMPERATURE: 96.5 F | SYSTOLIC BLOOD PRESSURE: 148 MMHG | WEIGHT: 206 LBS

## 2024-09-23 DIAGNOSIS — R31.29 MICROSCOPIC HEMATURIA: ICD-10-CM

## 2024-09-23 DIAGNOSIS — D35.00 ADRENAL ADENOMA, UNSPECIFIED LATERALITY: ICD-10-CM

## 2024-09-23 DIAGNOSIS — R39.9 UTI SYMPTOMS: Primary | ICD-10-CM

## 2024-09-23 DIAGNOSIS — N39.0 URINARY TRACT INFECTION WITHOUT HEMATURIA, SITE UNSPECIFIED: Primary | ICD-10-CM

## 2024-09-23 LAB
ANION GAP SERPL CALCULATED.3IONS-SCNC: 8 MMOL/L (ref 4–13)
BUN SERPL-MCNC: 12 MG/DL (ref 5–25)
CALCIUM SERPL-MCNC: 9.6 MG/DL (ref 8.4–10.2)
CHLORIDE SERPL-SCNC: 97 MMOL/L (ref 96–108)
CO2 SERPL-SCNC: 26 MMOL/L (ref 21–32)
CORTIS AM PEAK SERPL-MCNC: 1.2 UG/DL (ref 6.7–22.6)
CREAT SERPL-MCNC: 0.78 MG/DL (ref 0.6–1.3)
GFR SERPL CREATININE-BSD FRML MDRD: 71 ML/MIN/1.73SQ M
GLUCOSE P FAST SERPL-MCNC: 144 MG/DL (ref 65–99)
POTASSIUM SERPL-SCNC: 4.3 MMOL/L (ref 3.5–5.3)
SL AMB  POCT GLUCOSE, UA: ABNORMAL
SL AMB LEUKOCYTE ESTERASE,UA: ABNORMAL
SL AMB POCT BILIRUBIN,UA: ABNORMAL
SL AMB POCT BLOOD,UA: ABNORMAL
SL AMB POCT CLARITY,UA: ABNORMAL
SL AMB POCT COLOR,UA: ABNORMAL
SL AMB POCT KETONES,UA: ABNORMAL
SL AMB POCT NITRITE,UA: ABNORMAL
SL AMB POCT PH,UA: 7.5
SL AMB POCT SPECIFIC GRAVITY,UA: 1
SL AMB POCT URINE PROTEIN: ABNORMAL
SL AMB POCT UROBILINOGEN: 0.2
SODIUM SERPL-SCNC: 131 MMOL/L (ref 135–147)

## 2024-09-23 PROCEDURE — 80048 BASIC METABOLIC PNL TOTAL CA: CPT

## 2024-09-23 PROCEDURE — 36415 COLL VENOUS BLD VENIPUNCTURE: CPT

## 2024-09-23 PROCEDURE — 81002 URINALYSIS NONAUTO W/O SCOPE: CPT

## 2024-09-23 PROCEDURE — 82088 ASSAY OF ALDOSTERONE: CPT

## 2024-09-23 PROCEDURE — 87086 URINE CULTURE/COLONY COUNT: CPT

## 2024-09-23 PROCEDURE — 99213 OFFICE O/P EST LOW 20 MIN: CPT

## 2024-09-23 PROCEDURE — 83835 ASSAY OF METANEPHRINES: CPT

## 2024-09-23 PROCEDURE — 84244 ASSAY OF RENIN: CPT

## 2024-09-23 PROCEDURE — 82533 TOTAL CORTISOL: CPT

## 2024-09-23 RX ORDER — CEPHALEXIN 500 MG/1
500 CAPSULE ORAL EVERY 12 HOURS SCHEDULED
Qty: 10 CAPSULE | Refills: 0 | Status: SHIPPED | OUTPATIENT
Start: 2024-09-23 | End: 2024-09-23

## 2024-09-23 RX ORDER — CEPHALEXIN 500 MG/1
500 CAPSULE ORAL EVERY 12 HOURS SCHEDULED
Qty: 10 CAPSULE | Refills: 0 | Status: SHIPPED | OUTPATIENT
Start: 2024-09-23 | End: 2024-09-28

## 2024-09-23 RX ORDER — PHENAZOPYRIDINE HYDROCHLORIDE 100 MG/1
100 TABLET, FILM COATED ORAL 3 TIMES DAILY PRN
Qty: 10 TABLET | Refills: 0 | Status: SHIPPED | OUTPATIENT
Start: 2024-09-23

## 2024-09-23 RX ORDER — CEPHALEXIN 500 MG/1
500 CAPSULE ORAL EVERY 8 HOURS SCHEDULED
Qty: 21 CAPSULE | Refills: 0 | Status: SHIPPED | OUTPATIENT
Start: 2024-09-23 | End: 2024-09-30

## 2024-09-23 NOTE — TELEPHONE ENCOUNTER
Patient calling regarding UTI symptoms. Pt was seen today at  for Urinary frequency , vaginal pressure and discomfort. Pt was prescribed Rx: Keflex for UTI. Pt states if there is something else she is able to take for the vaginal pressure and discomfort as she was up all night and unable to sleep with pain.      Please review and advise.  Thank you

## 2024-09-23 NOTE — PATIENT INSTRUCTIONS
Take antibiotics as prescribed. Complete the entire course. If you do not complete the entire course this may result in bacterial resistance making future infections very difficult or impossible to treat. You should never have leftover antibiotics unless your antibiotic is switched. Note that if you are taking birth control medications, antibiotics can decrease their effectiveness. Recommend abstinence or back up method while on antibiotics and for 3-5 days after completing the antibiotic course.   We send all positive urine for culture, we will call you if your antibiotic needs to be changed based on culture results.   If symptoms do not improve in 2-3 days, follow-up with your primary care provider.   If you develop fever, chills, or worsening low back pain report to the emergency room. These are symptoms of a more serious condition or possible spread of the infection into your bloodstream.     Urinary Tract Infection in Women   AMBULATORY CARE:   A urinary tract infection (UTI)  is caused by bacteria that get inside your urinary tract. Most bacteria that enter your urinary tract come out when you urinate. If the bacteria stay in your urinary tract, you may get an infection. Your urinary tract includes your kidneys, ureters, bladder, and urethra. Urine is made in your kidneys, and it flows from the ureters to the bladder. Urine leaves the bladder through the urethra. A UTI is more common in your lower urinary tract, which includes your bladder and urethra.      Common symptoms include the following:   Urinating more often or waking from sleep to urinate    Pain or burning when you urinate    Pain or pressure in your lower abdomen     Urine that smells bad    Blood in your urine    Leaking urine    Seek care immediately if:   You are urinating very little or not at all.    You have a high fever with shaking chills.     You have side or back pain that gets worse.    Call your doctor if:   You have a fever.    You do  not feel better after 2 days of taking antibiotics.    You are vomiting.     You have questions or concerns about your condition or care.    Treatment for a UTI  may include antibiotics to treat a bacterial infection. You may also need medicines to decrease pain and burning, or decrease the urge to urinate often. If you have UTIs often (called recurrent UTIs), you may be given antibiotics to take regularly. You will be given directions for when and how to use antibiotics. The goal is to prevent UTIs but not cause antibiotic resistance by using antibiotics too often.  Prevent a UTI:   Empty your bladder often.  Urinate and empty your bladder as soon as you feel the need. Do not hold your urine for long periods of time.    Wipe from front to back after you urinate or have a bowel movement.  This will help prevent germs from getting into your urinary tract through your urethra.    Drink liquids as directed.  Ask how much liquid to drink each day and which liquids are best for you. You may need to drink more liquids than usual to help flush out the bacteria. Do not drink alcohol, caffeine, or citrus juices. These can irritate your bladder and increase your symptoms. Your healthcare provider may recommend cranberry juice to help prevent a UTI.    Urinate after you have sex.  This can help flush out bacteria passed during sex.    Do not douche or use feminine deodorants.  These can change the chemical balance in your vagina.    Change sanitary pads or tampons often.  This will help prevent germs from getting into your urinary tract.    Talk to your healthcare provider about your birth control method.  You may need to change your method if it is increasing your risk for UTIs.    Wear cotton underwear and clothes that are loose.  Tight pants and nylon underwear can trap moisture and cause bacteria to grow.    Vaginal estrogen may be recommended.  This medicine helps prevent UTIs in women who have gone through menopause or  are in landen-menopause.    Do pelvic muscle exercises often.  Pelvic muscle exercises may help you start and stop urinating. Strong pelvic muscles may help you empty your bladder easier. Squeeze these muscles tightly for 5 seconds like you are trying to hold back urine. Then relax for 5 seconds. Gradually work up to squeezing for 10 seconds. Do 3 sets of 15 repetitions a day, or as directed.    Follow up with your healthcare provider as directed:  Write down your questions so you remember to ask them during your visits.   © Copyright [employer] 2020 Information is for End User's use only and may not be sold, redistributed or otherwise used for commercial purposes. All illustrations and images included in CareNotes® are the copyrighted property of [employer], [employer] or [employer]  The above information is an  only. It is not intended as medical advice for individual conditions or treatments. Talk to your doctor, nurse or pharmacist before following any medical regimen to see if it is safe and effective for you.

## 2024-09-23 NOTE — PROGRESS NOTES
[facility]        NAME: Lubna Bronson is a 83 y.o. female  : 1940    MRN: [id number]  DATE: 2024  TIME: 8:50 AM    Assessment and Plan   UTI symptoms [R39.9]  1. UTI symptoms  POCT urine dip    Urine culture    cephalexin (KEFLEX) 500 mg capsule    DISCONTINUED: cephalexin (KEFLEX) 500 mg capsule        Chronic urinary frequency and urgency.  New vaginal pain and suprapubic pressure with urination for 1 day.  Urine dip positive for Leuks and blood.  Will initiate oral abx therapy.  Urine sent for culture.  Follow up PCP 2 days.     Patient Instructions       Follow up with PCP in 3-5 days.  Proceed to  ER if symptoms worsen.    If tests have been performed at [facility], our office will contact you with results if changes need to be made to the care plan discussed with you at the visit.  You can review your full results on [facility] My[facility].    Chief Complaint     Chief Complaint   Patient presents with    Possible UTI     Pt states she has had symptoms for last 6 months. CT performed and has been seen by urologist. Extreme pelvic pain.          History of Present Illness       Pt is an 83 year old female presenting with increased dysuria and pain which started last night.  Pt reports she is currently being seen by urology for 6 months of urinary symptoms including increased frequency, urgency, and dysuria.  Overnight, she reports urinating approximately 12 times, with suprapubic pressure and stabbing vaginal pain.  She denies fever, chills, nausea, vomiting and diarrhea. Pt has follow up appointment with internal medicine in 3 days.          Review of Systems   Review of Systems   Constitutional:  Negative for chills and fatigue.   Gastrointestinal:  Positive for abdominal pain. Negative for diarrhea, nausea and vomiting.   Genitourinary:  Positive for decreased urine volume, dysuria, enuresis, frequency, hematuria, pelvic pain, urgency and vaginal pain. Negative for flank pain  and menstrual problem.         Current Medications       Current Outpatient Medications:     acetaminophen (TYLENOL) 325 mg tablet, Take 650 mg by mouth every 6 (six) hours as needed for mild pain, Disp: , Rfl:     atorvastatin (LIPITOR) 10 mg tablet, Take 1 tablet (10 mg total) by mouth daily at bedtime, Disp: 90 tablet, Rfl: 1    cephalexin (KEFLEX) 500 mg capsule, Take 1 capsule (500 mg total) by mouth every 12 (twelve) hours for 5 days, Disp: 10 capsule, Rfl: 0    Eliquis 5 MG, Take 1 tablet (5 mg total) by mouth 2 (two) times a day, Disp: 180 tablet, Rfl: 0    estradiol (ESTRACE VAGINAL) 0.1 mg/g vaginal cream, Insert 1 g into the vagina daily Daily for 3 weeks then 3 times a week to anterior vaginal wall, Disp: 42.5 g, Rfl: 1    furosemide (LASIX) 20 mg tablet, Take 1 tablet (20 mg total) by mouth daily as needed (edema), Disp: 30 tablet, Rfl: 0    hydroCHLOROthiazide 12.5 mg tablet, Take 1 tablet (12.5 mg total) by mouth daily, Disp: 90 tablet, Rfl: 1    labetalol (NORMODYNE) 200 mg tablet, Take 1 tablet (200 mg total) by mouth 2 (two) times a day, Disp: 180 tablet, Rfl: 0    levothyroxine 25 mcg tablet, Take 1 tablet (25 mcg total) by mouth daily, Disp: 90 tablet, Rfl: 1    losartan (COZAAR) 100 MG tablet, Take 1 tablet (100 mg total) by mouth daily, Disp: 90 tablet, Rfl: 1    metFORMIN (GLUCOPHAGE-XR) 750 mg 24 hr tablet, Take 1 tablet (750 mg total) by mouth daily with breakfast, Disp: 90 tablet, Rfl: 0    trospium chloride (SANCTURA) 20 mg tablet, Take 1 tablet (20 mg total) by mouth 2 (two) times a day, Disp: 60 tablet, Rfl: 3    Current Allergies     Allergies as of 09/23/2024 - Reviewed 09/23/2024   Allergen Reaction Noted    Oxycodone Vomiting 12/11/2019            The following portions of the patient's history were reviewed and updated as appropriate: allergies, current medications, past family history, past medical history, past social history, past surgical history and problem list.     Past  Medical History:   Diagnosis Date    Acquired hypothyroidism     Adenoma of left adrenal gland 02/27/2018    Adrenal gland disorder (HCC)     Anemia     Anxiety     Arthritis     At risk for falls     Diabetes mellitus (HCC)     Disease of thyroid gland     hypo    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    Edema of both legs     History of pulmonary embolus (PE)     Hyperlipidemia     Hypertension     Irritable bowel syndrome     Obesity 2000    Prediabetes     Presence of right artificial hip joint     Pulmonary embolus (HCC) 2/27/2018 & 2/14/2019    Uses roller walker     Wears glasses     Wears partial dentures     upper partial       Past Surgical History:   Procedure Laterality Date    CHOLECYSTECTOMY  1991    COLONOSCOPY  11/09/2017    HEMORRHOID SURGERY  1999    HIP SURGERY  2019    HYSTERECTOMY  1993    Vaginal    [place] ARTHRP ACETBLR/PROX FEM PROSTC AGRFT/ALGRFT Right 10/22/2019    Procedure: ARTHROPLASTY HIP TOTAL;  Surgeon: Lupe Driscoll DO;  Location: [facility];  Service: Orthopedics    TRIGGER FINGER RELEASE Right 1998       Family History   Problem Relation Age of Onset    Hypertension Family     Hypertension Mother          Medications have been verified.        Objective   /94   Pulse 88   Temp (!) 96.5 °F (35.8 °C)   Resp 20   Wt 93.4 kg (206 lb)   SpO2 98%   BMI 35.36 kg/m²   No LMP recorded. Patient has had a hysterectomy.       Physical Exam     Physical Exam  Vitals and nursing note reviewed.   Constitutional:       General: She is not in acute distress.     Appearance: Normal appearance. She is normal weight. She is not ill-appearing.   HENT:      Head: Normocephalic.   Cardiovascular:      Rate and Rhythm: Normal rate.      Pulses: Normal pulses.   Pulmonary:      Effort: Pulmonary effort is normal.   Abdominal:      General: Bowel sounds are normal.      Tenderness: There is abdominal tenderness (suprapubic).      Hernia: A hernia (hiatal) is present.   Skin:      General: Skin is warm.      Capillary Refill: Capillary refill takes less than 2 seconds.   Neurological:      Mental Status: She is alert.

## 2024-09-25 ENCOUNTER — OFFICE VISIT (OUTPATIENT)
Dept: INTERNAL MEDICINE CLINIC | Facility: CLINIC | Age: 84
End: 2024-09-25
Payer: COMMERCIAL

## 2024-09-25 VITALS
SYSTOLIC BLOOD PRESSURE: 132 MMHG | DIASTOLIC BLOOD PRESSURE: 78 MMHG | TEMPERATURE: 98.4 F | BODY MASS INDEX: 35.34 KG/M2 | WEIGHT: 207 LBS | HEART RATE: 77 BPM | OXYGEN SATURATION: 97 % | HEIGHT: 64 IN

## 2024-09-25 DIAGNOSIS — E53.8 B12 DEFICIENCY: ICD-10-CM

## 2024-09-25 DIAGNOSIS — I10 ESSENTIAL HYPERTENSION: Primary | ICD-10-CM

## 2024-09-25 DIAGNOSIS — I87.2 VENOUS INSUFFICIENCY OF BOTH LOWER EXTREMITIES: ICD-10-CM

## 2024-09-25 DIAGNOSIS — E11.65 UNCONTROLLED TYPE 2 DIABETES MELLITUS WITH HYPERGLYCEMIA (HCC): ICD-10-CM

## 2024-09-25 DIAGNOSIS — E03.9 ACQUIRED HYPOTHYROIDISM: ICD-10-CM

## 2024-09-25 DIAGNOSIS — M17.0 PRIMARY OSTEOARTHRITIS OF BOTH KNEES: ICD-10-CM

## 2024-09-25 DIAGNOSIS — I27.82 CHRONIC SADDLE PULMONARY EMBOLISM WITHOUT ACUTE COR PULMONALE (HCC): ICD-10-CM

## 2024-09-25 DIAGNOSIS — Z23 ENCOUNTER FOR IMMUNIZATION: ICD-10-CM

## 2024-09-25 DIAGNOSIS — I26.92 CHRONIC SADDLE PULMONARY EMBOLISM WITHOUT ACUTE COR PULMONALE (HCC): ICD-10-CM

## 2024-09-25 DIAGNOSIS — E78.2 MIXED HYPERLIPIDEMIA: ICD-10-CM

## 2024-09-25 LAB
BACTERIA UR CULT: NORMAL
BACTERIA UR CULT: NORMAL

## 2024-09-25 PROCEDURE — G0009 ADMIN PNEUMOCOCCAL VACCINE: HCPCS

## 2024-09-25 PROCEDURE — 90677 PCV20 VACCINE IM: CPT

## 2024-09-25 PROCEDURE — 99214 OFFICE O/P EST MOD 30 MIN: CPT | Performed by: INTERNAL MEDICINE

## 2024-09-25 RX ORDER — DEXAMETHASONE 1 MG
TABLET ORAL
COMMUNITY
Start: 2024-09-16

## 2024-09-25 NOTE — PROGRESS NOTES
Assessment/Plan:             1. Essential hypertension  Comments:  Continue same medication  Orders:  -     CBC and differential; Future  -     Comprehensive metabolic panel; Future  -     Lipid Panel with Direct LDL reflex; Future  -     TSH, 3rd generation; Future  2. Venous insufficiency of both lower extremities  3. Uncontrolled type 2 diabetes mellitus with hyperglycemia (HCC)  Comments:  Continue same medication  Orders:  -     Albumin / creatinine urine ratio; Future  -     CBC and differential; Future  -     Comprehensive metabolic panel; Future  -     Lipid Panel with Direct LDL reflex; Future  -     TSH, 3rd generation; Future  -     Hemoglobin A1C; Future  4. Acquired hypothyroidism  Comments:  Continue same medication  5. Primary osteoarthritis of both knees  6. Mixed hyperlipidemia  Comments:  Continue same medication  Orders:  -     Comprehensive metabolic panel; Future  -     Lipid Panel with Direct LDL reflex; Future  -     TSH, 3rd generation; Future  7. 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 B12 deficiency  -     Vitamin B12; Future         Subjective:      Patient ID: Lubna Bronson is a 83 y.o. female.    Follow-up on multiple medical problems to ensure they are stable on current medications        The following portions of the patient's history were reviewed and updated as appropriate: She  has a past medical history of Acquired hypothyroidism, 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), Adrenal gland disorder (HCC), Anemia, Anxiety, Arthritis, At risk for falls, Diabetes mellitus (HCC), Disease of thyroid gland, DJD (degenerative joint disease) of knee, Edema of both legs, History of pulmonary embolus (PE), Hyperlipidemia, Hypertension, Irritable bowel syndrome, Obesity (2000), Prediabetes, Presence of right artificial hip joint, Pulmonary embolus (HCC) (2/27/2018 & 2/14/2019), Uses roller walker, Wears glasses, and Wears  partial dentures.  She   Patient Active Problem List    Diagnosis Date Noted    Uncontrolled type 2 diabetes mellitus with hyperglycemia (formerly [facility]) 06/14/2024    Hyponatremia 05/31/2024    Primary osteoarthritis of both knees 09/07/2022    Tracheitis 06/29/2022    Obesity, morbid (HCC) 05/05/2021    Essential hypertension, benign 05/05/2021    Venous insufficiency of both lower extremities 12/09/2020    Diabetes mellitus without complication (formerly [facility]) 12/09/2020    Hypertension     Irritable bowel syndrome     Edema of both legs     Acquired hypothyroidism     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, without long-term current use of insulin (formerly [facility])     History of pulmonary embolus (PE)     Pulmonary embolus (formerly [facility])     Presence of right artificial hip joint     Acute blood loss anemia 10/25/2019    Mixed hyperlipidemia 10/23/2019    Essential hypertension 10/23/2019    History of hypercoagulable state 10/09/2019    Primary osteoarthritis of one hip, right 08/09/2019    Primary osteoarthritis of right hip      She  has a past surgical history that includes Cholecystectomy (1991); Colonoscopy (11/09/2017); pr arthrp acetblr/prox fem prostc agrft/algrft (Right, 10/22/2019); Hysterectomy (1993); Hemorrhoid surgery (1999); Trigger finger release (Right, 1998); and Hip surgery (2019).  Her family history includes Hypertension in her family and mother.  She  reports that she has never smoked. She has never used smokeless tobacco. She reports that she does not drink alcohol and does not use drugs.  Current Outpatient Medications   Medication Sig Dispense Refill    acetaminophen (TYLENOL) 325 mg tablet Take 650 mg by mouth every 6 (six) hours as needed for mild pain      atorvastatin (LIPITOR) 10 mg tablet Take 1 tablet (10 mg total) by mouth daily at bedtime 90 tablet 1    cephalexin (KEFLEX) 500 mg capsule Take 1 capsule (500 mg total) by mouth every 12 (twelve) hours for 5 days 10 capsule 0    Eliquis 5 MG  Take 1 tablet (5 mg total) by mouth 2 (two) times a day 180 tablet 0    estradiol (ESTRACE VAGINAL) 0.1 mg/g vaginal cream Insert 1 g into the vagina daily Daily for 3 weeks then 3 times a week to anterior vaginal wall 42.5 g 1    furosemide (LASIX) 20 mg tablet Take 1 tablet (20 mg total) by mouth daily as needed (edema) 30 tablet 0    hydroCHLOROthiazide 12.5 mg tablet Take 1 tablet (12.5 mg total) by mouth daily 90 tablet 1    labetalol (NORMODYNE) 200 mg tablet Take 1 tablet (200 mg total) by mouth 2 (two) times a day 180 tablet 0    levothyroxine 25 mcg tablet Take 1 tablet (25 mcg total) by mouth daily 90 tablet 1    losartan (COZAAR) 100 MG tablet Take 1 tablet (100 mg total) by mouth daily 90 tablet 1    metFORMIN (GLUCOPHAGE-XR) 750 mg 24 hr tablet Take 1 tablet (750 mg total) by mouth daily with breakfast 90 tablet 0    trospium chloride (SANCTURA) 20 mg tablet Take 1 tablet (20 mg total) by mouth 2 (two) times a day 60 tablet 3    cephalexin (KEFLEX) 500 mg capsule Take 1 capsule (500 mg total) by mouth every 8 (eight) hours for 7 days (Patient not taking: Reported on 9/25/2024) 21 capsule 0    dexamethasone (DECADRON) 1 mg tablet TAKE 1 TABLET (1 MG TOTAL) BY MOUTH 1 (ONE) TIME FOR 1 DOSE TAKE AT 11 PM THE NIGHT BEFORE (Patient not taking: Reported on 9/25/2024)      phenazopyridine (PYRIDIUM) 100 mg tablet Take 1 tablet (100 mg total) by mouth 3 (three) times a day as needed for bladder spasms (Patient not taking: Reported on 9/25/2024) 10 tablet 0     No current facility-administered medications for this visit.     Current Outpatient Medications on File Prior to Visit   Medication Sig    acetaminophen (TYLENOL) 325 mg tablet Take 650 mg by mouth every 6 (six) hours as needed for mild pain    atorvastatin (LIPITOR) 10 mg tablet Take 1 tablet (10 mg total) by mouth daily at bedtime    cephalexin (KEFLEX) 500 mg capsule Take 1 capsule (500 mg total) by mouth every 12 (twelve) hours for 5 days    Eliquis 5  MG Take 1 tablet (5 mg total) by mouth 2 (two) times a day    estradiol (ESTRACE VAGINAL) 0.1 mg/g vaginal cream Insert 1 g into the vagina daily Daily for 3 weeks then 3 times a week to anterior vaginal wall    furosemide (LASIX) 20 mg tablet Take 1 tablet (20 mg total) by mouth daily as needed (edema)    hydroCHLOROthiazide 12.5 mg tablet Take 1 tablet (12.5 mg total) by mouth daily    labetalol (NORMODYNE) 200 mg tablet Take 1 tablet (200 mg total) by mouth 2 (two) times a day    levothyroxine 25 mcg tablet Take 1 tablet (25 mcg total) by mouth daily    losartan (COZAAR) 100 MG tablet Take 1 tablet (100 mg total) by mouth daily    metFORMIN (GLUCOPHAGE-XR) 750 mg 24 hr tablet Take 1 tablet (750 mg total) by mouth daily with breakfast    trospium chloride (SANCTURA) 20 mg tablet Take 1 tablet (20 mg total) by mouth 2 (two) times a day    cephalexin (KEFLEX) 500 mg capsule Take 1 capsule (500 mg total) by mouth every 8 (eight) hours for 7 days (Patient not taking: Reported on 9/25/2024)    dexamethasone (DECADRON) 1 mg tablet TAKE 1 TABLET (1 MG TOTAL) BY MOUTH 1 (ONE) TIME FOR 1 DOSE TAKE AT 11 PM THE NIGHT BEFORE (Patient not taking: Reported on 9/25/2024)    phenazopyridine (PYRIDIUM) 100 mg tablet Take 1 tablet (100 mg total) by mouth 3 (three) times a day as needed for bladder spasms (Patient not taking: Reported on 9/25/2024)     No current facility-administered medications on file prior to visit.     She is allergic to oxycodone..    Review of Systems   Constitutional:  Negative for chills and fever.   HENT:  Negative for congestion, ear pain and sore throat.    Eyes:  Negative for pain.   Respiratory:  Positive for shortness of breath. Negative for cough.    Cardiovascular:  Negative for chest pain and leg swelling.   Gastrointestinal:  Negative for abdominal pain, nausea and vomiting.   Endocrine: Negative for polyuria.   Genitourinary:  Negative for difficulty urinating, frequency and urgency.  "  Musculoskeletal:  Positive for arthralgias and back pain.   Skin:  Negative for rash.   Neurological:  Negative for weakness and headaches.   Psychiatric/Behavioral:  Negative for sleep disturbance. The patient is not nervous/anxious.          Objective:      /78 (BP Location: Left arm, Patient Position: Sitting, Cuff Size: Standard)   Pulse 77   Temp 98.4 °F (36.9 °C) (Temporal)   Ht 5' 4\" (1.626 m)   Wt 93.9 kg (207 lb)   SpO2 97%   BMI 35.53 kg/m²     Recent Results (from the past 1344 hour(s))   Basic metabolic panel    Collection Time: 09/23/24  7:28 AM   Result Value Ref Range    Sodium 131 (L) 135 - 147 mmol/L    Potassium 4.3 3.5 - 5.3 mmol/L    Chloride 97 96 - 108 mmol/L    CO2 26 21 - 32 mmol/L    ANION GAP 8 4 - 13 mmol/L    BUN 12 5 - 25 mg/dL    Creatinine 0.78 0.60 - 1.30 mg/dL    Glucose, Fasting 144 (H) 65 - 99 mg/dL    Calcium 9.6 8.4 - 10.2 mg/dL    eGFR 71 ml/min/1.73sq m   Cortisol Level,7-9 AM Specimen    Collection Time: 09/23/24  7:28 AM   Result Value Ref Range    Cortisol - AM 1.2 (L) 6.7 - 22.6 ug/dL   POCT urine dip    Collection Time: 09/23/24  8:32 AM   Result Value Ref Range    LEUKOCYTE ESTERASE,UA moderate     NITRITE,UA neg     SL AMB POCT UROBILINOGEN 0.2     POCT URINE PROTEIN neg      PH,UA 7.5     BLOOD,UA nh trace     SPECIFIC GRAVITY,UA 1.005     KETONES,UA neg     BILIRUBIN,UA neg     GLUCOSE, UA neg      COLOR,UA straw     CLARITY,UA hazy    Urine culture    Collection Time: 09/23/24  9:32 AM    Specimen: Urine, Clean Catch   Result Value Ref Range    Urine Culture Culture too young- will reincubate         Physical Exam  Constitutional:       Appearance: Normal appearance.   HENT:      Head: Normocephalic.      Right Ear: External ear normal.      Left Ear: External ear normal.      Nose: Nose normal. No congestion.      Mouth/Throat:      Mouth: Mucous membranes are moist.      Pharynx: Oropharynx is clear. No oropharyngeal exudate or posterior oropharyngeal " erythema.   Eyes:      Extraocular Movements: Extraocular movements intact.      Conjunctiva/sclera: Conjunctivae normal.   Cardiovascular:      Rate and Rhythm: Normal rate and regular rhythm.      Heart sounds: Normal heart sounds. No murmur heard.  Pulmonary:      Effort: Pulmonary effort is normal.      Breath sounds: Normal breath sounds. No wheezing or rales.   Abdominal:      General: Abdomen is flat. There is no distension.      Palpations: Abdomen is soft.      Tenderness: There is no abdominal tenderness.   Musculoskeletal:         General: Normal range of motion.      Cervical back: Normal range of motion and neck supple.      Right lower leg: Edema present.      Left lower leg: Edema present.   Lymphadenopathy:      Cervical: No cervical adenopathy.   Skin:     General: Skin is warm.   Neurological:      General: No focal deficit present.      Mental Status: She is alert and oriented to person, place, and time.

## 2024-09-27 LAB
ALDOST SERPL-MCNC: 1.2 NG/DL (ref 0–30)
ALDOST/RENIN PLAS-RTO: 1.7 {RATIO} (ref 0–30)
RENIN PLAS-CCNC: 0.72 NG/ML/HR (ref 0.17–5.38)

## 2024-09-29 LAB
METANEPH FREE SERPL-MCNC: <25 PG/ML (ref 0–88)
NORMETANEPHRINE SERPL-MCNC: 83.9 PG/ML (ref 0–297.2)

## 2024-10-02 ENCOUNTER — EVALUATION (OUTPATIENT)
Dept: PHYSICAL THERAPY | Facility: REHABILITATION | Age: 84
End: 2024-10-02
Payer: COMMERCIAL

## 2024-10-02 DIAGNOSIS — R35.0 URINARY FREQUENCY: ICD-10-CM

## 2024-10-02 DIAGNOSIS — R39.15 URGENCY OF URINATION: ICD-10-CM

## 2024-10-02 DIAGNOSIS — M62.89 PELVIC FLOOR TENSION: Primary | ICD-10-CM

## 2024-10-02 PROCEDURE — 97162 PT EVAL MOD COMPLEX 30 MIN: CPT | Performed by: PHYSICAL THERAPIST

## 2024-10-02 PROCEDURE — 97530 THERAPEUTIC ACTIVITIES: CPT | Performed by: PHYSICAL THERAPIST

## 2024-10-02 NOTE — PROGRESS NOTES
PT Evaluation   Today's date: 10/2/2024  Patient name: Lubna Bronson  : 1940  MRN: [id number]  Referring provider: Eder Stewart P*  Dx:   Encounter Diagnosis     ICD-10-CM    1. Pelvic floor tension  M62.89       2. Urgency of urination  R39.15 Ambulatory Referral to Physical Therapy      3. Urinary frequency  R35.0           Start Time: 1005  Stop Time: 1100  Total time in clinic (min): 55 minutes    Assessment  Impairments: abnormal coordination, abnormal muscle firing, abnormal muscle tone, abnormal or restricted ROM, activity intolerance, impaired physical strength and lacks appropriate home exercise program  Symptom irritability: moderate    Assessment details: Lubna Bronson is a 83 y.o. female who presents with concerns of chronic urinary frequency and urgency with vaginal pain. She also describes suprapubic pain. Patient is deconditioned, leads a sedentary lifestyle, has moderately controlled DM2 and venous insufficiency. These co-morbidities place her with fair rehab potential. She would benefit from behavioral education, hydration recommendation, manual therapy and therapeutic exercise.     The plan of care was discussed and included education regarding pelvic floor anatomy, explanation of exam technique, explanation of exam findings and discussion of treatment plan as well as the importance of patient compliance and adherence to physical therapy visits. Patient would benefit from skilled physical therapy services  to address deficits and ultimately meet goal of independent self management of condition.     Patient provided written and verbal consent for pelvic floor muscle exam: yes        Understanding of Dx/Px/POC: good     Prognosis: fair    Goals  STGs to be met in 4 weeks:  * Patient will be compliant with introductory HEP as prescribed.  * Presents with improved understanding of bladder irritants in diet and makes concerted effort to reduce them by at least 50-75%.     LTGs to  "be met by discharge:  * Patient reports that she is able to successfully suppress an urge to empty her bladder for 20' without leakage.   * Normalize sEMG findings to indicate strength average > 12uV and resting average < 2.5uV.   * Presents with improved fluid intake to avoid concentrated and irritating urine by discharge.  * Presents with improved nocturia to 2-3 toiletings/night for more thorough sleep.   * Patient implements urge suppression strategies throughout the day and reports that her average voiding interval is 2+ hours upon discharge.   * Patient will be compliant with comprehensive home exercise program for self management of condition.         Plan  Patient would benefit from: skilled physical therapy  Referral necessary: No  Planned modality interventions: biofeedback    Planned therapy interventions: activity modification, manual therapy, neuromuscular re-education, patient/caregiver education, strengthening, therapeutic activities, therapeutic exercise, home exercise program and behavior modification    Frequency: 1x week  Duration in weeks: 12  Plan of Care beginning date: 10/2/2024  Plan of Care expiration date: 12/25/2024  Treatment plan discussed with: patient and PTA    PT Pelvic Floor Subjective:   History of Present Illness:   Reports ~ 6 months of urinary urgency, frequency and dysuria since March 2024. Medications have not been helpful. Feels improved vaginal pain with vaginal estrogen that she is using about 3 times a week.     She underwent a CAT scan and cystoscopy - was told there is redness and irritation in the bladder but feels nothing has helped her.     She arrives to clinic in a wheelchair but reports that she is ambulatory around her home. \"I have a walker but I don't use it regularly.\"    She also wears compression socks which and patient reports that her shoe size has increased from 7 1/2 to size 10 due to swelling over the past 2 years. PCP/Internist is aware of venous " "insufficiency. Does not have a cardiologist.     Reports that she does not check her blood sugars regularly. Is able to recite most recent A1C and glucose readings. (6.6 and 165 - I will verify in chart).   Social Support:     Lives in:  One-story house    Lives with:  Spouse    Relationship status: /committed    Work status: retired (retired nurse)  Diet and Exercise:      \"Not very active\"  \"I feel so tired.\"  OB/ gyn History    Gestational History:     Prior Pregnancy: Yes      Number of prior pregnancies: 5    Number of term pregnancies: 5    Delivery Type: vaginal delivery      Delivery Complications:  Children are ages 61, 60, 57, 54, 51  She has 11 grandchildren      Menstrual History:      Menopausal: menopause  H/o hysterectomy at age 52 due to fibroids and she wore an estrogen patch x 10 years   Bladder Function:     Voiding Difficulties positive for: urgency and frequent urination       Voiding Difficulties comments:     Voiding frequency: every 31-60 minutes    Urinary leakage: urine leakage    Nocturia (episodes per night): 4 or more    Painful urination: No      Intake (ounces):     Intake (ounces) comment: 4 bottles of Propel  1 can of Diet Coke  1 cup of tea  Bowel Function:     Bowel Function comments:  Colace -  takes one every 2-3 days  No irritation, denies pain with BMs    Bowel frequency: every 3 days  Sexual Function:     Sexually Active:  Not sexually active  Treatments:     Previous treatment:  Medication  Patient Goals:     Patient goals for therapy:  Improved pain management, improved quality of life, improved sleep, improved comfort, improved bladder or bowel function, fully empty bladder or bowels and decreased pain      Objective       Abdominal Assessment:      Abdominal Assessment: na      General Perineum Exam:   Positive for swelling and perianal erythema.     General perineum exam comments: No discharge, irritation, organ prolapse evident    Mild erythema along inner labia. " Sensation intact throughout. Able to isolate and contract pelvic floor along posterior, lateral and anterior aspects but ~ 50% deficit in full relaxation.     Visual Inspection of Perineum:   Excursion of perineal body in cephalad direction with contraction of pelvic floor muscles (PFM): fair   Excursion of perineal body in caudal direction with relaxation of pelvic floor muscles (PFM): delayed  and weak  Involuntary contraction with coughing: no  Cotton swab test: non-tender  Sphincter Tone Resting: normal  Sphincter Tone Squeeze: normal  Sensation: intact   Atrophic changes: erythema noted       Pelvic Floor Muscle Exam:      Breathing pattern with contraction: holding breath   Pelvic floor muscle relaxation is incomplete.   50% pelvic floor relaxation        PERFECT Score   Power right: 2/5   Power left: 2/5   Endurance (seconds to max): 4   Repetitions (before fatigue): 5        pelvic floor exam consent given by patient    Pelvic exam completed: vaginally     SMEG Biofeedback   to be assessed next treatment           Precautions:   Patient Active Problem List   Diagnosis    Primary osteoarthritis of right hip    Primary osteoarthritis of one hip, right    History of hypercoagulable state    Mixed hyperlipidemia    Essential hypertension    Acute blood loss anemia    Hypertension    Irritable bowel syndrome    Edema of both legs    Acquired hypothyroidism    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, without long-term current use of insulin (HCC)    History of pulmonary embolus (PE)    Pulmonary embolus (HCC)    Presence of right artificial hip joint    Venous insufficiency of both lower extremities    Diabetes mellitus without complication (HCC)    Obesity, morbid (HCC)    Essential hypertension, benign    Tracheitis    Primary osteoarthritis of both knees    Hyponatremia    Uncontrolled type 2 diabetes mellitus with hyperglycemia (HCC)         PRO EVAL RE-EVAL DISCHARGE   PFDI       ZAHIDA-18      VPQ      CPSI-NIH      PGQ          POC Expires Auth Status Start Date Exp Date PT Visit Limit DA expires DA provider   12/25               Date of Service 10/2 ZAHIDA          Visits Used            Visits Remaining                        Manuals            Release along periurethral tissue                                                Neuro Re-Ed                                                                                                Ther Ex            Nustep aerobic warmup            Ankle pumps            Heel slides             Hip flexion                                    Ther Activity            Education Anatomy and POC  Bladder urge suppresion                                                                      Modalities

## 2024-10-07 ENCOUNTER — OFFICE VISIT (OUTPATIENT)
Dept: PHYSICAL THERAPY | Facility: REHABILITATION | Age: 84
End: 2024-10-07
Payer: COMMERCIAL

## 2024-10-07 ENCOUNTER — TELEPHONE (OUTPATIENT)
Dept: UROLOGY | Facility: CLINIC | Age: 84
End: 2024-10-07

## 2024-10-07 DIAGNOSIS — R39.15 URGENCY OF URINATION: Primary | ICD-10-CM

## 2024-10-07 DIAGNOSIS — M62.89 PELVIC FLOOR TENSION: ICD-10-CM

## 2024-10-07 DIAGNOSIS — R35.0 URINARY FREQUENCY: ICD-10-CM

## 2024-10-07 PROCEDURE — 97110 THERAPEUTIC EXERCISES: CPT | Performed by: PHYSICAL THERAPIST

## 2024-10-07 PROCEDURE — 97112 NEUROMUSCULAR REEDUCATION: CPT | Performed by: PHYSICAL THERAPIST

## 2024-10-07 NOTE — PROGRESS NOTES
Daily Note     Today's date: 10/7/2024  Patient name: Lubna Bronson  : 1940  MRN: [id number]  Referring provider: Eder Stewart P*  Dx:   Encounter Diagnosis     ICD-10-CM    1. Urgency of urination  R39.15       2. Pelvic floor tension  M62.89       3. Urinary frequency  R35.0         Lubna Bronson is a 83 y.o. female who presents with concerns of chronic urinary frequency and urgency with vaginal pain. She also describes suprapubic pain. Patient is deconditioned, leads a sedentary lifestyle, has moderately controlled DM2 and venous insufficiency. These co-morbidities place her with fair rehab potential. She would benefit from behavioral education, hydration recommendation, manual therapy and therapeutic exercise.              Subjective: ZAHIDA  issue - Reports that she is not having a good day. Slightly dizzy. Feels that her diuretic makes her void frequently but is needed to control BP.       Objective: See treatment diary below      Assessment: Tolerated treatment well. Patient would benefit from continued PT. BP before and after NuStep - R arm seated was 120/90 and 120/92 respectively. She said she was due for BP medication during time of visit but deferred taking due to concern of side effects. Wears zip up compression socks during session and I inspected legs. No swelling above knees however she does have an abrasion on R kneecap area that is the result of a fall a few week ago. States that she takes Eliquis. Issued 3 day bladder diaries to complete before next session. Ninety minutes passed from time she left house until end of treatment session between voids.       Plan: Continue per plan of care.      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, without long-term current use of insulin (HCC)    History of pulmonary embolus (PE)    Pulmonary embolus (HCC)    Presence of right artificial hip joint    Venous insufficiency of both lower extremities    Diabetes mellitus without complication (HCC)    Obesity, morbid (HCC)    Essential hypertension, benign    Tracheitis    Primary osteoarthritis of both knees    Hyponatremia    Uncontrolled type 2 diabetes mellitus with hyperglycemia (HCC)         PRO EVAL RE-EVAL DISCHARGE   PFDI      ZAHIDA-18      VPQ      CPSI-NIH      PGQ          POC Expires Auth Status Start Date Exp Date PT Visit Limit DA expires DA provider   12/25               BP  120/90; 120/92          Date of Service 10/2 10/7          Visits Used            Visits Remaining                        Manuals            Release along periurethral tissue  Deferred due to pain post eval                                              Neuro Re-Ed            Gait training  8' wc amb                                                                                  Ther Ex            Nustep aerobic warmup  8'          Ankle pumps  Seated 20          Heel slides   np          Hip flexion  Seated x20          Seated add  5\"x10          Seated fwd flexion  x5          Seated snow ryan arm raised with exhale  x10          Knee extension  20          Ther Activity            Education Anatomy and POC  Bladder diary                                                                      Modalities                                           "

## 2024-10-07 NOTE — TELEPHONE ENCOUNTER
Spoke with pt and relayed message below:    [facility]  Adrenal adenoma testing returned back within normal limits.  Only abnormality that we can see on BMP was low sodium but looks like this has been a chronic concern for patient.  She should follow-up with her PCP in regards to this going forward.    She relayed her understanding and will follow up with her PCP.

## 2024-10-10 ENCOUNTER — TELEPHONE (OUTPATIENT)
Age: 84
End: 2024-10-10

## 2024-10-10 ENCOUNTER — OFFICE VISIT (OUTPATIENT)
Dept: INTERNAL MEDICINE CLINIC | Facility: CLINIC | Age: 84
End: 2024-10-10
Payer: COMMERCIAL

## 2024-10-10 VITALS
SYSTOLIC BLOOD PRESSURE: 148 MMHG | HEIGHT: 64 IN | WEIGHT: 207 LBS | TEMPERATURE: 98.4 F | DIASTOLIC BLOOD PRESSURE: 100 MMHG | OXYGEN SATURATION: 98 % | BODY MASS INDEX: 35.34 KG/M2 | HEART RATE: 81 BPM

## 2024-10-10 DIAGNOSIS — R39.15 URGENCY OF URINATION: ICD-10-CM

## 2024-10-10 DIAGNOSIS — J02.9 SORE THROAT: Primary | ICD-10-CM

## 2024-10-10 PROCEDURE — 99213 OFFICE O/P EST LOW 20 MIN: CPT | Performed by: INTERNAL MEDICINE

## 2024-10-10 PROCEDURE — G2211 COMPLEX E/M VISIT ADD ON: HCPCS | Performed by: INTERNAL MEDICINE

## 2024-10-10 RX ORDER — AZITHROMYCIN 250 MG/1
TABLET, FILM COATED ORAL
Qty: 6 TABLET | Refills: 0 | Status: SHIPPED | OUTPATIENT
Start: 2024-10-10 | End: 2024-10-14

## 2024-10-10 NOTE — TELEPHONE ENCOUNTER
Patient called complaining of sore throat x 3 days.  No fever or other symptoms.  Appointment made for today at 3:30 with Dr. Coffman. Patient agreeable Thank you

## 2024-10-10 NOTE — PROGRESS NOTES
"Ambulatory Visit  Name: Lubna Bronson      : 1940      MRN: [id number]  Encounter Provider: Ioana Coffman MD  Encounter Date: 10/10/2024   Encounter department: [facility]ICINE    Assessment & Plan  Sore throat  Discussed abortive management, complete antibiotics as below, follow-up as needed  Orders:    azithromycin (Zithromax) 250 mg tablet; Take 2 tablets (500 mg total) by mouth daily for 1 day, THEN 1 tablet (250 mg total) daily for 4 days.       History of Present Illness     HPI  Reports a sore throat for the last 3 days, no fevers chills nausea vomiting or diarrhea, no chest pain or shortness of breath        Review of Systems   Constitutional:  Negative for activity change, appetite change, chills, diaphoresis, fatigue, fever and unexpected weight change.   HENT:  Positive for sore throat. Negative for congestion, drooling, ear discharge, ear pain, facial swelling, hearing loss, postnasal drip, rhinorrhea, sinus pressure, sinus pain, sneezing, tinnitus, trouble swallowing and voice change.    Eyes:  Negative for discharge.   Respiratory:  Negative for apnea, cough, choking, chest tightness, shortness of breath, wheezing and stridor.    Cardiovascular:  Negative for chest pain, palpitations and leg swelling.   Gastrointestinal:  Negative for abdominal distention, abdominal pain, anal bleeding, blood in stool, constipation, diarrhea, nausea and vomiting.   Genitourinary:  Negative for decreased urine volume, difficulty urinating, frequency and urgency.   Musculoskeletal:  Negative for arthralgias, back pain and myalgias.   Skin:  Negative for color change.   Neurological:  Negative for dizziness, light-headedness, numbness and headaches.           Objective     /100 (BP Location: Left arm, Patient Position: Sitting, Cuff Size: Standard)   Pulse 81   Temp 98.4 °F (36.9 °C) (Temporal)   Ht 5' 4\" (1.626 m)   Wt 93.9 kg (207 lb)   SpO2 98%   BMI 35.53 kg/m² "     Physical Exam  Constitutional:       General: She is not in acute distress.     Appearance: Normal appearance. She is normal weight. She is not ill-appearing, toxic-appearing or diaphoretic.   HENT:      Nose: No congestion.      Mouth/Throat:      Pharynx: Posterior oropharyngeal erythema present. No oropharyngeal exudate.   Cardiovascular:      Rate and Rhythm: Normal rate and regular rhythm.      Pulses: Normal pulses.      Heart sounds: Normal heart sounds. No murmur heard.     No gallop.   Pulmonary:      Effort: Pulmonary effort is normal. No respiratory distress.      Breath sounds: Normal breath sounds. No stridor. No wheezing, rhonchi or rales.   Chest:      Chest wall: No tenderness.   Skin:     Capillary Refill: Capillary refill takes more than 3 seconds.   Neurological:      Mental Status: She is alert.

## 2024-10-16 ENCOUNTER — TELEPHONE (OUTPATIENT)
Age: 84
End: 2024-10-16

## 2024-10-16 NOTE — TELEPHONE ENCOUNTER
First Name: Lubna  Last Name: Audie  Email: [email or web address]  Phone: [phone]  Zip Code: [address]  YOB: 1940    Pt submitted email for obgyn services, lm for pt to cb   Pt returned call, scheduled 11/4/24

## 2024-10-21 ENCOUNTER — APPOINTMENT (OUTPATIENT)
Dept: PHYSICAL THERAPY | Facility: REHABILITATION | Age: 84
End: 2024-10-21
Payer: COMMERCIAL

## 2024-11-05 ENCOUNTER — OFFICE VISIT (OUTPATIENT)
Dept: OBGYN CLINIC | Facility: CLINIC | Age: 84
End: 2024-11-05
Payer: COMMERCIAL

## 2024-11-05 VITALS
SYSTOLIC BLOOD PRESSURE: 168 MMHG | WEIGHT: 202 LBS | DIASTOLIC BLOOD PRESSURE: 98 MMHG | BODY MASS INDEX: 34.49 KG/M2 | HEIGHT: 64 IN

## 2024-11-05 DIAGNOSIS — R35.0 URINARY FREQUENCY: Primary | ICD-10-CM

## 2024-11-05 LAB
SL AMB  POCT GLUCOSE, UA: NORMAL
SL AMB LEUKOCYTE ESTERASE,UA: NORMAL
SL AMB POCT BILIRUBIN,UA: NORMAL
SL AMB POCT BLOOD,UA: NORMAL
SL AMB POCT KETONES,UA: NORMAL
SL AMB POCT NITRITE,UA: NORMAL
SL AMB POCT PH,UA: 6
SL AMB POCT SPECIFIC GRAVITY,UA: 1010
SL AMB POCT URINE PROTEIN: NORMAL
SL AMB POCT UROBILINOGEN: NORMAL

## 2024-11-05 PROCEDURE — 81002 URINALYSIS NONAUTO W/O SCOPE: CPT | Performed by: OBSTETRICS & GYNECOLOGY

## 2024-11-05 PROCEDURE — 99214 OFFICE O/P EST MOD 30 MIN: CPT | Performed by: OBSTETRICS & GYNECOLOGY

## 2024-11-05 PROCEDURE — 87086 URINE CULTURE/COLONY COUNT: CPT | Performed by: OBSTETRICS & GYNECOLOGY

## 2024-11-05 NOTE — PROGRESS NOTES
Ambulatory Visit  Name: Lubna Bronson      : 1940      MRN: [id number]  Encounter Provider: Mary South MD  Encounter Date: 2024   Encounter department: [facility]    Assessment & Plan  Urinary frequency    Orders:    Urine culture; Future    POCT urine dip    Urine culture      History of Present Illness     Lubna Bronson is a 83 y.o. female who presents with a history of increasingly severe dysuria, frequency, urgency and pelvic pain over the past several months.  During that time, she has had multiple urinalysis and cultures performed and multiple rounds of antibiotic treatment.  She has undergone a cystoscopy and is using vaginal estrogen cream.  She is currently undergoing pelvic floor therapy.  She is scheduled for Botox injections at the end of this month.  She has been taking Myrbetriq which has not improved her symptoms.  She also has had a trial of Pyridium which likewise did not improve her symptoms.  She underwent a hysterectomy over 30 years ago for benign indications of fibroids and heavy bleeding.  She is a  5 para 5-0-0-5, with all vaginal deliveries.  She has some mild stress incontinence, for which she wears a pad, but which has not become worse and is not seriously problematic.  He also had a CAT scan done which showed no external mass effect.    She has been using the estrogen since July and on occasion when using her finger to apply the estrogen she has felt as if there might be something in the vaginal vault.  He is no longer sexually active.  On examination today there is very mild apical descensus, consistent with her age and parity.  She was advised to continue the estrogen vaginally (she is not a candidate for systemic estrogen because of having history of pulmonary emboli).  She had previously been given a referral/called urogynecology and was advised that they could only provide service for her if she was a candidate for surgery.    She  "will continue with Dr. Pardo.      Review of Systems   Constitutional:  Negative for chills, diaphoresis, fatigue, fever and unexpected weight change.   HENT:  Negative for congestion, sinus pressure, sinus pain, tinnitus and trouble swallowing.    Eyes:  Negative for visual disturbance.   Respiratory:  Negative for cough, chest tightness and shortness of breath.    Cardiovascular:  Negative for chest pain, palpitations and leg swelling.   Gastrointestinal:  Negative for abdominal distention, abdominal pain, anal bleeding, constipation, diarrhea, nausea, rectal pain and vomiting.   Endocrine: Negative for heat intolerance.   Genitourinary:  Positive for dysuria, frequency, pelvic pain, urgency and vaginal pain. Negative for difficulty urinating, flank pain, genital sores and hematuria.   Musculoskeletal:  Negative for arthralgias, back pain and joint swelling.   Skin:  Negative for rash.   Allergic/Immunologic: Negative for environmental allergies and food allergies.   Neurological:  Negative for headaches.   Hematological:  Negative for adenopathy. Does not bruise/bleed easily.   Psychiatric/Behavioral:  Negative for decreased concentration and dysphoric mood. The patient is not nervous/anxious.            Objective     /98 (BP Location: Left arm, Patient Position: Sitting, Cuff Size: Adult)   Ht 5' 4\" (1.626 m)   Wt 91.6 kg (202 lb)   BMI 34.67 kg/m²     Physical Exam  Vitals and nursing note reviewed. Exam conducted with a chaperone present.   Constitutional:       Appearance: Normal appearance. She is normal weight.   HENT:      Head: Normocephalic and atraumatic.      Nose: Nose normal.   Eyes:      Conjunctiva/sclera: Conjunctivae normal.   Pulmonary:      Effort: Pulmonary effort is normal.   Abdominal:      General: Abdomen is flat.      Palpations: Abdomen is soft.   Genitourinary:     General: Normal vulva.      Exam position: Lithotomy position.      Vagina: Normal.      Uterus: Absent.       " Adnexa: Right adnexa normal and left adnexa normal.      Comments: Some erythema    Very minimal descensus of the vaginal apex, consistent with age and parity    Musculoskeletal:         General: Normal range of motion.   Skin:     General: Skin is warm and dry.   Neurological:      General: No focal deficit present.      Mental Status: She is alert and oriented to person, place, and time.   Psychiatric:         Mood and Affect: Mood normal.         Behavior: Behavior normal.         Thought Content: Thought content normal.         Judgment: Judgment normal.

## 2024-11-06 DIAGNOSIS — I27.82 CHRONIC SADDLE PULMONARY EMBOLISM WITHOUT ACUTE COR PULMONALE (HCC): ICD-10-CM

## 2024-11-06 DIAGNOSIS — I26.92 CHRONIC SADDLE PULMONARY EMBOLISM WITHOUT ACUTE COR PULMONALE (HCC): ICD-10-CM

## 2024-11-06 DIAGNOSIS — N95.2 ATROPHIC VAGINITIS: ICD-10-CM

## 2024-11-07 LAB — BACTERIA UR CULT: NORMAL

## 2024-11-07 RX ORDER — ESTRADIOL 0.1 MG/G
1 CREAM VAGINAL DAILY
Qty: 42.5 G | Refills: 1 | Status: SHIPPED | OUTPATIENT
Start: 2024-11-07

## 2024-11-07 RX ORDER — TROSPIUM CHLORIDE 20 MG/1
20 TABLET, FILM COATED ORAL 2 TIMES DAILY
Qty: 60 TABLET | Refills: 0 | Status: SHIPPED | OUTPATIENT
Start: 2024-11-07

## 2024-11-07 RX ORDER — APIXABAN 5 MG/1
5 TABLET, FILM COATED ORAL 2 TIMES DAILY
Qty: 180 TABLET | Refills: 1 | Status: SHIPPED | OUTPATIENT
Start: 2024-11-07 | End: 2025-05-06

## 2024-11-09 DIAGNOSIS — E11.65 UNCONTROLLED TYPE 2 DIABETES MELLITUS WITH HYPERGLYCEMIA (HCC): ICD-10-CM

## 2024-11-11 RX ORDER — METFORMIN HYDROCHLORIDE 750 MG/1
750 TABLET, EXTENDED RELEASE ORAL
Qty: 90 TABLET | Refills: 1 | Status: SHIPPED | OUTPATIENT
Start: 2024-11-11

## 2024-11-19 ENCOUNTER — TELEPHONE (OUTPATIENT)
Dept: UROLOGY | Facility: CLINIC | Age: 84
End: 2024-11-19

## 2024-11-20 NOTE — TELEPHONE ENCOUNTER
Called and left a message for patient unable to see if she has angelia or who with , told her to call that office to maybe be put on waiting list and she had anymore questions to call back the office   
Patient called back to confirm message from missed call.  
Pt's  stating that Horacio had an appt Thursday in [place] to get Botox and she's hurting all the time.  She's in pain and they cancelled the appt.  They waiting a couple of months for this is there anything Dr Hayes can do about getting her an appt.    Please call him back.      
no

## 2024-11-21 DIAGNOSIS — I10 ESSENTIAL HYPERTENSION, BENIGN: ICD-10-CM

## 2024-11-22 RX ORDER — LABETALOL 200 MG/1
200 TABLET, FILM COATED ORAL 2 TIMES DAILY
Qty: 180 TABLET | Refills: 1 | Status: SHIPPED | OUTPATIENT
Start: 2024-11-22

## 2024-11-22 RX ORDER — HYDROCHLOROTHIAZIDE 12.5 MG/1
12.5 TABLET ORAL DAILY
Qty: 90 TABLET | Refills: 0 | OUTPATIENT
Start: 2024-11-22 | End: 2025-05-21

## 2024-11-22 RX ORDER — LOSARTAN POTASSIUM 100 MG/1
100 TABLET ORAL DAILY
Qty: 90 TABLET | Refills: 0 | OUTPATIENT
Start: 2024-11-22 | End: 2025-02-20

## 2024-12-02 ENCOUNTER — ESTABLISHED COMPREHENSIVE EXAM (OUTPATIENT)
Dept: URBAN - METROPOLITAN AREA CLINIC 6 | Facility: CLINIC | Age: 84
End: 2024-12-02

## 2024-12-02 DIAGNOSIS — H35.361: ICD-10-CM

## 2024-12-02 DIAGNOSIS — H25.813: ICD-10-CM

## 2024-12-02 DIAGNOSIS — H52.13: ICD-10-CM

## 2024-12-02 DIAGNOSIS — E11.9: ICD-10-CM

## 2024-12-02 DIAGNOSIS — Z83.518: ICD-10-CM

## 2024-12-02 PROCEDURE — 92014 COMPRE OPH EXAM EST PT 1/>: CPT

## 2024-12-02 PROCEDURE — 92015 DETERMINE REFRACTIVE STATE: CPT

## 2024-12-02 PROCEDURE — 92134 CPTRZ OPH DX IMG PST SGM RTA: CPT

## 2024-12-02 ASSESSMENT — VISUAL ACUITY
OD_GLARE: 20/80
OD_PH: 20/50
OS_GLARE: 20/50
OD_CC: 20/50-1
OS_CC: 20/30

## 2024-12-02 ASSESSMENT — TONOMETRY
OD_IOP_MMHG: 13
OS_IOP_MMHG: 12

## 2024-12-04 ENCOUNTER — RA CDI HCC (OUTPATIENT)
Dept: OTHER | Facility: HOSPITAL | Age: 84
End: 2024-12-04

## 2024-12-04 NOTE — PROGRESS NOTES
HCC coding opportunities          Chart Reviewed number of suggestions sent to Provider: 1   E11.36, H25.813  See 2/7/24 eye exam assessment    Patients Insurance     Medicare Insurance: [employer]

## 2024-12-09 ENCOUNTER — APPOINTMENT (OUTPATIENT)
Dept: LAB | Age: 84
End: 2024-12-09
Payer: COMMERCIAL

## 2024-12-09 DIAGNOSIS — E11.65 UNCONTROLLED TYPE 2 DIABETES MELLITUS WITH HYPERGLYCEMIA (HCC): ICD-10-CM

## 2024-12-09 DIAGNOSIS — E53.8 B12 DEFICIENCY: ICD-10-CM

## 2024-12-09 DIAGNOSIS — E78.2 MIXED HYPERLIPIDEMIA: ICD-10-CM

## 2024-12-09 DIAGNOSIS — I10 ESSENTIAL HYPERTENSION: ICD-10-CM

## 2024-12-09 LAB
ALBUMIN SERPL BCG-MCNC: 3.8 G/DL (ref 3.5–5)
ALP SERPL-CCNC: 59 U/L (ref 34–104)
ALT SERPL W P-5'-P-CCNC: 18 U/L (ref 7–52)
ANION GAP SERPL CALCULATED.3IONS-SCNC: 4 MMOL/L (ref 4–13)
AST SERPL W P-5'-P-CCNC: 17 U/L (ref 13–39)
BASOPHILS # BLD AUTO: 0.02 THOUSANDS/ÂΜL (ref 0–0.1)
BASOPHILS NFR BLD AUTO: 0 % (ref 0–1)
BILIRUB SERPL-MCNC: 0.6 MG/DL (ref 0.2–1)
BUN SERPL-MCNC: 13 MG/DL (ref 5–25)
CALCIUM SERPL-MCNC: 9.2 MG/DL (ref 8.4–10.2)
CHLORIDE SERPL-SCNC: 97 MMOL/L (ref 96–108)
CHOLEST SERPL-MCNC: 143 MG/DL (ref ?–200)
CO2 SERPL-SCNC: 31 MMOL/L (ref 21–32)
CREAT SERPL-MCNC: 0.77 MG/DL (ref 0.6–1.3)
CREAT UR-MCNC: 71.5 MG/DL
EOSINOPHIL # BLD AUTO: 0.12 THOUSAND/ÂΜL (ref 0–0.61)
EOSINOPHIL NFR BLD AUTO: 2 % (ref 0–6)
ERYTHROCYTE [DISTWIDTH] IN BLOOD BY AUTOMATED COUNT: 13.2 % (ref 11.6–15.1)
EST. AVERAGE GLUCOSE BLD GHB EST-MCNC: 148 MG/DL
GFR SERPL CREATININE-BSD FRML MDRD: 72 ML/MIN/1.73SQ M
GLUCOSE P FAST SERPL-MCNC: 103 MG/DL (ref 65–99)
HBA1C MFR BLD: 6.8 %
HCT VFR BLD AUTO: 36.7 % (ref 34.8–46.1)
HDLC SERPL-MCNC: 49 MG/DL
HGB BLD-MCNC: 12.1 G/DL (ref 11.5–15.4)
IMM GRANULOCYTES # BLD AUTO: 0.02 THOUSAND/UL (ref 0–0.2)
IMM GRANULOCYTES NFR BLD AUTO: 0 % (ref 0–2)
LDLC SERPL CALC-MCNC: 69 MG/DL (ref 0–100)
LYMPHOCYTES # BLD AUTO: 1.82 THOUSANDS/ÂΜL (ref 0.6–4.47)
LYMPHOCYTES NFR BLD AUTO: 34 % (ref 14–44)
MCH RBC QN AUTO: 30.7 PG (ref 26.8–34.3)
MCHC RBC AUTO-ENTMCNC: 33 G/DL (ref 31.4–37.4)
MCV RBC AUTO: 93 FL (ref 82–98)
MICROALBUMIN UR-MCNC: 76.3 MG/L
MICROALBUMIN/CREAT 24H UR: 107 MG/G CREATININE (ref 0–30)
MONOCYTES # BLD AUTO: 0.61 THOUSAND/ÂΜL (ref 0.17–1.22)
MONOCYTES NFR BLD AUTO: 11 % (ref 4–12)
NEUTROPHILS # BLD AUTO: 2.8 THOUSANDS/ÂΜL (ref 1.85–7.62)
NEUTS SEG NFR BLD AUTO: 53 % (ref 43–75)
NRBC BLD AUTO-RTO: 0 /100 WBCS
PLATELET # BLD AUTO: 311 THOUSANDS/UL (ref 149–390)
PMV BLD AUTO: 9.2 FL (ref 8.9–12.7)
POTASSIUM SERPL-SCNC: 4.1 MMOL/L (ref 3.5–5.3)
PROT SERPL-MCNC: 6.7 G/DL (ref 6.4–8.4)
RBC # BLD AUTO: 3.94 MILLION/UL (ref 3.81–5.12)
SODIUM SERPL-SCNC: 132 MMOL/L (ref 135–147)
TRIGL SERPL-MCNC: 126 MG/DL (ref ?–150)
TSH SERPL DL<=0.05 MIU/L-ACNC: 3.4 UIU/ML (ref 0.45–4.5)
VIT B12 SERPL-MCNC: 346 PG/ML (ref 180–914)
WBC # BLD AUTO: 5.39 THOUSAND/UL (ref 4.31–10.16)

## 2024-12-09 PROCEDURE — 80053 COMPREHEN METABOLIC PANEL: CPT

## 2024-12-09 PROCEDURE — 83036 HEMOGLOBIN GLYCOSYLATED A1C: CPT

## 2024-12-09 PROCEDURE — 85025 COMPLETE CBC W/AUTO DIFF WBC: CPT

## 2024-12-09 PROCEDURE — 82043 UR ALBUMIN QUANTITATIVE: CPT

## 2024-12-09 PROCEDURE — 36415 COLL VENOUS BLD VENIPUNCTURE: CPT

## 2024-12-09 PROCEDURE — 82607 VITAMIN B-12: CPT

## 2024-12-09 PROCEDURE — 80061 LIPID PANEL: CPT

## 2024-12-09 PROCEDURE — 84443 ASSAY THYROID STIM HORMONE: CPT

## 2024-12-09 PROCEDURE — 82570 ASSAY OF URINE CREATININE: CPT

## 2024-12-11 ENCOUNTER — OFFICE VISIT (OUTPATIENT)
Dept: INTERNAL MEDICINE CLINIC | Facility: CLINIC | Age: 84
End: 2024-12-11
Payer: COMMERCIAL

## 2024-12-11 VITALS
TEMPERATURE: 97.7 F | DIASTOLIC BLOOD PRESSURE: 86 MMHG | OXYGEN SATURATION: 97 % | WEIGHT: 202 LBS | HEIGHT: 64 IN | BODY MASS INDEX: 34.49 KG/M2 | HEART RATE: 89 BPM | SYSTOLIC BLOOD PRESSURE: 136 MMHG

## 2024-12-11 DIAGNOSIS — E03.9 HYPOTHYROIDISM (ACQUIRED): ICD-10-CM

## 2024-12-11 DIAGNOSIS — M17.0 PRIMARY OSTEOARTHRITIS OF BOTH KNEES: ICD-10-CM

## 2024-12-11 DIAGNOSIS — I87.2 VENOUS INSUFFICIENCY OF BOTH LOWER EXTREMITIES: ICD-10-CM

## 2024-12-11 DIAGNOSIS — E78.2 MIXED HYPERLIPIDEMIA: ICD-10-CM

## 2024-12-11 DIAGNOSIS — E11.65 UNCONTROLLED TYPE 2 DIABETES MELLITUS WITH HYPERGLYCEMIA (HCC): ICD-10-CM

## 2024-12-11 DIAGNOSIS — Z23 ENCOUNTER FOR IMMUNIZATION: ICD-10-CM

## 2024-12-11 DIAGNOSIS — E03.9 ACQUIRED HYPOTHYROIDISM: ICD-10-CM

## 2024-12-11 DIAGNOSIS — E87.1 HYPONATREMIA: ICD-10-CM

## 2024-12-11 DIAGNOSIS — M16.11 PRIMARY OSTEOARTHRITIS OF RIGHT HIP: ICD-10-CM

## 2024-12-11 DIAGNOSIS — I10 ESSENTIAL HYPERTENSION, BENIGN: Primary | ICD-10-CM

## 2024-12-11 PROCEDURE — 99214 OFFICE O/P EST MOD 30 MIN: CPT | Performed by: INTERNAL MEDICINE

## 2024-12-11 PROCEDURE — 90662 IIV NO PRSV INCREASED AG IM: CPT

## 2024-12-11 PROCEDURE — G0008 ADMIN INFLUENZA VIRUS VAC: HCPCS

## 2024-12-11 RX ORDER — LEVOTHYROXINE SODIUM 25 UG/1
25 TABLET ORAL DAILY
Qty: 90 TABLET | Refills: 1 | Status: SHIPPED | OUTPATIENT
Start: 2024-12-11

## 2024-12-11 NOTE — PROGRESS NOTES
Assessment/Plan:             1. Essential hypertension, benign  Comments:  continue same med  2. Hypothyroidism (acquired)  Comments:  continue same med  Orders:  -     levothyroxine 25 mcg tablet; Take 1 tablet (25 mcg total) by mouth daily  3. Venous insufficiency of both lower extremities  4. Uncontrolled type 2 diabetes mellitus with hyperglycemia (HCC)  Comments:  continue same med  5. Primary osteoarthritis of both knees  6. Primary osteoarthritis of right hip  7. Acquired hypothyroidism  Comments:  continue same med  8. Mixed hyperlipidemia  Comments:  continue same med  9. Hyponatremia  Comments:  stable         Subjective:      Patient ID: Lunba Bronson is a 84 y.o. female.    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  She   Patient Active Problem List    Diagnosis Date Noted    Uncontrolled type 2 diabetes mellitus with hyperglycemia (HCC) 06/14/2024    Hyponatremia 05/31/2024    Primary osteoarthritis of both knees 09/07/2022    Tracheitis 06/29/2022    Obesity, morbid (HCC) 05/05/2021    Essential hypertension, benign 05/05/2021    Venous insufficiency of both lower extremities 12/09/2020    Diabetes mellitus without complication (HCC) 12/09/2020    Hypertension     Irritable bowel syndrome     Edema of both  legs     Acquired hypothyroidism     History of pulmonary embolus (PE)     Pulmonary embolus (HCC)     Presence of right artificial hip joint     Acute blood loss anemia 10/25/2019    Mixed hyperlipidemia 10/23/2019    Essential hypertension 10/23/2019    History of hypercoagulable state 10/09/2019    Primary osteoarthritis of one hip, right 08/09/2019    Primary osteoarthritis of right hip      She  has a past surgical history that includes Cholecystectomy (1991); Colonoscopy (11/09/2017); pr arthrp acetblr/prox fem prostc agrft/algrft (Right, 10/22/2019); Hysterectomy (1993); Hemorrhoid surgery (1999); Trigger finger release (Right, 1998); and Hip surgery (2019).  Her family history includes Hypertension in her family and mother.  She  reports that she has never smoked. She has never used smokeless tobacco. She reports that she does not drink alcohol and does not use drugs.  Current Outpatient Medications   Medication Sig Dispense Refill    acetaminophen (TYLENOL) 325 mg tablet Take 650 mg by mouth every 6 (six) hours as needed for mild pain      atorvastatin (LIPITOR) 10 mg tablet Take 1 tablet (10 mg total) by mouth daily at bedtime 90 tablet 1    Eliquis 5 MG Take 1 tablet (5 mg total) by mouth 2 (two) times a day 180 tablet 1    estradiol (ESTRACE VAGINAL) 0.1 mg/g vaginal cream Insert 1 g into the vagina daily Daily for 3 weeks then 3 times a week to anterior vaginal wall 42.5 g 1    furosemide (LASIX) 20 mg tablet Take 1 tablet (20 mg total) by mouth daily as needed (edema) 30 tablet 0    hydroCHLOROthiazide 12.5 mg tablet Take 1 tablet (12.5 mg total) by mouth daily 90 tablet 1    labetalol (NORMODYNE) 200 mg tablet Take 1 tablet (200 mg total) by mouth 2 (two) times a day 180 tablet 1    levothyroxine 25 mcg tablet Take 1 tablet (25 mcg total) by mouth daily 90 tablet 1    losartan (COZAAR) 100 MG tablet Take 1 tablet (100 mg total) by mouth daily 90 tablet 1    metFORMIN (GLUCOPHAGE-XR) 750 mg 24 hr  tablet TAKE 1 TABLET BY MOUTH EVERY DAY WITH BREAKFAST 90 tablet 1    dexamethasone (DECADRON) 1 mg tablet  (Patient not taking: Reported on 12/11/2024)      phenazopyridine (PYRIDIUM) 100 mg tablet Take 1 tablet (100 mg total) by mouth 3 (three) times a day as needed for bladder spasms (Patient not taking: Reported on 12/11/2024) 10 tablet 0    trospium chloride (SANCTURA) 20 mg tablet Take 1 tablet (20 mg total) by mouth 2 (two) times a day (Patient not taking: Reported on 12/11/2024) 60 tablet 0     No current facility-administered medications for this visit.     Current Outpatient Medications on File Prior to Visit   Medication Sig    acetaminophen (TYLENOL) 325 mg tablet Take 650 mg by mouth every 6 (six) hours as needed for mild pain    atorvastatin (LIPITOR) 10 mg tablet Take 1 tablet (10 mg total) by mouth daily at bedtime    Eliquis 5 MG Take 1 tablet (5 mg total) by mouth 2 (two) times a day    estradiol (ESTRACE VAGINAL) 0.1 mg/g vaginal cream Insert 1 g into the vagina daily Daily for 3 weeks then 3 times a week to anterior vaginal wall    furosemide (LASIX) 20 mg tablet Take 1 tablet (20 mg total) by mouth daily as needed (edema)    hydroCHLOROthiazide 12.5 mg tablet Take 1 tablet (12.5 mg total) by mouth daily    labetalol (NORMODYNE) 200 mg tablet Take 1 tablet (200 mg total) by mouth 2 (two) times a day    losartan (COZAAR) 100 MG tablet Take 1 tablet (100 mg total) by mouth daily    metFORMIN (GLUCOPHAGE-XR) 750 mg 24 hr tablet TAKE 1 TABLET BY MOUTH EVERY DAY WITH BREAKFAST    [DISCONTINUED] levothyroxine 25 mcg tablet Take 1 tablet (25 mcg total) by mouth daily    dexamethasone (DECADRON) 1 mg tablet  (Patient not taking: Reported on 12/11/2024)    phenazopyridine (PYRIDIUM) 100 mg tablet Take 1 tablet (100 mg total) by mouth 3 (three) times a day as needed for bladder spasms (Patient not taking: Reported on 12/11/2024)    trospium chloride (SANCTURA) 20 mg tablet Take 1 tablet (20 mg total) by  "mouth 2 (two) times a day (Patient not taking: Reported on 12/11/2024)     No current facility-administered medications on file prior to visit.     She is allergic to oxycodone..    Review of Systems   Constitutional:  Negative for chills and fever.   HENT:  Negative for congestion, ear pain and sore throat.    Eyes:  Negative for pain.   Respiratory:  Negative for cough and shortness of breath.    Cardiovascular:  Negative for chest pain and leg swelling.   Gastrointestinal:  Negative for abdominal pain, nausea and vomiting.   Endocrine: Negative for polyuria.   Genitourinary:  Positive for difficulty urinating and frequency. Negative for urgency.   Musculoskeletal:  Positive for arthralgias and back pain.   Skin:  Negative for rash.   Neurological:  Negative for weakness and headaches.   Psychiatric/Behavioral:  Negative for sleep disturbance. The patient is not nervous/anxious.          Objective:      /86 (BP Location: Left arm, Patient Position: Sitting, Cuff Size: Standard)   Pulse 89   Temp 97.7 °F (36.5 °C) (Temporal)   Ht 5' 4\" (1.626 m)   Wt 91.6 kg (202 lb)   SpO2 97%   BMI 34.67 kg/m²     Recent Results (from the past 8 weeks)   POCT urine dip    Collection Time: 11/05/24 11:45 AM   Result Value Ref Range    LEUKOCYTE ESTERASE,UA 3+     NITRITE,UA neg     SL AMB POCT UROBILINOGEN neg     POCT URINE PROTEIN trace      PH,UA 6     BLOOD,UA neg     SPECIFIC GRAVITY,UA 1,010     KETONES,UA neg     BILIRUBIN,UA neg     GLUCOSE, UA neg    Urine culture    Collection Time: 11/05/24 11:49 AM    Specimen: Urine, Clean Catch   Result Value Ref Range    Urine Culture 10,000-19,000 cfu/ml    Albumin / creatinine urine ratio    Collection Time: 12/09/24  9:06 AM   Result Value Ref Range    Creatinine, Ur 71.5 Reference range not established. mg/dL    Albumin,U,Random 76.3 (H) <20.0 mg/L    Albumin Creat Ratio 107 (H) 0 - 30 mg/g creatinine   CBC and differential    Collection Time: 12/09/24  9:06 AM "   Result Value Ref Range    WBC 5.39 4.31 - 10.16 Thousand/uL    RBC 3.94 3.81 - 5.12 Million/uL    Hemoglobin 12.1 11.5 - 15.4 g/dL    Hematocrit 36.7 34.8 - 46.1 %    MCV 93 82 - 98 fL    MCH 30.7 26.8 - 34.3 pg    MCHC 33.0 31.4 - 37.4 g/dL    RDW 13.2 11.6 - 15.1 %    MPV 9.2 8.9 - 12.7 fL    Platelets 311 149 - 390 Thousands/uL    nRBC 0 /100 WBCs    Segmented % 53 43 - 75 %    Immature Grans % 0 0 - 2 %    Lymphocytes % 34 14 - 44 %    Monocytes % 11 4 - 12 %    Eosinophils Relative 2 0 - 6 %    Basophils Relative 0 0 - 1 %    Absolute Neutrophils 2.80 1.85 - 7.62 Thousands/µL    Absolute Immature Grans 0.02 0.00 - 0.20 Thousand/uL    Absolute Lymphocytes 1.82 0.60 - 4.47 Thousands/µL    Absolute Monocytes 0.61 0.17 - 1.22 Thousand/µL    Eosinophils Absolute 0.12 0.00 - 0.61 Thousand/µL    Basophils Absolute 0.02 0.00 - 0.10 Thousands/µL   Comprehensive metabolic panel    Collection Time: 12/09/24  9:06 AM   Result Value Ref Range    Sodium 132 (L) 135 - 147 mmol/L    Potassium 4.1 3.5 - 5.3 mmol/L    Chloride 97 96 - 108 mmol/L    CO2 31 21 - 32 mmol/L    ANION GAP 4 4 - 13 mmol/L    BUN 13 5 - 25 mg/dL    Creatinine 0.77 0.60 - 1.30 mg/dL    Glucose, Fasting 103 (H) 65 - 99 mg/dL    Calcium 9.2 8.4 - 10.2 mg/dL    AST 17 13 - 39 U/L    ALT 18 7 - 52 U/L    Alkaline Phosphatase 59 34 - 104 U/L    Total Protein 6.7 6.4 - 8.4 g/dL    Albumin 3.8 3.5 - 5.0 g/dL    Total Bilirubin 0.60 0.20 - 1.00 mg/dL    eGFR 72 ml/min/1.73sq m   Lipid Panel with Direct LDL reflex    Collection Time: 12/09/24  9:06 AM   Result Value Ref Range    Cholesterol 143 See Comment mg/dL    Triglycerides 126 See Comment mg/dL    HDL, Direct 49 (L) >=50 mg/dL    LDL Calculated 69 0 - 100 mg/dL   TSH, 3rd generation    Collection Time: 12/09/24  9:06 AM   Result Value Ref Range    TSH 3RD GENERATON 3.395 0.450 - 4.500 uIU/mL   Vitamin B12    Collection Time: 12/09/24  9:06 AM   Result Value Ref Range    Vitamin B-12 346 180 - 914 pg/mL    Hemoglobin A1C    Collection Time: 12/09/24  9:06 AM   Result Value Ref Range    Hemoglobin A1C 6.8 (H) Normal 4.0-5.6%; PreDiabetic 5.7-6.4%; Diabetic >=6.5%; Glycemic control for adults with diabetes <7.0% %     mg/dl        Physical Exam  Constitutional:       Appearance: Normal appearance.   HENT:      Head: Normocephalic.      Right Ear: External ear normal.      Left Ear: External ear normal.      Nose: Nose normal. No congestion.      Mouth/Throat:      Mouth: Mucous membranes are moist.      Pharynx: Oropharynx is clear. No oropharyngeal exudate or posterior oropharyngeal erythema.   Eyes:      Extraocular Movements: Extraocular movements intact.      Conjunctiva/sclera: Conjunctivae normal.   Cardiovascular:      Rate and Rhythm: Normal rate and regular rhythm.      Heart sounds: Normal heart sounds. No murmur heard.  Pulmonary:      Effort: Pulmonary effort is normal.      Breath sounds: Normal breath sounds. No wheezing or rales.   Abdominal:      General: Abdomen is flat. There is no distension.      Palpations: Abdomen is soft.      Tenderness: There is no abdominal tenderness.   Musculoskeletal:         General: Normal range of motion.      Cervical back: Normal range of motion and neck supple.      Right lower leg: Edema present.      Left lower leg: Edema present.   Lymphadenopathy:      Cervical: No cervical adenopathy.   Skin:     General: Skin is warm.   Neurological:      General: No focal deficit present.      Mental Status: She is alert and oriented to person, place, and time.

## 2024-12-24 ENCOUNTER — PROCEDURE VISIT (OUTPATIENT)
Dept: UROLOGY | Facility: CLINIC | Age: 84
End: 2024-12-24
Payer: COMMERCIAL

## 2024-12-24 ENCOUNTER — TELEPHONE (OUTPATIENT)
Age: 84
End: 2024-12-24

## 2024-12-24 VITALS
BODY MASS INDEX: 34.15 KG/M2 | OXYGEN SATURATION: 97 % | HEIGHT: 64 IN | WEIGHT: 200 LBS | HEART RATE: 77 BPM | DIASTOLIC BLOOD PRESSURE: 72 MMHG | SYSTOLIC BLOOD PRESSURE: 142 MMHG

## 2024-12-24 DIAGNOSIS — R39.9 UTI SYMPTOMS: ICD-10-CM

## 2024-12-24 DIAGNOSIS — R39.15 URGENCY OF URINATION: ICD-10-CM

## 2024-12-24 DIAGNOSIS — N32.81 OVERACTIVE BLADDER: Primary | ICD-10-CM

## 2024-12-24 LAB
SL AMB  POCT GLUCOSE, UA: NORMAL
SL AMB LEUKOCYTE ESTERASE,UA: NORMAL
SL AMB POCT BILIRUBIN,UA: NORMAL
SL AMB POCT BLOOD,UA: NORMAL
SL AMB POCT CLARITY,UA: CLEAR
SL AMB POCT COLOR,UA: YELLOW
SL AMB POCT KETONES,UA: NORMAL
SL AMB POCT NITRITE,UA: NORMAL
SL AMB POCT PH,UA: 6.5
SL AMB POCT SPECIFIC GRAVITY,UA: 1.02
SL AMB POCT URINE PROTEIN: NORMAL
SL AMB POCT UROBILINOGEN: 0.2

## 2024-12-24 PROCEDURE — 87086 URINE CULTURE/COLONY COUNT: CPT | Performed by: UROLOGY

## 2024-12-24 PROCEDURE — 52287 CYSTOSCOPY CHEMODENERVATION: CPT | Performed by: UROLOGY

## 2024-12-24 PROCEDURE — 96372 THER/PROPH/DIAG INJ SC/IM: CPT

## 2024-12-24 PROCEDURE — 81002 URINALYSIS NONAUTO W/O SCOPE: CPT | Performed by: UROLOGY

## 2024-12-24 PROCEDURE — 99214 OFFICE O/P EST MOD 30 MIN: CPT | Performed by: UROLOGY

## 2024-12-24 RX ORDER — CEFTRIAXONE 1 G/1
1000 INJECTION, POWDER, FOR SOLUTION INTRAMUSCULAR; INTRAVENOUS ONCE
Status: COMPLETED | OUTPATIENT
Start: 2024-12-24 | End: 2024-12-24

## 2024-12-24 RX ADMIN — CEFTRIAXONE 1000 MG: 1 INJECTION, POWDER, FOR SOLUTION INTRAMUSCULAR; INTRAVENOUS at 10:35

## 2024-12-24 NOTE — PROGRESS NOTES
"Assessment/Plan:    Urgency of urination  Botox 100 units administered today.  The patient tolerated his procedure but had significant discomfort.  Visualization was also difficult because of mild but persistent bleeding throughout the procedure if she wishes to pursue repeat in the future may be better to be done in OR.    UTI symptoms  The patient's urine and bladder did look concerning for possible infection.  Urine was collected for culture.  We discussed whether we should move forward with procedure given high risk for infection and she wanted to move forward.  Rocephin given after.  If her urine culture shows growth we will plan for oral antibiotic course          Subjective:      Patient ID: Lubna Bronson is a 84 y.o.   Horacio is an 84-year-old female with a past medical history significant for microscopic hematuria and lower urinary tract symptoms including overactive bladder issues.      For several months she has had issues with feelings of having UTI although culture data has not corroborated.  She states that she was driving home in the spring from [place] with her spouse when she began to experience symptoms such as frequency, urgency, suprapubic pain with \" bumps in the road\", dysuria.  She consulted with her primary care physician who ordered urine testing which in both cases did not demonstrate culture proven UTI.  Patient was also placed on a course of antibiotics with Keflex and Bactrim which once again did not do anything for symptoms.      She was found to have microscopic hematuria and had cystoscopy with Dr. Pardo in July 2024 which did not show any concerning lesions within the bladder but did note irritation and erythema within the bladder that did not look consistent with CIS.  Patient denies gross hematuria.  She had a CT urogram which was unremarkable.  She was first put on Ditropan by PCP which did not help and then mirabegron and cystoscopy was not effective then we added Vesicare.  " But this did not help her symptoms.  She was then put on Tropium twice a day and referred to pelvic for physical therapy and as all of this did not help she is now here for Botox.  She states that the frequency and urgency is affecting her quality of life and that she is not able to travel and is keeping her up at night.      Prior to Botox today we did discuss that since he is having UTI symptoms it is possible that Botox can make an infection worse.  She was given Rocephin after the appointment.  She has not stopped her Eliquis for today's procedure.    Guarding her dysuria she tried Azo which did help with sense of dysuria.  He also started the vaginal estrogen cream which also seem to help her dysuria.      Patient denies any smoking history.  She previously worked as a labor and delivery nurse.  She denies any known family history of malignancy although notes that she does not know her father's side of history.  She has previous history of gynecologic surgery with a total hysterectomy at the age of 52 for fibroids and abnormal uterine bleeding.  She denies any vaginal bleeding         Past Surgical History:   Procedure Laterality Date    CHOLECYSTECTOMY  1991    COLONOSCOPY  11/09/2017    HEMORRHOID SURGERY  1999    HIP SURGERY  2019    HYSTERECTOMY  1993    Vaginal    TN ARTHRP ACETBLR/PROX FEM PROSTC AGRFT/ALGRFT Right 10/22/2019    Procedure: ARTHROPLASTY HIP TOTAL;  Surgeon: Lupe Driscoll DO;  Location: [facility] OR;  Service: Orthopedics    TRIGGER FINGER RELEASE Right 1998        Past Medical History:   Diagnosis Date    Acquired hypothyroidism     Adenoma of left adrenal gland 02/27/2018    Adrenal gland disorder (HCC)     Anemia     Anxiety     Arthritis     At risk for falls     Diabetes mellitus (HCC)     Disease of thyroid gland     hypo    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    Edema of both legs     Fibroid     History of pulmonary embolus (PE)     Hyperlipidemia      "Hypertension     Irritable bowel syndrome     Obesity 2000    Prediabetes     Presence of right artificial hip joint     Pulmonary embolus (HCC) 2/27/2018 & 2/14/2019    Uses roller walker     Wears glasses     Wears partial dentures     upper partial             Review of Systems   Constitutional:  Negative for chills and fever.   HENT:  Negative for ear pain and sore throat.    Eyes:  Negative for pain and visual disturbance.   Respiratory:  Negative for cough and shortness of breath.    Cardiovascular:  Negative for chest pain and palpitations.   Gastrointestinal:  Negative for abdominal pain and vomiting.   Genitourinary:  Positive for frequency and urgency. Negative for dysuria and hematuria.   Musculoskeletal:  Negative for arthralgias and back pain.   Skin:  Negative for color change and rash.   Neurological:  Negative for seizures and syncope.   All other systems reviewed and are negative.        Objective:      /72 (BP Location: Left arm, Patient Position: Sitting, Cuff Size: Large)   Pulse 77   Ht 5' 4\" (1.626 m)   Wt 90.7 kg (200 lb)   SpO2 97%   BMI 34.33 kg/m²     No results found for: \"PSA\"       Physical Exam  Vitals reviewed.   Constitutional:       General: She is not in acute distress.     Appearance: Normal appearance. She is not ill-appearing, toxic-appearing or diaphoretic.   HENT:      Head: Normocephalic and atraumatic.   Eyes:      Extraocular Movements: Extraocular movements intact.      Pupils: Pupils are equal, round, and reactive to light.   Pulmonary:      Effort: Pulmonary effort is normal.   Abdominal:      General: Abdomen is flat. There is no distension.      Palpations: Abdomen is soft. There is no mass.      Tenderness: There is no abdominal tenderness. There is no guarding or rebound.      Hernia: No hernia is present.   Skin:     General: Skin is warm.   Neurological:      General: No focal deficit present.      Mental Status: She is alert and oriented to person, " "place, and time. Mental status is at baseline.   Psychiatric:         Mood and Affect: Mood normal.         Behavior: Behavior normal.         Thought Content: Thought content normal.              Cystoscopy     Date/Time  12/24/2024 10:00 AM     Performed by  Luciano Sanchez MD   Authorized by  Luciano Sanchez MD     Universal Protocol:  Consent: Written consent obtained.  Risks and benefits: risks, benefits and alternatives were discussed  Consent given by: patient  Time out: Immediately prior to procedure a \"time out\" was called to verify the correct patient, procedure, equipment, support staff and site/side marked as required.  Patient understanding: patient states understanding of the procedure being performed  Patient consent: the patient's understanding of the procedure matches consent given  Procedure consent: procedure consent matches procedure scheduled  Patient identity confirmed: verbally with patient      Procedure Details:  Procedure type: cystoscopy, injection for chemodenervation    Patient tolerance: Patient tolerated the procedure well with no immediate complications    Additional Procedure Details: A female MA was in the room and served as a chaperone and assistant    The patient's bladder was numbed with liquid lidocaine inserted via catheter which dwelled for 5 minutes.    100 units of botox were sterily reconstituted into 10cc of saline on the back table.    The patient's external genitals were sterilely prepped and draped.  Viscous lidocaine was introduced into the urethra  A flexible cystoscope was introduced into the urethra.    Urethra: Normal  Bladder: No lesions. Ureteral orifices in orthotopic position.  No diverticula or trabeculations  The bladder appeared generally inflamed and friable.  Urine was collected for culture.  There was areas of mild bleeding induced just from the scope rubbing on the bladder wall.  Photos were taken of this.    A flexible needle was used through the scope. " It was deployed to 3mm depth.   The bladder was then injected in 10 point template along the back wall with 1cc injected each time under visualization. Care was taken to avoid inject of the trigone.  The procedure was difficult because of patient's mild bleeding made visualization difficult.  After injections were complete the bladder was surveyed and there was assistant mild accumulating but no obvious  significant bleeding seen.        Orders  Orders Placed This Encounter   Procedures    Cystoscopy     This order was created via procedure documentation    Urine culture     Release to patient through Mychart:   Immediate    POCT urine dip

## 2024-12-24 NOTE — TELEPHONE ENCOUNTER
Patient was returning a missed call. She just had a Botox appt at 10 am with Daniel. She is unsure who was trying to contact her.     Requesting call back to let her know.

## 2024-12-24 NOTE — TELEPHONE ENCOUNTER
Original call was placed by Dr. Sanchez as patient left today prior to injection of Rocephin. Patient informed of need for injection and returned to receive it. Patient is on Eliquis. During injection of Rocephin, reminded patient to be on the look out for any excessive bleeding from injection site or urine or due to botox injections in the bladder today. Patient expressed understanding and is agreeable. Dr. Sanchez made aware of Eliquis.

## 2024-12-24 NOTE — ASSESSMENT & PLAN NOTE
Botox 100 units administered today.  The patient tolerated his procedure but had significant discomfort.  Visualization was also difficult because of mild but persistent bleeding throughout the procedure if she wishes to pursue repeat in the future may be better to be done in OR.

## 2024-12-24 NOTE — ASSESSMENT & PLAN NOTE
The patient's urine and bladder did look concerning for possible infection.  Urine was collected for culture.  We discussed whether we should move forward with procedure given high risk for infection and she wanted to move forward.  Rocephin given after.  If her urine culture shows growth we will plan for oral antibiotic course

## 2024-12-25 LAB — BACTERIA UR CULT: NORMAL

## 2025-01-10 ENCOUNTER — APPOINTMENT (OUTPATIENT)
Dept: LAB | Age: 85
End: 2025-01-10
Payer: COMMERCIAL

## 2025-01-10 DIAGNOSIS — R39.9 UTI SYMPTOMS: ICD-10-CM

## 2025-01-10 LAB

## 2025-01-10 PROCEDURE — 87086 URINE CULTURE/COLONY COUNT: CPT

## 2025-01-10 PROCEDURE — 81001 URINALYSIS AUTO W/SCOPE: CPT

## 2025-01-12 LAB — BACTERIA UR CULT: NORMAL

## 2025-01-13 ENCOUNTER — RESULTS FOLLOW-UP (OUTPATIENT)
Dept: UROLOGY | Facility: CLINIC | Age: 85
End: 2025-01-13

## 2025-01-15 NOTE — TELEPHONE ENCOUNTER
"Returned patient's call. Patient asked \"why is nothing being done for an abnormal UA?\" \"Can anyone there even fix my bladder?\" \"What about a UroGyn? Can they help me? They said they couldn't.\" \"When will I get answers to why there is white and red blood cells in my urine?\"  "

## 2025-01-22 ENCOUNTER — OFFICE VISIT (OUTPATIENT)
Dept: UROLOGY | Facility: CLINIC | Age: 85
End: 2025-01-22
Payer: COMMERCIAL

## 2025-01-22 ENCOUNTER — TELEPHONE (OUTPATIENT)
Age: 85
End: 2025-01-22

## 2025-01-22 VITALS
SYSTOLIC BLOOD PRESSURE: 142 MMHG | HEART RATE: 68 BPM | WEIGHT: 205 LBS | OXYGEN SATURATION: 98 % | BODY MASS INDEX: 35 KG/M2 | HEIGHT: 64 IN | DIASTOLIC BLOOD PRESSURE: 86 MMHG

## 2025-01-22 DIAGNOSIS — N30.10 INTERSTITIAL CYSTITIS: ICD-10-CM

## 2025-01-22 DIAGNOSIS — N32.81 OVERACTIVE BLADDER: Primary | ICD-10-CM

## 2025-01-22 LAB — POST-VOID RESIDUAL VOLUME, ML POC: 31 ML

## 2025-01-22 PROCEDURE — 99213 OFFICE O/P EST LOW 20 MIN: CPT

## 2025-01-22 PROCEDURE — 51798 US URINE CAPACITY MEASURE: CPT

## 2025-01-22 RX ORDER — HYDROXYZINE HYDROCHLORIDE 10 MG/1
10 TABLET, FILM COATED ORAL DAILY
Qty: 30 TABLET | Refills: 1 | Status: SHIPPED | OUTPATIENT
Start: 2025-01-22

## 2025-01-22 NOTE — TELEPHONE ENCOUNTER
PA for HYDROXYZINE 10 MG  APPROVED     Date(s) approved UNTIL 01/20/2026        Patient advised by          [x]MyChart Message  []Phone call   [x]LMOM  []L/M to call office as no active Communication consent on file  []Unable to leave detailed message as VM not approved on Communication consent       Pharmacy advised by    [x]Fax  []Phone call    Approval letter scanned into Media No WILL SCAN UPON RECEIPT

## 2025-01-22 NOTE — PROGRESS NOTES
Office Visit- Urology  Lubna Bronson 1940 MRN: [id number]      Assessment/Discussion/Plan    84 y.o. female managed by     Urinary frequency/urgency/urinary incontinence, nocturia dysuria   Patient previously trialed Myrbetriq 25 mg but this not able to be picked up from the pharmacy  -At last visit patient had addition of Vesicare 10 mg along with Myrbetriq  -Addition of vaginal estrogen cream which has improved patient's sensation of dysuria  -Patient is still having sensation of frequency urgency pressure.  Myrbetriq plus Vesicare has not been beneficial for this purpose  Will trial-trospium 20 mg twice daily as alternative medication.  Will also have patient be evaluated by pelvic floor physical therapy to see if there is any component of pelvic floor hypertonicity  -Patient did not find any antispasmodics or beta 3 agonist to be helpful  -She only completed 2 courses of pelvic floor physical therapy  -She underwent intravesical Botox which was initially beneficial but subsided.  -Patient was seen by gynecology with no significant gynecologic abnormality reported.  -Discussed with patient that her urine cultures in the past have not demonstrated a clear infectious etiology of her symptomatology as she is not sexually active.  Presumed diagnosis was overactive bladder.  Patient does also note pelvic pain.  Discussed potentiality of interstitial cystitis.  Can trial hydroxyzine 10 mg and see if there is any benefit to this.  Also reviewed fundamentals of lifestyle modification and requested that patient only drink water with no coffee, tea, soda.  Patient also has lower extremity edema.  Advised her to keep her legs elevated when she is sitting to prevent fluid accumulation and to utilize compression stockings to hopefully decrease episode nocturia.  Spouse  states that she does not snore so relatively low concern for sleep apnea.  -Patient has scheduled follow-up with myself in February to can reassess  "the above measures     2.  Microscopic hematuria  -Patient has had 2 episodes of clinically significant microscopic hematuria with 4-10 RBCs seen in May 2024 with negative urine culture and 30-50 RBC seen in context of negative urine culture on 6/14/2024  -Patient had a CT of the abdomen pelvis with contrast in May 2024 with mild thickening of the urinary bladder wall  -Will send out urine cytology  -Status post negative CT urogram  -Cystoscopy with evidence of vaginal atrophy.  Redness in the right lateral wall with nothing raised or papillary with physician not suspecting malignancy     3.  Adrenal adenoma  -1.9 cm left adrenal nodule that is stable on last imaging on 5/20/2024  -Was imaged as far back as 2018  -Adrenal adenoma is negative      Chief Complaint:   Lubna is a 84 y.o. female presenting to the office for a follow up visit regarding lower urinary tract symptoms        Subjective    Hx 7/12/2024  Patient is a 83-year-old female with no significant past urologic history who presents to the office today for evaluation of low urinary tract symptoms has been ongoing for the past few months.  She states that she was driving home in the spring from [place] with her spouse when she began to experience symptoms such as frequency, urgency, suprapubic pain with \" bumps in the road\", dysuria.  She consulted with her primary care physician who ordered urine testing which in both cases did not demonstrate culture proven UTI.  Patient was also placed on a course of antibiotics with Keflex and Bactrim which once again did not do anything for symptoms.  Of note.  Urine analysis did demonstrate significant microscopic hematuria.  Patient denies gross hematuria.  She states that she was utilizing Azo which did help with sense of dysuria.  She was initiated on oxybutynin which did not do anything for her frequency and urgency.  She was prescribed Myrbetriq by her PCP but that is not able to be obtained from her pharmacy " so she did not yet utilize.  She states that the frequency and urgency is affecting her quality of life and that she is not able to travel and is keeping her up at night.  Patient denies any smoking history.  She previously worked as a labor and delivery nurse.  She denies any known family history of malignancy although notes that she does not know her father's side of history.  She has previous history of gynecologic surgery with a total hysterectomy at the age of 52 for fibroids and abnormal uterine bleeding.  She denies any vaginal bleeding     Hx 9/16/2024  In the interim since patient was last seen by myself she had a cystoscopy was demonstrated significant vaginal atrophy as well as irritation/erythema noted within the bladder that was not felt to be indicative of carcinoma in situ by physician.  CT urogram was negative. she was initiated on Vesicare in addition to Myrbetriq but she states that there has been no significant symptomatology change in terms of frequency, urgency, bladder pressure.  With use of vaginal estrogen cream she has had significant relief and sensation of dysuria     Hx 1/22/2025  Patient presents to the office today for follow-up.  Mentions that she has seen she has tried pelvic close for physical therapy for 2 sessions and also had intravesical Botox.  Intravesical Botox was essentially mildly beneficial in the beginning but she states that her symptoms are largely the same now.  She reports frequency, urgency, nocturia up to 10 times a day.    ROS:   Review of Systems   Constitutional: Negative.  Negative for chills, fatigue and fever.   HENT: Negative.     Respiratory:  Negative for shortness of breath.    Cardiovascular:  Negative for chest pain.   Gastrointestinal: Negative.  Negative for abdominal pain.   Endocrine: Negative.    Musculoskeletal: Negative.    Skin: Negative.    Neurological: Negative.  Negative for dizziness and light-headedness.   Hematological: Negative.     Psychiatric/Behavioral: Negative.           Past Medical History  Past Medical History:   Diagnosis Date    Acquired hypothyroidism     Adenoma of left adrenal gland 02/27/2018    Adrenal gland disorder (HCC)     Anemia     Anxiety     Arthritis     At risk for falls     Diabetes mellitus (HCC)     Disease of thyroid gland     hypo    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    Edema of both legs     Fibroid     History of pulmonary embolus (PE)     Hyperlipidemia     Hypertension     Irritable bowel syndrome     Obesity 2000    Prediabetes     Presence of right artificial hip joint     Pulmonary embolus (HCC) 2/27/2018 & 2/14/2019    Uses roller walker     Wears glasses     Wears partial dentures     upper partial       Past Surgical History  Past Surgical History:   Procedure Laterality Date    CHOLECYSTECTOMY  1991    COLONOSCOPY  11/09/2017    HEMORRHOID SURGERY  1999    HIP SURGERY  2019    HYSTERECTOMY  1993    Vaginal    LA ARTHRP ACETBLR/PROX FEM PROSTC AGRFT/ALGRFT Right 10/22/2019    Procedure: ARTHROPLASTY HIP TOTAL;  Surgeon: Lupe Driscoll DO;  Location: [facility];  Service: Orthopedics    TRIGGER FINGER RELEASE Right 1998       Past Family History  Family History   Problem Relation Age of Onset    Hypertension Family     Hypertension Mother        Past Social history  Social History     Socioeconomic History    Marital status: /Civil Union     Spouse name: Not on file    Number of children: Not on file    Years of education: Not on file    Highest education level: Not on file   Occupational History    Occupation: RN, retired   Tobacco Use    Smoking status: Never    Smokeless tobacco: Never   Vaping Use    Vaping status: Never Used   Substance and Sexual Activity    Alcohol use: Never    Drug use: Never    Sexual activity: Not Currently     Partners: Male     Birth control/protection: None   Other Topics Concern    Not on file   Social History Narrative    Not on file      Social Drivers of Health     Financial Resource Strain: Low Risk  (12/19/2022)    Overall Financial Resource Strain (CARDIA)     Difficulty of Paying Living Expenses: Not hard at all   Food Insecurity: No Food Insecurity (8/7/2024)    Nursing - Inadequate Food Risk Classification     Worried About Running Out of Food in the Last Year: Never true     Ran Out of Food in the Last Year: Never true     Ran Out of Food in the Last Year: Not on file   Transportation Needs: No Transportation Needs (8/7/2024)    PRAPARE - Transportation     Lack of Transportation (Medical): No     Lack of Transportation (Non-Medical): No   Physical Activity: Not on file   Stress: Not on file   Social Connections: Not on file   Intimate Partner Violence: Not on file   Housing Stability: Low Risk  (8/7/2024)    Housing Stability Vital Sign     Unable to Pay for Housing in the Last Year: No     Number of Times Moved in the Last Year: 1     Homeless in the Last Year: No       Current Medications  Current Outpatient Medications   Medication Sig Dispense Refill    acetaminophen (TYLENOL) 325 mg tablet Take 650 mg by mouth every 6 (six) hours as needed for mild pain      atorvastatin (LIPITOR) 10 mg tablet Take 1 tablet (10 mg total) by mouth daily at bedtime 90 tablet 1    Eliquis 5 MG Take 1 tablet (5 mg total) by mouth 2 (two) times a day 180 tablet 1    estradiol (ESTRACE VAGINAL) 0.1 mg/g vaginal cream Insert 1 g into the vagina daily Daily for 3 weeks then 3 times a week to anterior vaginal wall 42.5 g 1    furosemide (LASIX) 20 mg tablet Take 1 tablet (20 mg total) by mouth daily as needed (edema) 30 tablet 0    hydroCHLOROthiazide 12.5 mg tablet Take 1 tablet (12.5 mg total) by mouth daily 90 tablet 1    labetalol (NORMODYNE) 200 mg tablet Take 1 tablet (200 mg total) by mouth 2 (two) times a day 180 tablet 1    levothyroxine 25 mcg tablet Take 1 tablet (25 mcg total) by mouth daily 90 tablet 1    losartan (COZAAR) 100 MG tablet Take 1  "tablet (100 mg total) by mouth daily 90 tablet 1    metFORMIN (GLUCOPHAGE-XR) 750 mg 24 hr tablet TAKE 1 TABLET BY MOUTH EVERY DAY WITH BREAKFAST 90 tablet 1     No current facility-administered medications for this visit.       Allergies  Allergies   Allergen Reactions    Oxycodone Vomiting       OBJECTIVE    Vitals   Vitals:    01/22/25 1116   Weight: 93 kg (205 lb)   Height: 5' 4\" (1.626 m)       PVR:    Physical Exam  Constitutional:       General: She is not in acute distress.     Appearance: Normal appearance. She is normal weight. She is not ill-appearing or toxic-appearing.   HENT:      Head: Normocephalic and atraumatic.   Eyes:      Conjunctiva/sclera: Conjunctivae normal.   Cardiovascular:      Rate and Rhythm: Normal rate.   Pulmonary:      Effort: Pulmonary effort is normal. No respiratory distress.   Skin:     General: Skin is warm and dry.   Neurological:      General: No focal deficit present.      Mental Status: She is alert and oriented to person, place, and time.      Cranial Nerves: No cranial nerve deficit.   Psychiatric:         Mood and Affect: Mood normal.         Behavior: Behavior normal.         Thought Content: Thought content normal.          Labs:     Lab Results   Component Value Date    CREATININE 0.77 12/09/2024      Lab Results   Component Value Date    HGBA1C 6.8 (H) 12/09/2024     Lab Results   Component Value Date    CALCIUM 9.2 12/09/2024    K 4.1 12/09/2024    CO2 31 12/09/2024    CL 97 12/09/2024    BUN 13 12/09/2024    CREATININE 0.77 12/09/2024       I have personally reviewed all pertinent lab results and reviewed with patient    Imaging       Eder Stewart PA-C  Date: 1/22/2025 Time: 11:20 AM  [facility]    This note was written using fluency dictation software. Please excuse any resulting minor grammatical errors.      "

## 2025-02-04 ENCOUNTER — TELEPHONE (OUTPATIENT)
Age: 85
End: 2025-02-04

## 2025-02-04 DIAGNOSIS — R39.89 BLADDER PAIN: Primary | ICD-10-CM

## 2025-02-04 NOTE — TELEPHONE ENCOUNTER
Patient calling as she is at a loss of what to do next. Reports she is not getting any sleep due to her ongoing pelvic issues.   -Tried the hydroxyzine 10mg and saw no difference in her symptoms.  -Due to eliquis can only take tylenol which does not help.  -Tried pelvic floor physical therapy a few times and can not really say that it helped. She was given exercises to try and has stopped doing them.   -Saw GYN and they recommended:  She had previously been given a referral/called urogynecology and was advised that they could only provide service for her if she was a candidate for surgery.   She is wondering what urogynecologist we would recommend or if we would recommend [facility].     Please advise.

## 2025-02-06 NOTE — TELEPHONE ENCOUNTER
Patient called in requesting an answer as she is continuing to deal with constant pain and discomfort. States she wakes up every 30 minutes during the night. She is hoping for some guidance. Please advise.

## 2025-02-06 NOTE — TELEPHONE ENCOUNTER
Please call the patient and let her know that we are currently working on this for her.  We have reached out to to give her physicians to see what the next best steps would be.  Once we hear back from the physicians we will reach back out to her as soon as we know something.

## 2025-02-06 NOTE — TELEPHONE ENCOUNTER
Contacted Horacio and made her aware we are currently awaiting further recommendations from her physicians regarding her continued pain. Office to return call to patient with additional recommendations after receiving response from MD.

## 2025-02-06 NOTE — TELEPHONE ENCOUNTER
Pt known to [place] offices- I reviewed her history and treatments to date (keflex, bactrim, oxybutynin, vesicare, trospium, myrbetriq, PFPT, estradiol, botox 100, hydroxyzine,  pyridium, amongst lifestyle/dietary changes) workup (ct urogram cystoscopy, repeat cystoscopy at time of botox recently as well)    reviewed office botox procedure 12/24/24 had some bleeding during procedure  has some initial improvement but no lasting improvement in oab symptoms    Dr Pardo & Dr Sanchez have both seen her so will ask them to weigh in  f/u is with ryland 2/26/25    is OR botox 200 appropriate for her?  Interstim consult?

## 2025-02-10 NOTE — TELEPHONE ENCOUNTER
Called and spoke with Horacio to discuss recommendations. Advised that a referral was placed to Urogynecology Dr. Liao's office. Patient was provided with their contact information to call and schedule a consult. She was also made aware a CT pelvis w wo contrast was ordered. She was provided with the central scheduling phone number and will call to schedule imaging- hopefully prior to upcoming office visit.

## 2025-02-10 NOTE — TELEPHONE ENCOUNTER
to summarize please let Horacio know we understand she has tried essentially everything   based on MD's suggestions so far I have placed two orders prior to her upcoming visit  CT scan and urogynecology referral

## 2025-02-19 ENCOUNTER — HOSPITAL ENCOUNTER (OUTPATIENT)
Dept: RADIOLOGY | Facility: IMAGING CENTER | Age: 85
Discharge: HOME | End: 2025-02-19
Payer: COMMERCIAL

## 2025-02-19 DIAGNOSIS — R39.89 BLADDER PAIN: ICD-10-CM

## 2025-02-19 PROCEDURE — 72193 CT PELVIS W/DYE: CPT

## 2025-02-19 RX ADMIN — IOHEXOL 100 ML: 350 INJECTION, SOLUTION INTRAVENOUS at 12:34

## 2025-02-25 ENCOUNTER — TELEPHONE (OUTPATIENT)
Age: 85
End: 2025-02-25

## 2025-02-25 ENCOUNTER — RESULTS FOLLOW-UP (OUTPATIENT)
Dept: UROLOGY | Facility: CLINIC | Age: 85
End: 2025-02-25

## 2025-02-25 ENCOUNTER — APPOINTMENT (OUTPATIENT)
Dept: LAB | Age: 85
End: 2025-02-25
Payer: COMMERCIAL

## 2025-02-25 DIAGNOSIS — I10 ESSENTIAL HYPERTENSION, BENIGN: ICD-10-CM

## 2025-02-25 DIAGNOSIS — I27.82 CHRONIC SADDLE PULMONARY EMBOLISM WITHOUT ACUTE COR PULMONALE (HCC): ICD-10-CM

## 2025-02-25 DIAGNOSIS — N95.2 ATROPHIC VAGINITIS: ICD-10-CM

## 2025-02-25 DIAGNOSIS — N30.10 INTERSTITIAL CYSTITIS: ICD-10-CM

## 2025-02-25 DIAGNOSIS — I26.92 CHRONIC SADDLE PULMONARY EMBOLISM WITHOUT ACUTE COR PULMONALE (HCC): ICD-10-CM

## 2025-02-25 DIAGNOSIS — N30.10 INTERSTITIAL CYSTITIS: Primary | ICD-10-CM

## 2025-02-25 PROCEDURE — 87077 CULTURE AEROBIC IDENTIFY: CPT

## 2025-02-25 PROCEDURE — 87186 SC STD MICRODIL/AGAR DIL: CPT

## 2025-02-25 PROCEDURE — 87086 URINE CULTURE/COLONY COUNT: CPT

## 2025-02-25 NOTE — PROGRESS NOTES
PT Evaluation   Today's date: 2025  Patient name: Lunba Bronson  : 1940  MRN: [id number]  Referring provider: Eder Stewart P*  Dx:   Encounter Diagnosis     ICD-10-CM    1. Overactive bladder  N32.81       2. Dysuria  R30.0       3. Pelvic pain  R10.2                        Assessment  Impairments: abnormal coordination, abnormal muscle firing, abnormal muscle tone, abnormal or restricted ROM, activity intolerance, impaired physical strength and lacks appropriate home exercise program  Symptom irritability: moderate    Assessment details: Lubna Bronson is a 84 y.o. female who presents with concerns of chronic urinary frequency and urgency, vaginal pain and dysuria. She emptied her bladder twice during 45 minutes session which reduced her pelvic pain for brief periods but it returned quickly.  Extensive education provided at this visit regarding pathophysiology of urinary urgency and frequency as well as pelvic floor muscle dysfunction and patient and her  expressed understanding.  She was requested to stop drinking propel and lieu of water with flavor drops and to begin scheduled voiding of 30 minutes if possible.  She has not picked up antibiotics yet for her current infection so internal assessment was deferred but patient provided verbal consent to proceed with pelvic floor examination next visit.  The plan of care was discussed and included education regarding pelvic floor anatomy, explanation of exam technique, explanation of exam findings and discussion of treatment plan as well as the importance of patient compliance and adherence to physical therapy visits. Patient would benefit from skilled physical therapy services  to address deficits and ultimately meet goal of independent self management of condition.     Patient provided written and verbal consent for pelvic floor muscle exam: yes next session        Understanding of Dx/Px/POC: good     Prognosis: fair    Goals  STGs  "to be met in 4 weeks:  * Patient will be compliant with introductory HEP as prescribed.  * Presents with improved understanding of bladder irritants and will comply with hydration recommendations.    LTGs to be met by discharge:  * Patient reports that she is able to successfully suppress an urge to empty her bladder for 20' without leakage.   * Reports voiding interval of 90 minutes without pain or incontinence.  *Reports that she can drive in a car without suprapubic discomfort \"going over bumps. \"  * Presents with improved fluid intake to avoid concentrated and irritating urine by discharge.  * Presents with improved nocturia to 2-3 toiletings/night for more thorough sleep.   * Patient will be compliant with comprehensive home exercise program for self management of condition.         Plan  Patient would benefit from: skilled physical therapy  Referral necessary: No  Planned modality interventions: biofeedback    Planned therapy interventions: activity modification, manual therapy, neuromuscular re-education, patient/caregiver education, strengthening, therapeutic activities, therapeutic exercise, home exercise program and behavior modification    Frequency: 1x week  Duration in weeks: 12  Plan of Care beginning date: 2/27/2025  Plan of Care expiration date: 5/22/2025  Treatment plan discussed with: patient and PTA    PT Pelvic Floor Subjective:   History of Present Illness:   Former patient returns with ongoing concerns of marked urinary frequency, urgency and dysuria.    Valium suppository, Gemsta, Urabel - Dr Heard for Urogyn  Using vaginal estrogen 3 times a week    Pain - lower abdominal and vaginal knife-like pain   \"Not getting better\" disrupts sleep  Heating pad for relief   \"We can't go for rides in the car which is our forte.\" \"I can't go more than 30 minutes.\"   Wearing Poise pads -overnight pads which she changes 3 times a day.   Urge leakage - \"I usually feel like there is a lot of urine.\"      She " "underwent a CAT scan and cystoscopy - was told there is redness and irritation in the bladder but feels nothing has helped her.     She arrives to clinic in a wheelchair but reports that she is ambulatory around her home. \"I have a walker but I don't use it regularly.\"    She also wears compression socks which and patient reports that her shoe size has increased from 7 1/2 to size 10 due to swelling over the past 2 years. PCP/Internist is aware of venous insufficiency. Does not have a cardiologist.     Reports that she does not check her blood sugars regularly. Is able to recite most recent A1C and glucose readings. (6.6 and 165 - I will verify in chart).   Social Support:     Lives in:  One-story house    Lives with:  Spouse    Relationship status: /committed    Work status: retired (retired nurse)  Diet and Exercise:      \"Not very active\"  \"I feel so tired.\"  OB/ gyn History    Gestational History:     Prior Pregnancy: Yes      Number of prior pregnancies: 5    Number of term pregnancies: 5    Delivery Type: vaginal delivery      Delivery Complications:  Children are ages 61, 60, 57, 54, 51  She has 11 grandchildren      Menstrual History:      Menopausal: menopause  H/o hysterectomy at age 52 due to fibroids and she wore an estrogen patch x 10 years   Bladder Function:     Voiding Difficulties positive for: urgency and frequent urination       Voiding Difficulties comments:     Voiding frequency: every 15-30 minutes    Urinary leakage: urine leakage    Urinary leakage aggravated by: walking to the bathroom    Nocturia (episodes per night): 4 or more    Painful urination: No      Intake (ounces):     Intake (ounces) comment: 3-4 bottles of Propel  1 can of Diet Coke, caffeine free  1 cup of tea  Bowel Function:     Bowel Function comments:  Colace -  takes one every 2-3 days  No irritation, denies pain with BMs    Bowel frequency: every 3 days  Sexual Function:     Sexually Active:  Not sexually " active  Pain:     Current pain ratin    At best pain ratin    At worst pain ratin    Location:  Lower abdominal and pelvic pain    Quality:  Burning and pressure  Diagnostic Tests:     None    Treatments:     Previous treatment:  Medication  Patient Goals:     Patient goals for therapy:  Improved pain management, improved quality of life, improved sleep, improved comfort, improved bladder or bowel function, fully empty bladder or bowels and decreased pain      Objective     Static Posture     Head  Forward.    Shoulders  Rounded.    Thoracic Spine  Hyperkyphosis.    Rib Cage  Pectus carinatum.    Lumbar Spine   Flattened.       Abdominal Assessment:      Abdominal Assessment: na      General Perineum Exam:   Positive for swelling and perianal erythema.     General perineum exam comments: No discharge, irritation, organ prolapse evident    Mild erythema along inner labia. Sensation intact throughout. Able to isolate and contract pelvic floor along posterior, lateral and anterior aspects but ~ 50% deficit in full relaxation.     Visual Inspection of Perineum:   Excursion of perineal body in cephalad direction with contraction of pelvic floor muscles (PFM): fair   Excursion of perineal body in caudal direction with relaxation of pelvic floor muscles (PFM): delayed  and weak  Involuntary contraction with coughing: no  Cotton swab test: non-tender  Sphincter Tone Resting: normal  Sphincter Tone Squeeze: normal  Sensation: intact   Atrophic changes: erythema noted       Pelvic Floor Muscle Exam:      Breathing pattern with contraction: holding breath   Pelvic floor muscle relaxation is incomplete.   50% pelvic floor relaxation        PERFECT Score   Power right: 2/5   Power left: 2/5   Endurance (seconds to max): 4   Repetitions (before fatigue): 5        pelvic floor exam consent given by patient    Pelvic exam completed: vaginally     SMEG Biofeedback   to be assessed next treatment           Precautions:    Patient Active Problem List   Diagnosis    Primary osteoarthritis of right hip    Primary osteoarthritis of one hip, right    History of hypercoagulable state    Mixed hyperlipidemia    Essential hypertension    Acute blood loss anemia    Hypertension    Irritable bowel syndrome    Edema of both legs    Acquired hypothyroidism    History of pulmonary embolus (PE)    Pulmonary embolus (HCC)    Presence of right artificial hip joint    Venous insufficiency of both lower extremities    Diabetes mellitus without complication (HCC)    Obesity, morbid (HCC)    Essential hypertension, benign    Tracheitis    Primary osteoarthritis of both knees    Hyponatremia    Uncontrolled type 2 diabetes mellitus with hyperglycemia (HCC)    Urgency of urination    UTI symptoms         PRO EVAL RE-EVAL DISCHARGE   PFDI      ZAHIDA-18      VPQ      CPSI-NIH      PGQ          POC Expires Auth Status Start Date Exp Date PT Visit Limit DA expires DA provider   12/25               Date of Service 2/27 ZAHIDA          Visits Used            Visits Remaining                        Manuals            Release along periurethral tissue                                                Neuro Re-Ed                                                                                                Ther Ex            Nustep aerobic warmup            Ankle pumps            Heel slides             Hip flexion                                    Ther Activity            Education Anatomy and POC  Bladder urge suppresion                                                                      Modalities

## 2025-02-25 NOTE — TELEPHONE ENCOUNTER
"Called and spoke directly to this pt and relayed this message in detail. Pt stated she understood. She is aware she needs to give a urine culture at any [facility] lab although pt is confused with ,\" why do I have to be seen at both places?\" I did ask for clarification on \" both places\" although this pt did not clarify. Fwd back to Juanpablo so he is aware of the conversation. If this pt calls back please messages again. Thank you. Pt did not want to set up any appt at this time until she got a response from Juanpablo.                         ----- Message from NGA Snow sent at 2/25/2025 10:53 AM EST -----  Please let patient know that her CT of the pelvis does not show any acute findings other than suspicion of cystitis.  There is a small amount of air in the bladder which may be due to recent catheterization.  I placed orders for urine testing to be completed.  Patient will need to be scheduled for follow-up visit as her appointment tomorrow with checkup was canceled.  "

## 2025-02-25 NOTE — TELEPHONE ENCOUNTER
"Spoke to mc again about this message and made it clear that she will now see Estefanía instead of Eder. She said she understands. Thank you for clarification. So appreciated.             ----- Message from NGA Snow sent at 2/25/2025 12:57 PM EST -----  I'm not clear what the patient might be referring to as \"both places\". She was originally schedule for a visit with Eder tomorrow, but this appears to have been cancelled for unknown reason. Looks like she was rescheduled with Estefanía on 3/04.   "

## 2025-02-25 NOTE — RESULT ENCOUNTER NOTE
Please let patient know that her CT of the pelvis does not show any acute findings other than suspicion of cystitis.  There is a small amount of air in the bladder which may be due to recent catheterization.  I placed orders for urine testing to be completed.  Patient will need to be scheduled for follow-up visit as her appointment tomorrow with checkup was canceled.

## 2025-02-25 NOTE — TELEPHONE ENCOUNTER
Radiology Test Results - Radiology Calling with report update  Ct scan pelvis  Pt under care of:   Ordering Provider: Gladys Mckenna PA-C     Imaging Completed: 02/19/25    Significant Findings - Please review

## 2025-02-26 RX ORDER — LOSARTAN POTASSIUM 100 MG/1
100 TABLET ORAL DAILY
Qty: 90 TABLET | Refills: 1 | Status: SHIPPED | OUTPATIENT
Start: 2025-02-26 | End: 2025-05-27

## 2025-02-26 RX ORDER — HYDROCHLOROTHIAZIDE 12.5 MG/1
12.5 TABLET ORAL DAILY
Qty: 90 TABLET | Refills: 1 | Status: SHIPPED | OUTPATIENT
Start: 2025-02-26 | End: 2025-08-25

## 2025-02-26 RX ORDER — ESTRADIOL 0.1 MG/G
1 CREAM VAGINAL DAILY
Qty: 42.5 G | Refills: 0 | Status: SHIPPED | OUTPATIENT
Start: 2025-02-26

## 2025-02-26 RX ORDER — APIXABAN 5 MG/1
5 TABLET, FILM COATED ORAL 2 TIMES DAILY
Qty: 180 TABLET | Refills: 1 | Status: SHIPPED | OUTPATIENT
Start: 2025-02-26 | End: 2025-08-25

## 2025-02-27 ENCOUNTER — RESULTS FOLLOW-UP (OUTPATIENT)
Dept: UROLOGY | Facility: CLINIC | Age: 85
End: 2025-02-27

## 2025-02-27 ENCOUNTER — EVALUATION (OUTPATIENT)
Dept: PHYSICAL THERAPY | Facility: REHABILITATION | Age: 85
End: 2025-02-27
Payer: COMMERCIAL

## 2025-02-27 DIAGNOSIS — R10.2 PELVIC PAIN: ICD-10-CM

## 2025-02-27 DIAGNOSIS — N30.00 ACUTE CYSTITIS WITHOUT HEMATURIA: Primary | ICD-10-CM

## 2025-02-27 DIAGNOSIS — R30.0 DYSURIA: ICD-10-CM

## 2025-02-27 DIAGNOSIS — N32.81 OVERACTIVE BLADDER: Primary | ICD-10-CM

## 2025-02-27 LAB
BACTERIA UR CULT: ABNORMAL
BACTERIA UR CULT: ABNORMAL

## 2025-02-27 PROCEDURE — 97530 THERAPEUTIC ACTIVITIES: CPT | Performed by: PHYSICAL THERAPIST

## 2025-02-27 PROCEDURE — 97162 PT EVAL MOD COMPLEX 30 MIN: CPT | Performed by: PHYSICAL THERAPIST

## 2025-02-27 RX ORDER — CEPHALEXIN 500 MG/1
500 CAPSULE ORAL EVERY 6 HOURS SCHEDULED
Qty: 28 CAPSULE | Refills: 0 | Status: SHIPPED | OUTPATIENT
Start: 2025-02-27 | End: 2025-03-06

## 2025-02-27 NOTE — TELEPHONE ENCOUNTER
----- Message from NGA Snow sent at 2/27/2025  9:00 AM EST -----  Please let patient know that her urine culture is positive.  I sent a prescription for Keflex to her pharmacy.

## 2025-02-27 NOTE — TELEPHONE ENCOUNTER
Called patient and left a detailed vm, per communication consent. Informed the pt that her urine culture came back positive for infection and that keflex was sent to her pharmacy. Left office number in case of any questions or concerns.

## 2025-02-27 NOTE — RESULT ENCOUNTER NOTE
Please let patient know that her urine culture is positive.  I sent a prescription for Keflex to her pharmacy.

## 2025-03-03 NOTE — PROGRESS NOTES
3/4/2025      Chief Complaint   Patient presents with   • Follow-up   • Overactive bladder         Assessment and Plan    84 y.o. female managed by Ap team           1. OAB (overactive bladder)  Assessment & Plan:    Follow-up for urinary tract infection  Patient treated with Keflex  Reports improvement since abx   Referral placed to urogynecology [facility] [place] here in [place]  She started on Gemtesa and restarted PFPT   Since then patient reports improvement with her OAB  Plan to transfer care to [facility] in 4/10  Plan to take over vaginal estrogen and atrax   Plan to follow-up as needed      History of Present Illness  Lubna Bronson is a 84 y.o. female here for evaluation of OAB.  Patient has a longstanding history of overactive bladder along with pelvic pain.  She was trialed on multiple medications without relief.  She additionally trialed Botox injections with minimal relief and increased bleeding.  Given she has failed multiple therapies a referral was placed to urogynecology.  She reports following up with them and was started on Gemtesa.  She reports much improvement with Gemtesa but reports medication being very expensive.  She additionally reports returning to pelvic floor physical therapy.  She previously only completed 2 sessions.  She reports improvement since medication and pelvic floor physical therapy.  She plans to continue with urogynecology.  She continues to use vaginal estrogen cream and is questioning to stop Atarax as she does not feel that the medication has made a difference.  She plans to further discuss at her follow-up appointment in April.      1.9 cm left adrenal nodule that is stable on last imaging on 5/20/2024  -Was imaged as far back as 2018  -Adrenal adenoma is negative      Review of Systems   Constitutional:  Negative for chills and fever.   HENT:  Negative for ear pain and sore throat.    Eyes:  Negative for pain and visual disturbance.   Respiratory:  Negative  "for cough and shortness of breath.    Cardiovascular:  Negative for chest pain and palpitations.   Gastrointestinal:  Negative for abdominal pain and vomiting.   Genitourinary:  Negative for decreased urine volume, difficulty urinating, dysuria, flank pain, frequency, hematuria and urgency.   Musculoskeletal:  Negative for arthralgias and back pain.   Skin:  Negative for color change and rash.   Neurological:  Negative for seizures and syncope.   All other systems reviewed and are negative.               Vitals  Vitals:    03/04/25 1409   BP: 146/80   BP Location: Left arm   Patient Position: Sitting   Cuff Size: Adult   Pulse: 72   SpO2: 94%   Height: 5' 4\" (1.626 m)       Physical Exam  Constitutional:       Appearance: Normal appearance.   HENT:      Head: Normocephalic and atraumatic.   Pulmonary:      Effort: Pulmonary effort is normal.   Musculoskeletal:         General: Normal range of motion.      Cervical back: Normal range of motion.   Neurological:      General: No focal deficit present.      Mental Status: She is alert and oriented to person, place, and time. Mental status is at baseline.   Psychiatric:         Mood and Affect: Mood normal.         Behavior: Behavior normal.         Thought Content: Thought content normal.           Past History  Past Medical History:   Diagnosis Date   • Acquired hypothyroidism    • Adenoma of left adrenal gland 02/27/2018   • Adrenal gland disorder (HCC)    • Anemia    • Anxiety    • Arthritis    • At risk for falls    • Diabetes mellitus (HCC)    • Disease of thyroid gland     hypo   • 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   • Edema of both legs    • Fibroid    • History of pulmonary embolus (PE)    • Hyperlipidemia    • Hypertension    • Irritable bowel syndrome    • Obesity 2000   • Prediabetes    • Presence of right artificial hip joint    • Pulmonary embolus (HCC) 2/27/2018 & 2/14/2019   • Uses roller walker    • Wears glasses    • Wears partial " dentures     upper partial     Social History     Socioeconomic History   • Marital status: /Civil Union     Spouse name: None   • Number of children: None   • Years of education: None   • Highest education level: None   Occupational History   • Occupation: RN, retired   Tobacco Use   • Smoking status: Never   • Smokeless tobacco: Never   Vaping Use   • Vaping status: Never Used   Substance and Sexual Activity   • Alcohol use: Not Currently   • Drug use: Never   • Sexual activity: Not Currently     Partners: Male     Birth control/protection: None   Other Topics Concern   • None   Social History Narrative   • None     Social Drivers of Health     Financial Resource Strain: Low Risk  (12/19/2022)    Overall Financial Resource Strain (CARDIA)    • Difficulty of Paying Living Expenses: Not hard at all   Food Insecurity: No Food Insecurity (8/7/2024)    Nursing - Inadequate Food Risk Classification    • Worried About Running Out of Food in the Last Year: Never true    • Ran Out of Food in the Last Year: Never true    • Ran Out of Food in the Last Year: Not on file   Transportation Needs: No Transportation Needs (8/7/2024)    PRAPARE - Transportation    • Lack of Transportation (Medical): No    • Lack of Transportation (Non-Medical): No   Physical Activity: Not on file   Stress: Not on file   Social Connections: Not on file   Intimate Partner Violence: Not on file   Housing Stability: Low Risk  (8/7/2024)    Housing Stability Vital Sign    • Unable to Pay for Housing in the Last Year: No    • Number of Times Moved in the Last Year: 1    • Homeless in the Last Year: No     Social History     Tobacco Use   Smoking Status Never   Smokeless Tobacco Never     Family History   Problem Relation Age of Onset   • Hypertension Family    • Hypertension Mother        The following portions of the patient's history were reviewed and updated as appropriate: allergies, current medications, past medical history, past social  "history, past surgical history and problem list.    Results  No results found for this or any previous visit (from the past hour).]  No results found for: \"PSA\"  Lab Results   Component Value Date    CALCIUM 9.2 12/09/2024    K 4.1 12/09/2024    CO2 31 12/09/2024    CL 97 12/09/2024    BUN 13 12/09/2024    CREATININE 0.77 12/09/2024     Lab Results   Component Value Date    WBC 5.39 12/09/2024    HGB 12.1 12/09/2024    HCT 36.7 12/09/2024    MCV 93 12/09/2024     12/09/2024       "

## 2025-03-04 ENCOUNTER — OFFICE VISIT (OUTPATIENT)
Dept: UROLOGY | Facility: CLINIC | Age: 85
End: 2025-03-04

## 2025-03-04 VITALS
OXYGEN SATURATION: 94 % | BODY MASS INDEX: 35.19 KG/M2 | HEART RATE: 72 BPM | SYSTOLIC BLOOD PRESSURE: 146 MMHG | DIASTOLIC BLOOD PRESSURE: 80 MMHG | HEIGHT: 64 IN

## 2025-03-04 DIAGNOSIS — N32.81 OAB (OVERACTIVE BLADDER): Primary | ICD-10-CM

## 2025-03-04 RX ORDER — VIBEGRON 75 MG/1
75 TABLET, FILM COATED ORAL DAILY
COMMUNITY

## 2025-03-04 NOTE — ASSESSMENT & PLAN NOTE
Follow-up for urinary tract infection  Patient treated with Keflex  Reports improvement since abx   Referral placed to urogynecology pelvic [place] here in [place]  She started on Gemtesa and restarted PFPT   Since then patient reports improvement with her OAB  Plan to transfer care to urogyn- [facility] in 4/10  Plan to take over vaginal estrogen and atrax   Plan to follow-up as needed

## 2025-03-06 ENCOUNTER — OFFICE VISIT (OUTPATIENT)
Dept: PHYSICAL THERAPY | Facility: REHABILITATION | Age: 85
End: 2025-03-06
Payer: COMMERCIAL

## 2025-03-06 DIAGNOSIS — R30.0 DYSURIA: ICD-10-CM

## 2025-03-06 DIAGNOSIS — N32.81 OVERACTIVE BLADDER: Primary | ICD-10-CM

## 2025-03-06 DIAGNOSIS — R10.2 PELVIC PAIN: ICD-10-CM

## 2025-03-06 PROCEDURE — 97140 MANUAL THERAPY 1/> REGIONS: CPT | Performed by: PHYSICAL THERAPIST

## 2025-03-06 PROCEDURE — 97110 THERAPEUTIC EXERCISES: CPT | Performed by: PHYSICAL THERAPIST

## 2025-03-06 NOTE — PROGRESS NOTES
Daily Note     Today's date: 3/6/2025  Patient name: Lubna Bronson  : 1940  MRN: [id number]  Referring provider: Eder Stewart P*  Dx:   Encounter Diagnosis     ICD-10-CM    1. Overactive bladder  N32.81       2. Pelvic pain  R10.2       3. Dysuria  R30.0           Lubna Bronson is a 84 y.o. female who presents with concerns of chronic urinary frequency and urgency, vaginal pain and dysuria. She emptied her bladder twice during 45 minutes session which reduced her pelvic pain for brief periods but it returned quickly.  Extensive education provided at this visit regarding pathophysiology of urinary urgency and frequency as well as pelvic floor muscle dysfunction and patient and her  expressed understanding.  She was requested to stop drinking propel and lieu of water with flavor drops and to begin scheduled voiding of 30 minutes if possible.  She has not picked up antibiotics yet for her current infection so internal assessment was deferred but patient provided verbal consent to proceed with pelvic floor examination next visit.             Subjective: She is well pleased with how she is feeling since she started Keflex. Tomorrow is her last dose. She reports less painful urgency t/o the day.     She will return to urogynecologist on 4/10.       Objective: See treatment diary below    3/6/25:  Pelvic floor strength:  Power: 2+/5  Endurance: 6 seconds  Reps @ 4 seconds: 6  Fast holds in 10 seconds: nt  Mild hypertonicity along lateral PC and IC       Assessment: Tolerated treatment well. Patient would benefit from continued PT.   Following aerobic warmup, patient underwent a pelvic floor muscle examination.  Marked erythema evident along entire perineum and she was encouraged to use vitamin A and D ointment to act as a skin barrier due to irritation.  She is able to actively engage her pelvic floor however she overuses her abdominals and downward descent is evident.  Required verbal and  manual cueing for proper pelvic floor muscle contraction and she was able to perform.  PERF as above.  Requested to perform isolated holding relax 5 times a day for 6 seconds 10 repetitions at a time.  We will see her again in 2 weeks and add co-contractions.       Plan: Continue per plan of care.      Precautions:   Patient Active Problem List   Diagnosis    Primary osteoarthritis of right hip    Primary osteoarthritis of one hip, right    History of hypercoagulable state    Mixed hyperlipidemia    Essential hypertension    Acute blood loss anemia    Hypertension    Irritable bowel syndrome    Edema of both legs    Acquired hypothyroidism    History of pulmonary embolus (PE)    Pulmonary embolus (HCC)    Presence of right artificial hip joint    Venous insufficiency of both lower extremities    Diabetes mellitus without complication (HCC)    Obesity, morbid (HCC)    Essential hypertension, benign    Tracheitis    Primary osteoarthritis of both knees    Hyponatremia    Uncontrolled type 2 diabetes mellitus with hyperglycemia (HCC)    Urgency of urination    UTI symptoms         PRO EVAL RE-EVAL DISCHARGE   PFDI      [id number]      VPQ      CPSI-NIH      PGQ          POC Expires Auth Status Start Date Exp Date PT Visit Limit DA expires DA provider   12/25               Date of Service 2/27 3/6          Visits Used            Visits Remaining                        Manuals            PFM exam   25'          Breath + PFM  20'                                  Neuro Re-Ed                                                                                                Ther Ex            Nustep aerobic warmup  10'          Ankle pumps            Heel slides             Hip flexion                                    Ther Activity            Education Anatomy and POC  Bladder urge suppresion                                                                      Modalities

## 2025-03-16 ENCOUNTER — HOSPITAL ENCOUNTER (EMERGENCY)
Facility: HOSPITAL | Age: 85
Discharge: HOME | End: 2025-03-16
Attending: EMERGENCY MEDICINE
Payer: COMMERCIAL

## 2025-03-16 VITALS
RESPIRATION RATE: 18 BRPM | DIASTOLIC BLOOD PRESSURE: 88 MMHG | TEMPERATURE: 98.2 F | OXYGEN SATURATION: 95 % | HEART RATE: 93 BPM | SYSTOLIC BLOOD PRESSURE: 178 MMHG

## 2025-03-16 DIAGNOSIS — N30.10 BLADDER PAIN SYNDROME: Primary | ICD-10-CM

## 2025-03-16 LAB
BACTERIA UR QL AUTO: ABNORMAL /HPF
BILIRUB UR QL STRIP: ABNORMAL
CLARITY UR: ABNORMAL
COLOR UR: ABNORMAL
GLUCOSE UR STRIP-MCNC: ABNORMAL MG/DL
HGB UR QL STRIP.AUTO: ABNORMAL
KETONES UR STRIP-MCNC: ABNORMAL MG/DL
LEUKOCYTE ESTERASE UR QL STRIP: ABNORMAL
NITRITE UR QL STRIP: ABNORMAL
NON-SQ EPI CELLS URNS QL MICRO: ABNORMAL /HPF
PH UR STRIP.AUTO: ABNORMAL [PH]
PROT UR STRIP-MCNC: ABNORMAL MG/DL
RBC #/AREA URNS AUTO: ABNORMAL /HPF
SP GR UR STRIP.AUTO: 1 (ref 1–1.03)
WBC #/AREA URNS AUTO: ABNORMAL /HPF

## 2025-03-16 PROCEDURE — 99283 EMERGENCY DEPT VISIT LOW MDM: CPT

## 2025-03-16 PROCEDURE — 99284 EMERGENCY DEPT VISIT MOD MDM: CPT | Performed by: EMERGENCY MEDICINE

## 2025-03-16 PROCEDURE — 81001 URINALYSIS AUTO W/SCOPE: CPT

## 2025-03-16 PROCEDURE — 87086 URINE CULTURE/COLONY COUNT: CPT

## 2025-03-16 RX ORDER — HYDROCODONE BITARTRATE AND ACETAMINOPHEN 5; 325 MG/1; MG/1
1 TABLET ORAL ONCE
Refills: 0 | Status: COMPLETED | OUTPATIENT
Start: 2025-03-16 | End: 2025-03-16

## 2025-03-16 RX ORDER — HYDROCODONE BITARTRATE AND ACETAMINOPHEN 5; 325 MG/1; MG/1
1 TABLET ORAL EVERY 6 HOURS PRN
Qty: 12 TABLET | Refills: 0 | Status: SHIPPED | OUTPATIENT
Start: 2025-03-16 | End: 2025-03-19

## 2025-03-16 RX ADMIN — HYDROCODONE BITARTRATE AND ACETAMINOPHEN 1 TABLET: 5; 325 TABLET ORAL at 13:57

## 2025-03-16 NOTE — ED PROVIDER NOTES
Time reflects when diagnosis was documented in both MDM as applicable and the Disposition within this note       Time User Action Codes Description Comment    3/16/2025  3:52 PM Frank Titus [N30.10] Bladder pain syndrome           ED Disposition       ED Disposition   Discharge    Condition   Stable    Date/Time   Sun Mar 16, 2025  3:49 PM    Comment   Lubna Bronson discharge to home/self care.                   Assessment & Plan       Medical Decision Making  84-year-old female with past medical history of PE on Eliquis, HLD, HTN, IBS, diabetes presents to the ED for evaluation of chronic bladder pain.  Patient notes she has been having this bladder pain for the past year currently followed by urogynecology.  Patient currently on Gemtesa which has improved her symptoms of urinary frequency however still complaining of pain.  Patient denies dysuria, fever, chills, nausea, vomiting, chest pain, abdominal pain    Differentials include but not limited to UTI, chronic pelvic pain, doubt abdominal surgical pathology    Will evaluate with UA and treat symptomatically  UA contaminated and not concerning for UTI at this time  Will treat with Norco  Upon reevaluation patient reports significant improvement in her symptoms.  Patient discharged home with instructions follow-up with her PCP and urogynecology.  Strict return precaution provided    Amount and/or Complexity of Data Reviewed  Labs: ordered.    Risk  Prescription drug management.        ED Course as of 03/19/25 1039   Sun Mar 16, 2025   1512 Temp Source: Oral       Medications   HYDROcodone-acetaminophen (NORCO) 5-325 mg per tablet 1 tablet (1 tablet Oral Given 3/16/25 1357)       ED Risk Strat Scores                    Identification of Seniors at Risk      Flowsheet Row Most Recent Value   (ISAR) Identification of Seniors at Risk    Before the illness or injury that brought you to the Emergency, did you need someone to help you on a regular basis? 0  Filed at: 03/16/2025 1129   In the last 24 hours, have you needed more help than usual? 0 Filed at: 03/16/2025 1129   Have you been hospitalized for one or more nights during the past 6 months? 0 Filed at: 03/16/2025 1129   In general, do you see well? 0 Filed at: 03/16/2025 1129   In general, do you have serious problems with your memory? 0 Filed at: 03/16/2025 1129   Do you take more than three different medications every day? 1 Filed at: 03/16/2025 1129   ISAR Score 1 Filed at: 03/16/2025 1129                SBIRT 20yo+      Flowsheet Row Most Recent Value   Initial Alcohol Screen: US AUDIT-C     1. How often do you have a drink containing alcohol? 0 Filed at: 03/16/2025 1129   2. How many drinks containing alcohol do you have on a typical day you are drinking?  0 Filed at: 03/16/2025 1129   3a. Male UNDER 65: How often do you have five or more drinks on one occasion? 0 Filed at: 03/16/2025 1129   3b. FEMALE Any Age, or MALE 65+: How often do you have 4 or more drinks on one occassion? 0 Filed at: 03/16/2025 1129   Audit-C Score 0 Filed at: 03/16/2025 1129   FILOMENA: How many times in the past year have you...    Used an illegal drug or used a prescription medication for non-medical reasons? Never Filed at: 03/16/2025 1129                            History of Present Illness       Chief Complaint   Patient presents with    Possible UTI     Pt states she has a UTI that she can not get rid of.  Pt states she has been on multiple meds for it.       Past Medical History:   Diagnosis Date    Acquired hypothyroidism     Adenoma of left adrenal gland 02/27/2018    Adrenal gland disorder (HCC)     Anemia     Anxiety     Arthritis     At risk for falls     Diabetes mellitus (HCC)     Disease of thyroid gland     hypo    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    Edema of both legs     Fibroid     History of pulmonary embolus (PE)     Hyperlipidemia     Hypertension     Irritable bowel syndrome     Obesity  2000    Prediabetes     Presence of right artificial hip joint     Pulmonary embolus (HCC) 2/27/2018 & 2/14/2019    Uses roller walker     Wears glasses     Wears partial dentures     upper partial      Past Surgical History:   Procedure Laterality Date    CHOLECYSTECTOMY  1991    COLONOSCOPY  11/09/2017    HEMORRHOID SURGERY  1999    HIP SURGERY  2019    HYSTERECTOMY  1993    Vaginal    NE ARTHRP ACETBLR/PROX FEM PROSTC AGRFT/ALGRFT Right 10/22/2019    Procedure: ARTHROPLASTY HIP TOTAL;  Surgeon: Lupe Driscoll DO;  Location: [facility];  Service: Orthopedics    TRIGGER FINGER RELEASE Right 1998      Family History   Problem Relation Age of Onset    Hypertension Family     Hypertension Mother       Social History     Tobacco Use    Smoking status: Never    Smokeless tobacco: Never   Vaping Use    Vaping status: Never Used   Substance Use Topics    Alcohol use: Not Currently    Drug use: Never      E-Cigarette/Vaping    E-Cigarette Use Never User       E-Cigarette/Vaping Substances    Nicotine No     THC No     CBD No     Flavoring No     Other No     Unknown No       I have reviewed and agree with the history as documented.     HPI    Review of Systems   Constitutional:  Negative for appetite change, chills, diaphoresis, fever and unexpected weight change.   HENT:  Negative for dental problem, ear pain, facial swelling, sore throat and trouble swallowing.    Eyes:  Negative for pain and visual disturbance.   Respiratory:  Negative for cough, chest tightness and shortness of breath.    Cardiovascular:  Negative for chest pain, palpitations and leg swelling.   Gastrointestinal:  Negative for abdominal distention, abdominal pain, constipation, diarrhea, nausea and vomiting.   Endocrine: Negative for polyuria.   Genitourinary:  Positive for pelvic pain. Negative for difficulty urinating, dysuria and hematuria.   Musculoskeletal:  Negative for arthralgias and back pain.   Skin:  Negative for color change and rash.    Neurological:  Negative for dizziness, seizures, syncope, light-headedness and headaches.   Psychiatric/Behavioral:  Negative for confusion.    All other systems reviewed and are negative.          Objective       ED Triage Vitals [03/16/25 1127]   Temperature Pulse Blood Pressure Respirations SpO2 Patient Position - Orthostatic VS   98.2 °F (36.8 °C) 93 (!) 178/88 18 95 % Sitting      Temp Source Heart Rate Source BP Location FiO2 (%) Pain Score    Oral Monitor Left arm -- 7      Vitals      Date and Time Temp Pulse SpO2 Resp BP Pain Score FACES Pain Rating User   03/16/25 1513 -- -- -- -- -- 4 -- CR   03/16/25 1357 -- -- -- -- -- 6 -- CR   03/16/25 1127 98.2 °F (36.8 °C) 93 95 % 18 178/88 7 -- KRR            Physical Exam  Vitals and nursing note reviewed.   Constitutional:       General: She is not in acute distress.     Appearance: Normal appearance. She is well-developed. She is not ill-appearing or toxic-appearing.      Comments: Patient appears uncomfortable however not in acute distress   HENT:      Head: Normocephalic and atraumatic.      Right Ear: External ear normal.      Left Ear: External ear normal.      Nose: Nose normal.      Mouth/Throat:      Mouth: Mucous membranes are moist.   Eyes:      General: No scleral icterus.        Right eye: No discharge.      Extraocular Movements: Extraocular movements intact.      Conjunctiva/sclera: Conjunctivae normal.   Cardiovascular:      Rate and Rhythm: Normal rate and regular rhythm.      Pulses: Normal pulses.      Heart sounds: No murmur heard.  Pulmonary:      Effort: Pulmonary effort is normal. No respiratory distress.      Breath sounds: Normal breath sounds. No wheezing.   Chest:      Chest wall: No tenderness.   Abdominal:      General: Abdomen is flat. There is no distension.      Palpations: Abdomen is soft.      Tenderness: There is no abdominal tenderness.   Genitourinary:     Comments: Pt deferred   Musculoskeletal:         General: No swelling,  deformity or signs of injury. Normal range of motion.      Cervical back: Normal range of motion and neck supple. No rigidity or tenderness.      Right lower leg: No edema.      Left lower leg: No edema.   Skin:     General: Skin is warm and dry.      Capillary Refill: Capillary refill takes less than 2 seconds.   Neurological:      General: No focal deficit present.      Mental Status: She is alert and oriented to person, place, and time.      Motor: No weakness.   Psychiatric:         Mood and Affect: Mood normal.         Results Reviewed       Procedure Component Value Units Date/Time    Urine culture [[id number]] Collected: 03/16/25 1257    Lab Status: Final result Specimen: Urine, Other Updated: 03/17/25 1112     Urine Culture >100,000 cfu/ml    UA w Reflex to Microscopic w Reflex to Culture [[id number]]  (Abnormal) Collected: 03/16/25 1257    Lab Status: Final result Specimen: Urine, Other Updated: 03/16/25 1335     Color, UA Green     Clarity, UA Cloudy     Specific Gravity, UA 1.000     pH, UA Interference-unable to analyze     Leukocytes, UA Interference- unable to analyze     Nitrite, UA Interference- unable to analyze     Protein, UA       Interference- unable to analyze     mg/dl     Glucose, UA       Interference- unable to analyze     mg/dl     Ketones, UA       Interference- unable to analyze     mg/dl     Bilirubin, UA Interference- unable to analyze     Occult Blood, UA Interference- unable to analyze    Urine Microscopic [[id number]]  (Abnormal) Collected: 03/16/25 1257    Lab Status: Final result Specimen: Urine, Other Updated: 03/16/25 1335     RBC, UA None Seen /hpf      WBC, UA Innumerable /hpf      Epithelial Cells Occasional /hpf      Bacteria, UA Occasional /hpf             No orders to display       Procedures    ED Medication and Procedure Management   Prior to Admission Medications   Prescriptions Last Dose Informant Patient Reported? Taking?   Eliquis 5 MG  Self No No   Sig: Take 1 tablet  (5 mg total) by mouth 2 (two) times a day   Vibegron (Gemtesa) 75 MG TABS  Self Yes No   Sig: Take 75 mg by mouth in the morning   acetaminophen (TYLENOL) 325 mg tablet  Self Yes No   Sig: Take 650 mg by mouth every 6 (six) hours as needed for mild pain   atorvastatin (LIPITOR) 10 mg tablet  Self No No   Sig: Take 1 tablet (10 mg total) by mouth daily at bedtime   estradiol (ESTRACE VAGINAL) 0.1 mg/g vaginal cream  Self No No   Sig: Insert 1 g into the vagina daily Daily for 3 weeks then 3 times a week to anterior vaginal wall   furosemide (LASIX) 20 mg tablet  Self No No   Sig: Take 1 tablet (20 mg total) by mouth daily as needed (edema)   hydrOXYzine HCL (ATARAX) 10 mg tablet  Self No No   Sig: Take 1 tablet (10 mg total) by mouth daily   hydroCHLOROthiazide 12.5 mg tablet  Self No No   Sig: Take 1 tablet (12.5 mg total) by mouth daily   labetalol (NORMODYNE) 200 mg tablet  Self No No   Sig: Take 1 tablet (200 mg total) by mouth 2 (two) times a day   levothyroxine 25 mcg tablet  Self No No   Sig: Take 1 tablet (25 mcg total) by mouth daily   losartan (COZAAR) 100 MG tablet  Self No No   Sig: Take 1 tablet (100 mg total) by mouth daily   metFORMIN (GLUCOPHAGE-XR) 750 mg 24 hr tablet  Self No No   Sig: TAKE 1 TABLET BY MOUTH EVERY DAY WITH BREAKFAST      Facility-Administered Medications: None     Discharge Medication List as of 3/16/2025  5:01 PM        START taking these medications    Details   HYDROcodone-acetaminophen (NORCO) 5-325 mg per tablet Take 1 tablet by mouth every 6 (six) hours as needed for pain for up to 3 days Max Daily Amount: 4 tablets, Starting Sun 3/16/2025, Until Wed 3/19/2025 at 2359, Normal           CONTINUE these medications which have NOT CHANGED    Details   acetaminophen (TYLENOL) 325 mg tablet Take 650 mg by mouth every 6 (six) hours as needed for mild pain, Historical Med      atorvastatin (LIPITOR) 10 mg tablet Take 1 tablet (10 mg total) by mouth daily at bedtime, Starting Fri  9/20/2024, Normal      Eliquis 5 MG Take 1 tablet (5 mg total) by mouth 2 (two) times a day, Starting Wed 2/26/2025, Until Mon 8/25/2025, Normal      estradiol (ESTRACE VAGINAL) 0.1 mg/g vaginal cream Insert 1 g into the vagina daily Daily for 3 weeks then 3 times a week to anterior vaginal wall, Starting Wed 2/26/2025, Normal      furosemide (LASIX) 20 mg tablet Take 1 tablet (20 mg total) by mouth daily as needed (edema), Starting Wed 8/21/2024, Normal      hydroCHLOROthiazide 12.5 mg tablet Take 1 tablet (12.5 mg total) by mouth daily, Starting Wed 2/26/2025, Until Mon 8/25/2025, Normal      hydrOXYzine HCL (ATARAX) 10 mg tablet Take 1 tablet (10 mg total) by mouth daily, Starting Wed 1/22/2025, Normal      labetalol (NORMODYNE) 200 mg tablet Take 1 tablet (200 mg total) by mouth 2 (two) times a day, Starting Fri 11/22/2024, Normal      levothyroxine 25 mcg tablet Take 1 tablet (25 mcg total) by mouth daily, Starting Wed 12/11/2024, Normal      losartan (COZAAR) 100 MG tablet Take 1 tablet (100 mg total) by mouth daily, Starting Wed 2/26/2025, Until Tue 5/27/2025, Normal      metFORMIN (GLUCOPHAGE-XR) 750 mg 24 hr tablet TAKE 1 TABLET BY MOUTH EVERY DAY WITH BREAKFAST, Starting Mon 11/11/2024, Normal      Vibegron (Gemtesa) 75 MG TABS Take 75 mg by mouth in the morning, Historical Med           No discharge procedures on file.  ED SEPSIS DOCUMENTATION   Time reflects when diagnosis was documented in both MDM as applicable and the Disposition within this note       Time User Action Codes Description Comment    3/16/2025  3:52 PM Frank Titus [N30.10] Bladder pain syndrome                  Fermin Martinez DO  03/19/25 1039

## 2025-03-16 NOTE — DISCHARGE INSTRUCTIONS
You were evaluated in the Emergency Department today for urinary frequency and pelvic pressure.    Can take Narco together every 6 hours for pain control. Do not take Tylenol with Narco because it contains Tylenol.     Please schedule an appointment with your primary care physician and DR. Heard within the next 2-3 days.    Return to the Emergency Department if you experience worsening or uncontrolled pain, fevers 100.4°F or greater, recurrent vomiting, inability to tolerate food or fluids by mouth, bloody stools or vomit, black or tarry stools, or any other concerning symptoms.    Thank you for choosing us for your care.

## 2025-03-17 ENCOUNTER — TELEPHONE (OUTPATIENT)
Age: 85
End: 2025-03-17

## 2025-03-17 LAB — BACTERIA UR CULT: NORMAL

## 2025-03-17 NOTE — PROGRESS NOTES
Daily Note     Today's date: 3/17/2025  Patient name: Lubna Bronson  : 1940  MRN: [id number]  Referring provider: Eder Stewart P*  Dx:   Encounter Diagnosis     ICD-10-CM    1. Overactive bladder  N32.81       2. Pelvic pain  R10.2       3. Dysuria  R30.0           Lubna Bronson is a 84 y.o. female who presents with concerns of chronic urinary frequency and urgency, vaginal pain and dysuria. She emptied her bladder twice during 45 minutes session which reduced her pelvic pain for brief periods but it returned quickly.  Extensive education provided at this visit regarding pathophysiology of urinary urgency and frequency as well as pelvic floor muscle dysfunction and patient and her  expressed understanding.  She was requested to stop drinking propel and lieu of water with flavor drops and to begin scheduled voiding of 30 minutes if possible.  She has not picked up antibiotics yet for her current infection so internal assessment was deferred but patient provided verbal consent to proceed with pelvic floor examination next visit.             Subjective: She is well pleased with how she is feeling since she started Keflex. Tomorrow is her last dose. She reports less painful urgency t/o the day.     She will return to urogynecologist on 4/10.       Objective: See treatment diary below    3/6/25:  Pelvic floor strength:  Power: 2+/5  Endurance: 6 seconds  Reps @ 4 seconds: 6  Fast holds in 10 seconds: nt  Mild hypertonicity along lateral PC and IC       Assessment: Tolerated treatment well. Patient would benefit from continued PT.   Following aerobic warmup, patient underwent a pelvic floor muscle examination.  Marked erythema evident along entire perineum and she was encouraged to use vitamin A and D ointment to act as a skin barrier due to irritation.  She is able to actively engage her pelvic floor however she overuses her abdominals and downward descent is evident.  Required verbal and  manual cueing for proper pelvic floor muscle contraction and she was able to perform.  PERF as above.  Requested to perform isolated holding relax 5 times a day for 6 seconds 10 repetitions at a time.  We will see her again in 2 weeks and add co-contractions.       Plan: Continue per plan of care.      Precautions:   Patient Active Problem List   Diagnosis    Primary osteoarthritis of right hip    Primary osteoarthritis of one hip, right    History of hypercoagulable state    Mixed hyperlipidemia    Essential hypertension    Acute blood loss anemia    Hypertension    Irritable bowel syndrome    Edema of both legs    Acquired hypothyroidism    History of pulmonary embolus (PE)    Pulmonary embolus (HCC)    Presence of right artificial hip joint    Venous insufficiency of both lower extremities    Diabetes mellitus without complication (HCC)    Obesity, morbid (HCC)    Essential hypertension, benign    Tracheitis    Primary osteoarthritis of both knees    Hyponatremia    Uncontrolled type 2 diabetes mellitus with hyperglycemia (HCC)    Urgency of urination    UTI symptoms         PRO EVAL RE-EVAL DISCHARGE   PFDI      ZAHIDA-18      VPQ      CPSI-NIH      PGQ          POC Expires Auth Status Start Date Exp Date PT Visit Limit DA expires DA provider   12/25               Date of Service 2/27 3/6          Visits Used            Visits Remaining                        Manuals            PFM exam   25'          Breath + PFM  20'                                  Neuro Re-Ed                                                                                                Ther Ex            Nustep aerobic warmup  10'          Ankle pumps            Heel slides             Hip flexion                                    Ther Activity            Education Anatomy and POC  Bladder urge suppresion                                                                      Modalities

## 2025-03-17 NOTE — TELEPHONE ENCOUNTER
Spoke to patient,she has contacted pelvic medicine doctor and will keep her next appointment for 4/11/25   Does not need to be seen earlier.

## 2025-03-17 NOTE — TELEPHONE ENCOUNTER
Patient was discharged from the ED yesterday. Discharge paperwork advised patient to follow up with PCP within 2-3 days. First available appointment for OVL was 5/2/25. Patient does have a 4 month follow up appointment 4/11/25. Attempted to call  twice to ask if this appointment can be her follow up visit from ED as well or if she needs a separate appointment.  unavailable. Please review and call patient back to advise further.

## 2025-03-18 ENCOUNTER — APPOINTMENT (OUTPATIENT)
Dept: PHYSICAL THERAPY | Facility: REHABILITATION | Age: 85
End: 2025-03-18
Payer: COMMERCIAL

## 2025-03-18 DIAGNOSIS — R30.0 DYSURIA: ICD-10-CM

## 2025-03-18 DIAGNOSIS — N32.81 OVERACTIVE BLADDER: Primary | ICD-10-CM

## 2025-03-18 DIAGNOSIS — R10.2 PELVIC PAIN: ICD-10-CM

## 2025-03-19 ENCOUNTER — VBI (OUTPATIENT)
Dept: ADMINISTRATIVE | Facility: OTHER | Age: 85
End: 2025-03-19

## 2025-03-19 NOTE — TELEPHONE ENCOUNTER
03/19/25 2:16 PM     Chart reviewed for Blood Pressure ; nothing is submitted to the patient's insurance at this time.     Gagan Waston MA   [facility]

## 2025-03-24 ENCOUNTER — OFFICE VISIT (OUTPATIENT)
Dept: PHYSICAL THERAPY | Facility: REHABILITATION | Age: 85
End: 2025-03-24
Payer: COMMERCIAL

## 2025-03-24 DIAGNOSIS — N32.81 OVERACTIVE BLADDER: Primary | ICD-10-CM

## 2025-03-24 DIAGNOSIS — R30.0 DYSURIA: ICD-10-CM

## 2025-03-24 DIAGNOSIS — R10.2 PELVIC PAIN: ICD-10-CM

## 2025-03-24 PROCEDURE — 97110 THERAPEUTIC EXERCISES: CPT | Performed by: PHYSICAL THERAPIST

## 2025-03-24 PROCEDURE — 97530 THERAPEUTIC ACTIVITIES: CPT | Performed by: PHYSICAL THERAPIST

## 2025-03-24 PROCEDURE — 97112 NEUROMUSCULAR REEDUCATION: CPT | Performed by: PHYSICAL THERAPIST

## 2025-03-24 NOTE — PROGRESS NOTES
Daily Note     Today's date: 3/24/2025  Patient name: Lubna Bronson  : 1940  MRN: [id number]  Referring provider: Eder Stewart P*  Dx:   Encounter Diagnosis     ICD-10-CM    1. Overactive bladder  N32.81       2. Pelvic pain  R10.2       3. Dysuria  R30.0           Lubna Bronson is a 84 y.o. female who presents with concerns of chronic urinary frequency and urgency, vaginal pain and dysuria. She emptied her bladder twice during 45 minutes session which reduced her pelvic pain for brief periods but it returned quickly.  Extensive education provided at this visit regarding pathophysiology of urinary urgency and frequency as well as pelvic floor muscle dysfunction and patient and her  expressed understanding.  She was requested to stop drinking propel and lieu of water with flavor drops and to begin scheduled voiding of 30 minutes if possible.  She has not picked up antibiotics yet for her current infection so internal assessment was deferred but patient provided verbal consent to proceed with pelvic floor examination next visit.             Subjective: Stopped the Keflex. Using valium suppositories, Gemtessa and vaginal cream. She is going longer through the night without voiding in a pad but still getting up 5-6 times a night. Feeling worse than last time and somewhat dejected.      She will return to urogynecologist on 4/10.       Objective: See treatment diary below    3/6/25:  Pelvic floor strength:  Power: 2+/5  Endurance: 6 seconds  Reps @ 4 seconds: 6  Fast holds in 10 seconds: nt  Mild hypertonicity along lateral PC and IC       Assessment: Tolerated treatment well. Patient would benefit from continued PT. attended better with exercise based program today including upright activities and seated pelvic floor muscle contractions with upper and lower extremity movement.  Encouraged to hydrate well.  She is seeing her PCP toward the end of next week regarding rash over hands,  forearms, and lower legs.  For now she was advised to continue with doctors orders of Benadryl cream and to notify physician if the rash gets worse.    Plan: Continue per plan of care.      Precautions:   Patient Active Problem List   Diagnosis    Primary osteoarthritis of right hip    Primary osteoarthritis of one hip, right    History of hypercoagulable state    Mixed hyperlipidemia    Essential hypertension    Acute blood loss anemia    Hypertension    Irritable bowel syndrome    Edema of both legs    Acquired hypothyroidism    History of pulmonary embolus (PE)    Pulmonary embolus (HCC)    Presence of right artificial hip joint    Venous insufficiency of both lower extremities    Diabetes mellitus without complication (HCC)    Obesity, morbid (HCC)    Essential hypertension, benign    Tracheitis    Primary osteoarthritis of both knees    Hyponatremia    Uncontrolled type 2 diabetes mellitus with hyperglycemia (HCC)    Urgency of urination    UTI symptoms         PRO EVAL RE-EVAL DISCHARGE   PFDI      ZAHIDA-18      VPQ      CPSI-NIH      PGQ          POC Expires Auth Status Start Date Exp Date PT Visit Limit DA expires DA provider   12/25               Date of Service 2/27 3/6 3/24         Visits Used            Visits Remaining                        Manuals            PFM exam   25'          Breath + PFM  20'                                  Neuro Re-Ed            PFM + breath   LG         PFM + add seated   x15         PFM + peach TB scap retraction   x15                                                         Ther Ex            Nustep aerobic warmup  10' 10'         Ankle pumps   10         Heel slides             Hip flexion   Nv seated         Side stepping   X6 lengths         Tandem walking   4 lengths         Heel to toe along barre   2 lengths         Tip toe along barre   2 lengths         Ther Activity            Education Anatomy and POC  Bladder urge suppresion Review of hydration and meds                                                                      Modalities

## 2025-04-03 ENCOUNTER — OFFICE VISIT (OUTPATIENT)
Dept: PHYSICAL THERAPY | Facility: REHABILITATION | Age: 85
End: 2025-04-03
Payer: COMMERCIAL

## 2025-04-03 DIAGNOSIS — N32.81 OVERACTIVE BLADDER: Primary | ICD-10-CM

## 2025-04-03 DIAGNOSIS — R10.2 PELVIC PAIN: ICD-10-CM

## 2025-04-03 DIAGNOSIS — R30.0 DYSURIA: ICD-10-CM

## 2025-04-03 PROCEDURE — 97112 NEUROMUSCULAR REEDUCATION: CPT | Performed by: PHYSICAL THERAPIST

## 2025-04-03 PROCEDURE — 97110 THERAPEUTIC EXERCISES: CPT | Performed by: PHYSICAL THERAPIST

## 2025-04-03 NOTE — PROGRESS NOTES
Daily Note     Today's date: 4/3/2025  Patient name: Lubna Bronson  : 1940  MRN: [id number]  Referring provider: Eder Stewart P*  Dx:   Encounter Diagnosis     ICD-10-CM    1. Overactive bladder  N32.81       2. Pelvic pain  R10.2       3. Dysuria  R30.0           Lubna Bronson is a 84 y.o. female who presents with concerns of chronic urinary frequency and urgency, vaginal pain and dysuria. She emptied her bladder twice during 45 minutes session which reduced her pelvic pain for brief periods but it returned quickly.  Extensive education provided at this visit regarding pathophysiology of urinary urgency and frequency as well as pelvic floor muscle dysfunction and patient and her  expressed understanding.  She was requested to stop drinking propel and lieu of water with flavor drops and to begin scheduled voiding of 30 minutes if possible.  She has not picked up antibiotics yet for her current infection so internal assessment was deferred but patient provided verbal consent to proceed with pelvic floor examination next visit.  Start Time: 1005          Subjective: Started Gabapentin yesterday which was prescribed by Dr. Heard. She will see her tomorrow for follow-up. She is still waking multiple times per night.     Objective: See treatment diary below    3/6/25:  Pelvic floor strength:  Power: 2+/5  Endurance: 6 seconds  Reps @ 4 seconds: 6  Fast holds in 10 seconds: nt  Mild hypertonicity along lateral PC and IC       Assessment: Tolerated treatment well. Patient would benefit from continued PT. More TE this session with weakness evident in R hip and poor balance control more on that side. Recommended she practice single leg weight shifting while she is brushing her teeth. Good understanding of engaging her pelvic floor with PFM.     Plan: Continue per plan of care.      Precautions:   Patient Active Problem List   Diagnosis    Primary osteoarthritis of right hip    Primary  osteoarthritis of one hip, right    History of hypercoagulable state    Mixed hyperlipidemia    Essential hypertension    Acute blood loss anemia    Hypertension    Irritable bowel syndrome    Edema of both legs    Acquired hypothyroidism    History of pulmonary embolus (PE)    Pulmonary embolus (HCC)    Presence of right artificial hip joint    Venous insufficiency of both lower extremities    Diabetes mellitus without complication (HCC)    Obesity, morbid (HCC)    Essential hypertension, benign    Tracheitis    Primary osteoarthritis of both knees    Hyponatremia    Uncontrolled type 2 diabetes mellitus with hyperglycemia (HCC)    Urgency of urination    UTI symptoms             POC Expires Auth Status Start Date Exp Date PT Visit Limit DA expires DA provider   5/22               Date of Service 2/27 3/6 3/24 4/3        Visits Used            Visits Remaining                        Manuals            PFM exam   25'          Breath + PFM  20'                                  Neuro Re-Ed            PFM + breath   LG LG        PFM + add seated   x15         PFM + peach TB scap retraction   x15                                                         Ther Ex            Nustep aerobic warmup  10' 10' 10'        Ankle pumps   10         Heel slides             Hip flexion   Nv seated Standing x15 each        Side stepping   X6 lengths X 6 lengths        Tandem walking   4 lengths 6 lengths        Heel to toe along barre   2 lengths 2 lengths         Seated: hip abd, add, pilated ring arm adduction    15 each         Standing weight shift at barre    X10 each        Tip toe along barre   2 lengths         Ther Activity            Education Anatomy and POC  Bladder urge suppresion Review of hydration and meds                                                                     Modalities

## 2025-04-08 ENCOUNTER — RA CDI HCC (OUTPATIENT)
Dept: OTHER | Facility: HOSPITAL | Age: 85
End: 2025-04-08

## 2025-04-11 ENCOUNTER — OFFICE VISIT (OUTPATIENT)
Dept: INTERNAL MEDICINE CLINIC | Facility: CLINIC | Age: 85
End: 2025-04-11
Payer: COMMERCIAL

## 2025-04-11 ENCOUNTER — TELEPHONE (OUTPATIENT)
Age: 85
End: 2025-04-11

## 2025-04-11 VITALS
HEIGHT: 64 IN | TEMPERATURE: 98.4 F | SYSTOLIC BLOOD PRESSURE: 136 MMHG | WEIGHT: 195 LBS | BODY MASS INDEX: 33.29 KG/M2 | OXYGEN SATURATION: 97 % | DIASTOLIC BLOOD PRESSURE: 86 MMHG | HEART RATE: 77 BPM

## 2025-04-11 DIAGNOSIS — E66.01 OBESITY, MORBID (HCC): ICD-10-CM

## 2025-04-11 DIAGNOSIS — I10 ESSENTIAL HYPERTENSION, BENIGN: Primary | ICD-10-CM

## 2025-04-11 DIAGNOSIS — E53.8 B12 DEFICIENCY: ICD-10-CM

## 2025-04-11 DIAGNOSIS — R21 SKIN RASH: ICD-10-CM

## 2025-04-11 DIAGNOSIS — E03.9 ACQUIRED HYPOTHYROIDISM: ICD-10-CM

## 2025-04-11 DIAGNOSIS — M17.0 PRIMARY OSTEOARTHRITIS OF BOTH KNEES: ICD-10-CM

## 2025-04-11 DIAGNOSIS — I87.2 VENOUS INSUFFICIENCY OF BOTH LOWER EXTREMITIES: ICD-10-CM

## 2025-04-11 DIAGNOSIS — E78.2 MIXED HYPERLIPIDEMIA: ICD-10-CM

## 2025-04-11 DIAGNOSIS — E11.65 UNCONTROLLED TYPE 2 DIABETES MELLITUS WITH HYPERGLYCEMIA (HCC): ICD-10-CM

## 2025-04-11 LAB — SL AMB POCT HEMOGLOBIN AIC: 6.6 (ref ?–6.5)

## 2025-04-11 PROCEDURE — 99214 OFFICE O/P EST MOD 30 MIN: CPT | Performed by: INTERNAL MEDICINE

## 2025-04-11 PROCEDURE — 83036 HEMOGLOBIN GLYCOSYLATED A1C: CPT | Performed by: INTERNAL MEDICINE

## 2025-04-11 PROCEDURE — G2211 COMPLEX E/M VISIT ADD ON: HCPCS | Performed by: INTERNAL MEDICINE

## 2025-04-11 RX ORDER — DIAZEPAM 5 MG/1
TABLET ORAL EVERY 12 HOURS
COMMUNITY
Start: 2025-02-24

## 2025-04-11 RX ORDER — GABAPENTIN 100 MG/1
100 CAPSULE ORAL EVERY 24 HOURS
COMMUNITY
Start: 2025-03-19

## 2025-04-11 NOTE — PROGRESS NOTES
Diabetic Foot Exam    Patient's shoes and socks removed.    Right Foot/Ankle   Right Foot Inspection  Skin Exam: skin normal and skin intact. No dry skin, no warmth, no callus, no erythema, no maceration, no abnormal color, no pre-ulcer, no ulcer and no callus.     Toe Exam: ROM and strength within normal limits and swelling. No tenderness, erythema and  no right toe deformity    Sensory   Monofilament testing: intact    Vascular  Capillary refills: < 3 seconds  The right DP pulse is 2+. The right PT pulse is 2+.     Left Foot/Ankle  Left Foot Inspection  Skin Exam: skin normal and skin intact. No dry skin, no warmth, no erythema, no maceration, normal color, no pre-ulcer, no ulcer and no callus.     Toe Exam: ROM and strength within normal limits and swelling. No tenderness, no erythema and no left toe deformity.     Sensory   Monofilament testing: intact    Vascular  Capillary refills: < 3 seconds  The left DP pulse is 2+. The left PT pulse is 2+.     Assign Risk Category  No deformity present  No loss of protective sensation  No weak pulses  Risk: 0     Sotyktu Pregnancy And Lactation Text: There is insufficient data to evaluate whether or not Sotyktu is safe to use during pregnancy.? ?It is not known if Sotyktu passes into breast milk and whether or not it is safe to use when breastfeeding.??

## 2025-04-11 NOTE — TELEPHONE ENCOUNTER
Zenia Vaughn calling from pt's PCP office to schedule pt from STAT ref to derm for rash    Pt has rash all over arms and legs    Zenia Vaughn would like us to call pt back to schedule    Advised I will have mgt review and call pt back

## 2025-04-11 NOTE — TELEPHONE ENCOUNTER
Received call from Michela Vaughn stating patient is in the office waiting for our call.    I told Michela Vaughn that the message has been sent to the  and we are waiting to hear back from her before the patient can be siddhartha.    Michela Vaughn verbalized understanding

## 2025-04-11 NOTE — PROGRESS NOTES
Assessment/Plan:      Depression Screening and Follow-up Plan: Patient was screened for depression during today's encounter. They screened negative with a PHQ-2 score of 0.              1. Essential hypertension, benign  Comments:  continue same med  2. Uncontrolled type 2 diabetes mellitus with hyperglycemia (HCC)  Comments:  continue same med  Orders:  -     Albumin / creatinine urine ratio; Future  -     CBC and differential; Future  -     Comprehensive metabolic panel; Future  -     Lipid Panel with Direct LDL reflex; Future  -     TSH, 3rd generation; Future  -     Hemoglobin A1C; Future  3. Venous insufficiency of both lower extremities  4. Primary osteoarthritis of both knees  5. Acquired hypothyroidism  Comments:  continue same med  6. Mixed hyperlipidemia  Comments:  continue same med  7. Skin rash  -     Ambulatory Referral to Dermatology; Future  8. Obesity, morbid (HCC)  9. B12 deficiency  -     Vitamin B12; Future         Subjective:      Patient ID: Lubna Bronson is a 84 y.o. female.    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  She   Patient Active Problem List    Diagnosis Date Noted    OAB (overactive bladder) 03/04/2025    Urgency of urination 12/24/2024     UTI symptoms 12/24/2024    Uncontrolled type 2 diabetes mellitus with hyperglycemia (HCC) 06/14/2024    Hyponatremia 05/31/2024    Primary osteoarthritis of both knees 09/07/2022    Tracheitis 06/29/2022    Obesity, morbid (HCC) 05/05/2021    Essential hypertension, benign 05/05/2021    Venous insufficiency of both lower extremities 12/09/2020    Diabetes mellitus without complication (HCC) 12/09/2020    Hypertension     Irritable bowel syndrome     Edema of both legs     Acquired hypothyroidism     History of pulmonary embolus (PE)     Pulmonary embolus (HCC)     Presence of right artificial hip joint     Acute blood loss anemia 10/25/2019    Mixed hyperlipidemia 10/23/2019    Essential hypertension 10/23/2019    History of hypercoagulable state 10/09/2019    Primary osteoarthritis of one hip, right 08/09/2019    Primary osteoarthritis of right hip      She  has a past surgical history that includes Cholecystectomy (1991); Colonoscopy (11/09/2017); pr arthrp acetblr/prox fem prostc agrft/algrft (Right, 10/22/2019); Hysterectomy (1993); Hemorrhoid surgery (1999); Trigger finger release (Right, 1998); and Hip surgery (2019).  Her family history includes Hypertension in her family and mother.  She  reports that she has never smoked. She has never used smokeless tobacco. She reports that she does not currently use alcohol. She reports that she does not use drugs.  Current Outpatient Medications   Medication Sig Dispense Refill    acetaminophen (TYLENOL) 325 mg tablet Take 650 mg by mouth every 6 (six) hours as needed for mild pain      atorvastatin (LIPITOR) 10 mg tablet Take 1 tablet (10 mg total) by mouth daily at bedtime 90 tablet 1    diazepam (VALIUM) 5 mg tablet Every 12 hours      Eliquis 5 MG Take 1 tablet (5 mg total) by mouth 2 (two) times a day 180 tablet 1    estradiol (ESTRACE VAGINAL) 0.1 mg/g vaginal cream Insert 1 g into the vagina daily Daily for 3 weeks then 3 times a week to anterior vaginal wall  42.5 g 0    furosemide (LASIX) 20 mg tablet Take 1 tablet (20 mg total) by mouth daily as needed (edema) 30 tablet 0    gabapentin (NEURONTIN) 100 mg capsule 100 mg every 24 hours      hydroCHLOROthiazide 12.5 mg tablet Take 1 tablet (12.5 mg total) by mouth daily 90 tablet 1    labetalol (NORMODYNE) 200 mg tablet Take 1 tablet (200 mg total) by mouth 2 (two) times a day 180 tablet 1    levothyroxine 25 mcg tablet Take 1 tablet (25 mcg total) by mouth daily 90 tablet 1    losartan (COZAAR) 100 MG tablet Take 1 tablet (100 mg total) by mouth daily 90 tablet 1    metFORMIN (GLUCOPHAGE-XR) 750 mg 24 hr tablet TAKE 1 TABLET BY MOUTH EVERY DAY WITH BREAKFAST 90 tablet 1    Vibegron (Gemtesa) 75 MG TABS Take 75 mg by mouth in the morning      hydrOXYzine HCL (ATARAX) 10 mg tablet Take 1 tablet (10 mg total) by mouth daily (Patient not taking: Reported on 4/11/2025) 30 tablet 1     No current facility-administered medications for this visit.     Current Outpatient Medications on File Prior to Visit   Medication Sig    acetaminophen (TYLENOL) 325 mg tablet Take 650 mg by mouth every 6 (six) hours as needed for mild pain    atorvastatin (LIPITOR) 10 mg tablet Take 1 tablet (10 mg total) by mouth daily at bedtime    diazepam (VALIUM) 5 mg tablet Every 12 hours    Eliquis 5 MG Take 1 tablet (5 mg total) by mouth 2 (two) times a day    estradiol (ESTRACE VAGINAL) 0.1 mg/g vaginal cream Insert 1 g into the vagina daily Daily for 3 weeks then 3 times a week to anterior vaginal wall    furosemide (LASIX) 20 mg tablet Take 1 tablet (20 mg total) by mouth daily as needed (edema)    gabapentin (NEURONTIN) 100 mg capsule 100 mg every 24 hours    hydroCHLOROthiazide 12.5 mg tablet Take 1 tablet (12.5 mg total) by mouth daily    labetalol (NORMODYNE) 200 mg tablet Take 1 tablet (200 mg total) by mouth 2 (two) times a day    levothyroxine 25 mcg tablet Take 1 tablet (25 mcg total) by mouth daily    losartan (COZAAR) 100 MG tablet Take 1  "tablet (100 mg total) by mouth daily    metFORMIN (GLUCOPHAGE-XR) 750 mg 24 hr tablet TAKE 1 TABLET BY MOUTH EVERY DAY WITH BREAKFAST    Vibegron (Gemtesa) 75 MG TABS Take 75 mg by mouth in the morning    hydrOXYzine HCL (ATARAX) 10 mg tablet Take 1 tablet (10 mg total) by mouth daily (Patient not taking: Reported on 4/11/2025)     No current facility-administered medications on file prior to visit.     She is allergic to oxycodone..    Review of Systems   Constitutional:  Negative for chills and fever.   HENT:  Negative for congestion, ear pain and sore throat.    Eyes:  Negative for pain.   Respiratory:  Negative for cough and shortness of breath.    Cardiovascular:  Negative for chest pain and leg swelling.   Gastrointestinal:  Negative for abdominal pain, nausea and vomiting.   Endocrine: Negative for polyuria.   Genitourinary:  Negative for difficulty urinating, frequency and urgency.   Musculoskeletal:  Positive for arthralgias. Negative for back pain.   Skin:  Positive for rash.   Neurological:  Negative for weakness and headaches.   Psychiatric/Behavioral:  Negative for sleep disturbance. The patient is not nervous/anxious.          Objective:      /86 (BP Location: Left arm, Patient Position: Sitting, Cuff Size: Standard)   Pulse 77   Temp 98.4 °F (36.9 °C) (Temporal)   Ht 5' 4\" (1.626 m)   Wt 88.5 kg (195 lb)   SpO2 97%   BMI 33.47 kg/m²     Recent Results (from the past 8 weeks)   Urine culture    Collection Time: 02/25/25  2:18 PM    Specimen: Urine   Result Value Ref Range    Urine Culture >100,000 cfu/ml Escherichia coli (A)     Urine Culture 10,000-19,000 cfu/ml        Susceptibility    Escherichia coli - MAYTE     ZID Performed Yes       Amoxicillin + Clavulanate <=8/4 Susceptible ug/ml     Ampicillin ($$) >16.00 Resistant ug/ml     Ampicillin + Sulbactam ($) >16/8 Resistant ug/ml     Aztreonam ($$$)  <=4 Susceptible ug/ml     Cefazolin ($) <=2.00 Susceptible ug/ml     Ciprofloxacin ($)  " <=0.25 Susceptible ug/ml     Ertapenem ($$$) <=0.5 Susceptible ug/ml     Gentamicin ($$) <=2 Susceptible ug/ml     Levofloxacin ($) <=0.50 Susceptible ug/ml     Minocycline <=4 Susceptible ug/ml     Nitrofurantoin <=32 Susceptible ug/ml     Piperacillin + Tazobactam ($$$) <=8 Susceptible ug/ml     Tetracycline >8 Resistant ug/ml     Trimethoprim + Sulfamethoxazole ($$$) >2/38 Resistant ug/ml   UA w Reflex to Microscopic w Reflex to Culture    Collection Time: 03/16/25 12:57 PM    Specimen: Urine, Other   Result Value Ref Range    Color, UA Green     Clarity, UA Cloudy     Specific Gravity, UA 1.000 (L) 1.003 - 1.030    pH, UA Interference-unable to analyze (A) 4.5, 5.0, 5.5, 6.0, 6.5, 7.0, 7.5, 8.0    Leukocytes, UA Interference- unable to analyze (A) Negative    Nitrite, UA Interference- unable to analyze Negative    Protein, UA Interference- unable to analyze (A) Negative mg/dl    Glucose, UA Interference- unable to analyze (A) Negative mg/dl    Ketones, UA Interference- unable to analyze (A) Negative mg/dl    Bilirubin, UA Interference- unable to analyze (A) Negative    Occult Blood, UA Interference- unable to analyze (A) Negative   Urine Microscopic    Collection Time: 03/16/25 12:57 PM   Result Value Ref Range    RBC, UA None Seen None Seen, 1-2 /hpf    WBC, UA Innumerable (A) None Seen, 1-2 /hpf    Epithelial Cells Occasional None Seen, Occasional /hpf    Bacteria, UA Occasional None Seen, Occasional /hpf   Urine culture    Collection Time: 03/16/25 12:57 PM    Specimen: Urine, Other   Result Value Ref Range    Urine Culture >100,000 cfu/ml         Physical Exam  Constitutional:       Appearance: Normal appearance.   HENT:      Head: Normocephalic.      Right Ear: External ear normal.      Left Ear: External ear normal.      Nose: Nose normal. No congestion.      Mouth/Throat:      Mouth: Mucous membranes are moist.      Pharynx: Oropharynx is clear. No oropharyngeal exudate or posterior oropharyngeal  erythema.   Eyes:      Extraocular Movements: Extraocular movements intact.      Conjunctiva/sclera: Conjunctivae normal.   Cardiovascular:      Rate and Rhythm: Normal rate and regular rhythm.      Heart sounds: Normal heart sounds. No murmur heard.  Pulmonary:      Effort: Pulmonary effort is normal.      Breath sounds: Normal breath sounds. No wheezing or rales.   Abdominal:      General: Abdomen is flat. There is no distension.      Palpations: Abdomen is soft.      Tenderness: There is no abdominal tenderness.   Musculoskeletal:      Cervical back: Normal range of motion and neck supple.      Right lower leg: No edema.      Left lower leg: No edema.   Lymphadenopathy:      Cervical: No cervical adenopathy.   Skin:     General: Skin is warm.      Comments: Macular edematous rash on arms and thighs   Neurological:      General: No focal deficit present.      Mental Status: She is alert and oriented to person, place, and time.

## 2025-04-11 NOTE — TELEPHONE ENCOUNTER
Rec'd another call from Zenia Vaughn at patient's PCP office. She states that Dr. Hayes would like patient to be seen next week.     I explained that we are just waiting on a response from the  to approve a sooner more urgent appt. Zenia Vaughn verbalized understanding and explained that Dr. Hayes was upset that patient hasn't been scheduled yet.    Please advise. Thank you!

## 2025-04-14 ENCOUNTER — TELEPHONE (OUTPATIENT)
Age: 85
End: 2025-04-14

## 2025-04-14 ENCOUNTER — OFFICE VISIT (OUTPATIENT)
Dept: PHYSICAL THERAPY | Facility: REHABILITATION | Age: 85
End: 2025-04-14
Payer: COMMERCIAL

## 2025-04-14 DIAGNOSIS — R10.2 PELVIC PAIN: ICD-10-CM

## 2025-04-14 DIAGNOSIS — N32.81 OVERACTIVE BLADDER: Primary | ICD-10-CM

## 2025-04-14 DIAGNOSIS — R30.0 DYSURIA: ICD-10-CM

## 2025-04-14 PROCEDURE — 97110 THERAPEUTIC EXERCISES: CPT | Performed by: PHYSICAL THERAPIST

## 2025-04-14 PROCEDURE — 97112 NEUROMUSCULAR REEDUCATION: CPT | Performed by: PHYSICAL THERAPIST

## 2025-04-14 NOTE — PROGRESS NOTES
Daily Note     Today's date: 2025  Patient name: Lubna Bronson  : 1940  MRN: [id number]  Referring provider: Eder Stewart P*  Dx:   Encounter Diagnosis     ICD-10-CM    1. Overactive bladder  N32.81       2. Pelvic pain  R10.2       3. Dysuria  R30.0             Lubna Bronson is a 84 y.o. female who presents with concerns of chronic urinary frequency and urgency, vaginal pain and dysuria. She emptied her bladder twice during 45 minutes session which reduced her pelvic pain for brief periods but it returned quickly.  Extensive education provided at this visit regarding pathophysiology of urinary urgency and frequency as well as pelvic floor muscle dysfunction and patient and her  expressed understanding.  She was requested to stop drinking propel and lieu of water with flavor drops and to begin scheduled voiding of 30 minutes if possible.  She has not picked up antibiotics yet for her current infection so internal assessment was deferred but patient provided verbal consent to proceed with pelvic floor examination next visit.             Subjective: Patient is discouraged that she continues with bladder leakage and awakening at night frequently.  She is scheduled to see a dermatologist tomorrow for rash over her hands forearms and lower legs.  Appears to have more distal lower extremity swelling today.  Internist is aware.    Objective: See treatment diary below    3/6/25:  Pelvic floor strength:  Power: 2+/5  Endurance: 6 seconds  Reps @ 4 seconds: 6  Fast holds in 10 seconds: nt  Mild hypertonicity along lateral PC and IC       Assessment: Tolerated treatment well. Patient would benefit from continued PT. Despite reported fatigue, Horacio was able to do a full therapeutic exercise session.  We focused on engaging the pelvic floor together with lower extremities about 50% of her exercises and she notes that she does this often at home.  Despite reported compliance with HEP she  continues with overactive bladder symptoms and frequent nocturia.  I feel she may be having an autoimmune flare at the moment which may be contributing to her symptoms.  Encouraged patient not to stop her exercises and we plan to continue for a few more weeks with the hope of improving her bladder control.    Plan: Continue per plan of care.      Precautions:   Patient Active Problem List   Diagnosis    Primary osteoarthritis of right hip    Primary osteoarthritis of one hip, right    History of hypercoagulable state    Mixed hyperlipidemia    Essential hypertension    Acute blood loss anemia    Hypertension    Irritable bowel syndrome    Edema of both legs    Acquired hypothyroidism    History of pulmonary embolus (PE)    Pulmonary embolus (HCC)    Presence of right artificial hip joint    Venous insufficiency of both lower extremities    Diabetes mellitus without complication (HCC)    Obesity, morbid (HCC)    Essential hypertension, benign    Tracheitis    Primary osteoarthritis of both knees    Hyponatremia    Uncontrolled type 2 diabetes mellitus with hyperglycemia (HCC)    Urgency of urination    UTI symptoms             POC Expires Auth Status Start Date Exp Date PT Visit Limit DA expires DA provider   5/22               Date of Service 2/27 3/6 3/24 4/3 4/14       Visits Used            Visits Remaining                        Manuals            PFM exam   25'          Breath + PFM  20'                                  Neuro Re-Ed            PFM + breath   LG LG LG       PFM + add seated   x15  15       PFM + peach TB scap retraction   x15  15                                                       Ther Ex            Nustep aerobic warmup  10' 10' 10' 10'       Ankle pumps   10         Heel slides             Supine SLR                                                 Hip flexion   Nv seated Standing x15 each        Side stepping   X6 lengths X 6 lengths 6 lengths       Tandem walking   4 lengths 6 lengths 6  lengths       Heel to toe along barre   2 lengths 2 lengths  2 lengths        Seated: hip abd, add, pilated ring arm adduction    15 each         Standing weight shift at barre    X10 each x10       Tip toe along barre   2 lengths         Ther Activity            Education Anatomy and POC  Bladder urge suppresion Review of hydration and meds                                                                     Modalities

## 2025-04-15 ENCOUNTER — APPOINTMENT (OUTPATIENT)
Dept: LAB | Facility: CLINIC | Age: 85
End: 2025-04-15
Payer: COMMERCIAL

## 2025-04-15 DIAGNOSIS — R21 RASH: ICD-10-CM

## 2025-04-15 LAB — IGA SERPL-MCNC: 129 MG/DL (ref 66–433)

## 2025-04-15 PROCEDURE — 88313 SPECIAL STAINS GROUP 2: CPT | Performed by: STUDENT IN AN ORGANIZED HEALTH CARE EDUCATION/TRAINING PROGRAM

## 2025-04-15 PROCEDURE — 82784 ASSAY IGA/IGD/IGG/IGM EACH: CPT

## 2025-04-15 PROCEDURE — 88342 IMHCHEM/IMCYTCHM 1ST ANTB: CPT | Performed by: STUDENT IN AN ORGANIZED HEALTH CARE EDUCATION/TRAINING PROGRAM

## 2025-04-15 PROCEDURE — 36415 COLL VENOUS BLD VENIPUNCTURE: CPT

## 2025-04-15 PROCEDURE — 83516 IMMUNOASSAY NONANTIBODY: CPT

## 2025-04-15 PROCEDURE — 88350 IMFLUOR EA ADDL 1ANTB STN PX: CPT | Performed by: STUDENT IN AN ORGANIZED HEALTH CARE EDUCATION/TRAINING PROGRAM

## 2025-04-15 PROCEDURE — 88341 IMHCHEM/IMCYTCHM EA ADD ANTB: CPT | Performed by: STUDENT IN AN ORGANIZED HEALTH CARE EDUCATION/TRAINING PROGRAM

## 2025-04-15 PROCEDURE — 88305 TISSUE EXAM BY PATHOLOGIST: CPT | Performed by: STUDENT IN AN ORGANIZED HEALTH CARE EDUCATION/TRAINING PROGRAM

## 2025-04-15 PROCEDURE — 88346 IMFLUOR 1ST 1ANTB STAIN PX: CPT | Performed by: STUDENT IN AN ORGANIZED HEALTH CARE EDUCATION/TRAINING PROGRAM

## 2025-04-15 PROCEDURE — 86364 TISS TRNSGLTMNASE EA IG CLAS: CPT

## 2025-04-15 PROCEDURE — 86231 EMA EACH IG CLASS: CPT

## 2025-04-16 LAB
ENDOMYSIUM IGA SER QL: NEGATIVE
GLIADIN PEPTIDE IGA SER-ACNC: 3 UNITS (ref 0–19)
GLIADIN PEPTIDE IGG SER-ACNC: 3 UNITS (ref 0–19)

## 2025-04-17 ENCOUNTER — RESULTS FOLLOW-UP (OUTPATIENT)
Dept: DERMATOLOGY | Facility: CLINIC | Age: 85
End: 2025-04-17

## 2025-04-17 DIAGNOSIS — L30.9 DERMATITIS: Primary | ICD-10-CM

## 2025-04-17 LAB — TTG IGG SER IA-ACNC: <1.7 U/ML (ref ?–10)

## 2025-04-17 NOTE — RESULT ENCOUNTER NOTE
LAB RESULT NOTE    Results reviewed by ordering physician.  Called patient to personally discuss results. Discussed results with patient. Discussed labs were normal.       Instructions for Clinical Derm Team:   (remember to route Result Note to appropriate staff):    None

## 2025-04-22 PROCEDURE — 88346 IMFLUOR 1ST 1ANTB STAIN PX: CPT | Performed by: STUDENT IN AN ORGANIZED HEALTH CARE EDUCATION/TRAINING PROGRAM

## 2025-04-22 PROCEDURE — 88342 IMHCHEM/IMCYTCHM 1ST ANTB: CPT | Performed by: STUDENT IN AN ORGANIZED HEALTH CARE EDUCATION/TRAINING PROGRAM

## 2025-04-22 PROCEDURE — 88313 SPECIAL STAINS GROUP 2: CPT | Performed by: STUDENT IN AN ORGANIZED HEALTH CARE EDUCATION/TRAINING PROGRAM

## 2025-04-22 PROCEDURE — 88350 IMFLUOR EA ADDL 1ANTB STN PX: CPT | Performed by: STUDENT IN AN ORGANIZED HEALTH CARE EDUCATION/TRAINING PROGRAM

## 2025-04-22 PROCEDURE — 88341 IMHCHEM/IMCYTCHM EA ADD ANTB: CPT | Performed by: STUDENT IN AN ORGANIZED HEALTH CARE EDUCATION/TRAINING PROGRAM

## 2025-04-22 PROCEDURE — 88305 TISSUE EXAM BY PATHOLOGIST: CPT | Performed by: STUDENT IN AN ORGANIZED HEALTH CARE EDUCATION/TRAINING PROGRAM

## 2025-04-29 ENCOUNTER — OFFICE VISIT (OUTPATIENT)
Dept: PHYSICAL THERAPY | Facility: REHABILITATION | Age: 85
End: 2025-04-29
Payer: COMMERCIAL

## 2025-04-29 DIAGNOSIS — N32.81 OVERACTIVE BLADDER: ICD-10-CM

## 2025-04-29 DIAGNOSIS — R30.0 DYSURIA: ICD-10-CM

## 2025-04-29 DIAGNOSIS — R10.2 PELVIC PAIN: Primary | ICD-10-CM

## 2025-04-29 PROCEDURE — 97110 THERAPEUTIC EXERCISES: CPT | Performed by: PHYSICAL THERAPIST

## 2025-04-29 PROCEDURE — 97112 NEUROMUSCULAR REEDUCATION: CPT | Performed by: PHYSICAL THERAPIST

## 2025-04-29 NOTE — PROGRESS NOTES
"Daily Note     Today's date: 2025  Patient name: Lubna Bronson  : 1940  MRN: [id number]  Referring provider: Eder Stewart P*  Dx:   Encounter Diagnosis     ICD-10-CM    1. Pelvic pain  R10.2       2. Dysuria  R30.0       3. Overactive bladder  N32.81               Lubna Bronson is a 84 y.o. female who presents with concerns of chronic urinary frequency and urgency, vaginal pain and dysuria. She emptied her bladder twice during 45 minutes session which reduced her pelvic pain for brief periods but it returned quickly.  Extensive education provided at this visit regarding pathophysiology of urinary urgency and frequency as well as pelvic floor muscle dysfunction and patient and her  expressed understanding.  She was requested to stop drinking propel and lieu of water with flavor drops and to begin scheduled voiding of 30 minutes if possible.  She has not picked up antibiotics yet for her current infection so internal assessment was deferred but patient provided verbal consent to proceed with pelvic floor examination next visit.             Subjective: Patient reports that she is getting up 6-7 times a night and she is able to go back to sleep. (\"That's better than it was.\") She is going to see Dr. Heard in 3 days and would like to have Botox scheduled. Feels like bladder urgency is about the same as SOC when she is upright. \"I feel the pressure.\"    She had a follow up with dermatology and they biopsy but she is waiting for results.     PCP follow up later this week.       Objective: See treatment diary below    3/6/25:  Pelvic floor strength:  Power: 2+/5  Endurance: 6 seconds  Reps @ 4 seconds: 6  Fast holds in 10 seconds: nt  Mild hypertonicity along lateral PC and IC       Assessment: Tolerated treatment well. Patient would benefit from continued PT. She will return in 2 weeks and may have received Botox by then. Will consider DC by that time.     Plan: Continue per plan of " care.      Precautions:   Patient Active Problem List   Diagnosis    Primary osteoarthritis of right hip    Primary osteoarthritis of one hip, right    History of hypercoagulable state    Mixed hyperlipidemia    Essential hypertension    Acute blood loss anemia    Hypertension    Irritable bowel syndrome    Edema of both legs    Acquired hypothyroidism    History of pulmonary embolus (PE)    Pulmonary embolus (HCC)    Presence of right artificial hip joint    Venous insufficiency of both lower extremities    Diabetes mellitus without complication (HCC)    Obesity, morbid (HCC)    Essential hypertension, benign    Tracheitis    Primary osteoarthritis of both knees    Hyponatremia    Uncontrolled type 2 diabetes mellitus with hyperglycemia (HCC)    Urgency of urination    UTI symptoms             POC Expires Auth Status Start Date Exp Date PT Visit Limit DA expires DA provider   5/22               Date of Service 2/27 3/6 3/24 4/3 4/14 4/29      Visits Used            Visits Remaining                        Manuals            PFM exam   25'          Breath + PFM  20'                                  Neuro Re-Ed            PFM + breath   LG LG LG 10'      PFM + add seated   x15  15 15      PFM + peach TB scap retraction   x15  15                                                       Ther Ex            Nustep aerobic warmup  10' 10' 10' 10' 10'      Ankle pumps   10   10 seated on wedge      Heel slides             Supine SLR             Setaed marches      5      Hip flexion   Nv seated Standing x15 each  Standing x15 each      Side stepping   X6 lengths X 6 lengths 6 lengths       Tandem walking   4 lengths 6 lengths 6 lengths       Heel to toe along barre   2 lengths 2 lengths  2 lengths  2 lengths       Seated: hip abd, add, pilated ring arm adduction    15 each   15      Standing weight shift at barre    X10 each x10 10      Tip toe along barre   2 lengths         Ther Activity            Education Anatomy and POC   Bladder urge suppresion Review of hydration and meds                                                                     Modalities

## 2025-04-30 DIAGNOSIS — E11.65 UNCONTROLLED TYPE 2 DIABETES MELLITUS WITH HYPERGLYCEMIA (HCC): ICD-10-CM

## 2025-04-30 NOTE — TELEPHONE ENCOUNTER
Patient called office to discuss pathology report and next steps for treatment if needed.    Please return call back to [phone] she is available all day.

## 2025-05-01 RX ORDER — CLOBETASOL PROPIONATE 0.5 MG/G
CREAM TOPICAL
Qty: 60 G | Refills: 1 | Status: SHIPPED | OUTPATIENT
Start: 2025-05-01 | End: 2025-06-20

## 2025-05-01 RX ORDER — METFORMIN HYDROCHLORIDE 750 MG/1
750 TABLET, EXTENDED RELEASE ORAL
Qty: 90 TABLET | Refills: 1 | Status: SHIPPED | OUTPATIENT
Start: 2025-05-01

## 2025-05-01 NOTE — RESULT ENCOUNTER NOTE
DERMATOPATHOLOGY RESULT NOTE    Results reviewed by ordering physician.  Called patient to personally discuss results. Discussed results with patient.       Instructions for Clinical Derm Team:   (remember to route Result Note to appropriate staff):    None    Result & Plan by Specimen:    Specimen A: benign  Plan: To affected areas, start clobetasol BID x 2 weeks, then then decrease to Mondays through Fridays only for an additional 2-3 weeks. If no improvement after this time, instructed patient to call and schedule for patch testing and follow up       Status: Final result      Dx: Neoplasm of uncertain behavior    Test Result Released: Yes (seen)    View Follow-Up Encounter         Component  Ref Range & Units (hover)   Case Report  Surgical Pathology Report                         Case: [id number]                                  Authorizing Provider:  Rebekah Pham MD            Collected:           04/15/2025 1637              Ordering Location:     [facility]      Received:            04/15/2025 1637                                     [place]                                                                      Pathologist:           Shani Guo MD                                                          Specimens:   A) - Skin, Other, A) H&E- Right forearm                                                             B) - Skin, Other, B) DIF- Right forearm                                                  Final Diagnosis  A. Skin, right forearm, punch biopsy:    Spongiotic dermatitis with focal lymphocytic exocytosis and scattered eosinophils (see note).    Note: In the appropriate clinical context, the histopathologic findings are consistent with an eczematous process or allergic contact dermatitis. Clinical pathologic correlation is advised. Pathogenic microorganisms are not seen with PAS stain. CD3 and  immunostains were reviewed; findings diagnostic of hematologic malignancy or a  lymphoproliferative disorder are not appreciated. Significant lymphocytic cytologic atypia is not seen. Spirochetes are not seen with spirochete immunostain. Multiple levels examined. If the rash were to progress/persist despite therapy, additional sampling should be sought to exclude development of a more significant disease.     B. Skin, right forearm, immunofluorescence:     IgG: negative  IgM: negative  IgA: negative  C3: negative  Fibrinogen: negative      Impression: The direct immunofluorescence findings are negative/non-diagnostic.  Electronically signed by Shani Guo MD on 4/22/2025 at 1747 EDT

## 2025-05-01 NOTE — TELEPHONE ENCOUNTER
Please call patient to discuss pathology  Final Diagnosis   A. Skin, right forearm, punch biopsy:     Spongiotic dermatitis with focal lymphocytic exocytosis and scattered eosinophils (see note).     Note: In the appropriate clinical context, the histopathologic findings are consistent with an eczematous process or allergic contact dermatitis. Clinical pathologic correlation is advised. Pathogenic microorganisms are not seen with PAS stain. CD3 and  immunostains were reviewed; findings diagnostic of hematologic malignancy or a lymphoproliferative disorder are not appreciated. Significant lymphocytic cytologic atypia is not seen. Spirochetes are not seen with spirochete immunostain. Multiple levels examined. If the rash were to progress/persist despite therapy, additional sampling should be sought to exclude development of a more significant disease.      B. Skin, right forearm, immunofluorescence:      IgG: negative  IgM: negative  IgA: negative  C3: negative  Fibrinogen: negative       Impression: The direct immunofluorescence findings are negative/non-diagnostic.

## 2025-05-03 DIAGNOSIS — E11.65 UNCONTROLLED TYPE 2 DIABETES MELLITUS WITH HYPERGLYCEMIA (HCC): Primary | ICD-10-CM

## 2025-05-05 RX ORDER — GABAPENTIN 100 MG/1
100 CAPSULE ORAL EVERY 24 HOURS
Qty: 90 CAPSULE | Refills: 0 | Status: SHIPPED | OUTPATIENT
Start: 2025-05-05

## 2025-05-06 ENCOUNTER — APPOINTMENT (OUTPATIENT)
Dept: LAB | Age: 85
End: 2025-05-06
Payer: COMMERCIAL

## 2025-05-06 DIAGNOSIS — E11.65 UNCONTROLLED TYPE 2 DIABETES MELLITUS WITH HYPERGLYCEMIA (HCC): ICD-10-CM

## 2025-05-06 DIAGNOSIS — E53.8 B12 DEFICIENCY: ICD-10-CM

## 2025-05-06 LAB
ALBUMIN SERPL BCG-MCNC: 3.8 G/DL (ref 3.5–5)
ALP SERPL-CCNC: 83 U/L (ref 34–104)
ALT SERPL W P-5'-P-CCNC: 12 U/L (ref 7–52)
ANION GAP SERPL CALCULATED.3IONS-SCNC: 8 MMOL/L (ref 4–13)
AST SERPL W P-5'-P-CCNC: 14 U/L (ref 13–39)
BASOPHILS # BLD AUTO: 0.03 THOUSANDS/ÂΜL (ref 0–0.1)
BASOPHILS NFR BLD AUTO: 0 % (ref 0–1)
BILIRUB SERPL-MCNC: 0.49 MG/DL (ref 0.2–1)
BUN SERPL-MCNC: 17 MG/DL (ref 5–25)
CALCIUM SERPL-MCNC: 9.1 MG/DL (ref 8.4–10.2)
CHLORIDE SERPL-SCNC: 98 MMOL/L (ref 96–108)
CHOLEST SERPL-MCNC: 134 MG/DL (ref ?–200)
CO2 SERPL-SCNC: 29 MMOL/L (ref 21–32)
CREAT SERPL-MCNC: 0.95 MG/DL (ref 0.6–1.3)
CREAT UR-MCNC: 33.9 MG/DL
EOSINOPHIL # BLD AUTO: 0.11 THOUSAND/ÂΜL (ref 0–0.61)
EOSINOPHIL NFR BLD AUTO: 2 % (ref 0–6)
ERYTHROCYTE [DISTWIDTH] IN BLOOD BY AUTOMATED COUNT: 13.5 % (ref 11.6–15.1)
EST. AVERAGE GLUCOSE BLD GHB EST-MCNC: 143 MG/DL
GFR SERPL CREATININE-BSD FRML MDRD: 55 ML/MIN/1.73SQ M
GLUCOSE P FAST SERPL-MCNC: 103 MG/DL (ref 65–99)
HBA1C MFR BLD: 6.6 %
HCT VFR BLD AUTO: 34.9 % (ref 34.8–46.1)
HDLC SERPL-MCNC: 43 MG/DL
HGB BLD-MCNC: 11.1 G/DL (ref 11.5–15.4)
IMM GRANULOCYTES # BLD AUTO: 0.02 THOUSAND/UL (ref 0–0.2)
IMM GRANULOCYTES NFR BLD AUTO: 0 % (ref 0–2)
LDLC SERPL CALC-MCNC: 59 MG/DL (ref 0–100)
LYMPHOCYTES # BLD AUTO: 2.28 THOUSANDS/ÂΜL (ref 0.6–4.47)
LYMPHOCYTES NFR BLD AUTO: 32 % (ref 14–44)
MCH RBC QN AUTO: 30.7 PG (ref 26.8–34.3)
MCHC RBC AUTO-ENTMCNC: 31.8 G/DL (ref 31.4–37.4)
MCV RBC AUTO: 97 FL (ref 82–98)
MICROALBUMIN UR-MCNC: 374.3 MG/L
MICROALBUMIN/CREAT 24H UR: 1104 MG/G CREATININE (ref 0–30)
MONOCYTES # BLD AUTO: 0.73 THOUSAND/ÂΜL (ref 0.17–1.22)
MONOCYTES NFR BLD AUTO: 10 % (ref 4–12)
NEUTROPHILS # BLD AUTO: 3.93 THOUSANDS/ÂΜL (ref 1.85–7.62)
NEUTS SEG NFR BLD AUTO: 56 % (ref 43–75)
NRBC BLD AUTO-RTO: 0 /100 WBCS
PLATELET # BLD AUTO: 364 THOUSANDS/UL (ref 149–390)
PMV BLD AUTO: 8.7 FL (ref 8.9–12.7)
POTASSIUM SERPL-SCNC: 3.9 MMOL/L (ref 3.5–5.3)
PROT SERPL-MCNC: 6.5 G/DL (ref 6.4–8.4)
RBC # BLD AUTO: 3.61 MILLION/UL (ref 3.81–5.12)
SODIUM SERPL-SCNC: 135 MMOL/L (ref 135–147)
TRIGL SERPL-MCNC: 161 MG/DL (ref ?–150)
TSH SERPL DL<=0.05 MIU/L-ACNC: 3.83 UIU/ML (ref 0.45–4.5)
VIT B12 SERPL-MCNC: 297 PG/ML (ref 180–914)
WBC # BLD AUTO: 7.1 THOUSAND/UL (ref 4.31–10.16)

## 2025-05-06 PROCEDURE — 80053 COMPREHEN METABOLIC PANEL: CPT

## 2025-05-06 PROCEDURE — 36415 COLL VENOUS BLD VENIPUNCTURE: CPT

## 2025-05-06 PROCEDURE — 82570 ASSAY OF URINE CREATININE: CPT

## 2025-05-06 PROCEDURE — 83036 HEMOGLOBIN GLYCOSYLATED A1C: CPT

## 2025-05-06 PROCEDURE — 82043 UR ALBUMIN QUANTITATIVE: CPT

## 2025-05-06 PROCEDURE — 84443 ASSAY THYROID STIM HORMONE: CPT

## 2025-05-06 PROCEDURE — 80061 LIPID PANEL: CPT

## 2025-05-06 PROCEDURE — 82607 VITAMIN B-12: CPT

## 2025-05-06 PROCEDURE — 85025 COMPLETE CBC W/AUTO DIFF WBC: CPT

## 2025-05-07 RX ORDER — DIPHENOXYLATE HYDROCHLORIDE AND ATROPINE SULFATE 2.5; .025 MG/1; MG/1
1 TABLET ORAL DAILY
COMMUNITY

## 2025-05-07 NOTE — PRE-PROCEDURE INSTRUCTIONS
Pre-Surgery Instructions:   Medication Instructions    acetaminophen (TYLENOL) 325 mg tablet Uses PRN- OK to take day of surgery    atorvastatin (LIPITOR) 10 mg tablet Take night before surgery    clobetasol (TEMOVATE) 0.05 % cream Hold day of surgery.    Eliquis 5 MG Instructions provided by MD- PCP OV 5/9 confirm instructions    estradiol (ESTRACE VAGINAL) 0.1 mg/g vaginal cream Hold day of surgery.    furosemide (LASIX) 20 mg tablet Hold day of surgery.    gabapentin (NEURONTIN) 100 mg capsule Take night before surgery    hydroCHLOROthiazide 12.5 mg tablet Hold day of surgery.    labetalol (NORMODYNE) 200 mg tablet Take day of surgery.    levothyroxine 25 mcg tablet Take day of surgery.    losartan (COZAAR) 100 MG tablet Hold day of surgery.    metFORMIN (GLUCOPHAGE-XR) 750 mg 24 hr tablet Hold day of surgery.    multivitamin (THERAGRAN) TABS Stop taking 7 days prior to surgery.    Vibegron (Gemtesa) 75 MG TABS Hold day of surgery.   Medication instructions for day of surgery reviewed. Please take all instructed medications with only a sip of water.       You will receive a call one business day prior to surgery with an arrival time and hospital directions. If your surgery is scheduled on a Monday, the hospital will be calling you on the Friday prior to your surgery. If you have not heard from anyone by 8pm, please call the hospital supervisor through the hospital  at [phone]. ([place] [phone] or [place] [phone]).    Do not eat or drink anything after midnight the night before your surgery, including candy, mints, lifesavers, or chewing gum. Do not drink alcohol 24hrs before your surgery. Try not to smoke at least 24hrs before your surgery.       Follow the pre surgery showering instructions as listed in the “My Surgical Experience Booklet” or otherwise provided by your surgeon's office. Do not use a blade to shave the surgical area 1 week before surgery. It is okay to use a clean  electric clippers up to 24 hours before surgery. Do not apply any lotions, creams, including makeup, cologne, deodorant, or perfumes after showering on the day of your surgery. Do not use dry shampoo, hair spray, hair gel, or any type of hair products.     No contact lenses, eye make-up, or artificial eyelashes. Remove nail polish, including gel polish, and any artificial, gel, or acrylic nails if possible. Remove all jewelry including rings and body piercing jewelry.     Wear causal clothing that is easy to take on and off. Consider your type of surgery.    Keep any valuables, jewelry, piercings at home. Please bring any specially ordered equipment (sling, braces) if indicated.    Arrange for a responsible person to drive you to and from the hospital on the day of your surgery. Please confirm the visitor policy for the day of your procedure when you receive your phone call with an arrival time.     Call the surgeon's office with any new illnesses, exposures, or additional questions prior to surgery.    Please reference your “My Surgical Experience Booklet” for additional information to prepare for your upcoming surgery.

## 2025-05-09 ENCOUNTER — CONSULT (OUTPATIENT)
Dept: INTERNAL MEDICINE CLINIC | Facility: CLINIC | Age: 85
End: 2025-05-09
Payer: COMMERCIAL

## 2025-05-09 VITALS
BODY MASS INDEX: 32.87 KG/M2 | HEIGHT: 64 IN | WEIGHT: 192.5 LBS | OXYGEN SATURATION: 99 % | HEART RATE: 71 BPM | TEMPERATURE: 97.9 F | SYSTOLIC BLOOD PRESSURE: 134 MMHG | DIASTOLIC BLOOD PRESSURE: 70 MMHG

## 2025-05-09 DIAGNOSIS — E03.9 ACQUIRED HYPOTHYROIDISM: ICD-10-CM

## 2025-05-09 DIAGNOSIS — Z01.818 PREOP EXAM FOR INTERNAL MEDICINE: Primary | ICD-10-CM

## 2025-05-09 DIAGNOSIS — I10 ESSENTIAL HYPERTENSION: ICD-10-CM

## 2025-05-09 DIAGNOSIS — E53.8 B12 DEFICIENCY: ICD-10-CM

## 2025-05-09 DIAGNOSIS — E78.2 MIXED HYPERLIPIDEMIA: ICD-10-CM

## 2025-05-09 DIAGNOSIS — E11.65 UNCONTROLLED TYPE 2 DIABETES MELLITUS WITH HYPERGLYCEMIA (HCC): ICD-10-CM

## 2025-05-09 DIAGNOSIS — N32.81 OAB (OVERACTIVE BLADDER): ICD-10-CM

## 2025-05-09 PROCEDURE — 99214 OFFICE O/P EST MOD 30 MIN: CPT | Performed by: INTERNAL MEDICINE

## 2025-05-09 PROCEDURE — 93000 ELECTROCARDIOGRAM COMPLETE: CPT | Performed by: INTERNAL MEDICINE

## 2025-05-09 PROCEDURE — G2211 COMPLEX E/M VISIT ADD ON: HCPCS | Performed by: INTERNAL MEDICINE

## 2025-05-09 RX ORDER — MAGNESIUM 200 MG
1000 TABLET ORAL DAILY
Qty: 100 TABLET | Refills: 1 | Status: SHIPPED | OUTPATIENT
Start: 2025-05-09

## 2025-05-09 NOTE — PROGRESS NOTES
Name: Lubna Bronson      : 1940      MRN: [id number]  Encounter Provider: Umu Hayes MD  Encounter Date: 2025   Encounter department: [facility]  :  Assessment & Plan  Preop exam for internal medicine         OAB (overactive bladder)  Continue same med       Acquired hypothyroidism  Continue same med       Uncontrolled type 2 diabetes mellitus with hyperglycemia (HCC)    Lab Results   Component Value Date    HGBA1C 6.6 (H) 2025   Continue same med       Essential hypertension  Continue same med       Mixed hyperlipidemia  Continue same med       B12 deficiency    Orders:  •  Cyanocobalamin (B-12) 1000 MCG SUBL; Place 1 tablet (1,000 mcg total) under the tongue in the morning           History of Present Illness   Preop for bladder procedure, going for Botox injection, also blood test done on 2025 test discussed with her    Pre-op Exam    Pre-operative Risk Factors:    History of cerebrovascular disease: No    History of ischemic heart disease: No  Pre-operative treatment with insulin: No  Pre-operative creatinine >2 mg/dL: No    History of congestive heart failure: No    Review of Systems   Constitutional:  Negative for chills and fever.   HENT:  Negative for congestion, ear pain and sore throat.    Eyes:  Negative for pain.   Respiratory:  Negative for cough and shortness of breath.    Cardiovascular:  Negative for chest pain and leg swelling.   Gastrointestinal:  Negative for abdominal pain, nausea and vomiting.   Endocrine: Negative for polyuria.   Genitourinary:  Positive for difficulty urinating and frequency. Negative for urgency.   Musculoskeletal:  Positive for arthralgias. Negative for back pain.   Skin:  Negative for rash.   Neurological:  Negative for weakness and headaches.   Psychiatric/Behavioral:  Negative for sleep disturbance. The patient is not nervous/anxious.        Objective   /70 (BP Location: Left arm, Patient Position: Sitting,  "Cuff Size: Standard)   Pulse 71   Temp 97.9 °F (36.6 °C) (Temporal)   Ht 5' 4\" (1.626 m)   Wt 87.3 kg (192 lb 8 oz)   SpO2 99%   BMI 33.04 kg/m²      Physical Exam  Vitals and nursing note reviewed.   Constitutional:       General: She is not in acute distress.     Appearance: She is well-developed.   HENT:      Head: Normocephalic and atraumatic.      Right Ear: Tympanic membrane, ear canal and external ear normal.      Left Ear: Tympanic membrane, ear canal and external ear normal.      Mouth/Throat:      Mouth: Mucous membranes are moist.      Pharynx: Oropharynx is clear.   Eyes:      Extraocular Movements: Extraocular movements intact.      Conjunctiva/sclera: Conjunctivae normal.   Cardiovascular:      Rate and Rhythm: Normal rate and regular rhythm.      Heart sounds: Normal heart sounds. No murmur heard.  Pulmonary:      Effort: Pulmonary effort is normal. No respiratory distress.      Breath sounds: Normal breath sounds. No wheezing or rales.   Abdominal:      General: Abdomen is flat. There is no distension.      Palpations: Abdomen is soft.      Tenderness: There is no abdominal tenderness.   Musculoskeletal:         General: No swelling.      Cervical back: Neck supple.      Right lower leg: Edema present.      Left lower leg: Edema present.   Skin:     General: Skin is warm and dry.      Capillary Refill: Capillary refill takes less than 2 seconds.   Neurological:      General: No focal deficit present.      Mental Status: She is alert and oriented to person, place, and time.   Psychiatric:         Mood and Affect: Mood normal.         "

## 2025-05-09 NOTE — ASSESSMENT & PLAN NOTE
Lab Results   Component Value Date    HGBA1C 6.6 (H) 05/06/2025   Continue same med      Orbicularis Oris Muscle Flap Text: The defect edges were debeveled with a #15 scalpel blade.  Given that the defect affected the competency of the oral sphincter an orbicularis oris muscle flap was deemed most appropriate to restore this competency and normal muscle function.  Using a sterile surgical marker, an appropriate flap was drawn incorporating the defect. The area thus outlined was incised with a #15 scalpel blade.

## 2025-05-11 NOTE — DISCHARGE INSTR - AVS FIRST PAGE
Post-Urogynecologic Surgery Discharge Instructions:  1. Nothing in the vagina for six weeks  2. You may take stairs one at a time, touching each step with both feet for the first few days, then as tolerated.  3. Call the office for fever greater than 100.4'F, heavy vaginal bleeding, or increasing pain.  4. Activity as tolerated.  5. Please take the following for postoperative bowel regimen: colace 100 mg twice daily, miralax 17g daily  6. Do not use topical estrogen until six weeks postoperatively    Post Operative Pain Management:  If you have cramping or mild pain you may take 600 mg Ibuprofen every 6 hours to relieve.     If you continue to have residual mild pain not entirely relieved by Ibuprofen then you may take 650 mg of tylenol every 6 hours.       If you have any questions regarding your prescriptions please call your doctor.

## 2025-05-13 ENCOUNTER — ANESTHESIA EVENT (OUTPATIENT)
Dept: PERIOP | Facility: HOSPITAL | Age: 85
End: 2025-05-13
Payer: COMMERCIAL

## 2025-05-15 NOTE — ANESTHESIA PREPROCEDURE EVALUATION
Procedure:  CYSTOURETHROSOCOPY WITH INJECTION OF BOTOX(100 UNITS) FOR CHEMODENERVATION; EUA (Bladder)  INJECTION BULKING AGENT URETHRAL; POSSIBLE INJECTION OF LESION, POSSIBLE HYDRODISTENTION; POSSIBLE PELVIC FLOOR MUSCLE TRIGGER POINT INJECTION (Urethra)    Relevant Problems   ANESTHESIA (within normal limits)      CARDIO   (+) Essential hypertension   (+) Essential hypertension, benign   (+) Hypertension   (+) Mixed hyperlipidemia   (+) Pulmonary embolus (HCC)   (+) Venous insufficiency of both lower extremities      ENDO   (+) Acquired hypothyroidism   (+) Uncontrolled type 2 diabetes mellitus with hyperglycemia (HCC)      HEMATOLOGY   (+) Acute blood loss anemia      MUSCULOSKELETAL   (+) Primary osteoarthritis of both knees   (+) Primary osteoarthritis of one hip, right   (+) Primary osteoarthritis of right hip      Endocrine   (+) Diabetes mellitus without complication (HCC)      Urinary   (+) OAB (overactive bladder)      Surgery/Wound/Pain   (+) Edema of both legs      FEN/Gastrointestinal   (+) Irritable bowel syndrome      Other   (+) History of pulmonary embolus (PE)   (+) Obesity, morbid (HCC)   (+) Urgency of urination        Physical Exam    Airway     Mallampati score: II          Cardiovascular  Cardiovascular exam normal    Dental        Pulmonary  Pulmonary exam normal     Neurological    She appears awake, alert and oriented x3.      Other Findings  post-pubertal.      Anesthesia Plan  ASA Score- 3     Anesthesia Type- sedation with ASA Monitors.         Additional Monitors:     Airway Plan:            Plan Factors-    Chart reviewed.   Existing labs reviewed. Patient summary reviewed.    Patient is not a current smoker.              Induction- intravenous.    Postoperative Plan- .   Monitoring Plan - Monitoring plan - standard ASA monitoring          Informed Consent- Anesthetic plan and risks discussed with patient.        NPO Status:  No vitals data found for the desired time range.

## 2025-05-16 ENCOUNTER — ANESTHESIA (OUTPATIENT)
Dept: PERIOP | Facility: HOSPITAL | Age: 85
End: 2025-05-16
Payer: COMMERCIAL

## 2025-05-16 ENCOUNTER — HOSPITAL ENCOUNTER (OUTPATIENT)
Facility: HOSPITAL | Age: 85
Setting detail: OUTPATIENT SURGERY
Discharge: HOME | End: 2025-05-16
Attending: OBSTETRICS & GYNECOLOGY | Admitting: OBSTETRICS & GYNECOLOGY
Payer: COMMERCIAL

## 2025-05-16 VITALS
OXYGEN SATURATION: 95 % | SYSTOLIC BLOOD PRESSURE: 168 MMHG | HEIGHT: 64 IN | WEIGHT: 195.11 LBS | DIASTOLIC BLOOD PRESSURE: 96 MMHG | RESPIRATION RATE: 18 BRPM | BODY MASS INDEX: 33.31 KG/M2 | TEMPERATURE: 96.7 F | HEART RATE: 89 BPM

## 2025-05-16 DIAGNOSIS — N39.46 MIXED INCONTINENCE: ICD-10-CM

## 2025-05-16 DIAGNOSIS — N30.10 INTERSTITIAL CYSTITIS (CHRONIC) WITHOUT HEMATURIA: ICD-10-CM

## 2025-05-16 LAB
GLUCOSE SERPL-MCNC: 157 MG/DL (ref 65–140)
GLUCOSE SERPL-MCNC: 168 MG/DL (ref 65–140)

## 2025-05-16 PROCEDURE — 88307 TISSUE EXAM BY PATHOLOGIST: CPT | Performed by: PATHOLOGY

## 2025-05-16 PROCEDURE — 82948 REAGENT STRIP/BLOOD GLUCOSE: CPT

## 2025-05-16 RX ORDER — ONDANSETRON 2 MG/ML
4 INJECTION INTRAMUSCULAR; INTRAVENOUS EVERY 6 HOURS PRN
Status: DISCONTINUED | OUTPATIENT
Start: 2025-05-16 | End: 2025-05-16 | Stop reason: HOSPADM

## 2025-05-16 RX ORDER — PROPOFOL 10 MG/ML
INJECTION, EMULSION INTRAVENOUS CONTINUOUS PRN
Status: DISCONTINUED | OUTPATIENT
Start: 2025-05-16 | End: 2025-05-16

## 2025-05-16 RX ORDER — CEFAZOLIN SODIUM 2 G/50ML
SOLUTION INTRAVENOUS AS NEEDED
Status: DISCONTINUED | OUTPATIENT
Start: 2025-05-16 | End: 2025-05-16

## 2025-05-16 RX ORDER — FENTANYL CITRATE 50 UG/ML
INJECTION, SOLUTION INTRAMUSCULAR; INTRAVENOUS AS NEEDED
Status: DISCONTINUED | OUTPATIENT
Start: 2025-05-16 | End: 2025-05-16

## 2025-05-16 RX ORDER — PROPOFOL 10 MG/ML
INJECTION, EMULSION INTRAVENOUS AS NEEDED
Status: DISCONTINUED | OUTPATIENT
Start: 2025-05-16 | End: 2025-05-16

## 2025-05-16 RX ORDER — MAGNESIUM HYDROXIDE 1200 MG/15ML
LIQUID ORAL AS NEEDED
Status: DISCONTINUED | OUTPATIENT
Start: 2025-05-16 | End: 2025-05-16 | Stop reason: HOSPADM

## 2025-05-16 RX ORDER — SODIUM CHLORIDE, SODIUM LACTATE, POTASSIUM CHLORIDE, CALCIUM CHLORIDE 600; 310; 30; 20 MG/100ML; MG/100ML; MG/100ML; MG/100ML
125 INJECTION, SOLUTION INTRAVENOUS CONTINUOUS
Status: DISCONTINUED | OUTPATIENT
Start: 2025-05-16 | End: 2025-05-16 | Stop reason: HOSPADM

## 2025-05-16 RX ORDER — SODIUM CHLORIDE, SODIUM LACTATE, POTASSIUM CHLORIDE, CALCIUM CHLORIDE 600; 310; 30; 20 MG/100ML; MG/100ML; MG/100ML; MG/100ML
INJECTION, SOLUTION INTRAVENOUS CONTINUOUS PRN
Status: DISCONTINUED | OUTPATIENT
Start: 2025-05-16 | End: 2025-05-16

## 2025-05-16 RX ORDER — ACETAMINOPHEN 325 MG/1
975 TABLET ORAL EVERY 6 HOURS PRN
Status: DISCONTINUED | OUTPATIENT
Start: 2025-05-16 | End: 2025-05-16 | Stop reason: HOSPADM

## 2025-05-16 RX ORDER — FAMOTIDINE 20 MG/1
20 TABLET, FILM COATED ORAL 2 TIMES DAILY PRN
Status: DISCONTINUED | OUTPATIENT
Start: 2025-05-16 | End: 2025-05-16 | Stop reason: HOSPADM

## 2025-05-16 RX ORDER — DEXAMETHASONE SODIUM PHOSPHATE 10 MG/ML
INJECTION, SOLUTION INTRAMUSCULAR; INTRAVENOUS AS NEEDED
Status: DISCONTINUED | OUTPATIENT
Start: 2025-05-16 | End: 2025-05-16

## 2025-05-16 RX ORDER — ONDANSETRON 2 MG/ML
INJECTION INTRAMUSCULAR; INTRAVENOUS AS NEEDED
Status: DISCONTINUED | OUTPATIENT
Start: 2025-05-16 | End: 2025-05-16

## 2025-05-16 RX ORDER — SODIUM CHLORIDE, SODIUM LACTATE, POTASSIUM CHLORIDE, CALCIUM CHLORIDE 600; 310; 30; 20 MG/100ML; MG/100ML; MG/100ML; MG/100ML
20 INJECTION, SOLUTION INTRAVENOUS CONTINUOUS
Status: DISCONTINUED | OUTPATIENT
Start: 2025-05-16 | End: 2025-05-16 | Stop reason: HOSPADM

## 2025-05-16 RX ORDER — ACETAMINOPHEN 325 MG/1
975 TABLET ORAL ONCE
Status: COMPLETED | OUTPATIENT
Start: 2025-05-16 | End: 2025-05-16

## 2025-05-16 RX ORDER — BUPIVACAINE HYDROCHLORIDE 2.5 MG/ML
INJECTION, SOLUTION EPIDURAL; INFILTRATION; INTRACAUDAL; PERINEURAL AS NEEDED
Status: DISCONTINUED | OUTPATIENT
Start: 2025-05-16 | End: 2025-05-16 | Stop reason: HOSPADM

## 2025-05-16 RX ORDER — TRIAMCINOLONE ACETONIDE 40 MG/ML
INJECTION, SUSPENSION INTRA-ARTICULAR; INTRAMUSCULAR AS NEEDED
Status: DISCONTINUED | OUTPATIENT
Start: 2025-05-16 | End: 2025-05-16 | Stop reason: HOSPADM

## 2025-05-16 RX ORDER — CEFAZOLIN SODIUM 2 G/50ML
2000 SOLUTION INTRAVENOUS ONCE
Status: DISCONTINUED | OUTPATIENT
Start: 2025-05-16 | End: 2025-05-16 | Stop reason: HOSPADM

## 2025-05-16 RX ORDER — IBUPROFEN 600 MG/1
600 TABLET, FILM COATED ORAL EVERY 6 HOURS PRN
Status: DISCONTINUED | OUTPATIENT
Start: 2025-05-16 | End: 2025-05-16 | Stop reason: HOSPADM

## 2025-05-16 RX ORDER — FENTANYL CITRATE/PF 50 MCG/ML
25 SYRINGE (ML) INJECTION
Status: DISCONTINUED | OUTPATIENT
Start: 2025-05-16 | End: 2025-05-16 | Stop reason: HOSPADM

## 2025-05-16 RX ORDER — IBUPROFEN 600 MG/1
600 TABLET, FILM COATED ORAL EVERY 6 HOURS PRN
Status: DISCONTINUED | OUTPATIENT
Start: 2025-05-16 | End: 2025-05-16 | Stop reason: SDUPTHER

## 2025-05-16 RX ORDER — PHENAZOPYRIDINE HYDROCHLORIDE 100 MG/1
100 TABLET, FILM COATED ORAL 3 TIMES DAILY PRN
Qty: 10 TABLET | Refills: 0 | Status: SHIPPED | OUTPATIENT
Start: 2025-05-16

## 2025-05-16 RX ORDER — ONDANSETRON 2 MG/ML
4 INJECTION INTRAMUSCULAR; INTRAVENOUS ONCE AS NEEDED
Status: DISCONTINUED | OUTPATIENT
Start: 2025-05-16 | End: 2025-05-16 | Stop reason: HOSPADM

## 2025-05-16 RX ADMIN — FENTANYL CITRATE 25 MCG: 50 INJECTION INTRAMUSCULAR; INTRAVENOUS at 07:49

## 2025-05-16 RX ADMIN — FENTANYL CITRATE 25 MCG: 50 INJECTION INTRAMUSCULAR; INTRAVENOUS at 07:39

## 2025-05-16 RX ADMIN — IBUPROFEN 600 MG: 600 TABLET, FILM COATED ORAL at 11:33

## 2025-05-16 RX ADMIN — PROPOFOL 30 MG: 10 INJECTION, EMULSION INTRAVENOUS at 07:36

## 2025-05-16 RX ADMIN — SODIUM CHLORIDE, SODIUM LACTATE, POTASSIUM CHLORIDE, AND CALCIUM CHLORIDE 125 ML/HR: .6; .31; .03; .02 INJECTION, SOLUTION INTRAVENOUS at 06:00

## 2025-05-16 RX ADMIN — DEXMEDETOMIDINE HYDROCHLORIDE 4 MCG: 100 INJECTION, SOLUTION INTRAVENOUS at 08:49

## 2025-05-16 RX ADMIN — DEXMEDETOMIDINE HYDROCHLORIDE 8 MCG: 100 INJECTION, SOLUTION INTRAVENOUS at 08:09

## 2025-05-16 RX ADMIN — PROPOFOL 80 MCG/KG/MIN: 10 INJECTION, EMULSION INTRAVENOUS at 07:36

## 2025-05-16 RX ADMIN — FENTANYL CITRATE 25 MCG: 50 INJECTION INTRAMUSCULAR; INTRAVENOUS at 07:36

## 2025-05-16 RX ADMIN — DEXAMETHASONE SODIUM PHOSPHATE 5 MG: 10 INJECTION, SOLUTION INTRAMUSCULAR; INTRAVENOUS at 07:38

## 2025-05-16 RX ADMIN — CEFAZOLIN SODIUM 2000 MG: 2 SOLUTION INTRAVENOUS at 07:36

## 2025-05-16 RX ADMIN — SODIUM CHLORIDE 100 UNITS: 9 INJECTION INTRAMUSCULAR; INTRAVENOUS; SUBCUTANEOUS at 08:47

## 2025-05-16 RX ADMIN — PROPOFOL 10 MG: 10 INJECTION, EMULSION INTRAVENOUS at 07:38

## 2025-05-16 RX ADMIN — PROPOFOL 40 MG: 10 INJECTION, EMULSION INTRAVENOUS at 08:27

## 2025-05-16 RX ADMIN — FENTANYL CITRATE 25 MCG: 50 INJECTION INTRAMUSCULAR; INTRAVENOUS at 10:12

## 2025-05-16 RX ADMIN — FENTANYL CITRATE 25 MCG: 50 INJECTION INTRAMUSCULAR; INTRAVENOUS at 08:01

## 2025-05-16 RX ADMIN — DEXMEDETOMIDINE HYDROCHLORIDE 8 MCG: 100 INJECTION, SOLUTION INTRAVENOUS at 07:30

## 2025-05-16 RX ADMIN — ACETAMINOPHEN 975 MG: 325 TABLET, FILM COATED ORAL at 06:00

## 2025-05-16 RX ADMIN — SODIUM CHLORIDE, SODIUM LACTATE, POTASSIUM CHLORIDE, AND CALCIUM CHLORIDE: .6; .31; .03; .02 INJECTION, SOLUTION INTRAVENOUS at 07:31

## 2025-05-16 RX ADMIN — ONDANSETRON 4 MG: 2 INJECTION INTRAMUSCULAR; INTRAVENOUS at 07:38

## 2025-05-16 NOTE — INTERIM OP NOTE
CYSTOURETHROSOCOPY WITH INJECTION OF BOTOX(100 UNITS) FOR CHEMODENERVATION; EUA; BLADDER BIOPSY WITH FULGURATION, PELVIC FLOOR MUSCLE TRIGGER POINT INJECTION  Postoperative Note  PATIENT NAME: Lubna Bronson  : 1940  MRN: [id number]  AL OR ROOM 02    Surgery Date: 2025    Preop Diagnosis:  Interstitial cystitis (chronic) without hematuria [N30.10]  Mixed incontinence [N39.46]    Post-Op Diagnosis Codes:     * Interstitial cystitis (chronic) without hematuria [N30.10]     * Mixed incontinence [N39.46]    Procedure(s) (LRB):  CYSTOURETHROSOCOPY WITH INJECTION OF BOTOX(100 UNITS) FOR CHEMODENERVATION; EUA; BLADDER BIOPSY WITH FULGURATION (N/A)  PELVIC FLOOR MUSCLE TRIGGER POINT INJECTION (N/A) (pudendal nerve block)    Surgeons and Role:     * Chas Heard MD - Primary     * Huyen Piedra MD - Assisting    Specimens:  ID Type Source Tests Collected by Time Destination   1 : Bladder biopsy Tissue Urinary Bladder TISSUE EXAM Chas Heard MD 2025 0834        Estimated Blood Loss:   Minimal    Anesthesia Type:   IV Sedation with Anesthesia     Findings:   Exam under anesthesia noted normal external female anatomy however skin of vagina and perineum appeared inflamed and erythematous. Anterior vaginal wall prolapse noted, however on cystoscopy there was only mild cystocele noted.   During cystourethroscopy, a 360 degree survey was performed and showed intact bladder with mucosa that appeared inflamed and areas along posterior wall that were friable with sloughing tissue and tissue that appeared polypoid (biopsied) (see photos in media). Otherwise no defects. Erythematous lesions at posterior bladder and near dome fulgurated especially where bleeding was occurring. Bilateral orthotopic ureteral orifices identified with bilateral efflux of urine, normal appearing trigone. Hemostasis noted at end of cysto. The urothelium appeared normal with no lesions, suture, or other foreign body.    At end of case,  good hemostasis and negative vaginal sweep.   See operative dictation for further details.       Complications:   None apparent    Chas Heard MD  [facility]  /[facility]    [address]  [place] [id number]    [phone]             SIGNATURE: Chas Heard MD   DATE: May 16, 2025   TIME: 10:00 AM

## 2025-05-16 NOTE — ANESTHESIA POSTPROCEDURE EVALUATION
Post-Op Assessment Note    CV Status:  Stable    Pain management: adequate       Mental Status:  Sleepy   Hydration Status:  Euvolemic   PONV Controlled:  Controlled   Airway Patency:  Patent  Two or more mitigation strategies used for obstructive sleep apnea   Post Op Vitals Reviewed: Yes    No anethesia notable event occurred.    Staff: Anesthesiologist, CRNA           Last Filed PACU Vitals:  Vitals Value Taken Time   Temp 96.8 °F (36 °C) 05/16/25 09:37   Pulse 74 05/16/25 09:39   /58 05/16/25 09:37   Resp     SpO2 97 % 05/16/25 09:39   Vitals shown include unfiled device data.

## 2025-05-16 NOTE — INTERVAL H&P NOTE
H&P reviewed. After examining the patient I find no changes in the patients condition since the H&P had been written.    No changes in health. She continues to feel her symptoms are overall improved from where we started but continues to have bothersome leakage with activity as well as urgency and frequency. She also has perineal/pelvic pain especially with sitting and bumps in the car. Reviewed planned procedure with patient and . Discussed bulking risks and benefits, plan to proceed with. Proceed with procedure as planned. Reviewed post op precautions, expectations and restrictions. Emphasized need to be aware of symptoms post op to indicate what helped.    All questions answered to satisfaction.    Chas Heard MD

## 2025-05-16 NOTE — ANESTHESIA POSTPROCEDURE EVALUATION
Post-Op Assessment Note    CV Status:  Stable    Pain management: adequate    Multimodal analgesia used between 6 hours prior to anesthesia start to PACU discharge    Mental Status:  Sleepy   Hydration Status:  Euvolemic   PONV Controlled:  Controlled   Airway Patency:  Patent  Two or more mitigation strategies used for obstructive sleep apnea   Post Op Vitals Reviewed: Yes    No anethesia notable event occurred.    Staff: Anesthesiologist, CRNA           Last Filed PACU Vitals:  Vitals Value Taken Time   Temp 96.8 °F (36 °C) 05/16/25 09:37   Pulse 74 05/16/25 09:39   /58 05/16/25 09:37   Resp     SpO2 97 % 05/16/25 09:39   Vitals shown include unfiled device data.    Modified [place]:     Vitals Value Taken Time   Activity 2 05/16/25 10:21   Respiration 2 05/16/25 10:21   Circulation 2 05/16/25 10:21   Consciousness 2 05/16/25 10:21   Oxygen Saturation 2 05/16/25 10:21     Modified [place]ad Score: 10

## 2025-05-16 NOTE — OP NOTE
OPERATIVE REPORT  PATIENT NAME: Lubna Bronson    :  1940  MRN: [id number]  Pt Location: [facility] OR ROOM 02    SURGERY DATE: 2025    Surgeons and Role:     * Chas Heard MD - Primary     * Huyen Piedra MD - Assisting    Preop Diagnosis:  Interstitial cystitis (chronic) without hematuria [N30.10]  Mixed incontinence [N39.46]    Post-Op Diagnosis Codes:     * Interstitial cystitis (chronic) without hematuria [N30.10]     * Mixed incontinence [N39.46]    Procedure(s):  CYSTOURETHROSOCOPY WITH INJECTION OF BOTOX(100 UNITS) FOR CHEMODENERVATION; EUA;   BLADDER BIOPSY WITH FULGURATION  PELVIC FLOOR MUSCLE TRIGGER POINT INJECTION with pudendal nerve block    Specimen(s):  ID Type Source Tests Collected by Time Destination   1 : Bladder biopsy Tissue Urinary Bladder TISSUE EXAM Chas Heard MD 2025 [id number]        Estimated Blood Loss:   Minimal    Drains:  * No LDAs found *    Anesthesia Type:   IV Sedation with Anesthesia    Operative Indications:  Interstitial cystitis (chronic) without hematuria [N30.10]  Mixed incontinence [N39.46]    Operative Findings:  Exam under anesthesia noted normal external female anatomy however skin of vagina and perineum appeared inflamed and erythematous. Anterior vaginal wall prolapse noted, however on cystoscopy there was only mild cystocele noted.   During cystourethroscopy, a 360 degree survey was performed and showed intact bladder with mucosa that appeared inflamed and areas along posterior wall that were friable with sloughing tissue and tissue that appeared polypoid (biopsied) (see photos in media). Otherwise no defects. Erythematous lesions at posterior bladder and near dome fulgurated especially where bleeding was occurring. Bilateral orthotopic ureteral orifices identified with bilateral efflux of urine, normal appearing trigone. Hemostasis noted at end of cysto. The urothelium appeared normal with no lesions, suture, or other foreign body.    At end of case,  good hemostasis and negative vaginal sweep.      Complications:   None apparent    Procedure and Technique:  The patient was taken to the operating room with an IV running. She was then placed in the dorsal supine position. Patient identified, informed consent affirmed, and safety checklist performed per protocol. Perioperative antibiotics were administered prior to making the initial incision. SCDs were in place and activated prior to initiation of anesthesia. Anesthesia was administered and found to be adequate. She was then repositioned in dorsal lithotomy position with legs in yellow fin michele stirrups. Careful attention was paid to not hyperextend or hyperflex at the knees or hips. Bony prominences were padded.     The patient was prepared and draped in the usual sterile manner. Surgical time-out performed. Exam under anesthesia performed noting the above findings.     First, a bilateral pudendal nerve block was performed with 1cc kenalog in 5cc of 0.25% marcaine. 3cc of the solution was injected with a spinal needle approximately 1cm inferior and 1cm medial of the ischial spine, under the sacrospinous ligament in order to infiltrate around the pudendal nerve, performed bilaterally. Aspiration was performed prior to injection and the needle was moved around in order to ensure to bath the nerve. Hemostasis noted.     Next, cystoscopy was performed with the above findings. Given how friable the bladder was with multiple areas of erythema with petechiae (see media) and sloughing tissue, the bladder was filled and drained several times in order to help visual. Low capacity bladder. While cystoscopy was started with saline, it was switched to water given the friability of tissue and bleeding that was noted from the lesions on the posterior bladder near the dome. Multiple biopsies of the tissue was performed with a cystoscopic grasper. 100 units of botox reconsitued into 10cc of injectable saline was then injected  into the bladder wall using colplast cystoscopic injection needle. Injection was performed over approximately 20 sites including trigone. Lastly, a bugbee was used with cautery to fulgurate the lesions and achieve hemostasis. Hemostasis confirmed at end of cystoscopy.     Given bladder findings, the decision was made to not perform urethral bulking.     Bladder was ensured emptied with a 10 Jamaican red ruber and then about 25cc of 0.25% marcaine was instilled into the bladder.     Hemostasis confirmed. Negative vaginal sweep performed by me.     A vaginal sweep was completed by me and no foreign bodies were present in the vagina at the end of the case.    Sponge, instrument, and needle counts were correct times 2.     The patient was then cleaned, carefully repositioned into dorsal supine position, and aroused from anesthesia without issue.    She was taken to the recovery room in stable condition.    She tolerated procedure and anesthesia well.       I was present and scrubbed for the entire procedure.     I spoke with the patient's family (, Juanpablo) at the end of the procedure with her permission given to me to do so prior to the procedure. I reviewed bladder images with him and made him aware that bladder biopsies were performed given bladder findings.         I was present for the entire procedure.    Patient Disposition:  PACU  in stable condition    Chas Heard MD  [facility]  /[facility]    [address]  [place] [id number]    [phone]            SIGNATURE: Chas Heard MD  DATE: May 16, 2025  TIME: 5:53 PM

## 2025-05-18 DIAGNOSIS — I10 ESSENTIAL HYPERTENSION, BENIGN: ICD-10-CM

## 2025-05-18 RX ORDER — LABETALOL 200 MG/1
200 TABLET, FILM COATED ORAL 2 TIMES DAILY
Qty: 180 TABLET | Refills: 1 | Status: SHIPPED | OUTPATIENT
Start: 2025-05-18

## 2025-05-22 DIAGNOSIS — E53.8 B12 DEFICIENCY: ICD-10-CM

## 2025-05-23 ENCOUNTER — OFFICE VISIT (OUTPATIENT)
Dept: INTERNAL MEDICINE CLINIC | Facility: CLINIC | Age: 85
End: 2025-05-23

## 2025-05-23 VITALS
HEIGHT: 64 IN | HEART RATE: 82 BPM | WEIGHT: 192 LBS | TEMPERATURE: 98.6 F | OXYGEN SATURATION: 98 % | BODY MASS INDEX: 32.78 KG/M2 | DIASTOLIC BLOOD PRESSURE: 80 MMHG | SYSTOLIC BLOOD PRESSURE: 142 MMHG

## 2025-05-23 DIAGNOSIS — E53.8 B12 DEFICIENCY: ICD-10-CM

## 2025-05-23 DIAGNOSIS — I10 ESSENTIAL HYPERTENSION: Primary | ICD-10-CM

## 2025-05-23 DIAGNOSIS — M17.0 PRIMARY OSTEOARTHRITIS OF BOTH KNEES: ICD-10-CM

## 2025-05-23 DIAGNOSIS — E78.2 MIXED HYPERLIPIDEMIA: ICD-10-CM

## 2025-05-23 DIAGNOSIS — E03.9 ACQUIRED HYPOTHYROIDISM: ICD-10-CM

## 2025-05-23 DIAGNOSIS — I87.2 VENOUS INSUFFICIENCY OF BOTH LOWER EXTREMITIES: ICD-10-CM

## 2025-05-23 DIAGNOSIS — E11.65 UNCONTROLLED TYPE 2 DIABETES MELLITUS WITH HYPERGLYCEMIA (HCC): ICD-10-CM

## 2025-05-23 RX ORDER — MAGNESIUM 200 MG
1000 TABLET ORAL DAILY
Qty: 100 TABLET | Refills: 1 | Status: SHIPPED | OUTPATIENT
Start: 2025-05-23

## 2025-05-23 RX ORDER — MAGNESIUM 200 MG
1000 TABLET ORAL DAILY
Qty: 100 TABLET | Refills: 1 | Status: SHIPPED | OUTPATIENT
Start: 2025-05-23 | End: 2025-05-23

## 2025-05-23 RX ORDER — MAGNESIUM 200 MG
1000 TABLET ORAL DAILY
Qty: 100 TABLET | Refills: 0 | Status: SHIPPED | OUTPATIENT
Start: 2025-05-23 | End: 2025-05-23 | Stop reason: SDUPTHER

## 2025-05-23 NOTE — PROGRESS NOTES
Name: Lubna Bronson      : 1940      MRN: [id number]  Encounter Provider: Umu Hayes MD  Encounter Date: 2025   Encounter department: [facility]  :  Assessment & Plan  Essential hypertension  Continue same med  Orders:  •  CBC and differential; Future  •  Comprehensive metabolic panel; Future  •  Lipid Panel with Direct LDL reflex; Future  •  TSH, 3rd generation; Future    Uncontrolled type 2 diabetes mellitus with hyperglycemia (HCC)    Lab Results   Component Value Date    HGBA1C 6.6 (H) 2025   Continue same med  Orders:  •  Albumin / creatinine urine ratio; Future  •  CBC and differential; Future  •  Comprehensive metabolic panel; Future  •  Lipid Panel with Direct LDL reflex; Future  •  TSH, 3rd generation; Future  •  Hemoglobin A1C; Future    Mixed hyperlipidemia  Continue same med  Orders:  •  Comprehensive metabolic panel; Future  •  Lipid Panel with Direct LDL reflex; Future  •  TSH, 3rd generation; Future    Primary osteoarthritis of both knees         Acquired hypothyroidism  Continue same med  Orders:  •  CBC and differential; Future  •  Comprehensive metabolic panel; Future  •  Lipid Panel with Direct LDL reflex; Future  •  TSH, 3rd generation; Future    Venous insufficiency of both lower extremities         B12 deficiency    Orders:  •  Cyanocobalamin (B-12) 1000 MCG SUBL; Place 1 tablet (1,000 mcg total) under the tongue in the morning  •  Vitamin B12; Future           History of Present Illness   Follow-up on multiple medical problems today are stable on current medications, status post Botox injection for the bladder, doing well      Review of Systems   Constitutional:  Negative for chills and fever.   HENT:  Negative for congestion, ear pain and sore throat.    Eyes:  Negative for pain.   Respiratory:  Negative for cough and shortness of breath.    Cardiovascular:  Negative for chest pain and leg swelling.   Gastrointestinal:  Negative for  "abdominal pain, nausea and vomiting.   Endocrine: Negative for polyuria.   Genitourinary:  Negative for difficulty urinating, frequency and urgency.   Musculoskeletal:  Positive for arthralgias and back pain.   Skin:  Negative for rash.   Neurological:  Negative for weakness and headaches.   Psychiatric/Behavioral:  Negative for sleep disturbance. The patient is not nervous/anxious.        Objective   /80 (BP Location: Left arm, Patient Position: Sitting, Cuff Size: Standard)   Pulse 82   Temp 98.6 °F (37 °C) (Temporal)   Ht 5' 4\" (1.626 m)   Wt 87.1 kg (192 lb)   SpO2 98%   BMI 32.96 kg/m²      Physical Exam  Vitals and nursing note reviewed.   Constitutional:       General: She is not in acute distress.     Appearance: She is well-developed.   HENT:      Head: Normocephalic and atraumatic.      Right Ear: External ear normal.      Left Ear: External ear normal.      Mouth/Throat:      Mouth: Mucous membranes are moist.      Pharynx: Oropharynx is clear.     Eyes:      Extraocular Movements: Extraocular movements intact.      Conjunctiva/sclera: Conjunctivae normal.       Cardiovascular:      Rate and Rhythm: Normal rate and regular rhythm.      Heart sounds: Normal heart sounds. No murmur heard.  Pulmonary:      Effort: Pulmonary effort is normal. No respiratory distress.      Breath sounds: Normal breath sounds. No wheezing or rales.   Abdominal:      General: Abdomen is flat. There is no distension.      Palpations: Abdomen is soft.      Tenderness: There is no abdominal tenderness.     Musculoskeletal:         General: No swelling.      Cervical back: Neck supple.      Right lower leg: Edema present.      Left lower leg: Edema present.     Skin:     General: Skin is warm and dry.      Capillary Refill: Capillary refill takes less than 2 seconds.     Neurological:      General: No focal deficit present.      Mental Status: She is alert and oriented to person, place, and time.     Psychiatric:         " Mood and Affect: Mood normal.

## 2025-05-23 NOTE — ASSESSMENT & PLAN NOTE
Lab Results   Component Value Date    HGBA1C 6.6 (H) 05/06/2025   Continue same med  Orders:  •  Albumin / creatinine urine ratio; Future  •  CBC and differential; Future  •  Comprehensive metabolic panel; Future  •  Lipid Panel with Direct LDL reflex; Future  •  TSH, 3rd generation; Future  •  Hemoglobin A1C; Future

## 2025-05-23 NOTE — ASSESSMENT & PLAN NOTE
Continue same med  Orders:  •  Comprehensive metabolic panel; Future  •  Lipid Panel with Direct LDL reflex; Future  •  TSH, 3rd generation; Future   No

## 2025-05-23 NOTE — ASSESSMENT & PLAN NOTE
Continue same med  Orders:  •  CBC and differential; Future  •  Comprehensive metabolic panel; Future  •  Lipid Panel with Direct LDL reflex; Future  •  TSH, 3rd generation; Future

## 2025-05-24 DIAGNOSIS — E78.2 MIXED HYPERLIPIDEMIA: ICD-10-CM

## 2025-05-25 RX ORDER — ATORVASTATIN CALCIUM 10 MG/1
10 TABLET, FILM COATED ORAL
Qty: 90 TABLET | Refills: 1 | Status: SHIPPED | OUTPATIENT
Start: 2025-05-25

## 2025-05-28 DIAGNOSIS — I10 ESSENTIAL HYPERTENSION, BENIGN: ICD-10-CM

## 2025-05-30 RX ORDER — LOSARTAN POTASSIUM 100 MG/1
100 TABLET ORAL DAILY
Qty: 90 TABLET | Refills: 1 | Status: SHIPPED | OUTPATIENT
Start: 2025-05-30 | End: 2025-08-28

## 2025-06-02 ENCOUNTER — TELEPHONE (OUTPATIENT)
Dept: INTERNAL MEDICINE CLINIC | Facility: CLINIC | Age: 85
End: 2025-06-02

## 2025-06-03 NOTE — TELEPHONE ENCOUNTER
PA for gabapentin (NEURONTIN) 100 mg capsule SUBMITTED to [employer]    via    [x]CMM-KEY: [id number]  []Surescripts-Case ID #   []Availity-Auth ID # NDC #   []Faxed to plan   []Other website   []Phone call Case ID #     [x]PA sent as URGENT    All office notes, labs and other pertaining documents and studies sent. Clinical questions answered. Awaiting determination from insurance company.     Turnaround time for your insurance to make a decision on your Prior Authorization can take 7-21 business days.

## 2025-06-04 NOTE — TELEPHONE ENCOUNTER
PA for gabapentin (NEURONTIN) 100 mg capsule APPROVED     Date(s) approved 04/03/25-06/02/26    Case # [id number]    Patient advised by          []MyChart Message  []Phone call   []LMOM  []L/M to call office as no active Communication consent on file  [x]Pharmacy dispensed medication to patient on 06/03/25       Pharmacy advised by    [x]Fax  []Phone call  []Secure Chat       Approval letter scanned into Media Yes

## 2025-06-05 DIAGNOSIS — E03.9 HYPOTHYROIDISM (ACQUIRED): ICD-10-CM

## 2025-06-05 RX ORDER — LEVOTHYROXINE SODIUM 25 UG/1
25 TABLET ORAL DAILY
Qty: 90 TABLET | Refills: 1 | Status: SHIPPED | OUTPATIENT
Start: 2025-06-05

## 2025-06-09 ENCOUNTER — TELEPHONE (OUTPATIENT)
Age: 85
End: 2025-06-09

## 2025-06-09 NOTE — TELEPHONE ENCOUNTER
Patient is having problems with hemorrhoids and is looking for Recommendation / Referral for the specialist. Please call to advise her accordingly.  Patient would also like to know if she should continue with Gabapentin.  Please let her know accordingly.      Thank you!!

## 2025-06-09 NOTE — TELEPHONE ENCOUNTER
Patient called regarding Rx: Gabapentin. Pt states at last OV visit discussed with PCP and was advised not to take medication. Inquiring if should be taking medication or not.        Please advise.  Thank you

## 2025-06-10 DIAGNOSIS — K64.1 GRADE II HEMORRHOIDS: Primary | ICD-10-CM

## 2025-06-10 NOTE — TELEPHONE ENCOUNTER
Consult placed for Dr. Rodriguez for colorectal surgery for hemorrhoids, please let her know no need to continue gabapentin,

## 2025-06-11 ENCOUNTER — OFFICE VISIT (OUTPATIENT)
Dept: DERMATOLOGY | Facility: CLINIC | Age: 85
End: 2025-06-11
Payer: COMMERCIAL

## 2025-06-11 VITALS — WEIGHT: 193.4 LBS | BODY MASS INDEX: 33.2 KG/M2 | TEMPERATURE: 97.2 F

## 2025-06-11 DIAGNOSIS — D22.5 MULTIPLE BENIGN MELANOCYTIC NEVI OF UPPER AND LOWER EXTREMITIES AND TRUNK: Primary | ICD-10-CM

## 2025-06-11 DIAGNOSIS — L81.4 LENTIGO: ICD-10-CM

## 2025-06-11 DIAGNOSIS — D22.61 MULTIPLE BENIGN MELANOCYTIC NEVI OF UPPER AND LOWER EXTREMITIES AND TRUNK: Primary | ICD-10-CM

## 2025-06-11 DIAGNOSIS — D18.01 CHERRY ANGIOMA: ICD-10-CM

## 2025-06-11 DIAGNOSIS — D22.72 MULTIPLE BENIGN MELANOCYTIC NEVI OF UPPER AND LOWER EXTREMITIES AND TRUNK: Primary | ICD-10-CM

## 2025-06-11 DIAGNOSIS — D22.71 MULTIPLE BENIGN MELANOCYTIC NEVI OF UPPER AND LOWER EXTREMITIES AND TRUNK: Primary | ICD-10-CM

## 2025-06-11 DIAGNOSIS — L82.1 SEBORRHEIC KERATOSES: ICD-10-CM

## 2025-06-11 DIAGNOSIS — D22.62 MULTIPLE BENIGN MELANOCYTIC NEVI OF UPPER AND LOWER EXTREMITIES AND TRUNK: Primary | ICD-10-CM

## 2025-06-11 PROCEDURE — 99213 OFFICE O/P EST LOW 20 MIN: CPT | Performed by: NURSE PRACTITIONER

## 2025-06-11 NOTE — PROGRESS NOTES
"[facility] Clinic Note     Patient Name: Lubna Bronson  Encounter Date: 6/11/2025       Have you been cared for by a [facility] Dermatologist in the last 3 years and, if so, which description applies to you? Yes. I have been here within the last 3 years, and my medical history has NOT changed since that time. I am not of child-bearing potential.     REVIEW OF SYSTEMS:  Have you recently had or currently have any of the following? No changes in my recent health.   PAST MEDICAL HISTORY:  Have you personally ever had or currently have any of the following?  If \"YES,\" then please provide more detail. No changes in my medical history.   HISTORY OF IMMUNOSUPPRESSION: Do you have a history of any of the following:  Systemic Immunosuppression such as Diabetes, Biologic or Immunotherapy, Chemotherapy, Organ Transplantation, Bone Marrow Transplantation or Prednisone?  YES, Diabetes.     Answering \"YES\" requires the addition of the dotphrase \"IMMUNOSUPPRESSED\" as the first diagnosis of the patient's visit.   FAMILY HISTORY:  Any \"first degree relatives\" (parent, brother, sister, or child) with the following?    No changes in my family's known health.   PATIENT EXPERIENCE:    Do you want the Dermatologist to perform a COMPLETE skin exam today including a clinical examination under the \"bra and underwear\" areas?  Yes  If necessary, do we have your permission to call and leave a detailed message on your Preferred Phone number that includes your specific medical information?  Yes      Allergies[1] Current Medications[2]        Whom besides the patient is providing clinical information about today's encounter?   NO ADDITIONAL HISTORIAN (patient alone provided history)    Physical Exam and Assessment/Plan by Diagnosis: Patient last evaluated by Dr. Pham on 4/15/25.  Underwent biopsy which showed spongiotic dermatitis.  Prescribed Clobetasol which has helped.  Patient denies any personal or family history of skin " "cancer.    CHERRY ANGIOMAS     Physical Exam:  Anatomic Location Affected:  Trunk and extremities  Morphological Description:  Scattered cherry red papules  Denies pain, itch, bleeding. No treatments tried. Present for years. Present constantly; no modifying factors which make it worse or better.     Assessment and Plan:  Based on a thorough discussion of this condition and the management approach to it (including a comprehensive discussion of the known risks, side effects and potential benefits of treatment), the patient (family) agrees to implement the following specific plan:  Reassure benign        SEBORRHEIC KERATOSIS; NON-INFLAMED     Physical Exam:  Anatomic Location Affected:  Trunk and extremities  Morphological Description:  Waxy, smooth to warty textured, yellow to brownish-grey to dark brown to blackish, discrete, \"stuck-on\" appearing papules.  Present for years. Denies pain, itch, bleeding.      Additional History of Present Condition:  Present constantly; no modifying factors which make it worse or better. No prior treatment.       Assessment and Plan:  Based on a thorough discussion of this condition and the management approach to it (including a comprehensive discussion of the known risks, side effects and potential benefits of treatment), the patient (family) agrees to implement the following specific plan:  Reassure benign  Use sun protection.  Apply SPF 30 or higher at least three times a day.  Wear sun protecting clothing and hats.        SOLAR LENTIGINES   OTHER SKIN CHANGES DUE TO CHRONIC EXPOSURE TO NONIONIZING RADIATION     Physical Exam:  Anatomic Location Affected:  Sun exposed areas of back, chest, arms, legs  Morphological Description:  Multiple scattered brown to tan evenly pigmented macules   Denies pain, itch, bleeding. No treatments tried. Present for months - years. Reports getting newer lesions with sun exposure.         Assessment and Plan:  Based on a thorough discussion of this " "condition and the management approach to it (including a comprehensive discussion of the known risks, side effects and potential benefits of treatment), the patient (family) agrees to implement the following specific plan:  Reassure benign  Use sun protection.  Apply SPF 30 or higher at least three times a day.  Wear sun protecting clothing and hats.         MULTIPLE MELANOCYTIC NEVI (\"Moles\")     Physical Exam:  Anatomic Location Affected: Trunk and extremities  Morphological Description:  Scattered, round to ovoid, symmetrical-appearing, even bordered, skin colored to dark brown macules/papules  Denies pain, itch, bleeding. No treatments tried. Present for years. Present constantly; no modifying factors which make it worse or better. Denies actively changing or growing moles.      Assessment and Plan:  Based on a thorough discussion of this condition and the management approach to it (including a comprehensive discussion of the known risks, side effects and potential benefits of treatment), the patient (family) agrees to implement the following specific plan:  Reassure benign  Monitor for changes  Use sun protection.  Apply SPF 30 or higher at least three times a day.  Wear sun protecting clothing and hats.  Follow up in 1 year for skin check       Worrisome signs of skin malignancy discussed, questions answered. Regular self-skin check discussed. Advised to call or return to office if patient notices any spots of concern, rapidly growing/changing lesions, bleeding lesions, non-healing lesions. Advised regular SPF use.     ATOPIC DERMATITIS (\"ECZEMA\")    Physical Exam:  Anatomic Location: back, trunk, forearms  Morphologic Description:  Eczematous papules/plaques  Body Surface Area at Today's Visit (patient's own palm = ~1% BSA): 2%  Global Assessment of Severity:  ALMOST CLEAR:  Barely perceptible erythema, barely perceptible induration/papulation, and/or minimal lichenification.  No oozing or " "crusting.  Pertinent Positives:  Pertinent Negatives:  Suspected SUPERINFECTION (erythema, oozing, and/or crusting is present)?: No    Additional History of Present Condition:  Patient underwent biopsy on 4/15/25 which showed spongiotic dermatitis.  Controlled with clobetasol, notes improvement.  Denies any new flares.       TODAY'S PLAN:     PRESCRIPTION MANAGEMENT:  We discussed that treatment often begins with topical steroids and topical calcineurin inhibitors; topical CECE-inhibitors are emerging as potentially useful.  Systemic therapy with oral corticosteroids such as prednisone or CECE-inhibitors or Dupixent (dupilumab) may also be indicated.  Side effects of these medications were discussed.    Skin Hygiene:      Recommend using only mild cleansers (hypoallergenic and without fragrances) and fragrance free detergent (not \"unscented\" products which contain a masking agent); we discussed avoiding irritants/fragranced products.  Encourage regular use of a humidifier to increase humidity and help prevent water loss.  At least 3 times day whole-body application using a good moisturizer such as Cerave.      Topical Management:      Clobetasol 0.05% cream FLARE TREATMENT:  Apply a thin layer TWICE A DAY to affected areas of skin for no more than 2 weeks straight. Do not apply to face, underarms or genitals unless directed.      Intensive Therapy:      NONE      Systemic Strategies:      NONE      Investigations: NONE      MEDICAL DECISION MAKING  Treatment Goal:  Resolution of the CHRONIC condition.       Chronic condition is currently at treatment goal.            Scribe Attestation      I,:  Kaela Bustos am acting as a scribe while in the presence of the attending physician.:       I,:  NGA De La Garza personally performed the services described in this documentation    as scribed in my presence.:                [1]   Allergies  Allergen Reactions    Oxycodone Vomiting    Codeine GI Intolerance    " Mirabegron GI Intolerance    Oxybutynin GI Intolerance    Solifenacin GI Intolerance    Trospium GI Intolerance   [2]   Current Outpatient Medications:     acetaminophen (TYLENOL) 325 mg tablet, Take 650 mg by mouth every 6 (six) hours as needed for mild pain, Disp: , Rfl:     atorvastatin (LIPITOR) 10 mg tablet, TAKE 1 TABLET BY MOUTH DAILY AT BEDTIME, Disp: 90 tablet, Rfl: 1    clobetasol (TEMOVATE) 0.05 % cream, Apply twice daily to affected areas for 2 weeks, then apply Mondays through Fridays only for an additional 2 weeks, Disp: 60 g, Rfl: 1    Cyanocobalamin (B-12) 1000 MCG SUBL, Place 1 tablet (1,000 mcg total) under the tongue in the morning, Disp: 100 tablet, Rfl: 1    Eliquis 5 MG, Take 1 tablet (5 mg total) by mouth 2 (two) times a day, Disp: 180 tablet, Rfl: 1    estradiol (ESTRACE VAGINAL) 0.1 mg/g vaginal cream, Insert 1 g into the vagina daily Daily for 3 weeks then 3 times a week to anterior vaginal wall, Disp: 42.5 g, Rfl: 0    furosemide (LASIX) 20 mg tablet, Take 1 tablet (20 mg total) by mouth daily as needed (edema), Disp: 30 tablet, Rfl: 0    gabapentin (NEURONTIN) 100 mg capsule, Take 1 capsule (100 mg total) by mouth every 24 hours, Disp: 90 capsule, Rfl: 0    hydroCHLOROthiazide 12.5 mg tablet, Take 1 tablet (12.5 mg total) by mouth daily, Disp: 90 tablet, Rfl: 1    labetalol (NORMODYNE) 200 mg tablet, TAKE 1 TABLET (200 MG TOTAL) BY MOUTH 2 TIMES A DAY., Disp: 180 tablet, Rfl: 1    levothyroxine 25 mcg tablet, TAKE 1 TABLET BY MOUTH EVERY DAY, Disp: 90 tablet, Rfl: 1    losartan (COZAAR) 100 MG tablet, Take 1 tablet (100 mg total) by mouth daily, Disp: 90 tablet, Rfl: 1    metFORMIN (GLUCOPHAGE-XR) 750 mg 24 hr tablet, TAKE 1 TABLET BY MOUTH EVERY DAY WITH BREAKFAST, Disp: 90 tablet, Rfl: 1    multivitamin (THERAGRAN) TABS, Take 1 tablet by mouth in the morning., Disp: , Rfl:     phenazopyridine (PYRIDIUM) 100 mg tablet, Take 1 tablet (100 mg total) by mouth 3 (three) times a day as needed  for bladder spasms, Disp: 10 tablet, Rfl: 0    Vibegron (Gemtesa) 75 MG TABS, Take 75 mg by mouth in the morning, Disp: , Rfl:

## 2025-06-11 NOTE — PATIENT INSTRUCTIONS
"ORDOÑEZ ANGIOMAS       Assessment and Plan:  Based on a thorough discussion of this condition and the management approach to it (including a comprehensive discussion of the known risks, side effects and potential benefits of treatment), the patient (family) agrees to implement the following specific plan:  Reassure benign        SEBORRHEIC KERATOSIS; NON-INFLAMED        Assessment and Plan:  Based on a thorough discussion of this condition and the management approach to it (including a comprehensive discussion of the known risks, side effects and potential benefits of treatment), the patient (family) agrees to implement the following specific plan:  Reassure benign  Use sun protection.  Apply SPF 30 or higher at least three times a day.  Wear sun protecting clothing and hats.        SOLAR LENTIGINES   OTHER SKIN CHANGES DUE TO CHRONIC EXPOSURE TO NONIONIZING RADIATION          Assessment and Plan:  Based on a thorough discussion of this condition and the management approach to it (including a comprehensive discussion of the known risks, side effects and potential benefits of treatment), the patient (family) agrees to implement the following specific plan:  Reassure benign  Use sun protection.  Apply SPF 30 or higher at least three times a day.  Wear sun protecting clothing and hats.         MULTIPLE MELANOCYTIC NEVI (\"Moles\")        Assessment and Plan:  Based on a thorough discussion of this condition and the management approach to it (including a comprehensive discussion of the known risks, side effects and potential benefits of treatment), the patient (family) agrees to implement the following specific plan:  Reassure benign  Monitor for changes  Use sun protection.  Apply SPF 30 or higher at least three times a day.  Wear sun protecting clothing and hats.       Worrisome signs of skin malignancy discussed, questions answered. Regular self-skin check discussed. Advised to call or return to office if patient " notices any spots of concern, rapidly growing/changing lesions, bleeding lesions, non-healing lesions. Advised regular SPF use.

## 2025-06-20 ENCOUNTER — OFFICE VISIT (OUTPATIENT)
Dept: INTERNAL MEDICINE CLINIC | Facility: CLINIC | Age: 85
End: 2025-06-20
Payer: COMMERCIAL

## 2025-06-20 VITALS
DIASTOLIC BLOOD PRESSURE: 74 MMHG | WEIGHT: 191 LBS | OXYGEN SATURATION: 99 % | HEIGHT: 64 IN | BODY MASS INDEX: 32.61 KG/M2 | TEMPERATURE: 98.3 F | HEART RATE: 77 BPM | SYSTOLIC BLOOD PRESSURE: 142 MMHG

## 2025-06-20 DIAGNOSIS — E11.65 UNCONTROLLED TYPE 2 DIABETES MELLITUS WITH HYPERGLYCEMIA (HCC): ICD-10-CM

## 2025-06-20 DIAGNOSIS — Z00.00 MEDICARE ANNUAL WELLNESS VISIT, SUBSEQUENT: ICD-10-CM

## 2025-06-20 DIAGNOSIS — I10 ESSENTIAL HYPERTENSION, BENIGN: Primary | ICD-10-CM

## 2025-06-20 DIAGNOSIS — M17.0 PRIMARY OSTEOARTHRITIS OF BOTH KNEES: ICD-10-CM

## 2025-06-20 DIAGNOSIS — E78.2 MIXED HYPERLIPIDEMIA: ICD-10-CM

## 2025-06-20 PROCEDURE — G0439 PPPS, SUBSEQ VISIT: HCPCS | Performed by: INTERNAL MEDICINE

## 2025-06-20 PROCEDURE — 99214 OFFICE O/P EST MOD 30 MIN: CPT | Performed by: INTERNAL MEDICINE

## 2025-06-20 NOTE — PROGRESS NOTES
"Answers submitted by the patient for this visit:  Medicare Annual Wellness Visit (Submitted on 6/18/2025)  How would you rate your overall health?: good  Compared to last year, how is your physical health?: slightly worse  In general, how satisfied are you with your life?: satisfied  Compared to last year, how is your eyesight?: same  Compared to last year, how is your hearing?: same  Compared to last year, how is your emotional/mental health?: same  How often is anger a problem for you?: never, rarely  How often do you feel unusually tired/fatigued?: sometimes  In the past 7 days, how much pain have you experienced?: some  If you answered \"some\" or \"a lot\", please rate the severity of your pain on a scale of 1 to 10 (1 being the least severe pain and 10 being the most intense pain).: 5/10  In the past 6 months, have you lost or gained 10 pounds without trying?: No  One or more falls in the last year: No  In the past 6 months, have you accidentally leaked urine?: Yes  Do you have trouble with the stairs inside or outside your home?: Yes  Does your home have working smoke alarms?: Yes  Does your home have a carbon monoxide monitor?: No  Which safety hazards (if any) have you experienced in your home? Please select all that apply.: none  How would you describe your current diet? Please select all that apply.: Regular  In addition to prescription medications, are you taking any over-the-counter supplements?: No  Can you manage your medications?: Yes  Are you currently taking any opioid medications?: No  Can you walk and transfer into and out of your bed and chair?: Yes  Can you dress and groom yourself?: Yes  Can you bathe or shower yourself?: Yes  Can you feed yourself?: Yes  Can you do your laundry/ housekeeping?: Yes  Can you manage your money, pay your bills, and track your expenses?: Yes  Can you make your own meals?: Yes  Can you do your own shopping?: Yes  Within the last 12 months, have you had any " hospitalizations or Emergency Department visits?: Yes  If yes, how many times have you been hospitalized within the past year?: 1-2  Do you have a living will?: Yes  Do you have a Durable POA (Power of ) for healthcare decisions?: Yes  Do you have an Advanced Directive for end of life decisions?: Yes  How often have you used an illegal drug (including marijuana) or a prescription medication for non-medical reasons in the past year?: never  What is the typical number of drinks you consume in a day?: 0  What is the typical number of drinks you consume in a week?: 0  How often did you have a drink containing alcohol in the past year?: never  How many drinks did you have on a typical day  when you were drinking in the past year?: 0  How often did you have 6 or more drinks on one occasion in the past year?: never

## 2025-06-20 NOTE — PROGRESS NOTES
Name: Lubna Bronson      : 1940      MRN: [id number]  Encounter Provider: Heather Kendrick MD  Encounter Date: 2025   Encounter department: [facility]  :  Assessment & Plan           History of Present Illness {?Quick Links Encounters * My Last Note * Last Note in Specialty * Snapshot * Since Last Visit * History :24847}  HPI    Lubna Bronson presents referred by Dr. Toño Hayes for grade ll hemorrhoids.    Colonoscopy on 2017 by Dr. Og Richardson (Report available under media).    History of hemorrhoid surgery in .      Review of Systems    Objective {?Quick Links Trend Vitals * Enter New Vitals * Results Review * Timeline (Adult) * Labs * Imaging * Cardiology * Procedures * Lung Cancer Screening * Surgical eConsent :71924}    There were no vitals taken for this visit.     Physical Exam  {Administrative / Billing Section (Optional):08290}

## 2025-06-20 NOTE — PROGRESS NOTES
"Answers submitted by the patient for this visit:  Medicare Annual Wellness Visit (Submitted on 6/18/2025)  How would you rate your overall health?: good  Compared to last year, how is your physical health?: slightly worse  In general, how satisfied are you with your life?: satisfied  Compared to last year, how is your eyesight?: same  Compared to last year, how is your hearing?: same  Compared to last year, how is your emotional/mental health?: same  How often is anger a problem for you?: never, rarely  How often do you feel unusually tired/fatigued?: sometimes  In the past 7 days, how much pain have you experienced?: some  If you answered \"some\" or \"a lot\", please rate the severity of your pain on a scale of 1 to 10 (1 being the least severe pain and 10 being the most intense pain).: 5/10  In the past 6 months, have you lost or gained 10 pounds without trying?: No  One or more falls in the last year: No  In the past 6 months, have you accidentally leaked urine?: Yes  Do you have trouble with the stairs inside or outside your home?: Yes  Does your home have working smoke alarms?: Yes  Does your home have a carbon monoxide monitor?: No  Which safety hazards (if any) have you experienced in your home? Please select all that apply.: none  How would you describe your current diet? Please select all that apply.: Regular  In addition to prescription medications, are you taking any over-the-counter supplements?: No  Can you manage your medications?: Yes  Are you currently taking any opioid medications?: No  Can you walk and transfer into and out of your bed and chair?: Yes  Can you dress and groom yourself?: Yes  Can you bathe or shower yourself?: Yes  Can you feed yourself?: Yes  Can you do your laundry/ housekeeping?: Yes  Can you manage your money, pay your bills, and track your expenses?: Yes  Can you make your own meals?: Yes  Can you do your own shopping?: Yes  Within the last 12 months, have you had any " hospitalizations or Emergency Department visits?: Yes  If yes, how many times have you been hospitalized within the past year?: 1-2  Do you have a living will?: Yes  Do you have a Durable POA (Power of ) for healthcare decisions?: Yes  Do you have an Advanced Directive for end of life decisions?: Yes  How often have you used an illegal drug (including marijuana) or a prescription medication for non-medical reasons in the past year?: never  What is the typical number of drinks you consume in a day?: 0  What is the typical number of drinks you consume in a week?: 0  How often did you have a drink containing alcohol in the past year?: never  How many drinks did you have on a typical day  when you were drinking in the past year?: 0  How often did you have 6 or more drinks on one occasion in the past year?: never  Name: Lubna Bronson      : 1940      MRN: [id number]  Encounter Provider: Umu Hayes MD  Encounter Date: 2025   Encounter department: [facility]  :  Assessment & Plan  Essential hypertension, benign  Continue same med       Uncontrolled type 2 diabetes mellitus with hyperglycemia (HCC)    Lab Results   Component Value Date    HGBA1C 6.6 (H) 2025   Continue same med       Primary osteoarthritis of both knees         Mixed hyperlipidemia  Continue same med       Medicare annual wellness visit, subsequent                History of Present Illness   Was lightheaded, few days back, when the blood pressure was low, she  held her medication, subsequently felt better, doing well, also needs medical wellness exam      Review of Systems   Constitutional:  Negative for chills and fever.   HENT:  Negative for congestion, ear pain and sore throat.    Eyes:  Negative for pain.   Respiratory:  Negative for cough and shortness of breath.    Cardiovascular:  Negative for chest pain and leg swelling.   Gastrointestinal:  Negative for abdominal pain, nausea and  "vomiting.   Endocrine: Negative for polyuria.   Genitourinary:  Negative for difficulty urinating, frequency and urgency.   Musculoskeletal:  Positive for arthralgias and back pain.   Skin:  Negative for rash.   Neurological:  Negative for weakness and headaches.   Psychiatric/Behavioral:  Negative for sleep disturbance. The patient is not nervous/anxious.        Objective   /74 (BP Location: Left arm, Patient Position: Sitting, Cuff Size: Standard)   Pulse 77   Temp 98.3 °F (36.8 °C) (Temporal)   Ht 5' 4\" (1.626 m)   Wt 86.6 kg (191 lb)   SpO2 99%   BMI 32.79 kg/m²      Physical Exam  Vitals and nursing note reviewed.   Constitutional:       General: She is not in acute distress.     Appearance: She is well-developed.   HENT:      Head: Normocephalic and atraumatic.      Right Ear: Tympanic membrane, ear canal and external ear normal.      Left Ear: Tympanic membrane, ear canal and external ear normal.      Mouth/Throat:      Mouth: Mucous membranes are moist.      Pharynx: Oropharynx is clear.     Eyes:      Extraocular Movements: Extraocular movements intact.      Conjunctiva/sclera: Conjunctivae normal.       Cardiovascular:      Rate and Rhythm: Normal rate and regular rhythm.      Heart sounds: Normal heart sounds. No murmur heard.  Pulmonary:      Effort: Pulmonary effort is normal. No respiratory distress.      Breath sounds: Normal breath sounds. No wheezing or rales.   Abdominal:      General: Abdomen is flat. There is no distension.      Palpations: Abdomen is soft.      Tenderness: There is no abdominal tenderness.     Musculoskeletal:         General: No swelling.      Cervical back: Neck supple.      Right lower leg: No edema.      Left lower leg: No edema.     Skin:     General: Skin is warm and dry.      Capillary Refill: Capillary refill takes less than 2 seconds.     Neurological:      General: No focal deficit present.      Mental Status: She is alert and oriented to person, place, and " time.     Psychiatric:         Mood and Affect: Mood normal.

## 2025-06-23 ENCOUNTER — CONSULT (OUTPATIENT)
Age: 85
End: 2025-06-23
Attending: INTERNAL MEDICINE
Payer: COMMERCIAL

## 2025-06-23 VITALS — HEIGHT: 64 IN | WEIGHT: 191 LBS | BODY MASS INDEX: 32.61 KG/M2

## 2025-06-23 DIAGNOSIS — K64.1 GRADE II HEMORRHOIDS: ICD-10-CM

## 2025-06-23 DIAGNOSIS — B36.9 FUNGAL DERMATITIS: Primary | ICD-10-CM

## 2025-06-23 PROCEDURE — 99203 OFFICE O/P NEW LOW 30 MIN: CPT | Performed by: SURGERY

## 2025-06-23 PROCEDURE — 46600 DIAGNOSTIC ANOSCOPY SPX: CPT | Performed by: SURGERY

## 2025-06-23 RX ORDER — CLOTRIMAZOLE 1 %
CREAM (GRAM) TOPICAL 2 TIMES DAILY
Qty: 15 G | Refills: 0 | Status: SHIPPED | OUTPATIENT
Start: 2025-06-23 | End: 2025-07-07

## 2025-06-23 NOTE — PROGRESS NOTES
:  Assessment & Plan  Grade II hemorrhoids    Orders:    Ambulatory Referral to Colorectal Surgery    Fungal dermatitis    Orders:    clotrimazole (LOTRIMIN) 1 % cream; Apply topically 2 (two) times a day for 14 days      Assessment & Plan  1. Fungal dermatitis.  Symptoms suggest a fungal infection around the anus, characterized by a red halo and itching. Hydrocortisone and Preparation H are not recommended as they may worsen the condition. An antifungal cream has been prescribed, to be applied twice daily for 2 weeks. If the rash persists, a skin biopsy may be considered.    2. Hemorrhoids.  Small internal hemorrhoids were identified but are not deemed significant enough for injection treatment. The sensation of needing a bowel movement is due to stool accumulation in the rectum. The use of laxatives and stool softeners appears to be excessive. Daily intake of Citrucel in the morning and 1 tablespoon of mineral oil at night is recommended. All other laxatives and stool softeners should be discontinued.    3. Overactive bladder.  The patient has been treated for overactive bladder for the past 14 months, including Botox injections. She continues to experience frequent nighttime awakenings.    Follow-up  The patient will follow up in 2 months.    PROCEDURE  The patient had Botox injections for overactive bladder.      History of Present Illness     Lubna Bronson is a 84 y.o. female   History of Present Illness  The patient is an 84-year-old female who presents for evaluation of hemorrhoids.    She reports persistent discomfort from hemorrhoids, describing a constant internal pressure. Approximately a week ago, she experienced a day-long episode of bleeding during each bathroom visit, which subsequently ceased. However, minor bleeding recurred two days ago. She also reports a sensation of protrusion from her anus. She has been managing her hemorrhoids with Preparation H, which typically alleviates the symptoms  unless she is constipated. The frequency of her constipation has increased due to her current medication regimen. She was advised to take MiraLAX daily following her second Botox treatment six weeks ago, which she found beneficial. She has not experienced hard stools recently but is concerned about potential constipation. Her bowel movements are infrequent, occurring every two to three days, and are often accompanied by straining and incomplete evacuation. She spends approximately 10 minutes on the toilet per bowel movement. She has been using hydrocortisone and Preparation H creams for her hemorrhoids. She has previously undergone pelvic floor physical therapy five to six times.    She describes a sensation akin to a knife in her vagina, despite having undergone a hysterectomy. This sensation is intermittent. She has been applying a cream to her vaginal area as recommended by her physician. Over the past few weeks, she has been experiencing pressure, prompting her to attempt bowel movements, which she believes exacerbates her hemorrhoids.    She has been dealing with overactive bladder for the last 14 months and had a bladder infection. She has been going to urology and finally went to the gynecologist. She had Botox but is still getting up a lot at night.    Supplemental Information  She has a history of skin rash and has consulted a dermatologist, who performed a biopsy.    MEDICATIONS  Current: Eliquis, MiraLAX, Colace, hydrocortisone, Preparation H  Review of Systems   Constitutional:  Negative for chills and fever.   HENT:  Negative for ear pain and sore throat.    Eyes:  Negative for pain and visual disturbance.   Respiratory:  Negative for cough and shortness of breath.    Cardiovascular:  Negative for chest pain and palpitations.   Gastrointestinal:  Positive for constipation. Negative for abdominal pain and vomiting.   Genitourinary:  Negative for dysuria and hematuria.   Musculoskeletal:  Negative for  "arthralgias and back pain.   Skin:  Negative for color change and rash.   Neurological:  Negative for seizures and syncope.   All other systems reviewed and are negative.    Objective   Ht 5' 4\" (1.626 m)   Wt 86.6 kg (191 lb)   BMI 32.79 kg/m²      Physical Exam  Vitals and nursing note reviewed.   Constitutional:       General: She is not in acute distress.     Appearance: She is well-developed.   HENT:      Head: Normocephalic and atraumatic.     Eyes:      Conjunctiva/sclera: Conjunctivae normal.       Cardiovascular:      Rate and Rhythm: Normal rate and regular rhythm.      Heart sounds: No murmur heard.  Pulmonary:      Effort: Pulmonary effort is normal. No respiratory distress.      Breath sounds: Normal breath sounds.   Abdominal:      Palpations: Abdomen is soft.      Tenderness: There is no abdominal tenderness.     Musculoskeletal:         General: No swelling.      Cervical back: Neck supple.     Skin:     General: Skin is warm and dry.      Capillary Refill: Capillary refill takes less than 2 seconds.     Neurological:      Mental Status: She is alert.     Psychiatric:         Mood and Affect: Mood normal.     Lower Endoscopy    Date/Time: 6/23/2025 2:00 PM    Performed by: Heather Kendrick MD  Authorized by: Heather Kendrick MD    Verbal consent obtained?: Yes    Risks and benefits: Risks, benefits and alternatives were discussed    Consent given by:  Patient  Patient identity confirmed:  Verbally with patient and provided demographic data  Time out: Immediately prior to the procedure a time out was called    Patient sedated: No    Scope type:  Anoscope  External exam performed: Yes    Digital exam performed: Yes    Internal hemorrhoids: Yes        Physical Exam  There is significant skin irritation around the anus. Small internal hemorrhoids are present in the rectum.    Results    Administrative Statements   I have spent a total time of 34 minutes in caring for this patient on " the day of the visit/encounter including Diagnostic results, Prognosis, Risks and benefits of tx options, Instructions for management, Patient and family education, Importance of tx compliance, Risk factor reductions, Impressions, Counseling / Coordination of care, Documenting in the medical record, Reviewing/placing orders in the medical record (including tests, medications, and/or procedures), Obtaining or reviewing history  , and Communicating with other healthcare professionals .

## 2025-06-23 NOTE — PATIENT INSTRUCTIONS
Use topical clotrimazole twice a day for two weeks.    Take citrucel daily in the morning.    Take one tablespoon of mineral oil at night.    You may stop all other laxatives and stools softeners.

## 2025-07-31 ENCOUNTER — HOSPITAL ENCOUNTER (INPATIENT)
Facility: HOSPITAL | Age: 85
LOS: 4 days | Discharge: HOME | DRG: 872 | End: 2025-08-04
Attending: EMERGENCY MEDICINE | Admitting: INTERNAL MEDICINE
Payer: COMMERCIAL

## 2025-07-31 ENCOUNTER — APPOINTMENT (EMERGENCY)
Dept: CT IMAGING | Facility: HOSPITAL | Age: 85
DRG: 872 | End: 2025-07-31
Payer: COMMERCIAL

## 2025-07-31 DIAGNOSIS — E87.1 HYPONATREMIA: ICD-10-CM

## 2025-07-31 DIAGNOSIS — K59.00 CONSTIPATION: ICD-10-CM

## 2025-07-31 DIAGNOSIS — R53.1 GENERALIZED WEAKNESS: ICD-10-CM

## 2025-07-31 DIAGNOSIS — N39.0 UTI (URINARY TRACT INFECTION): Primary | ICD-10-CM

## 2025-07-31 DIAGNOSIS — N30.10 INTERSTITIAL CYSTITIS: ICD-10-CM

## 2025-07-31 PROBLEM — A41.9 SEPSIS (HCC): Status: ACTIVE | Noted: 2025-07-31

## 2025-07-31 PROBLEM — N30.00 ACUTE CYSTITIS WITHOUT HEMATURIA: Status: ACTIVE | Noted: 2024-05-21

## 2025-07-31 LAB
2HR DELTA HS TROPONIN: 3 NG/L
4HR DELTA HS TROPONIN: 1 NG/L
ALBUMIN SERPL BCG-MCNC: 3.7 G/DL (ref 3.5–5)
ALP SERPL-CCNC: 118 U/L (ref 34–104)
ALT SERPL W P-5'-P-CCNC: 45 U/L (ref 7–52)
ANION GAP SERPL CALCULATED.3IONS-SCNC: 9 MMOL/L (ref 4–13)
APTT PPP: 34 SECONDS (ref 23–34)
AST SERPL W P-5'-P-CCNC: 32 U/L (ref 13–39)
BACTERIA UR QL AUTO: ABNORMAL /HPF
BASOPHILS # BLD AUTO: 0.03 THOUSANDS/ÂΜL (ref 0–0.1)
BASOPHILS NFR BLD AUTO: 0 % (ref 0–1)
BILIRUB SERPL-MCNC: 1 MG/DL (ref 0.2–1)
BILIRUB UR QL STRIP: NEGATIVE
BUN SERPL-MCNC: 15 MG/DL (ref 5–25)
CALCIUM SERPL-MCNC: 9.5 MG/DL (ref 8.4–10.2)
CARDIAC TROPONIN I PNL SERPL HS: 15 NG/L (ref ?–50)
CARDIAC TROPONIN I PNL SERPL HS: 16 NG/L (ref ?–50)
CARDIAC TROPONIN I PNL SERPL HS: 18 NG/L (ref ?–50)
CHLORIDE SERPL-SCNC: 95 MMOL/L (ref 96–108)
CLARITY UR: ABNORMAL
CO2 SERPL-SCNC: 24 MMOL/L (ref 21–32)
COLOR UR: ABNORMAL
CREAT SERPL-MCNC: 0.75 MG/DL (ref 0.6–1.3)
EOSINOPHIL # BLD AUTO: 0 THOUSAND/ÂΜL (ref 0–0.61)
EOSINOPHIL NFR BLD AUTO: 0 % (ref 0–6)
ERYTHROCYTE [DISTWIDTH] IN BLOOD BY AUTOMATED COUNT: 12.6 % (ref 11.6–15.1)
GFR SERPL CREATININE-BSD FRML MDRD: 74 ML/MIN/1.73SQ M
GLUCOSE SERPL-MCNC: 156 MG/DL (ref 65–140)
GLUCOSE SERPL-MCNC: 169 MG/DL (ref 65–140)
GLUCOSE SERPL-MCNC: 179 MG/DL (ref 65–140)
GLUCOSE UR STRIP-MCNC: NEGATIVE MG/DL
HCT VFR BLD AUTO: 36.5 % (ref 34.8–46.1)
HGB BLD-MCNC: 12.3 G/DL (ref 11.5–15.4)
HGB UR QL STRIP.AUTO: ABNORMAL
IMM GRANULOCYTES # BLD AUTO: 0.05 THOUSAND/UL (ref 0–0.2)
IMM GRANULOCYTES NFR BLD AUTO: 1 % (ref 0–2)
INR PPP: 1.25 (ref 0.85–1.19)
KETONES UR STRIP-MCNC: ABNORMAL MG/DL
LACTATE SERPL-SCNC: 0.9 MMOL/L (ref 0.5–2)
LEUKOCYTE ESTERASE UR QL STRIP: ABNORMAL
LIPASE SERPL-CCNC: 14 U/L (ref 11–82)
LYMPHOCYTES # BLD AUTO: 0.95 THOUSANDS/ÂΜL (ref 0.6–4.47)
LYMPHOCYTES NFR BLD AUTO: 13 % (ref 14–44)
MCH RBC QN AUTO: 31.8 PG (ref 26.8–34.3)
MCHC RBC AUTO-ENTMCNC: 33.7 G/DL (ref 31.4–37.4)
MCV RBC AUTO: 94 FL (ref 82–98)
MONOCYTES # BLD AUTO: 1.52 THOUSAND/ÂΜL (ref 0.17–1.22)
MONOCYTES NFR BLD AUTO: 20 % (ref 4–12)
MUCOUS THREADS UR QL AUTO: ABNORMAL
NEUTROPHILS # BLD AUTO: 5.02 THOUSANDS/ÂΜL (ref 1.85–7.62)
NEUTS SEG NFR BLD AUTO: 66 % (ref 43–75)
NITRITE UR QL STRIP: POSITIVE
NON-SQ EPI CELLS URNS QL MICRO: ABNORMAL /HPF
NRBC BLD AUTO-RTO: 0 /100 WBCS
PH UR STRIP.AUTO: 6 [PH]
PLATELET # BLD AUTO: 260 THOUSANDS/UL (ref 149–390)
PMV BLD AUTO: 8.8 FL (ref 8.9–12.7)
POTASSIUM SERPL-SCNC: 3.9 MMOL/L (ref 3.5–5.3)
PROCALCITONIN SERPL-MCNC: 0.73 NG/ML
PROT SERPL-MCNC: 6.6 G/DL (ref 6.4–8.4)
PROT UR STRIP-MCNC: ABNORMAL MG/DL
PROTHROMBIN TIME: 15.9 SECONDS (ref 12.3–15)
RBC # BLD AUTO: 3.87 MILLION/UL (ref 3.81–5.12)
RBC #/AREA URNS AUTO: ABNORMAL /HPF
SODIUM SERPL-SCNC: 128 MMOL/L (ref 135–147)
SP GR UR STRIP.AUTO: 1.02 (ref 1–1.03)
UROBILINOGEN UR STRIP-ACNC: <2 MG/DL
WBC # BLD AUTO: 7.57 THOUSAND/UL (ref 4.31–10.16)
WBC #/AREA URNS AUTO: ABNORMAL /HPF
WBC CLUMPS # UR AUTO: PRESENT /UL

## 2025-07-31 PROCEDURE — 93005 ELECTROCARDIOGRAM TRACING: CPT

## 2025-07-31 PROCEDURE — 83605 ASSAY OF LACTIC ACID: CPT

## 2025-07-31 PROCEDURE — 82948 REAGENT STRIP/BLOOD GLUCOSE: CPT

## 2025-07-31 PROCEDURE — 74177 CT ABD & PELVIS W/CONTRAST: CPT

## 2025-07-31 PROCEDURE — 36415 COLL VENOUS BLD VENIPUNCTURE: CPT

## 2025-07-31 PROCEDURE — 87086 URINE CULTURE/COLONY COUNT: CPT

## 2025-07-31 PROCEDURE — 87154 CUL TYP ID BLD PTHGN 6+ TRGT: CPT

## 2025-07-31 PROCEDURE — 80053 COMPREHEN METABOLIC PANEL: CPT

## 2025-07-31 PROCEDURE — 96366 THER/PROPH/DIAG IV INF ADDON: CPT

## 2025-07-31 PROCEDURE — 99285 EMERGENCY DEPT VISIT HI MDM: CPT

## 2025-07-31 PROCEDURE — 84484 ASSAY OF TROPONIN QUANT: CPT

## 2025-07-31 PROCEDURE — 87186 SC STD MICRODIL/AGAR DIL: CPT

## 2025-07-31 PROCEDURE — 87040 BLOOD CULTURE FOR BACTERIA: CPT

## 2025-07-31 PROCEDURE — 84484 ASSAY OF TROPONIN QUANT: CPT | Performed by: INTERNAL MEDICINE

## 2025-07-31 PROCEDURE — 83690 ASSAY OF LIPASE: CPT

## 2025-07-31 PROCEDURE — 87077 CULTURE AEROBIC IDENTIFY: CPT

## 2025-07-31 PROCEDURE — 85025 COMPLETE CBC W/AUTO DIFF WBC: CPT

## 2025-07-31 PROCEDURE — 85730 THROMBOPLASTIN TIME PARTIAL: CPT

## 2025-07-31 PROCEDURE — 96368 THER/DIAG CONCURRENT INF: CPT

## 2025-07-31 PROCEDURE — 84145 PROCALCITONIN (PCT): CPT

## 2025-07-31 PROCEDURE — 99223 1ST HOSP IP/OBS HIGH 75: CPT | Performed by: INTERNAL MEDICINE

## 2025-07-31 PROCEDURE — 85610 PROTHROMBIN TIME: CPT

## 2025-07-31 PROCEDURE — 96374 THER/PROPH/DIAG INJ IV PUSH: CPT

## 2025-07-31 PROCEDURE — 96365 THER/PROPH/DIAG IV INF INIT: CPT

## 2025-07-31 PROCEDURE — 96375 TX/PRO/DX INJ NEW DRUG ADDON: CPT

## 2025-07-31 PROCEDURE — 81001 URINALYSIS AUTO W/SCOPE: CPT

## 2025-07-31 RX ORDER — ONDANSETRON 2 MG/ML
4 INJECTION INTRAMUSCULAR; INTRAVENOUS ONCE
Status: COMPLETED | OUTPATIENT
Start: 2025-07-31 | End: 2025-07-31

## 2025-07-31 RX ORDER — ESTRADIOL 0.1 MG/G
1 CREAM VAGINAL
Status: DISCONTINUED | OUTPATIENT
Start: 2025-08-01 | End: 2025-07-31

## 2025-07-31 RX ORDER — ACETAMINOPHEN 10 MG/ML
1000 INJECTION, SOLUTION INTRAVENOUS ONCE
Status: COMPLETED | OUTPATIENT
Start: 2025-07-31 | End: 2025-07-31

## 2025-07-31 RX ORDER — LABETALOL 200 MG/1
200 TABLET, FILM COATED ORAL 2 TIMES DAILY
Status: DISCONTINUED | OUTPATIENT
Start: 2025-07-31 | End: 2025-08-04 | Stop reason: HOSPADM

## 2025-07-31 RX ORDER — ONDANSETRON 2 MG/ML
4 INJECTION INTRAMUSCULAR; INTRAVENOUS EVERY 6 HOURS PRN
Status: DISCONTINUED | OUTPATIENT
Start: 2025-07-31 | End: 2025-08-04 | Stop reason: HOSPADM

## 2025-07-31 RX ORDER — LEVOTHYROXINE SODIUM 25 UG/1
25 TABLET ORAL
Status: DISCONTINUED | OUTPATIENT
Start: 2025-08-01 | End: 2025-08-04 | Stop reason: HOSPADM

## 2025-07-31 RX ORDER — MAGNESIUM HYDROXIDE/ALUMINUM HYDROXICE/SIMETHICONE 120; 1200; 1200 MG/30ML; MG/30ML; MG/30ML
30 SUSPENSION ORAL EVERY 6 HOURS PRN
Status: DISCONTINUED | OUTPATIENT
Start: 2025-07-31 | End: 2025-08-04 | Stop reason: HOSPADM

## 2025-07-31 RX ORDER — ACETAMINOPHEN 325 MG/1
650 TABLET ORAL EVERY 6 HOURS PRN
Status: DISCONTINUED | OUTPATIENT
Start: 2025-07-31 | End: 2025-08-04 | Stop reason: HOSPADM

## 2025-07-31 RX ORDER — ESTRADIOL 0.1 MG/G
1 CREAM VAGINAL 3 TIMES WEEKLY
Status: CANCELLED | OUTPATIENT
Start: 2025-08-01

## 2025-07-31 RX ORDER — INSULIN LISPRO 100 [IU]/ML
1-5 INJECTION, SOLUTION INTRAVENOUS; SUBCUTANEOUS
Status: DISCONTINUED | OUTPATIENT
Start: 2025-07-31 | End: 2025-08-04 | Stop reason: HOSPADM

## 2025-07-31 RX ORDER — METFORMIN HYDROCHLORIDE 750 MG/1
750 TABLET, EXTENDED RELEASE ORAL
Status: DISCONTINUED | OUTPATIENT
Start: 2025-08-01 | End: 2025-08-04 | Stop reason: HOSPADM

## 2025-07-31 RX ORDER — HYDROCHLOROTHIAZIDE 12.5 MG/1
12.5 TABLET ORAL DAILY
Status: DISCONTINUED | OUTPATIENT
Start: 2025-08-01 | End: 2025-08-04 | Stop reason: HOSPADM

## 2025-07-31 RX ORDER — KETOROLAC TROMETHAMINE 30 MG/ML
15 INJECTION, SOLUTION INTRAMUSCULAR; INTRAVENOUS ONCE
Status: COMPLETED | OUTPATIENT
Start: 2025-07-31 | End: 2025-07-31

## 2025-07-31 RX ORDER — SODIUM CHLORIDE 9 MG/ML
75 INJECTION, SOLUTION INTRAVENOUS CONTINUOUS
Status: DISPENSED | OUTPATIENT
Start: 2025-07-31 | End: 2025-08-01

## 2025-07-31 RX ORDER — LOSARTAN POTASSIUM 50 MG/1
100 TABLET ORAL DAILY
Status: DISCONTINUED | OUTPATIENT
Start: 2025-08-01 | End: 2025-08-04 | Stop reason: HOSPADM

## 2025-07-31 RX ORDER — ATORVASTATIN CALCIUM 10 MG/1
10 TABLET, FILM COATED ORAL
Status: DISCONTINUED | OUTPATIENT
Start: 2025-07-31 | End: 2025-08-04 | Stop reason: HOSPADM

## 2025-07-31 RX ORDER — ESTRADIOL 0.1 MG/G
1 CREAM VAGINAL 3 TIMES WEEKLY
Status: DISCONTINUED | OUTPATIENT
Start: 2025-08-01 | End: 2025-08-04 | Stop reason: HOSPADM

## 2025-07-31 RX ORDER — FUROSEMIDE 20 MG/1
20 TABLET ORAL DAILY PRN
Status: DISCONTINUED | OUTPATIENT
Start: 2025-07-31 | End: 2025-08-04 | Stop reason: HOSPADM

## 2025-07-31 RX ORDER — HYDRALAZINE HYDROCHLORIDE 20 MG/ML
10 INJECTION INTRAMUSCULAR; INTRAVENOUS EVERY 6 HOURS PRN
Status: DISCONTINUED | OUTPATIENT
Start: 2025-07-31 | End: 2025-08-04 | Stop reason: HOSPADM

## 2025-07-31 RX ADMIN — SODIUM CHLORIDE, SODIUM LACTATE, POTASSIUM CHLORIDE, AND CALCIUM CHLORIDE 1000 ML: .6; .31; .03; .02 INJECTION, SOLUTION INTRAVENOUS at 13:57

## 2025-07-31 RX ADMIN — IOHEXOL 100 ML: 350 INJECTION, SOLUTION INTRAVENOUS at 14:49

## 2025-07-31 RX ADMIN — ONDANSETRON 4 MG: 2 INJECTION INTRAMUSCULAR; INTRAVENOUS at 14:22

## 2025-07-31 RX ADMIN — ACETAMINOPHEN 650 MG: 325 TABLET ORAL at 20:04

## 2025-07-31 RX ADMIN — ATORVASTATIN CALCIUM 10 MG: 10 TABLET, FILM COATED ORAL at 21:21

## 2025-07-31 RX ADMIN — DEXTROSE 1000 MG: 50 INJECTION, SOLUTION INTRAVENOUS at 15:08

## 2025-07-31 RX ADMIN — APIXABAN 5 MG: 5 TABLET, FILM COATED ORAL at 20:04

## 2025-07-31 RX ADMIN — ACETAMINOPHEN 1000 MG: 10 INJECTION INTRAVENOUS at 14:00

## 2025-07-31 RX ADMIN — LABETALOL HYDROCHLORIDE 200 MG: 200 TABLET, FILM COATED ORAL at 18:23

## 2025-07-31 RX ADMIN — INSULIN LISPRO 1 UNITS: 100 INJECTION, SOLUTION INTRAVENOUS; SUBCUTANEOUS at 18:34

## 2025-07-31 RX ADMIN — INSULIN LISPRO 1 UNITS: 100 INJECTION, SOLUTION INTRAVENOUS; SUBCUTANEOUS at 21:21

## 2025-07-31 RX ADMIN — SODIUM CHLORIDE 75 ML/HR: 0.9 INJECTION, SOLUTION INTRAVENOUS at 18:24

## 2025-07-31 RX ADMIN — VIBEGRON 75 MG: 75 TABLET, FILM COATED ORAL at 21:21

## 2025-07-31 RX ADMIN — KETOROLAC TROMETHAMINE 15 MG: 30 INJECTION, SOLUTION INTRAMUSCULAR at 16:04

## 2025-08-01 PROBLEM — R78.81 GRAM-NEGATIVE BACTEREMIA: Status: ACTIVE | Noted: 2025-08-01

## 2025-08-01 PROBLEM — E83.42 HYPOMAGNESEMIA: Status: ACTIVE | Noted: 2025-08-01

## 2025-08-01 LAB
ANION GAP SERPL CALCULATED.3IONS-SCNC: 7 MMOL/L (ref 4–13)
ATRIAL RATE: 109 BPM
ATRIAL RATE: 92 BPM
BUN SERPL-MCNC: 18 MG/DL (ref 5–25)
CALCIUM SERPL-MCNC: 8.5 MG/DL (ref 8.4–10.2)
CHLORIDE SERPL-SCNC: 99 MMOL/L (ref 96–108)
CO2 SERPL-SCNC: 24 MMOL/L (ref 21–32)
CREAT SERPL-MCNC: 0.95 MG/DL (ref 0.6–1.3)
ERYTHROCYTE [DISTWIDTH] IN BLOOD BY AUTOMATED COUNT: 12.9 % (ref 11.6–15.1)
GFR SERPL CREATININE-BSD FRML MDRD: 55 ML/MIN/1.73SQ M
GLUCOSE SERPL-MCNC: 160 MG/DL (ref 65–140)
GLUCOSE SERPL-MCNC: 160 MG/DL (ref 65–140)
GLUCOSE SERPL-MCNC: 180 MG/DL (ref 65–140)
GLUCOSE SERPL-MCNC: 191 MG/DL (ref 65–140)
GLUCOSE SERPL-MCNC: 92 MG/DL (ref 65–140)
GLUCOSE SERPL-MCNC: 95 MG/DL (ref 65–140)
HCT VFR BLD AUTO: 30.4 % (ref 34.8–46.1)
HGB BLD-MCNC: 10.1 G/DL (ref 11.5–15.4)
MAGNESIUM SERPL-MCNC: 1.6 MG/DL (ref 1.9–2.7)
MCH RBC QN AUTO: 32.2 PG (ref 26.8–34.3)
MCHC RBC AUTO-ENTMCNC: 33.2 G/DL (ref 31.4–37.4)
MCV RBC AUTO: 97 FL (ref 82–98)
P AXIS: 64 DEGREES
P AXIS: 64 DEGREES
PLATELET # BLD AUTO: 228 THOUSANDS/UL (ref 149–390)
PMV BLD AUTO: 9.3 FL (ref 8.9–12.7)
POTASSIUM SERPL-SCNC: 3.9 MMOL/L (ref 3.5–5.3)
PR INTERVAL: 154 MS
PR INTERVAL: 160 MS
QRS AXIS: 39 DEGREES
QRS AXIS: 57 DEGREES
QRSD INTERVAL: 74 MS
QRSD INTERVAL: 78 MS
QT INTERVAL: 308 MS
QT INTERVAL: 344 MS
QTC INTERVAL: 414 MS
QTC INTERVAL: 425 MS
RBC # BLD AUTO: 3.14 MILLION/UL (ref 3.81–5.12)
SODIUM SERPL-SCNC: 130 MMOL/L (ref 135–147)
T WAVE AXIS: 45 DEGREES
T WAVE AXIS: 47 DEGREES
VENTRICULAR RATE: 109 BPM
VENTRICULAR RATE: 92 BPM
WBC # BLD AUTO: 6.43 THOUSAND/UL (ref 4.31–10.16)

## 2025-08-01 PROCEDURE — 85027 COMPLETE CBC AUTOMATED: CPT | Performed by: INTERNAL MEDICINE

## 2025-08-01 PROCEDURE — 82948 REAGENT STRIP/BLOOD GLUCOSE: CPT

## 2025-08-01 PROCEDURE — 93010 ELECTROCARDIOGRAM REPORT: CPT

## 2025-08-01 PROCEDURE — 99232 SBSQ HOSP IP/OBS MODERATE 35: CPT

## 2025-08-01 PROCEDURE — 80048 BASIC METABOLIC PNL TOTAL CA: CPT | Performed by: INTERNAL MEDICINE

## 2025-08-01 PROCEDURE — 99222 1ST HOSP IP/OBS MODERATE 55: CPT

## 2025-08-01 PROCEDURE — 83735 ASSAY OF MAGNESIUM: CPT | Performed by: INTERNAL MEDICINE

## 2025-08-01 PROCEDURE — 97161 PT EVAL LOW COMPLEX 20 MIN: CPT

## 2025-08-01 PROCEDURE — 97166 OT EVAL MOD COMPLEX 45 MIN: CPT

## 2025-08-01 RX ORDER — DOCUSATE SODIUM 100 MG/1
100 CAPSULE, LIQUID FILLED ORAL 2 TIMES DAILY
Status: DISCONTINUED | OUTPATIENT
Start: 2025-08-01 | End: 2025-08-04 | Stop reason: HOSPADM

## 2025-08-01 RX ORDER — POLYETHYLENE GLYCOL 3350 17 G/17G
17 POWDER, FOR SOLUTION ORAL DAILY
Status: DISCONTINUED | OUTPATIENT
Start: 2025-08-01 | End: 2025-08-03

## 2025-08-01 RX ORDER — MAGNESIUM SULFATE HEPTAHYDRATE 40 MG/ML
2 INJECTION, SOLUTION INTRAVENOUS ONCE
Status: COMPLETED | OUTPATIENT
Start: 2025-08-01 | End: 2025-08-01

## 2025-08-01 RX ORDER — LANOLIN ALCOHOL/MO/W.PET/CERES
400 CREAM (GRAM) TOPICAL 2 TIMES DAILY
Status: DISCONTINUED | OUTPATIENT
Start: 2025-08-01 | End: 2025-08-02

## 2025-08-01 RX ADMIN — ACETAMINOPHEN 650 MG: 325 TABLET ORAL at 09:04

## 2025-08-01 RX ADMIN — Medication 400 MG: at 17:27

## 2025-08-01 RX ADMIN — ACETAMINOPHEN 650 MG: 325 TABLET ORAL at 02:36

## 2025-08-01 RX ADMIN — MELATONIN 6 MG: 3 TAB ORAL at 21:28

## 2025-08-01 RX ADMIN — DOCUSATE SODIUM 100 MG: 100 CAPSULE, LIQUID FILLED ORAL at 08:31

## 2025-08-01 RX ADMIN — VIBEGRON 75 MG: 75 TABLET, FILM COATED ORAL at 21:28

## 2025-08-01 RX ADMIN — POLYETHYLENE GLYCOL 3350 17 G: 17 POWDER, FOR SOLUTION ORAL at 08:31

## 2025-08-01 RX ADMIN — LABETALOL HYDROCHLORIDE 200 MG: 200 TABLET, FILM COATED ORAL at 21:28

## 2025-08-01 RX ADMIN — CEFEPIME 2000 MG: 2 INJECTION, POWDER, FOR SOLUTION INTRAVENOUS at 08:29

## 2025-08-01 RX ADMIN — INSULIN LISPRO 1 UNITS: 100 INJECTION, SOLUTION INTRAVENOUS; SUBCUTANEOUS at 21:28

## 2025-08-01 RX ADMIN — Medication 400 MG: at 08:31

## 2025-08-01 RX ADMIN — LABETALOL HYDROCHLORIDE 200 MG: 200 TABLET, FILM COATED ORAL at 08:31

## 2025-08-01 RX ADMIN — MELATONIN 6 MG: 3 TAB ORAL at 00:13

## 2025-08-01 RX ADMIN — Medication 1000 MCG: at 08:31

## 2025-08-01 RX ADMIN — SODIUM CHLORIDE 75 ML/HR: 0.9 INJECTION, SOLUTION INTRAVENOUS at 05:48

## 2025-08-01 RX ADMIN — DOCUSATE SODIUM 100 MG: 100 CAPSULE, LIQUID FILLED ORAL at 17:27

## 2025-08-01 RX ADMIN — INSULIN LISPRO 1 UNITS: 100 INJECTION, SOLUTION INTRAVENOUS; SUBCUTANEOUS at 08:31

## 2025-08-01 RX ADMIN — LEVOTHYROXINE SODIUM 25 MCG: 0.03 TABLET ORAL at 05:47

## 2025-08-01 RX ADMIN — ACETAMINOPHEN 650 MG: 325 TABLET ORAL at 16:16

## 2025-08-01 RX ADMIN — MAGNESIUM SULFATE HEPTAHYDRATE 2 G: 40 INJECTION, SOLUTION INTRAVENOUS at 08:29

## 2025-08-01 RX ADMIN — INSULIN LISPRO 1 UNITS: 100 INJECTION, SOLUTION INTRAVENOUS; SUBCUTANEOUS at 17:27

## 2025-08-01 RX ADMIN — ESTRADIOL 1 G: 0.1 CREAM VAGINAL at 21:30

## 2025-08-01 RX ADMIN — CEFTRIAXONE 1000 MG: 10 INJECTION, POWDER, FOR SOLUTION INTRAVENOUS at 15:18

## 2025-08-01 RX ADMIN — APIXABAN 5 MG: 5 TABLET, FILM COATED ORAL at 08:31

## 2025-08-01 RX ADMIN — APIXABAN 5 MG: 5 TABLET, FILM COATED ORAL at 21:28

## 2025-08-02 LAB
ALBUMIN SERPL BCG-MCNC: 3.2 G/DL (ref 3.5–5)
ALP SERPL-CCNC: 110 U/L (ref 34–104)
ALT SERPL W P-5'-P-CCNC: 31 U/L (ref 7–52)
ANION GAP SERPL CALCULATED.3IONS-SCNC: 6 MMOL/L (ref 4–13)
ANION GAP SERPL CALCULATED.3IONS-SCNC: 7 MMOL/L (ref 4–13)
AST SERPL W P-5'-P-CCNC: 20 U/L (ref 13–39)
BACTERIA UR CULT: ABNORMAL
BACTERIA UR CULT: ABNORMAL
BILIRUB SERPL-MCNC: 0.37 MG/DL (ref 0.2–1)
BUN SERPL-MCNC: 16 MG/DL (ref 5–25)
BUN SERPL-MCNC: 16 MG/DL (ref 5–25)
CALCIUM ALBUM COR SERPL-MCNC: 9.1 MG/DL (ref 8.3–10.1)
CALCIUM SERPL-MCNC: 8.5 MG/DL (ref 8.4–10.2)
CALCIUM SERPL-MCNC: 8.7 MG/DL (ref 8.4–10.2)
CHLORIDE SERPL-SCNC: 97 MMOL/L (ref 96–108)
CHLORIDE SERPL-SCNC: 97 MMOL/L (ref 96–108)
CK SERPL-CCNC: 63 U/L (ref 26–192)
CO2 SERPL-SCNC: 24 MMOL/L (ref 21–32)
CO2 SERPL-SCNC: 25 MMOL/L (ref 21–32)
CREAT SERPL-MCNC: 0.84 MG/DL (ref 0.6–1.3)
CREAT SERPL-MCNC: 0.86 MG/DL (ref 0.6–1.3)
ERYTHROCYTE [DISTWIDTH] IN BLOOD BY AUTOMATED COUNT: 12.7 % (ref 11.6–15.1)
GFR SERPL CREATININE-BSD FRML MDRD: 63 ML/MIN/1.73SQ M
GFR SERPL CREATININE-BSD FRML MDRD: 64 ML/MIN/1.73SQ M
GLUCOSE SERPL-MCNC: 108 MG/DL (ref 65–140)
GLUCOSE SERPL-MCNC: 119 MG/DL (ref 65–140)
GLUCOSE SERPL-MCNC: 131 MG/DL (ref 65–140)
GLUCOSE SERPL-MCNC: 138 MG/DL (ref 65–140)
GLUCOSE SERPL-MCNC: 158 MG/DL (ref 65–140)
GLUCOSE SERPL-MCNC: 173 MG/DL (ref 65–140)
HCT VFR BLD AUTO: 31.3 % (ref 34.8–46.1)
HGB BLD-MCNC: 10.3 G/DL (ref 11.5–15.4)
MAGNESIUM SERPL-MCNC: 2.2 MG/DL (ref 1.9–2.7)
MCH RBC QN AUTO: 31.5 PG (ref 26.8–34.3)
MCHC RBC AUTO-ENTMCNC: 32.9 G/DL (ref 31.4–37.4)
MCV RBC AUTO: 96 FL (ref 82–98)
OSMOLALITY UR/SERPL-RTO: 276 MMOL/KG (ref 282–298)
OSMOLALITY UR: 310 MMOL/KG (ref 250–900)
PLATELET # BLD AUTO: 257 THOUSANDS/UL (ref 149–390)
PMV BLD AUTO: 9.1 FL (ref 8.9–12.7)
POTASSIUM SERPL-SCNC: 4.1 MMOL/L (ref 3.5–5.3)
POTASSIUM SERPL-SCNC: 4.3 MMOL/L (ref 3.5–5.3)
PROT SERPL-MCNC: 5.7 G/DL (ref 6.4–8.4)
RBC # BLD AUTO: 3.27 MILLION/UL (ref 3.81–5.12)
SODIUM SERPL-SCNC: 128 MMOL/L (ref 135–147)
SODIUM SERPL-SCNC: 128 MMOL/L (ref 135–147)
SODIUM UR-SCNC: 33 MMOL/L
TSH SERPL DL<=0.05 MIU/L-ACNC: 3.19 UIU/ML (ref 0.45–4.5)
WBC # BLD AUTO: 7.35 THOUSAND/UL (ref 4.31–10.16)

## 2025-08-02 PROCEDURE — 83935 ASSAY OF URINE OSMOLALITY: CPT

## 2025-08-02 PROCEDURE — 84300 ASSAY OF URINE SODIUM: CPT

## 2025-08-02 PROCEDURE — 80053 COMPREHEN METABOLIC PANEL: CPT

## 2025-08-02 PROCEDURE — 85027 COMPLETE CBC AUTOMATED: CPT

## 2025-08-02 PROCEDURE — 80048 BASIC METABOLIC PNL TOTAL CA: CPT

## 2025-08-02 PROCEDURE — 84443 ASSAY THYROID STIM HORMONE: CPT

## 2025-08-02 PROCEDURE — 82948 REAGENT STRIP/BLOOD GLUCOSE: CPT

## 2025-08-02 PROCEDURE — 83735 ASSAY OF MAGNESIUM: CPT

## 2025-08-02 PROCEDURE — 82550 ASSAY OF CK (CPK): CPT

## 2025-08-02 PROCEDURE — 83930 ASSAY OF BLOOD OSMOLALITY: CPT

## 2025-08-02 PROCEDURE — 99232 SBSQ HOSP IP/OBS MODERATE 35: CPT

## 2025-08-02 RX ORDER — LANOLIN ALCOHOL/MO/W.PET/CERES
400 CREAM (GRAM) TOPICAL DAILY
Status: DISCONTINUED | OUTPATIENT
Start: 2025-08-02 | End: 2025-08-04 | Stop reason: HOSPADM

## 2025-08-02 RX ORDER — ALBUMIN (HUMAN) 12.5 G/50ML
25 SOLUTION INTRAVENOUS 2 TIMES DAILY
Status: COMPLETED | OUTPATIENT
Start: 2025-08-02 | End: 2025-08-02

## 2025-08-02 RX ORDER — BISACODYL 5 MG/1
5 TABLET, DELAYED RELEASE ORAL DAILY PRN
Status: DISCONTINUED | OUTPATIENT
Start: 2025-08-02 | End: 2025-08-04 | Stop reason: HOSPADM

## 2025-08-02 RX ADMIN — ACETAMINOPHEN 650 MG: 325 TABLET ORAL at 22:00

## 2025-08-02 RX ADMIN — ALBUMIN (HUMAN) 25 G: 0.25 INJECTION, SOLUTION INTRAVENOUS at 22:01

## 2025-08-02 RX ADMIN — APIXABAN 5 MG: 5 TABLET, FILM COATED ORAL at 22:01

## 2025-08-02 RX ADMIN — MELATONIN 6 MG: 3 TAB ORAL at 22:01

## 2025-08-02 RX ADMIN — APIXABAN 5 MG: 5 TABLET, FILM COATED ORAL at 08:21

## 2025-08-02 RX ADMIN — LABETALOL HYDROCHLORIDE 200 MG: 200 TABLET, FILM COATED ORAL at 22:07

## 2025-08-02 RX ADMIN — CEFTRIAXONE 1000 MG: 10 INJECTION, POWDER, FOR SOLUTION INTRAVENOUS at 14:44

## 2025-08-02 RX ADMIN — LOSARTAN POTASSIUM 100 MG: 50 TABLET, FILM COATED ORAL at 08:23

## 2025-08-02 RX ADMIN — VIBEGRON 75 MG: 75 TABLET, FILM COATED ORAL at 22:01

## 2025-08-02 RX ADMIN — POLYETHYLENE GLYCOL 3350 17 G: 17 POWDER, FOR SOLUTION ORAL at 08:21

## 2025-08-02 RX ADMIN — INSULIN LISPRO 1 UNITS: 100 INJECTION, SOLUTION INTRAVENOUS; SUBCUTANEOUS at 17:13

## 2025-08-02 RX ADMIN — Medication 1000 MCG: at 08:21

## 2025-08-02 RX ADMIN — DOCUSATE SODIUM 100 MG: 100 CAPSULE, LIQUID FILLED ORAL at 08:21

## 2025-08-02 RX ADMIN — Medication 400 MG: at 08:21

## 2025-08-02 RX ADMIN — LABETALOL HYDROCHLORIDE 200 MG: 200 TABLET, FILM COATED ORAL at 08:21

## 2025-08-02 RX ADMIN — LEVOTHYROXINE SODIUM 25 MCG: 0.03 TABLET ORAL at 05:29

## 2025-08-02 RX ADMIN — ACETAMINOPHEN 650 MG: 325 TABLET ORAL at 15:41

## 2025-08-02 RX ADMIN — DOCUSATE SODIUM 100 MG: 100 CAPSULE, LIQUID FILLED ORAL at 17:16

## 2025-08-02 RX ADMIN — ALBUMIN (HUMAN) 25 G: 0.25 INJECTION, SOLUTION INTRAVENOUS at 14:44

## 2025-08-02 RX ADMIN — ACETAMINOPHEN 650 MG: 325 TABLET ORAL at 04:33

## 2025-08-03 PROBLEM — K59.00 CONSTIPATION: Status: ACTIVE | Noted: 2025-08-03

## 2025-08-03 LAB
ALBUMIN SERPL BCG-MCNC: 3.8 G/DL (ref 3.5–5)
ANION GAP SERPL CALCULATED.3IONS-SCNC: 10 MMOL/L (ref 4–13)
BACTERIA BLD CULT: ABNORMAL
BACTERIA BLD CULT: ABNORMAL
BUN SERPL-MCNC: 14 MG/DL (ref 5–25)
CALCIUM SERPL-MCNC: 8.9 MG/DL (ref 8.4–10.2)
CHLORIDE SERPL-SCNC: 98 MMOL/L (ref 96–108)
CO2 SERPL-SCNC: 22 MMOL/L (ref 21–32)
CREAT SERPL-MCNC: 0.74 MG/DL (ref 0.6–1.3)
E COLI DNA BLD POS QL NAA+NON-PROBE: DETECTED
ERYTHROCYTE [DISTWIDTH] IN BLOOD BY AUTOMATED COUNT: 12.7 % (ref 11.6–15.1)
GFR SERPL CREATININE-BSD FRML MDRD: 75 ML/MIN/1.73SQ M
GLUCOSE SERPL-MCNC: 112 MG/DL (ref 65–140)
GLUCOSE SERPL-MCNC: 119 MG/DL (ref 65–140)
GLUCOSE SERPL-MCNC: 130 MG/DL (ref 65–140)
GLUCOSE SERPL-MCNC: 135 MG/DL (ref 65–140)
GLUCOSE SERPL-MCNC: 144 MG/DL (ref 65–140)
GRAM STN SPEC: ABNORMAL
GRAM STN SPEC: ABNORMAL
HCT VFR BLD AUTO: 31.3 % (ref 34.8–46.1)
HGB BLD-MCNC: 10.2 G/DL (ref 11.5–15.4)
MAGNESIUM SERPL-MCNC: 2.3 MG/DL (ref 1.9–2.7)
MCH RBC QN AUTO: 31.8 PG (ref 26.8–34.3)
MCHC RBC AUTO-ENTMCNC: 32.6 G/DL (ref 31.4–37.4)
MCV RBC AUTO: 98 FL (ref 82–98)
PLATELET # BLD AUTO: 257 THOUSANDS/UL (ref 149–390)
PMV BLD AUTO: 9.1 FL (ref 8.9–12.7)
POTASSIUM SERPL-SCNC: 4.3 MMOL/L (ref 3.5–5.3)
RBC # BLD AUTO: 3.21 MILLION/UL (ref 3.81–5.12)
SODIUM SERPL-SCNC: 130 MMOL/L (ref 135–147)
WBC # BLD AUTO: 8.03 THOUSAND/UL (ref 4.31–10.16)

## 2025-08-03 PROCEDURE — 85027 COMPLETE CBC AUTOMATED: CPT

## 2025-08-03 PROCEDURE — 99232 SBSQ HOSP IP/OBS MODERATE 35: CPT

## 2025-08-03 PROCEDURE — 82040 ASSAY OF SERUM ALBUMIN: CPT

## 2025-08-03 PROCEDURE — 80048 BASIC METABOLIC PNL TOTAL CA: CPT

## 2025-08-03 PROCEDURE — 83735 ASSAY OF MAGNESIUM: CPT

## 2025-08-03 PROCEDURE — 82948 REAGENT STRIP/BLOOD GLUCOSE: CPT

## 2025-08-03 RX ORDER — POLYETHYLENE GLYCOL 3350 17 G/17G
17 POWDER, FOR SOLUTION ORAL 2 TIMES DAILY
Status: DISCONTINUED | OUTPATIENT
Start: 2025-08-03 | End: 2025-08-04 | Stop reason: HOSPADM

## 2025-08-03 RX ORDER — BISACODYL 10 MG
10 SUPPOSITORY, RECTAL RECTAL DAILY PRN
Status: DISCONTINUED | OUTPATIENT
Start: 2025-08-03 | End: 2025-08-03

## 2025-08-03 RX ORDER — METHOCARBAMOL 500 MG/1
500 TABLET, FILM COATED ORAL EVERY 8 HOURS PRN
Status: DISCONTINUED | OUTPATIENT
Start: 2025-08-03 | End: 2025-08-04 | Stop reason: HOSPADM

## 2025-08-03 RX ADMIN — POLYETHYLENE GLYCOL 3350 17 G: 17 POWDER, FOR SOLUTION ORAL at 21:29

## 2025-08-03 RX ADMIN — ACETAMINOPHEN 650 MG: 325 TABLET ORAL at 19:32

## 2025-08-03 RX ADMIN — DOCUSATE SODIUM 100 MG: 100 CAPSULE, LIQUID FILLED ORAL at 08:15

## 2025-08-03 RX ADMIN — LOSARTAN POTASSIUM 100 MG: 50 TABLET, FILM COATED ORAL at 08:15

## 2025-08-03 RX ADMIN — ACETAMINOPHEN 650 MG: 325 TABLET ORAL at 04:11

## 2025-08-03 RX ADMIN — LEVOTHYROXINE SODIUM 25 MCG: 0.03 TABLET ORAL at 06:25

## 2025-08-03 RX ADMIN — BISACODYL 5 MG: 5 TABLET, COATED ORAL at 08:46

## 2025-08-03 RX ADMIN — METHOCARBAMOL 500 MG: 500 TABLET ORAL at 11:17

## 2025-08-03 RX ADMIN — Medication 400 MG: at 08:15

## 2025-08-03 RX ADMIN — APIXABAN 5 MG: 5 TABLET, FILM COATED ORAL at 08:15

## 2025-08-03 RX ADMIN — ACETAMINOPHEN 650 MG: 325 TABLET ORAL at 11:17

## 2025-08-03 RX ADMIN — LABETALOL HYDROCHLORIDE 200 MG: 200 TABLET, FILM COATED ORAL at 21:29

## 2025-08-03 RX ADMIN — VIBEGRON 75 MG: 75 TABLET, FILM COATED ORAL at 21:32

## 2025-08-03 RX ADMIN — MELATONIN 6 MG: 3 TAB ORAL at 21:29

## 2025-08-03 RX ADMIN — PHENYLEPHRINE HCL 1 ENEMA: 10 TABLET, COATED ORAL at 15:45

## 2025-08-03 RX ADMIN — Medication 1000 MCG: at 08:15

## 2025-08-03 RX ADMIN — LABETALOL HYDROCHLORIDE 200 MG: 200 TABLET, FILM COATED ORAL at 08:15

## 2025-08-03 RX ADMIN — CEFTRIAXONE 1000 MG: 10 INJECTION, POWDER, FOR SOLUTION INTRAVENOUS at 14:20

## 2025-08-03 RX ADMIN — APIXABAN 5 MG: 5 TABLET, FILM COATED ORAL at 21:29

## 2025-08-03 RX ADMIN — POLYETHYLENE GLYCOL 3350 17 G: 17 POWDER, FOR SOLUTION ORAL at 08:15

## 2025-08-04 VITALS
RESPIRATION RATE: 20 BRPM | DIASTOLIC BLOOD PRESSURE: 69 MMHG | OXYGEN SATURATION: 95 % | HEART RATE: 74 BPM | WEIGHT: 193.34 LBS | HEIGHT: 64 IN | TEMPERATURE: 97.6 F | SYSTOLIC BLOOD PRESSURE: 146 MMHG | BODY MASS INDEX: 33.01 KG/M2

## 2025-08-04 LAB
ANION GAP SERPL CALCULATED.3IONS-SCNC: 7 MMOL/L (ref 4–13)
BUN SERPL-MCNC: 12 MG/DL (ref 5–25)
CALCIUM SERPL-MCNC: 9 MG/DL (ref 8.4–10.2)
CHLORIDE SERPL-SCNC: 97 MMOL/L (ref 96–108)
CO2 SERPL-SCNC: 26 MMOL/L (ref 21–32)
CREAT SERPL-MCNC: 0.74 MG/DL (ref 0.6–1.3)
ERYTHROCYTE [DISTWIDTH] IN BLOOD BY AUTOMATED COUNT: 12.8 % (ref 11.6–15.1)
GFR SERPL CREATININE-BSD FRML MDRD: 75 ML/MIN/1.73SQ M
GLUCOSE SERPL-MCNC: 112 MG/DL (ref 65–140)
GLUCOSE SERPL-MCNC: 113 MG/DL (ref 65–140)
GLUCOSE SERPL-MCNC: 130 MG/DL (ref 65–140)
HCT VFR BLD AUTO: 30.9 % (ref 34.8–46.1)
HGB BLD-MCNC: 10.3 G/DL (ref 11.5–15.4)
MCH RBC QN AUTO: 31.3 PG (ref 26.8–34.3)
MCHC RBC AUTO-ENTMCNC: 33.3 G/DL (ref 31.4–37.4)
MCV RBC AUTO: 94 FL (ref 82–98)
PLATELET # BLD AUTO: 281 THOUSANDS/UL (ref 149–390)
PMV BLD AUTO: 8.7 FL (ref 8.9–12.7)
POTASSIUM SERPL-SCNC: 4.2 MMOL/L (ref 3.5–5.3)
RBC # BLD AUTO: 3.29 MILLION/UL (ref 3.81–5.12)
SODIUM SERPL-SCNC: 130 MMOL/L (ref 135–147)
WBC # BLD AUTO: 8.37 THOUSAND/UL (ref 4.31–10.16)

## 2025-08-04 PROCEDURE — 99239 HOSP IP/OBS DSCHRG MGMT >30: CPT

## 2025-08-04 PROCEDURE — 85027 COMPLETE CBC AUTOMATED: CPT

## 2025-08-04 PROCEDURE — 80048 BASIC METABOLIC PNL TOTAL CA: CPT

## 2025-08-04 PROCEDURE — 82948 REAGENT STRIP/BLOOD GLUCOSE: CPT

## 2025-08-04 RX ORDER — POLYETHYLENE GLYCOL 3350 17 G/17G
17 POWDER, FOR SOLUTION ORAL DAILY
Qty: 510 G | Refills: 0 | Status: SHIPPED | OUTPATIENT
Start: 2025-08-04

## 2025-08-04 RX ORDER — CEFUROXIME AXETIL 500 MG/1
500 TABLET ORAL EVERY 12 HOURS SCHEDULED
Qty: 10 TABLET | Refills: 0 | Status: SHIPPED | OUTPATIENT
Start: 2025-08-04 | End: 2025-08-09

## 2025-08-04 RX ORDER — BISACODYL 5 MG/1
5 TABLET, DELAYED RELEASE ORAL DAILY PRN
Qty: 30 TABLET | Refills: 0 | Status: SHIPPED | OUTPATIENT
Start: 2025-08-04

## 2025-08-04 RX ORDER — DOCUSATE SODIUM 100 MG/1
100 CAPSULE, LIQUID FILLED ORAL 2 TIMES DAILY
Qty: 60 CAPSULE | Refills: 0 | Status: SHIPPED | OUTPATIENT
Start: 2025-08-04

## 2025-08-04 RX ORDER — CEFUROXIME AXETIL 250 MG/1
500 TABLET ORAL EVERY 12 HOURS SCHEDULED
Status: DISCONTINUED | OUTPATIENT
Start: 2025-08-04 | End: 2025-08-04 | Stop reason: HOSPADM

## 2025-08-04 RX ADMIN — APIXABAN 5 MG: 5 TABLET, FILM COATED ORAL at 09:39

## 2025-08-04 RX ADMIN — CEFUROXIME AXETIL 500 MG: 250 TABLET, FILM COATED ORAL at 12:09

## 2025-08-04 RX ADMIN — LEVOTHYROXINE SODIUM 25 MCG: 0.03 TABLET ORAL at 05:57

## 2025-08-04 RX ADMIN — LABETALOL HYDROCHLORIDE 200 MG: 200 TABLET, FILM COATED ORAL at 09:39

## 2025-08-04 RX ADMIN — Medication 400 MG: at 09:39

## 2025-08-04 RX ADMIN — LOSARTAN POTASSIUM 100 MG: 50 TABLET, FILM COATED ORAL at 09:39

## 2025-08-04 RX ADMIN — ACETAMINOPHEN 650 MG: 325 TABLET ORAL at 07:44

## 2025-08-04 RX ADMIN — Medication 1000 MCG: at 09:39

## 2025-08-04 RX ADMIN — ACETAMINOPHEN 650 MG: 325 TABLET ORAL at 02:11

## 2025-08-05 ENCOUNTER — TELEPHONE (OUTPATIENT)
Dept: INTERNAL MEDICINE CLINIC | Facility: OTHER | Age: 85
End: 2025-08-05

## 2025-08-06 ENCOUNTER — APPOINTMENT (OUTPATIENT)
Dept: LAB | Age: 85
End: 2025-08-06
Attending: INTERNAL MEDICINE
Payer: COMMERCIAL

## 2025-08-08 ENCOUNTER — OFFICE VISIT (OUTPATIENT)
Dept: INTERNAL MEDICINE CLINIC | Facility: CLINIC | Age: 85
End: 2025-08-08
Payer: COMMERCIAL

## 2025-08-08 VITALS
DIASTOLIC BLOOD PRESSURE: 82 MMHG | BODY MASS INDEX: 32.95 KG/M2 | WEIGHT: 193 LBS | TEMPERATURE: 98.6 F | HEART RATE: 76 BPM | HEIGHT: 64 IN | SYSTOLIC BLOOD PRESSURE: 144 MMHG | OXYGEN SATURATION: 96 %

## 2025-08-08 DIAGNOSIS — M17.0 PRIMARY OSTEOARTHRITIS OF BOTH KNEES: ICD-10-CM

## 2025-08-08 DIAGNOSIS — E87.1 HYPONATREMIA: ICD-10-CM

## 2025-08-08 DIAGNOSIS — A41.51 SEPSIS DUE TO ESCHERICHIA COLI, UNSPECIFIED WHETHER ACUTE ORGAN DYSFUNCTION PRESENT (HCC): Primary | ICD-10-CM

## 2025-08-08 DIAGNOSIS — I10 ESSENTIAL HYPERTENSION, BENIGN: ICD-10-CM

## 2025-08-08 DIAGNOSIS — E78.2 MIXED HYPERLIPIDEMIA: ICD-10-CM

## 2025-08-08 DIAGNOSIS — E11.65 UNCONTROLLED TYPE 2 DIABETES MELLITUS WITH HYPERGLYCEMIA (HCC): ICD-10-CM

## 2025-08-08 PROCEDURE — 99496 TRANSJ CARE MGMT HIGH F2F 7D: CPT | Performed by: INTERNAL MEDICINE

## (undated) DEVICE — SUT ETHIBOND 5 V-37 30 IN MB66G

## (undated) DEVICE — STRL ALLENTOWN HIP SHOULDER PK: Brand: CARDINAL HEALTH

## (undated) DEVICE — PLUMEPEN PRO 10FT

## (undated) DEVICE — SAW BLADE OSCILLATING BRAZOL 167

## (undated) DEVICE — SUT MONOCRYL 3-0 PS-2 27 IN Y427H

## (undated) DEVICE — THE SIMPULSE SOLO SYSTEM WITH ULTREX RETRACTABLE SPLASH SHIELD TIP: Brand: SIMPULSE SOLO

## (undated) DEVICE — VEST SURGEON DISPOSABLE

## (undated) DEVICE — SYRINGE 10ML LL

## (undated) DEVICE — TOWEL SURG XR DETECT GREEN STRL RFD

## (undated) DEVICE — 3M™ STERI-STRIP™ REINFORCED ADHESIVE SKIN CLOSURES, R1547, 1/2 IN X 4 IN (12 MM X 100 MM), 6 STRIPS/ENVELOPE: Brand: 3M™ STERI-STRIP™

## (undated) DEVICE — BLADE BEAVER DEBAKEY 10MM

## (undated) DEVICE — GLOVE INDICATOR PI UNDERGLOVE SZ 7 BLUE

## (undated) DEVICE — COBAN 6 IN STERILE

## (undated) DEVICE — CYSTO TUBING SINGLE IRRIGATION

## (undated) DEVICE — 450 ML BOTTLE OF 0.05% CHLORHEXIDINE GLUCONATE IN 99.95% STERILE WATER FOR IRRIGATION, USP AND APPLICATOR.: Brand: IRRISEPT ANTIMICROBIAL WOUND LAVAGE

## (undated) DEVICE — SURGICAL GOWN, XL SMARTSLEEVE: Brand: CONVERTORS

## (undated) DEVICE — SKIN MARKER DUAL TIP WITH RULER CAP, FLEXIBLE RULER AND LABELS: Brand: DEVON

## (undated) DEVICE — GLOVE SRG BIOGEL ORTHOPEDIC 6.5

## (undated) DEVICE — TWIST DRILL 2.0MM DIA 127.0MM LONG

## (undated) DEVICE — INTENDED FOR TISSUE SEPARATION, AND OTHER PROCEDURES THAT REQUIRE A SHARP SURGICAL BLADE TO PUNCTURE OR CUT.: Brand: BARD-PARKER SAFETY BLADES SIZE 10, STERILE

## (undated) DEVICE — DRESSING MEPILEX AG BORDER 4 X 8 IN

## (undated) DEVICE — KERLIX BANDAGE ROLL: Brand: KERLIX

## (undated) DEVICE — CHLORAPREP HI-LITE 26ML ORANGE

## (undated) DEVICE — ADHESIVE SKIN CLSR DERMABOND NX

## (undated) DEVICE — INJECTION NEEDLE, SPINAL TIP: Brand: DURASPHERE ®

## (undated) DEVICE — BIPOLAR SEALER 23-113-1 AQM 2.3: Brand: AQUAMANTYS™

## (undated) DEVICE — CAPIT HIP COP -CERAMIC ON POLY

## (undated) DEVICE — FRAZIER SUCTION INSTRUMENT 18 FR W/OBTURATOR, NO CONTROL VENT: Brand: FRAZIER

## (undated) DEVICE — IMPERVIOUS STOCKINETTE: Brand: DEROYAL

## (undated) DEVICE — CATH URET 12FR RED RUBBER

## (undated) DEVICE — NEEDLE 18 G X 1 1/2 SAFETY

## (undated) DEVICE — SCD SEQUENTIAL COMPRESSION COMFORT SLEEVE MEDIUM KNEE LENGTH: Brand: KENDALL SCD

## (undated) DEVICE — BETHLEHEM UNIVERSAL MINOR VAG: Brand: CARDINAL HEALTH

## (undated) DEVICE — SYRINGE 1ML TB 25G X 5/8 NON SAFETY

## (undated) DEVICE — ALLENTOWN DR  LUCENTE S LAP PK: Brand: CARDINAL HEALTH

## (undated) DEVICE — HOOD: Brand: FLYTE

## (undated) DEVICE — 3M™ STERI-DRAPE™ U-DRAPE 1015: Brand: STERI-DRAPE™

## (undated) DEVICE — GLOVE SRG BIOGEL 6.5

## (undated) DEVICE — TRAY FOLEY 16FR URIMETER SURESTEP

## (undated) DEVICE — BONEE® NEEDLE FOR BLADDER INJECTION CH FR 05, 22G, 35 CM, BOX OF 1: Brand: PORGES COLOPLAST

## (undated) DEVICE — SUT VICRYL 2-0 CT-1 36 IN J945H

## (undated) DEVICE — GLOVE PI ULTRA TOUCH SZ.8.0

## (undated) DEVICE — 3000CC GUARDIAN II: Brand: GUARDIAN

## (undated) DEVICE — SPECIMEN CONTAINER STERILE PEEL PACK

## (undated) DEVICE — TIBURON HIP DRAPE WITH POUCHES: Brand: CONVERTORS

## (undated) DEVICE — SUT VICRYL 1 CTX 36 IN J977H

## (undated) DEVICE — ELECTRODE BLADE E-Z CLEAN 6.5IN -0014

## (undated) DEVICE — 3M™ IOBAN™ 2 ANTIMICROBIAL INCISE DRAPE 6648EZ: Brand: IOBAN™ 2

## (undated) DEVICE — 40583 XL ADVANCED TRENDELENBURG POSITIONING KIT: Brand: 40583 XL ADVANCED TRENDELENBURG POSITIONING KIT